# Patient Record
Sex: FEMALE | Race: WHITE | NOT HISPANIC OR LATINO
[De-identification: names, ages, dates, MRNs, and addresses within clinical notes are randomized per-mention and may not be internally consistent; named-entity substitution may affect disease eponyms.]

---

## 2017-02-10 ENCOUNTER — RX RENEWAL (OUTPATIENT)
Age: 82
End: 2017-02-10

## 2017-02-21 ENCOUNTER — NON-APPOINTMENT (OUTPATIENT)
Age: 82
End: 2017-02-21

## 2017-02-21 ENCOUNTER — APPOINTMENT (OUTPATIENT)
Dept: CARDIOLOGY | Facility: CLINIC | Age: 82
End: 2017-02-21

## 2017-02-21 VITALS
DIASTOLIC BLOOD PRESSURE: 82 MMHG | HEIGHT: 57 IN | BODY MASS INDEX: 28.26 KG/M2 | WEIGHT: 131 LBS | SYSTOLIC BLOOD PRESSURE: 152 MMHG | HEART RATE: 90 BPM

## 2017-03-02 ENCOUNTER — LABORATORY RESULT (OUTPATIENT)
Age: 82
End: 2017-03-02

## 2017-03-15 ENCOUNTER — RX RENEWAL (OUTPATIENT)
Age: 82
End: 2017-03-15

## 2017-04-18 ENCOUNTER — APPOINTMENT (OUTPATIENT)
Dept: CARDIOLOGY | Facility: CLINIC | Age: 82
End: 2017-04-18

## 2017-04-18 ENCOUNTER — NON-APPOINTMENT (OUTPATIENT)
Age: 82
End: 2017-04-18

## 2017-04-18 VITALS
OXYGEN SATURATION: 96 % | HEIGHT: 57 IN | DIASTOLIC BLOOD PRESSURE: 80 MMHG | SYSTOLIC BLOOD PRESSURE: 136 MMHG | WEIGHT: 125 LBS | HEART RATE: 86 BPM | BODY MASS INDEX: 26.97 KG/M2

## 2017-04-19 LAB
ALBUMIN SERPL ELPH-MCNC: 3.7 G/DL
ALP BLD-CCNC: 53 U/L
ALT SERPL-CCNC: 6 U/L
ANION GAP SERPL CALC-SCNC: 22 MMOL/L
AST SERPL-CCNC: 19 U/L
BILIRUB SERPL-MCNC: 0.4 MG/DL
BUN SERPL-MCNC: 30 MG/DL
CALCIUM SERPL-MCNC: 9.4 MG/DL
CHLORIDE SERPL-SCNC: 97 MMOL/L
CO2 SERPL-SCNC: 21 MMOL/L
CREAT SERPL-MCNC: 1.24 MG/DL
GLUCOSE SERPL-MCNC: 113 MG/DL
POTASSIUM SERPL-SCNC: 4 MMOL/L
PROT SERPL-MCNC: 6.2 G/DL
SODIUM SERPL-SCNC: 140 MMOL/L

## 2017-06-13 ENCOUNTER — RX RENEWAL (OUTPATIENT)
Age: 82
End: 2017-06-13

## 2017-07-18 ENCOUNTER — NON-APPOINTMENT (OUTPATIENT)
Age: 82
End: 2017-07-18

## 2017-07-18 ENCOUNTER — APPOINTMENT (OUTPATIENT)
Dept: CARDIOLOGY | Facility: CLINIC | Age: 82
End: 2017-07-18

## 2017-07-18 VITALS
BODY MASS INDEX: 26.97 KG/M2 | WEIGHT: 125 LBS | HEIGHT: 57 IN | HEART RATE: 88 BPM | DIASTOLIC BLOOD PRESSURE: 82 MMHG | SYSTOLIC BLOOD PRESSURE: 158 MMHG

## 2017-07-18 VITALS — SYSTOLIC BLOOD PRESSURE: 142 MMHG | DIASTOLIC BLOOD PRESSURE: 80 MMHG

## 2017-08-11 ENCOUNTER — RX RENEWAL (OUTPATIENT)
Age: 82
End: 2017-08-11

## 2017-09-11 ENCOUNTER — RX RENEWAL (OUTPATIENT)
Age: 82
End: 2017-09-11

## 2017-10-18 ENCOUNTER — APPOINTMENT (OUTPATIENT)
Dept: CARDIOLOGY | Facility: CLINIC | Age: 82
End: 2017-10-18
Payer: MEDICARE

## 2017-10-18 ENCOUNTER — NON-APPOINTMENT (OUTPATIENT)
Age: 82
End: 2017-10-18

## 2017-10-18 VITALS — SYSTOLIC BLOOD PRESSURE: 153 MMHG | OXYGEN SATURATION: 95 % | DIASTOLIC BLOOD PRESSURE: 89 MMHG | HEART RATE: 92 BPM

## 2017-10-18 VITALS — SYSTOLIC BLOOD PRESSURE: 127 MMHG | DIASTOLIC BLOOD PRESSURE: 78 MMHG

## 2017-10-18 PROCEDURE — 99215 OFFICE O/P EST HI 40 MIN: CPT

## 2017-10-18 PROCEDURE — 93000 ELECTROCARDIOGRAM COMPLETE: CPT

## 2017-11-09 ENCOUNTER — RX RENEWAL (OUTPATIENT)
Age: 82
End: 2017-11-09

## 2018-01-22 ENCOUNTER — NON-APPOINTMENT (OUTPATIENT)
Age: 83
End: 2018-01-22

## 2018-01-22 ENCOUNTER — APPOINTMENT (OUTPATIENT)
Dept: CARDIOLOGY | Facility: CLINIC | Age: 83
End: 2018-01-22
Payer: MEDICARE

## 2018-01-22 VITALS — SYSTOLIC BLOOD PRESSURE: 132 MMHG | DIASTOLIC BLOOD PRESSURE: 82 MMHG

## 2018-01-22 VITALS
OXYGEN SATURATION: 96 % | WEIGHT: 125 LBS | SYSTOLIC BLOOD PRESSURE: 151 MMHG | HEART RATE: 77 BPM | DIASTOLIC BLOOD PRESSURE: 92 MMHG | BODY MASS INDEX: 26.97 KG/M2 | HEIGHT: 57 IN

## 2018-01-22 PROCEDURE — 93000 ELECTROCARDIOGRAM COMPLETE: CPT

## 2018-01-22 PROCEDURE — 99214 OFFICE O/P EST MOD 30 MIN: CPT

## 2018-04-30 ENCOUNTER — NON-APPOINTMENT (OUTPATIENT)
Age: 83
End: 2018-04-30

## 2018-04-30 ENCOUNTER — APPOINTMENT (OUTPATIENT)
Dept: CARDIOLOGY | Facility: CLINIC | Age: 83
End: 2018-04-30
Payer: MEDICARE

## 2018-04-30 VITALS — SYSTOLIC BLOOD PRESSURE: 140 MMHG | DIASTOLIC BLOOD PRESSURE: 85 MMHG

## 2018-04-30 VITALS
BODY MASS INDEX: 26.97 KG/M2 | OXYGEN SATURATION: 99 % | SYSTOLIC BLOOD PRESSURE: 167 MMHG | DIASTOLIC BLOOD PRESSURE: 92 MMHG | HEIGHT: 57 IN | WEIGHT: 125 LBS

## 2018-04-30 PROCEDURE — 93000 ELECTROCARDIOGRAM COMPLETE: CPT

## 2018-04-30 PROCEDURE — 99214 OFFICE O/P EST MOD 30 MIN: CPT

## 2018-05-08 ENCOUNTER — RX RENEWAL (OUTPATIENT)
Age: 83
End: 2018-05-08

## 2018-08-29 ENCOUNTER — APPOINTMENT (OUTPATIENT)
Dept: CARDIOLOGY | Facility: CLINIC | Age: 83
End: 2018-08-29
Payer: MEDICARE

## 2018-08-29 ENCOUNTER — NON-APPOINTMENT (OUTPATIENT)
Age: 83
End: 2018-08-29

## 2018-08-29 VITALS — SYSTOLIC BLOOD PRESSURE: 130 MMHG | DIASTOLIC BLOOD PRESSURE: 80 MMHG

## 2018-08-29 VITALS — OXYGEN SATURATION: 96 % | SYSTOLIC BLOOD PRESSURE: 163 MMHG | HEART RATE: 99 BPM | DIASTOLIC BLOOD PRESSURE: 101 MMHG

## 2018-08-29 PROCEDURE — 93000 ELECTROCARDIOGRAM COMPLETE: CPT

## 2018-08-29 PROCEDURE — 99215 OFFICE O/P EST HI 40 MIN: CPT

## 2018-09-10 ENCOUNTER — RX RENEWAL (OUTPATIENT)
Age: 83
End: 2018-09-10

## 2018-11-05 ENCOUNTER — RX RENEWAL (OUTPATIENT)
Age: 83
End: 2018-11-05

## 2019-01-02 ENCOUNTER — APPOINTMENT (OUTPATIENT)
Dept: CARDIOLOGY | Facility: CLINIC | Age: 84
End: 2019-01-02
Payer: MEDICARE

## 2019-01-02 ENCOUNTER — NON-APPOINTMENT (OUTPATIENT)
Age: 84
End: 2019-01-02

## 2019-01-02 VITALS
BODY MASS INDEX: 27.18 KG/M2 | OXYGEN SATURATION: 94 % | HEART RATE: 102 BPM | DIASTOLIC BLOOD PRESSURE: 91 MMHG | SYSTOLIC BLOOD PRESSURE: 177 MMHG | HEIGHT: 57 IN | WEIGHT: 126 LBS

## 2019-01-02 VITALS — SYSTOLIC BLOOD PRESSURE: 145 MMHG | DIASTOLIC BLOOD PRESSURE: 80 MMHG

## 2019-01-02 DIAGNOSIS — E78.5 HYPERLIPIDEMIA, UNSPECIFIED: ICD-10-CM

## 2019-01-02 PROCEDURE — 93000 ELECTROCARDIOGRAM COMPLETE: CPT

## 2019-01-02 PROCEDURE — 99215 OFFICE O/P EST HI 40 MIN: CPT

## 2019-01-02 NOTE — HISTORY OF PRESENT ILLNESS
[FreeTextEntry1] : Ms. Sharma presents for follow up. Since last being seen she has well. She has CAD no chest pain. She has  AF rate controlled on A/C. No palpitations. She has HFpEF which is compensated. No shortness of breath. She only notes that once in a while that she doesn't feel herself.

## 2019-01-02 NOTE — DISCUSSION/SUMMARY
[FreeTextEntry1] : Overall doing well from a cardiac standpoint.\par Continue present medications.\par Follow up in 3 months.

## 2019-01-02 NOTE — PHYSICAL EXAM
[General Appearance - Well Developed] : well developed [Normal Appearance] : normal appearance [Well Groomed] : well groomed [General Appearance - Well Nourished] : well nourished [No Deformities] : no deformities [General Appearance - In No Acute Distress] : no acute distress [Normal Conjunctiva] : the conjunctiva exhibited no abnormalities [Eyelids - No Xanthelasma] : the eyelids demonstrated no xanthelasmas [Normal Oral Mucosa] : normal oral mucosa [No Oral Pallor] : no oral pallor [No Oral Cyanosis] : no oral cyanosis [Normal Jugular Venous A Waves Present] : normal jugular venous A waves present [Normal Jugular Venous V Waves Present] : normal jugular venous V waves present [No Jugular Venous Ratliff A Waves] : no jugular venous ratliff A waves [Respiration, Rhythm And Depth] : normal respiratory rhythm and effort [Exaggerated Use Of Accessory Muscles For Inspiration] : no accessory muscle use [Auscultation Breath Sounds / Voice Sounds] : lungs were clear to auscultation bilaterally [Heart Sounds] : normal S1 and S2 [Murmurs] : no murmurs present [Irregularly Irregular] : the rhythm was irregularly irregular [Abdomen Soft] : soft [Abdomen Tenderness] : non-tender [Abdomen Mass (___ Cm)] : no abdominal mass palpated [Abnormal Walk] : normal gait [Gait - Sufficient For Exercise Testing] : the gait was sufficient for exercise testing [Nail Clubbing] : no clubbing of the fingernails [Cyanosis, Localized] : no localized cyanosis [Petechial Hemorrhages (___cm)] : no petechial hemorrhages [Skin Color & Pigmentation] : normal skin color and pigmentation [] : no rash [No Venous Stasis] : no venous stasis [Skin Lesions] : no skin lesions [No Skin Ulcers] : no skin ulcer [No Xanthoma] : no  xanthoma was observed [Oriented To Time, Place, And Person] : oriented to person, place, and time [Affect] : the affect was normal [Mood] : the mood was normal [No Anxiety] : not feeling anxious [FreeTextEntry1] : Minimal LE edema.

## 2019-05-08 ENCOUNTER — NON-APPOINTMENT (OUTPATIENT)
Age: 84
End: 2019-05-08

## 2019-05-08 ENCOUNTER — APPOINTMENT (OUTPATIENT)
Dept: CARDIOLOGY | Facility: CLINIC | Age: 84
End: 2019-05-08
Payer: MEDICARE

## 2019-05-08 VITALS
HEART RATE: 98 BPM | DIASTOLIC BLOOD PRESSURE: 70 MMHG | BODY MASS INDEX: 29.62 KG/M2 | WEIGHT: 128 LBS | OXYGEN SATURATION: 97 % | SYSTOLIC BLOOD PRESSURE: 121 MMHG | HEIGHT: 55 IN

## 2019-05-08 PROCEDURE — 93000 ELECTROCARDIOGRAM COMPLETE: CPT

## 2019-05-08 PROCEDURE — 99215 OFFICE O/P EST HI 40 MIN: CPT

## 2019-05-08 NOTE — HISTORY OF PRESENT ILLNESS
[FreeTextEntry1] : Ms. Sharma presents for follow up. She notes she had been feeling fine until the last two days she has felt weak. She also notes feeling anxious and not sleeping well. She has CAD s/p CABG. NO chest pain. She has AF no palpitations on A/C. She has HTN on medical therapy. She has chronic LE edema on lasix. Feels swelling is better.

## 2019-05-08 NOTE — PHYSICAL EXAM
[General Appearance - Well Developed] : well developed [Normal Appearance] : normal appearance [Well Groomed] : well groomed [General Appearance - Well Nourished] : well nourished [No Deformities] : no deformities [General Appearance - In No Acute Distress] : no acute distress [Normal Conjunctiva] : the conjunctiva exhibited no abnormalities [Eyelids - No Xanthelasma] : the eyelids demonstrated no xanthelasmas [Normal Oral Mucosa] : normal oral mucosa [No Oral Pallor] : no oral pallor [No Oral Cyanosis] : no oral cyanosis [Normal Jugular Venous A Waves Present] : normal jugular venous A waves present [Normal Jugular Venous V Waves Present] : normal jugular venous V waves present [No Jugular Venous Ratliff A Waves] : no jugular venous ratliff A waves [Respiration, Rhythm And Depth] : normal respiratory rhythm and effort [Exaggerated Use Of Accessory Muscles For Inspiration] : no accessory muscle use [Auscultation Breath Sounds / Voice Sounds] : lungs were clear to auscultation bilaterally [Heart Sounds] : normal S1 and S2 [Irregularly Irregular] : the rhythm was irregularly irregular [Murmurs] : no murmurs present [Abdomen Tenderness] : non-tender [Abdomen Soft] : soft [Abnormal Walk] : normal gait [Abdomen Mass (___ Cm)] : no abdominal mass palpated [Gait - Sufficient For Exercise Testing] : the gait was sufficient for exercise testing [Nail Clubbing] : no clubbing of the fingernails [Petechial Hemorrhages (___cm)] : no petechial hemorrhages [Cyanosis, Localized] : no localized cyanosis [Skin Color & Pigmentation] : normal skin color and pigmentation [No Venous Stasis] : no venous stasis [] : no rash [Skin Lesions] : no skin lesions [No Skin Ulcers] : no skin ulcer [No Xanthoma] : no  xanthoma was observed [Oriented To Time, Place, And Person] : oriented to person, place, and time [Affect] : the affect was normal [No Anxiety] : not feeling anxious [Mood] : the mood was normal [FreeTextEntry1] : Minimal LE edema.

## 2019-05-08 NOTE — DISCUSSION/SUMMARY
[FreeTextEntry1] : Overall doing well from a cardiac standpoint.\par Continue present medications.\par Check repeat blood work. \par Follow up in 3 months.

## 2019-05-09 LAB
ALBUMIN SERPL ELPH-MCNC: 4 G/DL
ALP BLD-CCNC: 60 U/L
ALT SERPL-CCNC: 10 U/L
ANION GAP SERPL CALC-SCNC: 11 MMOL/L
AST SERPL-CCNC: 23 U/L
BASOPHILS # BLD AUTO: 0.02 K/UL
BASOPHILS NFR BLD AUTO: 0.3 %
BILIRUB SERPL-MCNC: 0.4 MG/DL
BUN SERPL-MCNC: 27 MG/DL
CALCIUM SERPL-MCNC: 9.1 MG/DL
CHLORIDE SERPL-SCNC: 104 MMOL/L
CO2 SERPL-SCNC: 25 MMOL/L
CREAT SERPL-MCNC: 0.95 MG/DL
EOSINOPHIL # BLD AUTO: 0.07 K/UL
EOSINOPHIL NFR BLD AUTO: 0.9 %
GLUCOSE SERPL-MCNC: 103 MG/DL
HCT VFR BLD CALC: 44.3 %
HGB BLD-MCNC: 14 G/DL
IMM GRANULOCYTES NFR BLD AUTO: 0.1 %
LYMPHOCYTES # BLD AUTO: 1.32 K/UL
LYMPHOCYTES NFR BLD AUTO: 17.8 %
MAN DIFF?: NORMAL
MCHC RBC-ENTMCNC: 30.4 PG
MCHC RBC-ENTMCNC: 31.6 GM/DL
MCV RBC AUTO: 96.3 FL
MONOCYTES # BLD AUTO: 0.73 K/UL
MONOCYTES NFR BLD AUTO: 9.8 %
NEUTROPHILS # BLD AUTO: 5.27 K/UL
NEUTROPHILS NFR BLD AUTO: 71.1 %
PLATELET # BLD AUTO: 192 K/UL
POTASSIUM SERPL-SCNC: 4.4 MMOL/L
PROT SERPL-MCNC: 6.3 G/DL
RBC # BLD: 4.6 M/UL
RBC # FLD: 13.9 %
SODIUM SERPL-SCNC: 140 MMOL/L
WBC # FLD AUTO: 7.42 K/UL

## 2019-05-31 ENCOUNTER — APPOINTMENT (OUTPATIENT)
Dept: CARDIOLOGY | Facility: CLINIC | Age: 84
End: 2019-05-31
Payer: MEDICARE

## 2019-05-31 ENCOUNTER — NON-APPOINTMENT (OUTPATIENT)
Age: 84
End: 2019-05-31

## 2019-05-31 VITALS
WEIGHT: 126 LBS | BODY MASS INDEX: 29.16 KG/M2 | HEART RATE: 109 BPM | OXYGEN SATURATION: 95 % | DIASTOLIC BLOOD PRESSURE: 79 MMHG | SYSTOLIC BLOOD PRESSURE: 134 MMHG | HEIGHT: 55 IN

## 2019-05-31 PROCEDURE — 93000 ELECTROCARDIOGRAM COMPLETE: CPT

## 2019-05-31 PROCEDURE — 99214 OFFICE O/P EST MOD 30 MIN: CPT

## 2019-08-21 ENCOUNTER — NON-APPOINTMENT (OUTPATIENT)
Age: 84
End: 2019-08-21

## 2019-08-21 ENCOUNTER — APPOINTMENT (OUTPATIENT)
Dept: CARDIOLOGY | Facility: CLINIC | Age: 84
End: 2019-08-21
Payer: MEDICARE

## 2019-08-21 VITALS
BODY MASS INDEX: 29.16 KG/M2 | OXYGEN SATURATION: 96 % | HEIGHT: 55 IN | DIASTOLIC BLOOD PRESSURE: 82 MMHG | HEART RATE: 97 BPM | SYSTOLIC BLOOD PRESSURE: 138 MMHG | WEIGHT: 126 LBS

## 2019-08-21 PROCEDURE — 93000 ELECTROCARDIOGRAM COMPLETE: CPT

## 2019-08-21 PROCEDURE — 99215 OFFICE O/P EST HI 40 MIN: CPT

## 2019-08-21 NOTE — DISCUSSION/SUMMARY
[FreeTextEntry1] : Overall doing well from a cardiac standpoint.\par Continue present medications.\par PAtient had gone back to Dilt 120 and rates ok. Can monitor at this dose. Continue A/C. \par Note Dilt is 240 mg and now 120 mg in the evening. \par Follow up in 3 months.

## 2019-08-21 NOTE — PHYSICAL EXAM
[General Appearance - Well Developed] : well developed [Normal Appearance] : normal appearance [General Appearance - Well Nourished] : well nourished [Well Groomed] : well groomed [No Deformities] : no deformities [General Appearance - In No Acute Distress] : no acute distress [Normal Conjunctiva] : the conjunctiva exhibited no abnormalities [Eyelids - No Xanthelasma] : the eyelids demonstrated no xanthelasmas [Normal Oral Mucosa] : normal oral mucosa [No Oral Pallor] : no oral pallor [No Oral Cyanosis] : no oral cyanosis [Normal Jugular Venous V Waves Present] : normal jugular venous V waves present [Normal Jugular Venous A Waves Present] : normal jugular venous A waves present [No Jugular Venous Ratliff A Waves] : no jugular venous ratliff A waves [Respiration, Rhythm And Depth] : normal respiratory rhythm and effort [Exaggerated Use Of Accessory Muscles For Inspiration] : no accessory muscle use [Auscultation Breath Sounds / Voice Sounds] : lungs were clear to auscultation bilaterally [Heart Sounds] : normal S1 and S2 [Murmurs] : no murmurs present [Irregularly Irregular] : the rhythm was irregularly irregular [Abdomen Soft] : soft [Abdomen Tenderness] : non-tender [Abdomen Mass (___ Cm)] : no abdominal mass palpated [Abnormal Walk] : normal gait [Gait - Sufficient For Exercise Testing] : the gait was sufficient for exercise testing [Nail Clubbing] : no clubbing of the fingernails [Cyanosis, Localized] : no localized cyanosis [Petechial Hemorrhages (___cm)] : no petechial hemorrhages [No Venous Stasis] : no venous stasis [Skin Color & Pigmentation] : normal skin color and pigmentation [] : no rash [Skin Lesions] : no skin lesions [No Skin Ulcers] : no skin ulcer [No Xanthoma] : no  xanthoma was observed [Oriented To Time, Place, And Person] : oriented to person, place, and time [Mood] : the mood was normal [Affect] : the affect was normal [No Anxiety] : not feeling anxious [FreeTextEntry1] : Minimal LE edema.

## 2019-08-21 NOTE — REASON FOR VISIT
[Atrial Fibrillation] : atrial fibrillation [Follow-Up - Clinic] : a clinic follow-up of [Coronary Artery Disease] : coronary artery disease [Heart Failure] : congestive heart failure

## 2019-08-23 NOTE — PHYSICAL EXAM
[Normal Appearance] : normal appearance [General Appearance - Well Developed] : well developed [General Appearance - Well Nourished] : well nourished [Well Groomed] : well groomed [No Deformities] : no deformities [General Appearance - In No Acute Distress] : no acute distress [Normal Conjunctiva] : the conjunctiva exhibited no abnormalities [Normal Oral Mucosa] : normal oral mucosa [Eyelids - No Xanthelasma] : the eyelids demonstrated no xanthelasmas [No Oral Pallor] : no oral pallor [No Oral Cyanosis] : no oral cyanosis [Normal Jugular Venous A Waves Present] : normal jugular venous A waves present [Normal Jugular Venous V Waves Present] : normal jugular venous V waves present [No Jugular Venous Ratliff A Waves] : no jugular venous ratliff A waves [Exaggerated Use Of Accessory Muscles For Inspiration] : no accessory muscle use [Respiration, Rhythm And Depth] : normal respiratory rhythm and effort [Auscultation Breath Sounds / Voice Sounds] : lungs were clear to auscultation bilaterally [Murmurs] : no murmurs present [Heart Sounds] : normal S1 and S2 [Abdomen Soft] : soft [FreeTextEntry1] : Minimal LE edema.  [Irregularly Irregular] : the rhythm was irregularly irregular [Abdomen Tenderness] : non-tender [Abdomen Mass (___ Cm)] : no abdominal mass palpated [Gait - Sufficient For Exercise Testing] : the gait was sufficient for exercise testing [Abnormal Walk] : normal gait [Cyanosis, Localized] : no localized cyanosis [Nail Clubbing] : no clubbing of the fingernails [Petechial Hemorrhages (___cm)] : no petechial hemorrhages [No Venous Stasis] : no venous stasis [] : no rash [Skin Color & Pigmentation] : normal skin color and pigmentation [No Skin Ulcers] : no skin ulcer [Skin Lesions] : no skin lesions [No Xanthoma] : no  xanthoma was observed [Oriented To Time, Place, And Person] : oriented to person, place, and time [Affect] : the affect was normal [Mood] : the mood was normal [No Anxiety] : not feeling anxious

## 2019-08-23 NOTE — HISTORY OF PRESENT ILLNESS
[FreeTextEntry1] : Ms. Sharma presents for follow up. She has flutter in her chest which she has had for some time. She as CAD with prior CABG nO chest pain. NO shortness of breath. She has AF on A/C. Rates are elevated and patient notes this fluttering.

## 2019-08-23 NOTE — DISCUSSION/SUMMARY
[FreeTextEntry1] : Increase dilt to 240 BID given fluttering she feels and elevated rates. \par Continue other medications. \par Lasix back to qd. Off [facility]. \par Patient to report if changes noted.

## 2019-09-09 ENCOUNTER — RX RENEWAL (OUTPATIENT)
Age: 84
End: 2019-09-09

## 2019-12-09 ENCOUNTER — APPOINTMENT (OUTPATIENT)
Dept: CARDIOLOGY | Facility: CLINIC | Age: 84
End: 2019-12-09
Payer: MEDICARE

## 2019-12-09 ENCOUNTER — NON-APPOINTMENT (OUTPATIENT)
Age: 84
End: 2019-12-09

## 2019-12-09 VITALS
SYSTOLIC BLOOD PRESSURE: 171 MMHG | BODY MASS INDEX: 29.75 KG/M2 | HEART RATE: 80 BPM | WEIGHT: 128 LBS | DIASTOLIC BLOOD PRESSURE: 77 MMHG | OXYGEN SATURATION: 98 %

## 2019-12-09 VITALS — DIASTOLIC BLOOD PRESSURE: 78 MMHG | SYSTOLIC BLOOD PRESSURE: 138 MMHG

## 2019-12-09 PROCEDURE — 93000 ELECTROCARDIOGRAM COMPLETE: CPT

## 2019-12-09 PROCEDURE — 99214 OFFICE O/P EST MOD 30 MIN: CPT

## 2019-12-09 NOTE — REASON FOR VISIT
[Atrial Fibrillation] : atrial fibrillation [Coronary Artery Disease] : coronary artery disease [Follow-Up - Clinic] : a clinic follow-up of [Hypertension] : hypertension

## 2019-12-09 NOTE — HISTORY OF PRESENT ILLNESS
[FreeTextEntry1] : Ms. Sharma presents for follow up. Since last being seen she has felt tired.  \par \par Labs from 9/19 Chol 136 LDL 54 HDL 72 TG 51

## 2019-12-09 NOTE — PHYSICAL EXAM
[General Appearance - Well Developed] : well developed [Normal Appearance] : normal appearance [Well Groomed] : well groomed [General Appearance - Well Nourished] : well nourished [General Appearance - In No Acute Distress] : no acute distress [Normal Conjunctiva] : the conjunctiva exhibited no abnormalities [No Deformities] : no deformities [Normal Oral Mucosa] : normal oral mucosa [Eyelids - No Xanthelasma] : the eyelids demonstrated no xanthelasmas [No Oral Pallor] : no oral pallor [No Oral Cyanosis] : no oral cyanosis [Normal Jugular Venous A Waves Present] : normal jugular venous A waves present [Normal Jugular Venous V Waves Present] : normal jugular venous V waves present [No Jugular Venous Ratliff A Waves] : no jugular venous ratliff A waves [Auscultation Breath Sounds / Voice Sounds] : lungs were clear to auscultation bilaterally [Respiration, Rhythm And Depth] : normal respiratory rhythm and effort [Exaggerated Use Of Accessory Muscles For Inspiration] : no accessory muscle use [Irregularly Irregular] : the rhythm was irregularly irregular [Heart Sounds] : normal S1 and S2 [Murmurs] : no murmurs present [Abdomen Soft] : soft [Abdomen Tenderness] : non-tender [Abnormal Walk] : normal gait [Abdomen Mass (___ Cm)] : no abdominal mass palpated [Cyanosis, Localized] : no localized cyanosis [Gait - Sufficient For Exercise Testing] : the gait was sufficient for exercise testing [Nail Clubbing] : no clubbing of the fingernails [Skin Color & Pigmentation] : normal skin color and pigmentation [Petechial Hemorrhages (___cm)] : no petechial hemorrhages [No Venous Stasis] : no venous stasis [Skin Lesions] : no skin lesions [] : no rash [No Skin Ulcers] : no skin ulcer [No Xanthoma] : no  xanthoma was observed [Oriented To Time, Place, And Person] : oriented to person, place, and time [Affect] : the affect was normal [No Anxiety] : not feeling anxious [Mood] : the mood was normal [FreeTextEntry1] : Minimal LE edema.

## 2020-05-26 ENCOUNTER — APPOINTMENT (OUTPATIENT)
Dept: ELECTROPHYSIOLOGY | Facility: CLINIC | Age: 85
End: 2020-05-26

## 2020-06-01 ENCOUNTER — APPOINTMENT (OUTPATIENT)
Dept: CARDIOLOGY | Facility: CLINIC | Age: 85
End: 2020-06-01
Payer: MEDICARE

## 2020-06-01 ENCOUNTER — NON-APPOINTMENT (OUTPATIENT)
Age: 85
End: 2020-06-01

## 2020-06-01 VITALS — SYSTOLIC BLOOD PRESSURE: 160 MMHG | DIASTOLIC BLOOD PRESSURE: 80 MMHG

## 2020-06-01 VITALS
HEART RATE: 111 BPM | DIASTOLIC BLOOD PRESSURE: 81 MMHG | BODY MASS INDEX: 29.16 KG/M2 | OXYGEN SATURATION: 95 % | WEIGHT: 126 LBS | HEIGHT: 55 IN | SYSTOLIC BLOOD PRESSURE: 184 MMHG

## 2020-06-01 PROCEDURE — 93000 ELECTROCARDIOGRAM COMPLETE: CPT

## 2020-06-01 PROCEDURE — 99215 OFFICE O/P EST HI 40 MIN: CPT

## 2020-06-01 NOTE — HISTORY OF PRESENT ILLNESS
[FreeTextEntry1] : Ms. Sharma presents for follow up. She has CAD. No chest pain. She has AF rate controlled. She is on A/C. She has HTN. BP elevated in the office but she suffers from significant anxiety.

## 2020-06-01 NOTE — DISCUSSION/SUMMARY
[FreeTextEntry1] : HR is acceptable. BP up but patient is extremely anxious. Repeat BP is improving. Will check blood work. She needs to address her anxiety which we have discussed in the past.

## 2020-06-01 NOTE — PHYSICAL EXAM
[General Appearance - Well Developed] : well developed [Normal Appearance] : normal appearance [Well Groomed] : well groomed [General Appearance - Well Nourished] : well nourished [No Deformities] : no deformities [General Appearance - In No Acute Distress] : no acute distress [Normal Conjunctiva] : the conjunctiva exhibited no abnormalities [Eyelids - No Xanthelasma] : the eyelids demonstrated no xanthelasmas [Normal Oral Mucosa] : normal oral mucosa [No Oral Pallor] : no oral pallor [No Oral Cyanosis] : no oral cyanosis [Normal Jugular Venous A Waves Present] : normal jugular venous A waves present [Normal Jugular Venous V Waves Present] : normal jugular venous V waves present [No Jugular Venous Ratliff A Waves] : no jugular venous ratliff A waves [Respiration, Rhythm And Depth] : normal respiratory rhythm and effort [Exaggerated Use Of Accessory Muscles For Inspiration] : no accessory muscle use [Auscultation Breath Sounds / Voice Sounds] : lungs were clear to auscultation bilaterally [Heart Sounds] : normal S1 and S2 [Murmurs] : no murmurs present [Irregularly Irregular] : the rhythm was irregularly irregular [Abdomen Soft] : soft [Abdomen Tenderness] : non-tender [Abdomen Mass (___ Cm)] : no abdominal mass palpated [Abnormal Walk] : normal gait [Nail Clubbing] : no clubbing of the fingernails [Gait - Sufficient For Exercise Testing] : the gait was sufficient for exercise testing [Cyanosis, Localized] : no localized cyanosis [Petechial Hemorrhages (___cm)] : no petechial hemorrhages [Skin Color & Pigmentation] : normal skin color and pigmentation [] : no rash [No Venous Stasis] : no venous stasis [No Xanthoma] : no  xanthoma was observed [No Skin Ulcers] : no skin ulcer [Skin Lesions] : no skin lesions [Oriented To Time, Place, And Person] : oriented to person, place, and time [No Anxiety] : not feeling anxious [Mood] : the mood was normal [Affect] : the affect was normal [FreeTextEntry1] : Minimal LE edema.

## 2020-06-02 LAB
ALBUMIN SERPL ELPH-MCNC: 4.2 G/DL
ALP BLD-CCNC: 55 U/L
ALT SERPL-CCNC: 8 U/L
ANION GAP SERPL CALC-SCNC: 14 MMOL/L
AST SERPL-CCNC: 20 U/L
BASOPHILS # BLD AUTO: 0.03 K/UL
BASOPHILS NFR BLD AUTO: 0.4 %
BILIRUB SERPL-MCNC: 0.3 MG/DL
BUN SERPL-MCNC: 26 MG/DL
CALCIUM SERPL-MCNC: 9.8 MG/DL
CHLORIDE SERPL-SCNC: 102 MMOL/L
CHOLEST SERPL-MCNC: 137 MG/DL
CHOLEST/HDLC SERPL: 1.9 RATIO
CO2 SERPL-SCNC: 27 MMOL/L
CREAT SERPL-MCNC: 1.02 MG/DL
EOSINOPHIL # BLD AUTO: 0.11 K/UL
EOSINOPHIL NFR BLD AUTO: 1.6 %
ESTIMATED AVERAGE GLUCOSE: 114 MG/DL
GLUCOSE SERPL-MCNC: 110 MG/DL
HBA1C MFR BLD HPLC: 5.6 %
HCT VFR BLD CALC: 43.3 %
HDLC SERPL-MCNC: 71 MG/DL
HGB BLD-MCNC: 13.7 G/DL
IMM GRANULOCYTES NFR BLD AUTO: 0.1 %
LDLC SERPL CALC-MCNC: 56 MG/DL
LYMPHOCYTES # BLD AUTO: 1.39 K/UL
LYMPHOCYTES NFR BLD AUTO: 20.6 %
MAN DIFF?: NORMAL
MCHC RBC-ENTMCNC: 30.5 PG
MCHC RBC-ENTMCNC: 31.6 GM/DL
MCV RBC AUTO: 96.4 FL
MONOCYTES # BLD AUTO: 0.67 K/UL
MONOCYTES NFR BLD AUTO: 9.9 %
NEUTROPHILS # BLD AUTO: 4.54 K/UL
NEUTROPHILS NFR BLD AUTO: 67.4 %
PLATELET # BLD AUTO: 197 K/UL
POTASSIUM SERPL-SCNC: 4.4 MMOL/L
PROT SERPL-MCNC: 6.1 G/DL
RBC # BLD: 4.49 M/UL
RBC # FLD: 13.7 %
SODIUM SERPL-SCNC: 143 MMOL/L
TRIGL SERPL-MCNC: 52 MG/DL
TSH SERPL-ACNC: 2.06 UIU/ML
WBC # FLD AUTO: 6.75 K/UL

## 2020-08-24 ENCOUNTER — APPOINTMENT (OUTPATIENT)
Dept: CARDIOLOGY | Facility: CLINIC | Age: 85
End: 2020-08-24
Payer: MEDICARE

## 2020-08-24 ENCOUNTER — NON-APPOINTMENT (OUTPATIENT)
Age: 85
End: 2020-08-24

## 2020-08-24 VITALS
OXYGEN SATURATION: 95 % | SYSTOLIC BLOOD PRESSURE: 170 MMHG | HEART RATE: 95 BPM | HEIGHT: 55 IN | BODY MASS INDEX: 28.46 KG/M2 | DIASTOLIC BLOOD PRESSURE: 94 MMHG | WEIGHT: 123 LBS

## 2020-08-24 VITALS — SYSTOLIC BLOOD PRESSURE: 150 MMHG | DIASTOLIC BLOOD PRESSURE: 80 MMHG

## 2020-08-24 PROCEDURE — 93000 ELECTROCARDIOGRAM COMPLETE: CPT

## 2020-08-24 PROCEDURE — 99214 OFFICE O/P EST MOD 30 MIN: CPT

## 2020-08-24 NOTE — HISTORY OF PRESENT ILLNESS
[FreeTextEntry1] : Ms. Sharma presents for follow up. Since last being seen she notes over the last few days she has been weak. She also notes her appetite is not what it should be. She has AF which is rate controlled on A/C. She has CAD without chest pain.

## 2020-08-24 NOTE — PHYSICAL EXAM
[General Appearance - Well Developed] : well developed [Well Groomed] : well groomed [Normal Appearance] : normal appearance [General Appearance - In No Acute Distress] : no acute distress [No Deformities] : no deformities [General Appearance - Well Nourished] : well nourished [Normal Oral Mucosa] : normal oral mucosa [Normal Conjunctiva] : the conjunctiva exhibited no abnormalities [Eyelids - No Xanthelasma] : the eyelids demonstrated no xanthelasmas [No Oral Cyanosis] : no oral cyanosis [Normal Jugular Venous A Waves Present] : normal jugular venous A waves present [No Oral Pallor] : no oral pallor [Normal Jugular Venous V Waves Present] : normal jugular venous V waves present [No Jugular Venous Ratliff A Waves] : no jugular venous ratliff A waves [Auscultation Breath Sounds / Voice Sounds] : lungs were clear to auscultation bilaterally [Exaggerated Use Of Accessory Muscles For Inspiration] : no accessory muscle use [Respiration, Rhythm And Depth] : normal respiratory rhythm and effort [Heart Sounds] : normal S1 and S2 [Irregularly Irregular] : the rhythm was irregularly irregular [Murmurs] : no murmurs present [Abdomen Soft] : soft [Abdomen Tenderness] : non-tender [Abdomen Mass (___ Cm)] : no abdominal mass palpated [Abnormal Walk] : normal gait [Cyanosis, Localized] : no localized cyanosis [Gait - Sufficient For Exercise Testing] : the gait was sufficient for exercise testing [Nail Clubbing] : no clubbing of the fingernails [Skin Color & Pigmentation] : normal skin color and pigmentation [] : no ischemic changes [Petechial Hemorrhages (___cm)] : no petechial hemorrhages [Skin Lesions] : no skin lesions [No Skin Ulcers] : no skin ulcer [No Venous Stasis] : no venous stasis [Affect] : the affect was normal [Mood] : the mood was normal [Oriented To Time, Place, And Person] : oriented to person, place, and time [No Xanthoma] : no  xanthoma was observed [No Anxiety] : not feeling anxious [FreeTextEntry1] : Minimal LE edema.

## 2020-08-24 NOTE — DISCUSSION/SUMMARY
[FreeTextEntry1] : Overall doing well from a cardiac standpoint.\par Continue present medications.\par BP is elevated in the office but she is extremely anxious in the doctors office. \par Follow up in 3 months.

## 2020-09-03 ENCOUNTER — RX RENEWAL (OUTPATIENT)
Age: 85
End: 2020-09-03

## 2020-09-08 ENCOUNTER — RX RENEWAL (OUTPATIENT)
Age: 85
End: 2020-09-08

## 2020-12-16 ENCOUNTER — NON-APPOINTMENT (OUTPATIENT)
Age: 85
End: 2020-12-16

## 2020-12-16 ENCOUNTER — APPOINTMENT (OUTPATIENT)
Dept: CARDIOLOGY | Facility: CLINIC | Age: 85
End: 2020-12-16
Payer: MEDICARE

## 2020-12-16 VITALS
HEART RATE: 89 BPM | DIASTOLIC BLOOD PRESSURE: 65 MMHG | WEIGHT: 125 LBS | HEIGHT: 55 IN | SYSTOLIC BLOOD PRESSURE: 182 MMHG | BODY MASS INDEX: 28.93 KG/M2 | OXYGEN SATURATION: 98 %

## 2020-12-16 VITALS — SYSTOLIC BLOOD PRESSURE: 142 MMHG | DIASTOLIC BLOOD PRESSURE: 82 MMHG

## 2020-12-16 PROCEDURE — 99214 OFFICE O/P EST MOD 30 MIN: CPT

## 2020-12-16 PROCEDURE — 93000 ELECTROCARDIOGRAM COMPLETE: CPT

## 2020-12-16 PROCEDURE — 99072 ADDL SUPL MATRL&STAF TM PHE: CPT

## 2020-12-16 NOTE — PHYSICAL EXAM
[General Appearance - Well Developed] : well developed [Normal Appearance] : normal appearance [Well Groomed] : well groomed [General Appearance - Well Nourished] : well nourished [No Deformities] : no deformities [General Appearance - In No Acute Distress] : no acute distress [Normal Conjunctiva] : the conjunctiva exhibited no abnormalities [Eyelids - No Xanthelasma] : the eyelids demonstrated no xanthelasmas [Normal Oral Mucosa] : normal oral mucosa [No Oral Pallor] : no oral pallor [No Oral Cyanosis] : no oral cyanosis [Normal Jugular Venous A Waves Present] : normal jugular venous A waves present [Normal Jugular Venous V Waves Present] : normal jugular venous V waves present [No Jugular Venous Ratliff A Waves] : no jugular venous ratliff A waves [Respiration, Rhythm And Depth] : normal respiratory rhythm and effort [Exaggerated Use Of Accessory Muscles For Inspiration] : no accessory muscle use [Auscultation Breath Sounds / Voice Sounds] : lungs were clear to auscultation bilaterally [Heart Sounds] : normal S1 and S2 [Murmurs] : no murmurs present [Irregularly Irregular] : the rhythm was irregularly irregular [FreeTextEntry1] : Minimal LE edema.  [Abdomen Soft] : soft [Abdomen Tenderness] : non-tender [Abdomen Mass (___ Cm)] : no abdominal mass palpated [Abnormal Walk] : normal gait [Gait - Sufficient For Exercise Testing] : the gait was sufficient for exercise testing [Nail Clubbing] : no clubbing of the fingernails [Cyanosis, Localized] : no localized cyanosis [Petechial Hemorrhages (___cm)] : no petechial hemorrhages [Skin Color & Pigmentation] : normal skin color and pigmentation [] : no rash [No Venous Stasis] : no venous stasis [Skin Lesions] : no skin lesions [No Skin Ulcers] : no skin ulcer [No Xanthoma] : no  xanthoma was observed [Oriented To Time, Place, And Person] : oriented to person, place, and time [Affect] : the affect was normal [Mood] : the mood was normal [No Anxiety] : not feeling anxious

## 2020-12-16 NOTE — HISTORY OF PRESENT ILLNESS
[FreeTextEntry1] : Ms. Sharma presents for follow up. She has CAD. She denies chest pain. She has AF on AC. Rates controlled. No palpitations. She has HTN and BP is significantly elevated in the office but she has white coat hypertension.

## 2020-12-16 NOTE — DISCUSSION/SUMMARY
[FreeTextEntry1] : Overall doing well from a cardiac standpoint.\par Continue present medications. BP elevated but improving on recheck. She will check at home. \par Check labs given diuretics and A/C. \par Follow up in 3 months.

## 2020-12-17 LAB
ALBUMIN SERPL ELPH-MCNC: 4.2 G/DL
ALP BLD-CCNC: 65 U/L
ALT SERPL-CCNC: 8 U/L
ANION GAP SERPL CALC-SCNC: 9 MMOL/L
AST SERPL-CCNC: 23 U/L
BASOPHILS # BLD AUTO: 0.02 K/UL
BASOPHILS NFR BLD AUTO: 0.3 %
BILIRUB SERPL-MCNC: 0.3 MG/DL
BUN SERPL-MCNC: 25 MG/DL
CALCIUM SERPL-MCNC: 9.5 MG/DL
CHLORIDE SERPL-SCNC: 103 MMOL/L
CO2 SERPL-SCNC: 29 MMOL/L
CREAT SERPL-MCNC: 1.06 MG/DL
EOSINOPHIL # BLD AUTO: 0.14 K/UL
EOSINOPHIL NFR BLD AUTO: 2 %
ESTIMATED AVERAGE GLUCOSE: 111 MG/DL
GLUCOSE SERPL-MCNC: 109 MG/DL
HBA1C MFR BLD HPLC: 5.5 %
HCT VFR BLD CALC: 44.1 %
HGB BLD-MCNC: 14 G/DL
IMM GRANULOCYTES NFR BLD AUTO: 0.1 %
LYMPHOCYTES # BLD AUTO: 1.5 K/UL
LYMPHOCYTES NFR BLD AUTO: 21.2 %
MAN DIFF?: NORMAL
MCHC RBC-ENTMCNC: 30.3 PG
MCHC RBC-ENTMCNC: 31.7 GM/DL
MCV RBC AUTO: 95.5 FL
MONOCYTES # BLD AUTO: 0.73 K/UL
MONOCYTES NFR BLD AUTO: 10.3 %
NEUTROPHILS # BLD AUTO: 4.69 K/UL
NEUTROPHILS NFR BLD AUTO: 66.1 %
PLATELET # BLD AUTO: 215 K/UL
POTASSIUM SERPL-SCNC: 4.6 MMOL/L
PROT SERPL-MCNC: 6.4 G/DL
RBC # BLD: 4.62 M/UL
RBC # FLD: 14.1 %
SODIUM SERPL-SCNC: 142 MMOL/L
WBC # FLD AUTO: 7.09 K/UL

## 2021-04-05 ENCOUNTER — NON-APPOINTMENT (OUTPATIENT)
Age: 86
End: 2021-04-05

## 2021-04-05 ENCOUNTER — APPOINTMENT (OUTPATIENT)
Dept: CARDIOLOGY | Facility: CLINIC | Age: 86
End: 2021-04-05
Payer: MEDICARE

## 2021-04-05 VITALS — SYSTOLIC BLOOD PRESSURE: 150 MMHG | DIASTOLIC BLOOD PRESSURE: 88 MMHG

## 2021-04-05 VITALS — HEIGHT: 55 IN | WEIGHT: 127 LBS | OXYGEN SATURATION: 98 % | BODY MASS INDEX: 29.39 KG/M2 | HEART RATE: 83 BPM

## 2021-04-05 PROCEDURE — 99072 ADDL SUPL MATRL&STAF TM PHE: CPT

## 2021-04-05 PROCEDURE — 99214 OFFICE O/P EST MOD 30 MIN: CPT

## 2021-04-05 PROCEDURE — 93000 ELECTROCARDIOGRAM COMPLETE: CPT

## 2021-04-05 NOTE — DISCUSSION/SUMMARY
[FreeTextEntry1] : OVerall doing well from a cardiac standpoint. \par She does have slight increased in LE edema. Can take extra 20 mg qd of lasix. \par Check Comp and CBC\par

## 2021-04-05 NOTE — HISTORY OF PRESENT ILLNESS
[FreeTextEntry1] : Ms. Sharma presents for follow up. Since last being seen she got Pfizer vaccine. \par She denies obvious cardiac symptoms.

## 2021-04-06 LAB
ALBUMIN SERPL ELPH-MCNC: 4.1 G/DL
ALP BLD-CCNC: 65 U/L
ALT SERPL-CCNC: 9 U/L
ANION GAP SERPL CALC-SCNC: 11 MMOL/L
AST SERPL-CCNC: 18 U/L
BASOPHILS # BLD AUTO: 0.02 K/UL
BASOPHILS NFR BLD AUTO: 0.3 %
BILIRUB SERPL-MCNC: 0.3 MG/DL
BUN SERPL-MCNC: 30 MG/DL
CALCIUM SERPL-MCNC: 9.7 MG/DL
CHLORIDE SERPL-SCNC: 105 MMOL/L
CO2 SERPL-SCNC: 26 MMOL/L
CREAT SERPL-MCNC: 1.04 MG/DL
EOSINOPHIL # BLD AUTO: 0.26 K/UL
EOSINOPHIL NFR BLD AUTO: 3.4 %
GLUCOSE SERPL-MCNC: 108 MG/DL
HCT VFR BLD CALC: 43.4 %
HGB BLD-MCNC: 13.7 G/DL
IMM GRANULOCYTES NFR BLD AUTO: 0.4 %
LYMPHOCYTES # BLD AUTO: 1.63 K/UL
LYMPHOCYTES NFR BLD AUTO: 21.5 %
MAN DIFF?: NORMAL
MCHC RBC-ENTMCNC: 30.4 PG
MCHC RBC-ENTMCNC: 31.6 GM/DL
MCV RBC AUTO: 96.4 FL
MONOCYTES # BLD AUTO: 0.77 K/UL
MONOCYTES NFR BLD AUTO: 10.2 %
NEUTROPHILS # BLD AUTO: 4.86 K/UL
NEUTROPHILS NFR BLD AUTO: 64.2 %
PLATELET # BLD AUTO: 243 K/UL
POTASSIUM SERPL-SCNC: 5.3 MMOL/L
PROT SERPL-MCNC: 6.5 G/DL
RBC # BLD: 4.5 M/UL
RBC # FLD: 14.2 %
SODIUM SERPL-SCNC: 143 MMOL/L
WBC # FLD AUTO: 7.57 K/UL

## 2021-07-12 NOTE — END OF VISIT
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  Hemolytic anemia treated with high dose steroids in 2016 and again in 2/2019 for treatment of hemolytic anemia.  However, when steroids were tapered to 5mg, she experienced a flare and received rituximab (?Nov/Dec 2019).  She was tapered off ofprednisone since April 2020 on plaquenil only.\par \par 4/26/2021  PtGA 0.5\par Household with COVID, she was asymptomatic but tested positive (4/9).  She was advised to receive the vaccine and she did receive her first dose on 4/21 which was associated with almost 2 days of fever and joint pains affecting her wrists, ankles and knees.  The pain is worse in the am, no swelling and is decreasing with time.  Otherwise, she is feeling well without fever, rash, anorexia, weight loss, alopecia, lymphadenopathy, chills, oral ulcers, fatigue, dry eyes, chest pain, shortness of breath, cough, anosmia, ageusia, nausea, vomiting, diarrhea, abdominal pain.\par \par 7/12/2021  PtGA 0.3\par f/u SLE, feeling extremely well except stating that she is losing hair when she hough it--however no hair on pillow (i.e. not considered "alopecia" per se).  No fever, rash, anorexia, weight loss--in fact she has gained weight, alopecia, lymphadenopathy, chills, joint pain/swelling/stiffness, oral ulcers, fatigue, dry eyes, chest pain, shortness of breath, cough, anosmia, ageusia, nausea, vomiting, diarrhea, abdominal pain. She received her first covid vaccination (Pfizer) on 4/21 and her second on 5/12/21.\par \par Meds: plaquenil 400mg (not sure when her last opthalmology appointment occurred)\par NKA\par \par PE:  /71, 154 LBS\par No rash, alopecia, nasal/oral ulcers, lymphadenopathy, synovitis, joint tenderness, nodules, livedo, parotid or lacrimal swelling.  Lungs: Clear without wheeze, rales, rhonch; Cor: S1S2 without murmur, gallop or rub; Abd: soft, NT, BS+;, Back: no spinal or paraspinal tenderness; Ext: no CCE; Neuro: non-focal\par \par Imp:\par SLE with h/o hemolytic anemia on plaquenil alone at this point.  Doing well, recently received her COVID vaccine.    Will check labs and will see in ~3 months (unless change in her clinical status).  \par \par Of note, she has had 3 episodes of hemolytic anemia, the most recent with treatment with B cell depletion.  Will follow closely--her hg has decreased slowly prior to her previous episodes requiring high dose steroids suggesting an indolent process.  Her hg has remained above 12.0 since March 2020. \par \par  \par \par \par \par \par 
[>50% of Time Spent on Counseling and Coordination of Care for  ___] : Greater than 50% of the encounter time was spent on counseling and coordination of care for [unfilled]

## 2021-07-28 ENCOUNTER — APPOINTMENT (OUTPATIENT)
Dept: CARDIOLOGY | Facility: CLINIC | Age: 86
End: 2021-07-28
Payer: MEDICARE

## 2021-07-28 ENCOUNTER — NON-APPOINTMENT (OUTPATIENT)
Age: 86
End: 2021-07-28

## 2021-07-28 VITALS — SYSTOLIC BLOOD PRESSURE: 140 MMHG | DIASTOLIC BLOOD PRESSURE: 80 MMHG

## 2021-07-28 VITALS
SYSTOLIC BLOOD PRESSURE: 153 MMHG | OXYGEN SATURATION: 95 % | BODY MASS INDEX: 28.93 KG/M2 | HEART RATE: 82 BPM | HEIGHT: 55 IN | WEIGHT: 125 LBS | DIASTOLIC BLOOD PRESSURE: 73 MMHG

## 2021-07-28 PROCEDURE — 99214 OFFICE O/P EST MOD 30 MIN: CPT

## 2021-07-28 PROCEDURE — 93000 ELECTROCARDIOGRAM COMPLETE: CPT

## 2021-07-29 LAB
ALBUMIN SERPL ELPH-MCNC: 4.1 G/DL
ALP BLD-CCNC: 62 U/L
ALT SERPL-CCNC: 8 U/L
ANION GAP SERPL CALC-SCNC: 13 MMOL/L
AST SERPL-CCNC: 19 U/L
BASOPHILS # BLD AUTO: 0.04 K/UL
BASOPHILS NFR BLD AUTO: 0.6 %
BILIRUB SERPL-MCNC: 0.3 MG/DL
BUN SERPL-MCNC: 35 MG/DL
CALCIUM SERPL-MCNC: 9.5 MG/DL
CHLORIDE SERPL-SCNC: 102 MMOL/L
CHOLEST SERPL-MCNC: 140 MG/DL
CO2 SERPL-SCNC: 26 MMOL/L
CREAT SERPL-MCNC: 1.03 MG/DL
EOSINOPHIL # BLD AUTO: 0.18 K/UL
EOSINOPHIL NFR BLD AUTO: 2.6 %
ESTIMATED AVERAGE GLUCOSE: 114 MG/DL
GLUCOSE SERPL-MCNC: 109 MG/DL
HBA1C MFR BLD HPLC: 5.6 %
HCT VFR BLD CALC: 41.9 %
HDLC SERPL-MCNC: 70 MG/DL
HGB BLD-MCNC: 13.4 G/DL
IMM GRANULOCYTES NFR BLD AUTO: 0.7 %
LDLC SERPL CALC-MCNC: 60 MG/DL
LDLC SERPL DIRECT ASSAY-MCNC: 62 MG/DL
LYMPHOCYTES # BLD AUTO: 1.43 K/UL
LYMPHOCYTES NFR BLD AUTO: 20.7 %
MAN DIFF?: NORMAL
MCHC RBC-ENTMCNC: 30.1 PG
MCHC RBC-ENTMCNC: 32 GM/DL
MCV RBC AUTO: 94.2 FL
MONOCYTES # BLD AUTO: 0.71 K/UL
MONOCYTES NFR BLD AUTO: 10.3 %
NEUTROPHILS # BLD AUTO: 4.49 K/UL
NEUTROPHILS NFR BLD AUTO: 65.1 %
NONHDLC SERPL-MCNC: 70 MG/DL
PLATELET # BLD AUTO: 225 K/UL
POTASSIUM SERPL-SCNC: 4.5 MMOL/L
PROT SERPL-MCNC: 6.4 G/DL
RBC # BLD: 4.45 M/UL
RBC # FLD: 14.1 %
SODIUM SERPL-SCNC: 141 MMOL/L
TRIGL SERPL-MCNC: 51 MG/DL
TSH SERPL-ACNC: 2.69 UIU/ML
WBC # FLD AUTO: 6.9 K/UL

## 2021-08-11 NOTE — HISTORY OF PRESENT ILLNESS
[FreeTextEntry1] : Ms. Sharma presents for follow up. Since last being seen she thinks she is doing well. She notes she sighs a lot.\par \par No chest pain. No SOB. No palpitations. \par \par LE edema is unchanged.

## 2021-08-11 NOTE — DISCUSSION/SUMMARY
[FreeTextEntry1] : Overall doing well from a cardiac standpoint.\par Continue present medications.\par Follow up in 3 months. \par Repeat BP improving to 140/80. She has white coat HTN.

## 2021-08-11 NOTE — PHYSICAL EXAM

## 2021-08-30 ENCOUNTER — RX RENEWAL (OUTPATIENT)
Age: 86
End: 2021-08-30

## 2021-10-22 ENCOUNTER — NON-APPOINTMENT (OUTPATIENT)
Age: 86
End: 2021-10-22

## 2021-11-03 ENCOUNTER — APPOINTMENT (OUTPATIENT)
Dept: CARDIOLOGY | Facility: CLINIC | Age: 86
End: 2021-11-03
Payer: MEDICARE

## 2021-11-03 ENCOUNTER — RX RENEWAL (OUTPATIENT)
Age: 86
End: 2021-11-03

## 2021-11-03 ENCOUNTER — NON-APPOINTMENT (OUTPATIENT)
Age: 86
End: 2021-11-03

## 2021-11-03 VITALS
BODY MASS INDEX: 28.93 KG/M2 | HEIGHT: 55 IN | DIASTOLIC BLOOD PRESSURE: 101 MMHG | SYSTOLIC BLOOD PRESSURE: 188 MMHG | OXYGEN SATURATION: 96 % | HEART RATE: 87 BPM | WEIGHT: 125 LBS

## 2021-11-03 VITALS — DIASTOLIC BLOOD PRESSURE: 82 MMHG | SYSTOLIC BLOOD PRESSURE: 142 MMHG

## 2021-11-03 PROCEDURE — 93000 ELECTROCARDIOGRAM COMPLETE: CPT

## 2021-11-03 PROCEDURE — 99214 OFFICE O/P EST MOD 30 MIN: CPT

## 2021-11-04 LAB
ALBUMIN SERPL ELPH-MCNC: 4.1 G/DL
ALP BLD-CCNC: 62 U/L
ALT SERPL-CCNC: 8 U/L
ANION GAP SERPL CALC-SCNC: 13 MMOL/L
AST SERPL-CCNC: 20 U/L
BASOPHILS # BLD AUTO: 0.02 K/UL
BASOPHILS NFR BLD AUTO: 0.3 %
BILIRUB SERPL-MCNC: 0.2 MG/DL
BUN SERPL-MCNC: 31 MG/DL
CALCIUM SERPL-MCNC: 9.3 MG/DL
CHLORIDE SERPL-SCNC: 102 MMOL/L
CHOLEST SERPL-MCNC: 141 MG/DL
CO2 SERPL-SCNC: 24 MMOL/L
CREAT SERPL-MCNC: 1.01 MG/DL
EOSINOPHIL # BLD AUTO: 0.1 K/UL
EOSINOPHIL NFR BLD AUTO: 1.6 %
GLUCOSE SERPL-MCNC: 104 MG/DL
HCT VFR BLD CALC: 43.4 %
HDLC SERPL-MCNC: 68 MG/DL
HGB BLD-MCNC: 13.6 G/DL
IMM GRANULOCYTES NFR BLD AUTO: 0.3 %
LDLC SERPL CALC-MCNC: 59 MG/DL
LYMPHOCYTES # BLD AUTO: 1.18 K/UL
LYMPHOCYTES NFR BLD AUTO: 18.4 %
MAN DIFF?: NORMAL
MCHC RBC-ENTMCNC: 30.2 PG
MCHC RBC-ENTMCNC: 31.3 GM/DL
MCV RBC AUTO: 96.4 FL
MONOCYTES # BLD AUTO: 0.58 K/UL
MONOCYTES NFR BLD AUTO: 9.1 %
NEUTROPHILS # BLD AUTO: 4.5 K/UL
NEUTROPHILS NFR BLD AUTO: 70.3 %
NONHDLC SERPL-MCNC: 72 MG/DL
PLATELET # BLD AUTO: 211 K/UL
POTASSIUM SERPL-SCNC: 4.6 MMOL/L
PROT SERPL-MCNC: 6.5 G/DL
RBC # BLD: 4.5 M/UL
RBC # FLD: 14.1 %
SODIUM SERPL-SCNC: 139 MMOL/L
TRIGL SERPL-MCNC: 69 MG/DL
WBC # FLD AUTO: 6.4 K/UL

## 2021-11-22 NOTE — PHYSICAL EXAM

## 2021-11-22 NOTE — HISTORY OF PRESENT ILLNESS
[FreeTextEntry1] : Good days and bad. On bad days mood feels down but not having any specific symptoms. \par Still with mostly right lower extremity edema. \par No other cardiac complaints besides the edema.

## 2022-02-22 RX ORDER — DABIGATRAN ETEXILATE MESYLATE 75 MG/1
75 CAPSULE ORAL
Qty: 180 | Refills: 1 | Status: ACTIVE | COMMUNITY
Start: 1900-01-01 | End: 1900-01-01

## 2022-03-29 ENCOUNTER — RESULT CHARGE (OUTPATIENT)
Age: 87
End: 2022-03-29

## 2022-03-30 ENCOUNTER — NON-APPOINTMENT (OUTPATIENT)
Age: 87
End: 2022-03-30

## 2022-03-30 ENCOUNTER — APPOINTMENT (OUTPATIENT)
Dept: CARDIOLOGY | Facility: CLINIC | Age: 87
End: 2022-03-30
Payer: MEDICARE

## 2022-03-30 VITALS
OXYGEN SATURATION: 93 % | BODY MASS INDEX: 31.24 KG/M2 | HEART RATE: 78 BPM | DIASTOLIC BLOOD PRESSURE: 86 MMHG | HEIGHT: 55 IN | WEIGHT: 135 LBS | SYSTOLIC BLOOD PRESSURE: 177 MMHG

## 2022-03-30 VITALS — DIASTOLIC BLOOD PRESSURE: 77 MMHG | SYSTOLIC BLOOD PRESSURE: 144 MMHG

## 2022-03-30 PROCEDURE — 99214 OFFICE O/P EST MOD 30 MIN: CPT

## 2022-03-30 PROCEDURE — 93000 ELECTROCARDIOGRAM COMPLETE: CPT

## 2022-03-31 LAB
24R-OH-CALCIDIOL SERPL-MCNC: 52.5 PG/ML
ALBUMIN SERPL ELPH-MCNC: 4.1 G/DL
ALP BLD-CCNC: 65 U/L
ALT SERPL-CCNC: 9 U/L
ANION GAP SERPL CALC-SCNC: 12 MMOL/L
AST SERPL-CCNC: 19 U/L
BASOPHILS # BLD AUTO: 0.02 K/UL
BASOPHILS NFR BLD AUTO: 0.3 %
BILIRUB SERPL-MCNC: 0.3 MG/DL
BUN SERPL-MCNC: 38 MG/DL
CALCIUM SERPL-MCNC: 9.7 MG/DL
CHLORIDE SERPL-SCNC: 103 MMOL/L
CHOLEST SERPL-MCNC: 168 MG/DL
CO2 SERPL-SCNC: 27 MMOL/L
CREAT SERPL-MCNC: 1.05 MG/DL
EGFR: 49 ML/MIN/1.73M2
EOSINOPHIL # BLD AUTO: 0.19 K/UL
EOSINOPHIL NFR BLD AUTO: 2.8 %
ESTIMATED AVERAGE GLUCOSE: 126 MG/DL
GLUCOSE SERPL-MCNC: 109 MG/DL
HBA1C MFR BLD HPLC: 6 %
HCT VFR BLD CALC: 43.5 %
HDLC SERPL-MCNC: 78 MG/DL
HGB BLD-MCNC: 13.6 G/DL
IMM GRANULOCYTES NFR BLD AUTO: 0.3 %
LDLC SERPL CALC-MCNC: 75 MG/DL
LYMPHOCYTES # BLD AUTO: 1.27 K/UL
LYMPHOCYTES NFR BLD AUTO: 18.7 %
MAGNESIUM SERPL-MCNC: 2.3 MG/DL
MAN DIFF?: NORMAL
MCHC RBC-ENTMCNC: 29.4 PG
MCHC RBC-ENTMCNC: 31.3 GM/DL
MCV RBC AUTO: 94.2 FL
MONOCYTES # BLD AUTO: 0.71 K/UL
MONOCYTES NFR BLD AUTO: 10.4 %
NEUTROPHILS # BLD AUTO: 4.59 K/UL
NEUTROPHILS NFR BLD AUTO: 67.5 %
NONHDLC SERPL-MCNC: 90 MG/DL
PLATELET # BLD AUTO: 230 K/UL
POTASSIUM SERPL-SCNC: 4.4 MMOL/L
PROT SERPL-MCNC: 6.5 G/DL
RBC # BLD: 4.62 M/UL
RBC # FLD: 14.2 %
SODIUM SERPL-SCNC: 142 MMOL/L
TRIGL SERPL-MCNC: 76 MG/DL
TSH SERPL-ACNC: 4.12 UIU/ML
VIT B12 SERPL-MCNC: 922 PG/ML
WBC # FLD AUTO: 6.8 K/UL

## 2022-04-04 NOTE — REVIEW OF SYSTEMS
[FreeTextEntry5] : As per HPI otherwise negative.  [FreeTextEntry1] : As per HPI HPI otherwise negative.

## 2022-04-04 NOTE — HISTORY OF PRESENT ILLNESS
[FreeTextEntry1] : Increasing lower extremity edema. No SOB. \par No chest pain. No SOB. \par She has just got diabetic socks \par She is not fully compliant with her diuretics. \par Toe pain.

## 2022-04-04 NOTE — PHYSICAL EXAM

## 2022-05-11 NOTE — CARDIOLOGY SUMMARY
Evaluated by [id number] [facility] Provider          [address] ED  EMERGENCY DEPARTMENT ENCOUNTER        Pt Name: Miguel Ribera  MRN: [id number]  [place] 1978  Dateof evaluation: 5/11/2022  Provider: SHAKA Busby - RICCI  PCP: Saman Celeste MD  ED Attending: No att. providers found    [facility]       Chief Complaint   Patient presents with    Chest Pain     Pt. states he has been having CP for a month. Pt. states his pain got worse today. HISTORY OF PRESENTILLNESS   (Location/Symptom, Timing/Onset, Context/Setting, Quality, Duration, Modifying Factors, Severity)  Note limiting factors. Miguel Ribera is a 37 y.o. male for chest pain. Onset was 1 month. Context includes patient reports that he has had intermittent chest pain for the last month in the left side of his chest.  Patient states that his pain became worse today which brought him to the ED. Patient denies that it is pleuritic in nature. Its not reproducible and its not worse with movement. Patient does not see cardiology. Alleviating factors include nothing. Aggravating factors include nothing. Pain is 0/10. Nothing has been used for pain today. Nursing Notes were all reviewed and agreed with or any disagreements were addressed  in the HPI. REVIEW OF SYSTEMS    (2-9 systems for level 4, 10 or more for level 5)     Review of Systems   Constitutional: Negative for activity change, appetite change and fever. HENT: Negative for congestion, facial swelling, rhinorrhea and sore throat. Eyes: Negative for visual disturbance. Respiratory: Negative for shortness of breath. Cardiovascular: Positive for chest pain. Gastrointestinal: Negative for abdominal pain. Genitourinary: Negative for difficulty urinating. Musculoskeletal: Negative for arthralgias and myalgias. Skin: Negative for color change and rash. Neurological: Negative for dizziness and light-headedness. [___] : [unfilled] Psychiatric/Behavioral: Negative for agitation. All other systems reviewed and are negative. Positives and Pertinent negatives as per HPI. Except as noted above in the ROS, all other systems were reviewed and negative. PAST MEDICAL HISTORY     Past Medical History:   Diagnosis Date    Arthritis     Asthma     Chronic back pain     Herniated nucleus pulposus     L4, L5,  has pinched nerve         SURGICAL HISTORY     History reviewed. No pertinent surgical history.       CURRENT MEDICATIONS       Discharge Medication List as of 5/11/2022  5:15 PM      CONTINUE these medications which have NOT CHANGED    Details   ondansetron (ZOFRAN) 4 MG tablet Take 1 tablet by mouth every 8 hours as needed for Nausea, Disp-10 tablet, R-0      naproxen (NAPROSYN) 500 MG tablet Take 1 tablet by mouth 2 times daily as needed for Pain, Disp-10 tablet, R-0               ALLERGIES     Penicillins and Tramadol    FAMILY HISTORY       Family History   Problem Relation Age of Onset    Osteoarthritis Mother     Stroke Mother     Hypertension Mother     Osteoarthritis Father     Stroke Paternal Grandfather     Heart Disease Other           SOCIAL HISTORY       Social History     Socioeconomic History    Marital status:      Spouse name: None    Number of children: 2    Years of education: None    Highest education level: None   Occupational History    Occupation: Labor   Tobacco Use    Smoking status: Current Every Day Smoker     Packs/day: 1.00     Years: 17.00     Pack years: 17.00     Types: Cigarettes    Smokeless tobacco: Never Used   Vaping Use    Vaping Use: Never used   Substance and Sexual Activity    Alcohol use: No    Drug use: No    Sexual activity: Yes     Partners: Female   Other Topics Concern    None   Social History Narrative    None     Social Determinants of Health     Financial Resource Strain:     Difficulty of Paying Living Expenses: Not on file   Food Insecurity:     Worried About [facility] in the Last Year: Not on file    Harjeet of Food in the Last Year: Not on file   Transportation Needs:     Lack of Transportation (Medical): Not on file    Lack of Transportation (Non-Medical): Not on file   Physical Activity:     Days of Exercise per Week: Not on file    Minutes of Exercise per Session: Not on file   Stress:     Feeling of Stress : Not on file   Social Connections:     Frequency of Communication with Friends and Family: Not on file    Frequency of Social Gatherings with Friends and Family: Not on file    Attends Zoroastrianism Services: Not on file    Active Member of [address] [place] [address] or Organizations: Not on file    Attends Club or Organization Meetings: Not on file    Marital Status: Not on file   Intimate Partner Violence:     Fear of Current or Ex-Partner: Not on file    Emotionally Abused: Not on file    Physically Abused: Not on file    Sexually Abused: Not on file   Housing Stability:     Unable to Pay for Housing in the Last Year: Not on file    Number of [place] in the Last Year: Not on file    Unstable Housing in the Last Year: Not on file       SCREENINGS      Heart Score for chest pain patients  History: Slightly Suspicious  ECG: Normal  Patient Age: < 45 years  *Risk factors for Atherosclerotic disease: Cigarette smoking,Obesity  Risk Factors: 1 or 2 risk factors  Troponin: < 1X normal limit  Heart Score Total: 1      PHYSICAL EXAM  (up to 7 for level 4, 8 or more for level 5)     ED Triage Vitals [05/11/22 1501]   BP Temp Temp Source Pulse Resp SpO2 Height Weight   (!) 158/90 96.9 °F (36.1 °C) Tympanic 68 18 97 % 5' 8\" (1.727 m) 200 lb (90.7 kg)       Physical Exam  Constitutional:       Appearance: He is well-developed. HENT:      Head: Normocephalic and atraumatic. Cardiovascular:      Rate and Rhythm: Normal rate. Pulmonary:      Effort: Pulmonary effort is normal. No respiratory distress.    Abdominal:      General: There is no distension. Palpations: Abdomen is soft. Tenderness: There is no abdominal tenderness. Musculoskeletal:         General: Normal range of motion. Cervical back: Normal range of motion. Skin:     General: Skin is warm and dry. Neurological:      Mental Status: He is alert and oriented to person, place, and time. DIAGNOSTIC RESULTS   LABS:    Labs Reviewed   COMPREHENSIVE METABOLIC PANEL W/ REFLEX TO MG FOR LOW K - Abnormal; Notable for the following components:       Result Value    CREATININE 0.8 (*)     ALT 51 (*)     All other components within normal limits   CBC WITH AUTO DIFFERENTIAL   TROPONIN   BRAIN NATRIURETIC PEPTIDE       All other labs werewithin normal range or not returned as of this dictation. EKG: All EKG's are interpreted by the Emergency Department Physician who either signs or Co-signs this chart in the absence of a cardiologist.  Please see their note for interpretation of EKG. RADIOLOGY:   Chest x-ray interpreted by radiologist for  FINDINGS:   No acute airspace infiltrate.  No pneumothorax or pleural effusion.  Normal   cardiomediastinal silhouette       Interpretation per the Radiologist below, if available at the time of this note:    XR CHEST (2 VW)   Final Result   No acute cardiopulmonary disease           XR CHEST (2 VW)    Result Date: 5/11/2022  EXAMINATION: TWO XRAY VIEWS OF THE CHEST 5/11/2022 3:32 pm COMPARISON: None. HISTORY: ORDERING SYSTEM PROVIDED HISTORY: Chest Discomfort TECHNOLOGIST PROVIDED HISTORY: Reason for exam:->Chest Discomfort Reason for Exam: chest pain FINDINGS: No acute airspace infiltrate. No pneumothorax or pleural effusion.   Normal cardiomediastinal silhouette     No acute cardiopulmonary disease         PROCEDURES   Unless otherwise noted below, none     Procedures     CRITICAL CARE TIME   N/A    CONSULTS:  None      EMERGENCYDEPARTMENT COURSE and DIFFERENTIAL DIAGNOSIS/MDM:   Vitals:    Vitals:    05/11/22 1501 05/11/22 1720 BP: (!) 158/90 (!) 155/89   Pulse: 68 69   Resp: 18 14   Temp: 96.9 °F (36.1 °C)    TempSrc: Tympanic    SpO2: 97% 100%   Weight: 200 lb (90.7 kg)    Height: 5' 8\" (1.727 m)        Patient was given the following medications:  Medications - No data to display    Patient was seen evaluated by myself. Patient here for concerns for chest pain and reports he has had intermittently for the last month. Patient states that it is not reproducible and is not pleuritic in nature. Patient does not follow with cardiology. On exam the patient is awake and alert hemodynamically stable nontoxic in appearance. Lab values have been reviewed and interpreted. Patient has a heart score of 1. I did discuss the case with Vibha Johnson however she did not see the patient she was in agreement with the patient being discharged. Patient will be discharged with instructions to follow-up with his primary care doctor the next 2 days and return to the ED for worsening symptoms. He was given referrals for cardiology and stress test.  He was encouraged to return to the ED for worsening symptoms. Patient was ultimately discharged with all questions answered. The patient tolerated their visit well. I have evaluated this patient. My supervising physician was available for consultation. The patient and / or the family were informed of the results of any tests, a time was given to answer questions, a plan was proposed and they agreed with plan. FINAL IMPRESSION      1. Chest wall pain          DISPOSITION/PLAN   DISPOSITION        PATIENT REFERRED TO:  Maty Boles [address]  [phone]      for re-evaluation    [facility] [place] [facility]  [address] [facility]  [phone]    If symptoms worsen    Kraig Mustafa MD  [address] [place] [address].   [facility]  [phone]            DISCHARGE MEDICATIONS:  Discharge Medication List as of 5/11/2022  5:15 PM DISCONTINUED MEDICATIONS:  Discharge Medication List as of 5/11/2022  5:15 PM                 (Please note that portions of this note were completed with a voice recognition program.  Efforts were made to edit the dictations but occasionally words are mis-transcribed.)    SHAKA Murray CNP (electronically signed)         SHAKA Murray CNP  05/15/22 0349

## 2022-05-20 LAB
ANION GAP SERPL CALC-SCNC: 16 MMOL/L
BUN SERPL-MCNC: 36 MG/DL
CALCIUM SERPL-MCNC: 9.1 MG/DL
CHLORIDE SERPL-SCNC: 101 MMOL/L
CO2 SERPL-SCNC: 25 MMOL/L
CREAT SERPL-MCNC: 1.28 MG/DL
EGFR: 39 ML/MIN/1.73M2
GLUCOSE SERPL-MCNC: 91 MG/DL
POTASSIUM SERPL-SCNC: 4.9 MMOL/L
SODIUM SERPL-SCNC: 142 MMOL/L

## 2022-05-27 ENCOUNTER — APPOINTMENT (OUTPATIENT)
Dept: CARDIOLOGY | Facility: CLINIC | Age: 87
End: 2022-05-27
Payer: MEDICARE

## 2022-05-27 VITALS
SYSTOLIC BLOOD PRESSURE: 174 MMHG | HEIGHT: 55 IN | BODY MASS INDEX: 32.63 KG/M2 | WEIGHT: 141 LBS | DIASTOLIC BLOOD PRESSURE: 62 MMHG | HEART RATE: 75 BPM | OXYGEN SATURATION: 92 %

## 2022-05-27 PROCEDURE — 99215 OFFICE O/P EST HI 40 MIN: CPT

## 2022-05-27 NOTE — PHYSICAL EXAM
[General Appearance - Well Developed] : well developed [Normal Appearance] : normal appearance [Well Groomed] : well groomed [General Appearance - Well Nourished] : well nourished [No Deformities] : no deformities [General Appearance - In No Acute Distress] : no acute distress [Normal Conjunctiva] : the conjunctiva exhibited no abnormalities [Eyelids - No Xanthelasma] : the eyelids demonstrated no xanthelasmas [Normal Oral Mucosa] : normal oral mucosa [No Oral Pallor] : no oral pallor [No Oral Cyanosis] : no oral cyanosis [Normal Jugular Venous A Waves Present] : normal jugular venous A waves present [Normal Jugular Venous V Waves Present] : normal jugular venous V waves present [No Jugular Venous Ratliff A Waves] : no jugular venous ratliff A waves [Respiration, Rhythm And Depth] : normal respiratory rhythm and effort [Exaggerated Use Of Accessory Muscles For Inspiration] : no accessory muscle use [Auscultation Breath Sounds / Voice Sounds] : lungs were clear to auscultation bilaterally [Abdomen Soft] : soft [Abdomen Tenderness] : non-tender [] : no hepato-splenomegaly [Abdomen Mass (___ Cm)] : no abdominal mass palpated [FreeTextEntry1] : bilateral pitting edema from the feet to the thighs, equal.  Small water blisters.  no weeping.  No open wounds.  strong pedal pulses [Oriented To Time, Place, And Person] : oriented to person, place, and time [Affect] : the affect was normal [Mood] : the mood was normal [No Anxiety] : not feeling anxious

## 2022-05-27 NOTE — REASON FOR VISIT
[Initial Evaluation] : an initial evaluation of [FreeTextEntry2] : venous insufficiency / LE edema [FreeTextEntry1] : 5/27/22\par \par 89 y/o F w/ PMHX HTN, HLD,  Afib, CVA, CAD, CKD,\par \par who presents for evaluation for LE edema / venous insufficiency.\par \par Reports possible treatment for sclerotherapy for reticular veins at the time.\par \par Reports previously wearing compression stockings.\par Stopped due to blister formation.\par Reports history of weeping from L LE.\par Resolved several days ago. \par Currently using gauze and then ACE wraps for the past several weeks.\par \par Denies history of VTE, PE or DVT.\par Reports history of LE edema with her sisters.\par \par Recent was increased by Cardiology Dr. Chew and patient reports recently loosing water weight of 3 to 4 lbs.\par \par Medications:\par *Pradaxa 75 mg BID -> transitioning to Eliquis 2.5 mg BID\par *Diltiazem 120 mg in PM and 240 mm Hg AM\par *Lasix 40 mg daily, takes BID PRN \par *Zocor 40 mg daily\par *Synthroid\par *Zolpidem\par *ASA 81 mg daily

## 2022-05-27 NOTE — ASSESSMENT
[FreeTextEntry1] :  Assessment:\par 1.  Pitting edema\par \par \par \par Plan:\par I'm not convinced there is a primary vascular cause of her edema (it venous insufficiency or lymphedema)- she has bilateral pitting to the thighs and was somewhat responsive to diuretic therapy.  Her echo from 2016 showed some AI , reduced EF and some pulmonary HTN.  \par \par She will repeat her echo\par I gave her ammonium lactate to apply to the skin\par I increased her Lasix to 40mg BID (I think this will help)\par Daughter will continue wrapping for now, I will not arrange for a pneumatic pump at this time.\par Home labs in 1 week\par I will see her again in 2 weeks to assure improvement \par \par Thanks\par \par AG

## 2022-06-01 ENCOUNTER — APPOINTMENT (OUTPATIENT)
Dept: CARDIOLOGY | Facility: CLINIC | Age: 87
End: 2022-06-01
Payer: MEDICARE

## 2022-06-01 PROCEDURE — 93306 TTE W/DOPPLER COMPLETE: CPT

## 2022-06-03 ENCOUNTER — LABORATORY RESULT (OUTPATIENT)
Age: 87
End: 2022-06-03

## 2022-06-03 ENCOUNTER — NON-APPOINTMENT (OUTPATIENT)
Age: 87
End: 2022-06-03

## 2022-06-10 ENCOUNTER — APPOINTMENT (OUTPATIENT)
Dept: CARDIOLOGY | Facility: CLINIC | Age: 87
End: 2022-06-10

## 2022-06-10 ENCOUNTER — INPATIENT (INPATIENT)
Facility: HOSPITAL | Age: 87
LOS: 10 days | Discharge: HOME HEALTH | DRG: 291 | End: 2022-06-21
Attending: STUDENT IN AN ORGANIZED HEALTH CARE EDUCATION/TRAINING PROGRAM | Admitting: STUDENT IN AN ORGANIZED HEALTH CARE EDUCATION/TRAINING PROGRAM
Payer: MEDICARE

## 2022-06-10 VITALS
OXYGEN SATURATION: 93 % | BODY MASS INDEX: 32.63 KG/M2 | HEART RATE: 85 BPM | HEIGHT: 55 IN | SYSTOLIC BLOOD PRESSURE: 158 MMHG | WEIGHT: 141 LBS | DIASTOLIC BLOOD PRESSURE: 81 MMHG

## 2022-06-10 VITALS
RESPIRATION RATE: 17 BRPM | SYSTOLIC BLOOD PRESSURE: 172 MMHG | HEIGHT: 56 IN | TEMPERATURE: 98 F | DIASTOLIC BLOOD PRESSURE: 82 MMHG | HEART RATE: 86 BPM | WEIGHT: 145.95 LBS | OXYGEN SATURATION: 93 %

## 2022-06-10 DIAGNOSIS — I50.33 ACUTE ON CHRONIC DIASTOLIC (CONGESTIVE) HEART FAILURE: ICD-10-CM

## 2022-06-10 DIAGNOSIS — E03.9 HYPOTHYROIDISM, UNSPECIFIED: ICD-10-CM

## 2022-06-10 DIAGNOSIS — L03.119 CELLULITIS OF UNSPECIFIED PART OF LIMB: ICD-10-CM

## 2022-06-10 DIAGNOSIS — Z29.9 ENCOUNTER FOR PROPHYLACTIC MEASURES, UNSPECIFIED: ICD-10-CM

## 2022-06-10 DIAGNOSIS — I50.32 CHRONIC DIASTOLIC (CONGESTIVE) HEART FAILURE: ICD-10-CM

## 2022-06-10 DIAGNOSIS — N18.30 CHRONIC KIDNEY DISEASE, STAGE 3 UNSPECIFIED: ICD-10-CM

## 2022-06-10 DIAGNOSIS — I25.10 ATHEROSCLEROTIC HEART DISEASE OF NATIVE CORONARY ARTERY WITHOUT ANGINA PECTORIS: ICD-10-CM

## 2022-06-10 DIAGNOSIS — I48.20 CHRONIC ATRIAL FIBRILLATION, UNSPECIFIED: ICD-10-CM

## 2022-06-10 DIAGNOSIS — R60.0 LOCALIZED EDEMA: ICD-10-CM

## 2022-06-10 DIAGNOSIS — I10 ESSENTIAL (PRIMARY) HYPERTENSION: ICD-10-CM

## 2022-06-10 DIAGNOSIS — I50.9 HEART FAILURE, UNSPECIFIED: ICD-10-CM

## 2022-06-10 DIAGNOSIS — E78.5 HYPERLIPIDEMIA, UNSPECIFIED: ICD-10-CM

## 2022-06-10 LAB
ALBUMIN SERPL ELPH-MCNC: 3.7 G/DL — SIGNIFICANT CHANGE UP (ref 3.3–5)
ALP SERPL-CCNC: 71 U/L — SIGNIFICANT CHANGE UP (ref 40–120)
ALT FLD-CCNC: 9 U/L — LOW (ref 10–45)
ANION GAP SERPL CALC-SCNC: 11 MMOL/L — SIGNIFICANT CHANGE UP (ref 5–17)
AST SERPL-CCNC: 28 U/L — SIGNIFICANT CHANGE UP (ref 10–40)
BASOPHILS # BLD AUTO: 0.04 K/UL — SIGNIFICANT CHANGE UP (ref 0–0.2)
BASOPHILS NFR BLD AUTO: 0.5 % — SIGNIFICANT CHANGE UP (ref 0–2)
BILIRUB SERPL-MCNC: 0.4 MG/DL — SIGNIFICANT CHANGE UP (ref 0.2–1.2)
BUN SERPL-MCNC: 44 MG/DL — HIGH (ref 7–23)
CALCIUM SERPL-MCNC: 9.4 MG/DL — SIGNIFICANT CHANGE UP (ref 8.4–10.5)
CHLORIDE SERPL-SCNC: 103 MMOL/L — SIGNIFICANT CHANGE UP (ref 96–108)
CO2 SERPL-SCNC: 29 MMOL/L — SIGNIFICANT CHANGE UP (ref 22–31)
CREAT SERPL-MCNC: 1.22 MG/DL — SIGNIFICANT CHANGE UP (ref 0.5–1.3)
EGFR: 41 ML/MIN/1.73M2 — LOW
EOSINOPHIL # BLD AUTO: 0.65 K/UL — HIGH (ref 0–0.5)
EOSINOPHIL NFR BLD AUTO: 8.3 % — HIGH (ref 0–6)
FLUAV AG NPH QL: SIGNIFICANT CHANGE UP
FLUBV AG NPH QL: SIGNIFICANT CHANGE UP
GLUCOSE SERPL-MCNC: 99 MG/DL — SIGNIFICANT CHANGE UP (ref 70–99)
HCT VFR BLD CALC: 40.7 % — SIGNIFICANT CHANGE UP (ref 34.5–45)
HGB BLD-MCNC: 12.5 G/DL — SIGNIFICANT CHANGE UP (ref 11.5–15.5)
IMM GRANULOCYTES NFR BLD AUTO: 0.3 % — SIGNIFICANT CHANGE UP (ref 0–1.5)
LYMPHOCYTES # BLD AUTO: 1.31 K/UL — SIGNIFICANT CHANGE UP (ref 1–3.3)
LYMPHOCYTES # BLD AUTO: 16.7 % — SIGNIFICANT CHANGE UP (ref 13–44)
MCHC RBC-ENTMCNC: 29.5 PG — SIGNIFICANT CHANGE UP (ref 27–34)
MCHC RBC-ENTMCNC: 30.7 GM/DL — LOW (ref 32–36)
MCV RBC AUTO: 96 FL — SIGNIFICANT CHANGE UP (ref 80–100)
MONOCYTES # BLD AUTO: 1.07 K/UL — HIGH (ref 0–0.9)
MONOCYTES NFR BLD AUTO: 13.7 % — SIGNIFICANT CHANGE UP (ref 2–14)
NEUTROPHILS # BLD AUTO: 4.74 K/UL — SIGNIFICANT CHANGE UP (ref 1.8–7.4)
NEUTROPHILS NFR BLD AUTO: 60.5 % — SIGNIFICANT CHANGE UP (ref 43–77)
NRBC # BLD: 0 /100 WBCS — SIGNIFICANT CHANGE UP (ref 0–0)
NT-PROBNP SERPL-SCNC: 2152 PG/ML — HIGH (ref 0–300)
PLATELET # BLD AUTO: 230 K/UL — SIGNIFICANT CHANGE UP (ref 150–400)
POTASSIUM SERPL-MCNC: 5 MMOL/L — SIGNIFICANT CHANGE UP (ref 3.5–5.3)
POTASSIUM SERPL-SCNC: 5 MMOL/L — SIGNIFICANT CHANGE UP (ref 3.5–5.3)
PROT SERPL-MCNC: 6.6 G/DL — SIGNIFICANT CHANGE UP (ref 6–8.3)
RBC # BLD: 4.24 M/UL — SIGNIFICANT CHANGE UP (ref 3.8–5.2)
RBC # FLD: 14.6 % — HIGH (ref 10.3–14.5)
RSV RNA NPH QL NAA+NON-PROBE: SIGNIFICANT CHANGE UP
SARS-COV-2 RNA SPEC QL NAA+PROBE: SIGNIFICANT CHANGE UP
SODIUM SERPL-SCNC: 143 MMOL/L — SIGNIFICANT CHANGE UP (ref 135–145)
TROPONIN T, HIGH SENSITIVITY RESULT: 31 NG/L — SIGNIFICANT CHANGE UP (ref 0–51)
WBC # BLD: 7.83 K/UL — SIGNIFICANT CHANGE UP (ref 3.8–10.5)
WBC # FLD AUTO: 7.83 K/UL — SIGNIFICANT CHANGE UP (ref 3.8–10.5)

## 2022-06-10 PROCEDURE — 99222 1ST HOSP IP/OBS MODERATE 55: CPT

## 2022-06-10 PROCEDURE — 99223 1ST HOSP IP/OBS HIGH 75: CPT

## 2022-06-10 PROCEDURE — 99214 OFFICE O/P EST MOD 30 MIN: CPT | Mod: 25

## 2022-06-10 PROCEDURE — 99285 EMERGENCY DEPT VISIT HI MDM: CPT

## 2022-06-10 PROCEDURE — 71045 X-RAY EXAM CHEST 1 VIEW: CPT | Mod: 26

## 2022-06-10 PROCEDURE — 93010 ELECTROCARDIOGRAM REPORT: CPT

## 2022-06-10 RX ORDER — DILTIAZEM HCL 120 MG
120 CAPSULE, EXT RELEASE 24 HR ORAL AT BEDTIME
Refills: 0 | Status: DISCONTINUED | OUTPATIENT
Start: 2022-06-10 | End: 2022-06-21

## 2022-06-10 RX ORDER — BRIMONIDINE TARTRATE 2 MG/MG
1 SOLUTION/ DROPS OPHTHALMIC
Refills: 0 | Status: DISCONTINUED | OUTPATIENT
Start: 2022-06-10 | End: 2022-06-21

## 2022-06-10 RX ORDER — ASPIRIN/CALCIUM CARB/MAGNESIUM 324 MG
81 TABLET ORAL DAILY
Refills: 0 | Status: DISCONTINUED | OUTPATIENT
Start: 2022-06-10 | End: 2022-06-21

## 2022-06-10 RX ORDER — FUROSEMIDE 40 MG
40 TABLET ORAL
Refills: 0 | Status: DISCONTINUED | OUTPATIENT
Start: 2022-06-10 | End: 2022-06-12

## 2022-06-10 RX ORDER — LEVOTHYROXINE SODIUM 125 MCG
50 TABLET ORAL DAILY
Refills: 0 | Status: DISCONTINUED | OUTPATIENT
Start: 2022-06-10 | End: 2022-06-21

## 2022-06-10 RX ORDER — APIXABAN 2.5 MG/1
2.5 TABLET, FILM COATED ORAL EVERY 12 HOURS
Refills: 0 | Status: DISCONTINUED | OUTPATIENT
Start: 2022-06-10 | End: 2022-06-21

## 2022-06-10 RX ORDER — DILTIAZEM HCL 120 MG
240 CAPSULE, EXT RELEASE 24 HR ORAL DAILY
Refills: 0 | Status: DISCONTINUED | OUTPATIENT
Start: 2022-06-10 | End: 2022-06-21

## 2022-06-10 RX ORDER — FUROSEMIDE 40 MG
40 TABLET ORAL ONCE
Refills: 0 | Status: COMPLETED | OUTPATIENT
Start: 2022-06-10 | End: 2022-06-10

## 2022-06-10 RX ORDER — ACETAMINOPHEN 500 MG
650 TABLET ORAL EVERY 6 HOURS
Refills: 0 | Status: DISCONTINUED | OUTPATIENT
Start: 2022-06-10 | End: 2022-06-21

## 2022-06-10 RX ORDER — SIMVASTATIN 20 MG/1
40 TABLET, FILM COATED ORAL AT BEDTIME
Refills: 0 | Status: DISCONTINUED | OUTPATIENT
Start: 2022-06-10 | End: 2022-06-21

## 2022-06-10 RX ORDER — ZOLPIDEM TARTRATE 10 MG/1
5 TABLET ORAL AT BEDTIME
Refills: 0 | Status: DISCONTINUED | OUTPATIENT
Start: 2022-06-10 | End: 2022-06-17

## 2022-06-10 RX ADMIN — APIXABAN 2.5 MILLIGRAM(S): 2.5 TABLET, FILM COATED ORAL at 18:22

## 2022-06-10 RX ADMIN — Medication 650 MILLIGRAM(S): at 18:19

## 2022-06-10 RX ADMIN — Medication 650 MILLIGRAM(S): at 19:00

## 2022-06-10 RX ADMIN — Medication 40 MILLIGRAM(S): at 12:13

## 2022-06-10 RX ADMIN — Medication 40 MILLIGRAM(S): at 16:58

## 2022-06-10 RX ADMIN — Medication 120 MILLIGRAM(S): at 21:46

## 2022-06-10 RX ADMIN — ZOLPIDEM TARTRATE 5 MILLIGRAM(S): 10 TABLET ORAL at 21:46

## 2022-06-10 RX ADMIN — BRIMONIDINE TARTRATE 1 DROP(S): 2 SOLUTION/ DROPS OPHTHALMIC at 21:47

## 2022-06-10 RX ADMIN — SIMVASTATIN 40 MILLIGRAM(S): 20 TABLET, FILM COATED ORAL at 21:46

## 2022-06-10 NOTE — H&P ADULT - PROBLEM SELECTOR PLAN 1
Elevated proBNP with significant LE edema. Nonhypoxic  -Cards following appreciate recs  -Lasix 40 IV BID  -Monitor I/O  -Monitor lytes  -Daily weight  -Tele

## 2022-06-10 NOTE — H&P ADULT - NSHPSOCIALHISTORY_GEN_ALL_CORE
Lives alone, daughter visits daily  Manages medications  Dependent in remainder of iadls  denies etoh/illicits  walks with walker, 3 steps to enter house

## 2022-06-10 NOTE — ED PROVIDER NOTE - OBJECTIVE STATEMENT
89 yo history of CHF, CKD, AF (not on AC)), HTN, Pulm HTN and moderate AS coming in w/ worsening LE. Followed by Dr. Murdock and was sent in for lack of improvement on 80mg PO lasix. Denies fevers, chills, chest pain, SOB, or exertional dyspnea.

## 2022-06-10 NOTE — ED PROVIDER NOTE - PHYSICAL EXAMINATION
GENERAL: well appearing in no acute distress, non-toxic appearing  HEAD: normocephalic, atraumatic  HENT: airway intact  EYES: normal conjunctiva  CARDIAC: regular rate and rhythm, normal S1S2, no appreciable murmurs, 2+ pulses in UE/LE b/l  PULM: normal breath sounds, clear to ascultation bilaterally, no rales, rhonchi, wheezing  GI: abdomen nondistended, soft, nontender, no guarding, rebound tenderness  NEURO: no focal motor or sensory deficits  MSK:4+ pitting edema b/l LE w/ blister formation  SKIN: well-perfused, extremities warm, no visible rashes

## 2022-06-10 NOTE — ED PROVIDER NOTE - ATTENDING CONTRIBUTION TO CARE
89 y/o f with pmhx CKD, moderate AS, HTN, pulm HTN, A fib, presents with daughter for concern of worsening CHF. She is on Lasix and had been taking incrementing doses with out any improvement in leg swelling and shortness of breath. earlier today in cardio office Dr. Murdock and sent to ED for worsening symptoms.   Cardio Dr. Kwasi Chew  Gen.  no acute distress   HEENT:  perrl eomi  Lungs:  b/l bs, O2 sat 91-92 % on room air  CVS: S1S2   Abd; soft non tender no distention    Ext: b/l lower ext edema +4 pitting to ant thigh.  Neuro: awake, alert, oriented to person   MSK: strength 5/5 b/l upper and lower ext.

## 2022-06-10 NOTE — H&P ADULT - HISTORY OF PRESENT ILLNESS
CC: 89 y/o F presenting with b/l LE swelling for 1 month    HPI: 89 y/o F with h/o HFpEF, CAD s/p CABG, CKD3, Chronic AF on Eliquis, HTN, moderate AS, pulm HTN presenting with 1 month of LE edema. History per patient and her daughter.     Over the past month, patient has noticed increased b/l LE swelling and heaviness while ambulating with walker. Denies SOB over this time/cp. Has been on lasix outpatient.  Cardiologist Dr. Chew prescribed increased dose to 40 BID for 6 days, without improvement. Presented to Dr. Murdock more recently recommended similarly to increase to 40 BID for a few days. Patient and daughter report no improvement so presented to ED. Of note denies cp/sob/inability to lie flat. Denies f/chills/recent sick contacts. Denies new cough. Recent outpatient TTE 6/1 with moderate AS and mod pulm HTN, EF 55%.     In ED afeb hds. labs notable for probnp of 2152. Received lasix 40 IV *1. Cardiology was consulted.  CC: 89 y/o F presenting with b/l LE swelling for 1 month    HPI: 89 y/o F with h/o HFpEF, CAD s/p CABG, CKD3, Chronic AF on Eliquis, HTN, moderate AS, pulm HTN presenting with 1 month of LE edema. History per patient and her daughter.     Over the past month, patient has noticed increased b/l LE swelling and heaviness while ambulating with walker. Denies SOB over this time/cp. Has been on lasix outpatient.  Cardiologist Dr. Chew prescribed increased dose to 40 BID for 6 days, without improvement. Presented to Dr. Murdock more recently recommended similarly to increase to 40 BID for a few days. Patient and daughter report no improvement so presented to ED. Of note denies cp/sob/inability to lie flat. Denies f/chills/recent sick contacts. Denies new cough. Recent outpatient TTE 6/1 with moderate AS and mod pulm HTN, EF 55%.     In ED afeb hds 93% on RA. labs notable for probnp of 2152. Received lasix 40 IV *1. Cardiology was consulted.   Since then now on 3L NC sat 94% speaking in full sentences

## 2022-06-10 NOTE — ED PROVIDER NOTE - NSICDXPASTMEDICALHX_GEN_ALL_CORE_FT
PAST MEDICAL HISTORY:  Atrial fibrillation     Benign Essential Hypertension     Chronic Sciatica     CVA (cerebral vascular accident)     Hypertension     Personal History of Coronary Artery Disease

## 2022-06-10 NOTE — ED PROVIDER NOTE - NS ED ROS FT
General: denies fever, chills  HENT: denies nasal congestion, rhinorrhea  Eyes: denies visual changes, blurred vision  CV: denies chest pain, palpitations  Resp: denies difficulty breathing, cough  Abdominal: denies nausea, vomiting, diarrhea, abdominal pain  : denies urinary pain or discharge  MSK: b/l leg swelling  Neuro: denies headaches, numbness, tingling  Skin: denies rashes, bruises

## 2022-06-10 NOTE — H&P ADULT - PROBLEM SELECTOR PLAN 5
Cr 1.22, outpatient 1.0-1.1. Likely combonent of volume overload  -monitor while admitted given diuresis Cr 1.22, outpatient 1.0-1.1. Likely component of volume overload  -monitor while admitted given diuresis

## 2022-06-10 NOTE — H&P ADULT - NSHPLABSRESULTS_GEN_ALL_CORE
EKG personally reviewed: Afib rate well controlled, previous inferior ischemia, Left axis deviation, unchanged from prior    CXR no acute effusions or infiltrates    TTE 6/1  EF 55%, moderate pulm HTN, moderate AS EKG personally reviewed: Afib rate well controlled, previous inferior ischemia, Left axis deviation, unchanged from prior    CXR no acute effusions or infiltrates on R, L side difficult to ascertain diaphragmatic border    TTE 6/1  EF 55%, moderate pulm HTN, moderate AS

## 2022-06-10 NOTE — ED PROVIDER NOTE - INTERNATIONAL TRAVEL
"-- Message is from the [facility]--    Reason for Call: patient mom Gaetano Chiu is calling and would like a call back from her son provider office to  a written script for his adderall. Patient mom can be reach at [phone]    Caller Information       Type Contact Phone    10/14/2019 12:08 PM Phone (Incoming) Gaetano Chiu (Mother) [phone]          Alternative phone number: Na  -- Message is from the [facility]--    Patient is requesting a medication refill - medication is on active medication list    Patient is currently OUT of the requested medication. Was Medication Pended? Yes. Rx Name and Dose:  amphetamine-dextroamphetamine XR (ADDERALL XR) 10 MG 24 hr capsule    Duration: 30 days    Patient will  prescription(s) from office. Caller Information       Type Contact Phone    10/14/2019 12:08 PM Phone (Incoming) Gaetano Chiu (Mother) [phone]          Alternative phone number: NA    Turnaround time given to caller: ""This message will be sent to [facility] Provider's name]. The clinical team will fulfill your request as soon as they review your message. \""  Turnaround time given to caller:   \""This message will be sent to [facility] Provider's name]. The clinical team will fulfill your request as soon as they review your message. \""    "
Part of med's filled needs additional refill
No

## 2022-06-10 NOTE — CONSULT NOTE ADULT - SUBJECTIVE AND OBJECTIVE BOX
Cardiology Consult Note    Patient seen and evaluated at bedside    Cardiologist: Dr. Kwasi Chew    Chief Complaint: LE edema    HPI:  89yo woman with diastolic HF, CKD3B, atrial fibrillation (not on anticoagulation), HTN, moderate AS, pulmonary HTN, who presents to the ED with several weeks of worsening LE edema.     Patient's daughter at bedside reports that patient's cardiologist is Dr. Kwasi Chew who prescribed PO furosemide 40 mg daily for LE edema. Patient was taking this daily but continued to have worsening LE edema and was instructed to take 80 mg daily x 6 days without minimal improvement (though patient's son reported 3 lbs weight loss). Patient then saw Dr. Sathish Murdock who recommended continuing furosemide 80 mg daily. Due to lack of response to increased diuretic dose patient was instructed to present to the ED. Of note patient had TTE done as outpatient 6/1/22 revealing preserved LV functio, moderate AS, and moderate pulmonary HTN.    In the ED patient was given     PMHx:   Benign Essential Hypertension  Chronic Sciatica  Personal History of Coronary Artery Disease  Hypertension  Atrial fibrillation  CVA (cerebral vascular accident)    PSHx:   S/P CABG    Allergies:  No Known Allergies      Home Meds:    Current Medications:       FAMILY HISTORY:  No pertinent family history        Social History:  Smoking History:  Alcohol Use:  Drug Use:    REVIEW OF SYSTEMS:  Constitutional:     [x ] negative [ ] fevers [ ] chills [ ] weight loss [ ] weight gain  HEENT:                  [x ] negative [ ] dry eyes [ ] eye irritation [ ] postnasal drip [ ] nasal congestion  CV:                         [ x] negative  [ ] chest pain [ ] orthopnea [ ] palpitations [ ] murmur  Resp:                     [x ] negative [ ] cough [ ] shortness of breath [ ] dyspnea [ ] wheezing [ ] sputum [ ]hemoptysis  GI:                          [ x] negative [ ] nausea [ ] vomiting [ ] diarrhea [ ] constipation [ ] abd pain [ ] dysphagia   :                        [ x] negative [ ] dysuria [ ] nocturia [ ] hematuria [ ] increased urinary frequency  Musculoskeletal: [x ] negative [ ] back pain [ ] myalgias [ ] arthralgias [ ] fracture  Skin:                       [ x] negative [ ] rash [ ] itch  Neurological:        [ x] negative [ ] headache [ ] dizziness [ ] syncope [ ] weakness [ ] numbness  Psychiatric:           [ x] negative [ ] anxiety [ ] depression  Endocrine:            [ x] negative [ ] diabetes [ ] thyroid problem  Heme/Lymph:      [ x] negative [ ] anemia [ ] bleeding problem  Allergic/Immune: [ x] negative [ ] itchy eyes [ ] nasal discharge [ ] hives [ ] angioedema    [ x] All other systems negative  [ ] Unable to assess ROS due to      Physical Exam:  T(F): 97.8 (06-10), Max: 97.8 (06-10)  HR: 86 (06-10) (86 - 86)  BP: 172/82 (06-10) (172/82 - 172/82)  RR: 17 (06-10)  SpO2: 93% (06-10)  GENERAL: No acute distress, well-developed  HEAD:  Atraumatic, Normocephalic  ENT: EOMI, PERRLA, conjunctiva and sclera clear, Neck supple, No JVD, moist mucosa  CHEST/LUNG: Clear to auscultation bilaterally; No wheeze, equal breath sounds bilaterally   BACK: No spinal tenderness  HEART: Regular rate and rhythm; No murmurs, rubs, or gallops  ABDOMEN: Soft, Nontender, Nondistended; Bowel sounds present  EXTREMITIES:  No clubbing, cyanosis, or edema  PSYCH: Nl behavior, nl affect  NEUROLOGY: AAOx3, non-focal, cranial nerves intact  SKIN: Normal color, No rashes or lesions  LINES:    Cardiovascular Diagnostic Testing:    ECG: Personally reviewed:    Echo: Personally reviewed:    Stress Testing:    Cath:    Imaging:    CXR: Personally reviewed    Labs: Personally reviewed                   Cardiology Consult Note    Patient seen and evaluated at bedside    Cardiologist: Dr. Kwasi Chew    Chief Complaint: LE edema    HPI:  89yo woman with diastolic HF, CKD3B, atrial fibrillation (not on anticoagulation), HTN, moderate AS, pulmonary HTN, who presents to the ED with several weeks of worsening LE edema.     Patient's daughter at bedside reports that patient's cardiologist is Dr. Kwasi Chew who prescribed PO furosemide 40 mg daily for LE edema. Patient was taking this daily but continued to have worsening LE edema and was instructed to take 80 mg daily x 6 days without minimal improvement (though patient's son reported 3 lbs weight loss). Patient then saw Dr. Sathish Murdock who recommended continuing furosemide 80 mg daily. Due to lack of response to increased diuretic dose patient was instructed to present to the ED. Of note patient had TTE done as outpatient 6/1/22 revealing preserved LV functio, moderate AS, and moderate pulmonary HTN.    In the ED patient was given 40 IV furosemide.     PMHx:   Benign Essential Hypertension  Chronic Sciatica  Personal History of Coronary Artery Disease  Hypertension  Atrial fibrillation  CVA (cerebral vascular accident)    PSHx:   S/P CABG    Allergies:  No Known Allergies    Home Meds:  · ALPRAZolam 0.25 MG Oral Tablet; TAKE 1 TABLET Every 8 hours PRN anxiety MDD:0.75  mg   · Aspirin 81 MG TABS; TAKE 1 TABLET DAILY   · Cartia  MG Oral Capsule Extended Release 24 Hour; TAKE 1 CAPSULE Daily   · dilTIAZem HCl ER Coated Beads 120 MG Oral Capsule Extended Release 24 Hour;  TAKE 1 CAPSULE Daily   · Eliquis 2.5 MG Oral Tablet; Take 1 tablet twice daily   · Enalapril Maleate 10 MG Oral Tablet   · Furosemide 40 MG Oral Tablet; TAKE 1 TABLET TWICE DAILY   · Levothyroxine Sodium 50 MCG Oral Tablet; TAKE 1 TABLET EVERY DAY   · Metoprolol Tartrate 25 MG Oral Tablet; TAKE 0.5 TABLET Twice daily   · Pradaxa 75 MG Oral Capsule; TAKE 1 CAPSULE Every twelve hours   · Pradaxa 75 MG Oral Capsule; take 1 capsule twice a day   · Simvastatin 40 MG Oral Tablet; take 1 tablet at bedtime   · Zocor 40 MG Oral Tablet; TAKE 1 TABLET DAILY AT BEDTIME   · Zolpidem Tartrate 10 MG Oral Tablet; TAKE 1 TABLET AT BEDTIME AS NEEDED      Current Medications:       FAMILY HISTORY:  No pertinent family history        Social History:  Smoking History:  Alcohol Use:  Drug Use:    REVIEW OF SYSTEMS:  Constitutional:     [x ] negative [ ] fevers [ ] chills [ ] weight loss [ ] weight gain  HEENT:                  [x ] negative [ ] dry eyes [ ] eye irritation [ ] postnasal drip [ ] nasal congestion  CV:                         [ x] negative  [ ] chest pain [ ] orthopnea [ ] palpitations [ ] murmur  Resp:                     [x ] negative [ ] cough [ ] shortness of breath [ ] dyspnea [ ] wheezing [ ] sputum [ ]hemoptysis  GI:                          [ x] negative [ ] nausea [ ] vomiting [ ] diarrhea [ ] constipation [ ] abd pain [ ] dysphagia   :                        [ x] negative [ ] dysuria [ ] nocturia [ ] hematuria [ ] increased urinary frequency  Musculoskeletal: [x ] negative [ ] back pain [ ] myalgias [ ] arthralgias [ ] fracture  Skin:                       [ x] negative [ ] rash [ ] itch  Neurological:        [ x] negative [ ] headache [ ] dizziness [ ] syncope [ ] weakness [ ] numbness  Psychiatric:           [ x] negative [ ] anxiety [ ] depression  Endocrine:            [ x] negative [ ] diabetes [ ] thyroid problem  Heme/Lymph:      [ x] negative [ ] anemia [ ] bleeding problem  Allergic/Immune: [ x] negative [ ] itchy eyes [ ] nasal discharge [ ] hives [ ] angioedema    [ x] All other systems negative  [ ] Unable to assess ROS due to      Physical Exam:  T(F): 97.8 (06-10), Max: 97.8 (06-10)  HR: 86 (06-10) (86 - 86)  BP: 172/82 (06-10) (172/82 - 172/82)  RR: 17 (06-10)  SpO2: 93% (06-10)  GENERAL: No acute distress, well-developed  HEAD:  Atraumatic, Normocephalic  ENT: EOMI, PERRLA, conjunctiva and sclera clear, Neck supple, No JVD, moist mucosa  CHEST/LUNG: Clear to auscultation bilaterally; No wheeze, equal breath sounds bilaterally   BACK: No spinal tenderness  HEART: Regular rate and rhythm; No murmurs, rubs, or gallops  ABDOMEN: Soft, Nontender, Nondistended; Bowel sounds present  EXTREMITIES:  No clubbing, cyanosis, or edema  PSYCH: Nl behavior, nl affect  NEUROLOGY: AAOx3, non-focal, cranial nerves intact  SKIN: Normal color, No rashes or lesions  LINES:    Cardiovascular Diagnostic Testing:    ECG: Personally reviewed:    Echo: Personally reviewed:    Stress Testing:    Cath:    Imaging:    CXR: Personally reviewed    Labs: Personally reviewed                   Cardiology Consult Note    Patient seen and evaluated at bedside    Cardiologist: Dr. Kwasi Chew    Chief Complaint: LE edema    HPI:  89yo woman with diastolic HF, CAD s/p CABG, CKD3B, atrial fibrillation (on apixaban), HTN, moderate AS, pulmonary HTN, who presents to the ED with several weeks of worsening LE edema.     Patient's daughter at bedside reports that patient's cardiologist is Dr. Kwasi Chew who prescribed PO furosemide 40 mg daily for LE edema. Patient was taking this daily but continued to have worsening LE edema and was instructed to take 80 mg daily x 6 days without minimal improvement (though patient's son reported 3 lbs weight loss). Patient then saw Dr. Sathish Murdock who recommended continuing furosemide 80 mg daily. Due to lack of response to increased diuretic dose patient was instructed to present to the ED. Of note patient had TTE done as outpatient 6/1/22 revealing preserved LV functio, moderate AS, and moderate pulmonary HTN.    In the ED patient was given 40 IV furosemide.     PMHx:   Benign Essential Hypertension  Chronic Sciatica  Personal History of Coronary Artery Disease  Hypertension  Atrial fibrillation  CVA (cerebral vascular accident)    PSHx:   S/P CABG    Allergies:  No Known Allergies    Home Meds:  · ALPRAZolam 0.25 MG Oral Tablet; TAKE 1 TABLET Every 8 hours PRN anxiety MDD:0.75  mg   · Aspirin 81 MG TABS; TAKE 1 TABLET DAILY   · Cartia  MG Oral Capsule Extended Release 24 Hour; TAKE 1 CAPSULE Daily   · dilTIAZem HCl ER Coated Beads 120 MG Oral Capsule Extended Release 24 Hour;  TAKE 1 CAPSULE Daily   · Eliquis 2.5 MG Oral Tablet; Take 1 tablet twice daily   · Enalapril Maleate 10 MG Oral Tablet   · Furosemide 40 MG Oral Tablet; TAKE 1 TABLET TWICE DAILY   · Levothyroxine Sodium 50 MCG Oral Tablet; TAKE 1 TABLET EVERY DAY   · Metoprolol Tartrate 25 MG Oral Tablet; TAKE 0.5 TABLET Twice daily   · Pradaxa 75 MG Oral Capsule; TAKE 1 CAPSULE Every twelve hours   · Pradaxa 75 MG Oral Capsule; take 1 capsule twice a day   · Simvastatin 40 MG Oral Tablet; take 1 tablet at bedtime   · Zocor 40 MG Oral Tablet; TAKE 1 TABLET DAILY AT BEDTIME   · Zolpidem Tartrate 10 MG Oral Tablet; TAKE 1 TABLET AT BEDTIME AS NEEDED    Current Medications:       FAMILY HISTORY:  No pertinent family history    Social History:    No toxic habits    REVIEW OF SYSTEMS:  Constitutional:     [x ] negative [ ] fevers [ ] chills [ ] weight loss [ ] weight gain  HEENT:                  [x ] negative [ ] dry eyes [ ] eye irritation [ ] postnasal drip [ ] nasal congestion  CV:                         [ ] negative  [ ] chest pain [ ] orthopnea [ ] palpitations [ ] murmur [x] LE edema  Resp:                     [x ] negative [ ] cough [ ] shortness of breath [ ] dyspnea [ ] wheezing [ ] sputum [ ]hemoptysis  GI:                          [ x] negative [ ] nausea [ ] vomiting [ ] diarrhea [ ] constipation [ ] abd pain [ ] dysphagia   :                        [ x] negative [ ] dysuria [ ] nocturia [ ] hematuria [ ] increased urinary frequency  Musculoskeletal: [x ] negative [ ] back pain [ ] myalgias [ ] arthralgias [ ] fracture  Skin:                       [ x] negative [ ] rash [ ] itch  Neurological:        [ x] negative [ ] headache [ ] dizziness [ ] syncope [ ] weakness [ ] numbness  Psychiatric:           [ x] negative [ ] anxiety [ ] depression  Endocrine:            [ x] negative [ ] diabetes [ ] thyroid problem  Heme/Lymph:      [ x] negative [ ] anemia [ ] bleeding problem  Allergic/Immune: [ x] negative [ ] itchy eyes [ ] nasal discharge [ ] hives [ ] angioedema      Physical Exam:  T(F): 97.8 (06-10), Max: 97.8 (06-10)  HR: 86 (06-10) (86 - 86)  BP: 172/82 (06-10) (172/82 - 172/82)  RR: 17 (06-10)  SpO2: 93% (06-10)  GENERAL: Elderly woman, NAD  HEAD:  Atraumatic, Normocephalic  ENT: EOMI, PERRLA, conjunctiva and sclera clear, Neck supple, No JVD, moist mucosa  CHEST/LUNG: Clear to auscultation bilaterally  HEART: Irregular; 2/6 systolic murmur best heard at LUSB,   ABDOMEN: Soft, Nontender, Nondistended; Bowel sounds present  EXTREMITIES:  3+ edema to thighs with blistering and stasis changes  PSYCH: Nl behavior, nl affect  NEUROLOGY: AAOx3, non-focal, cranial nerves intact  SKIN: Normal color, No rashes or lesions    Cardiovascular Diagnostic Testing:    ECG: Personally reviewed:  Afib with occasional PVCs    Imaging:    CXR: Personally reviewed    Labs: Personally reviewed                          12.5   7.83  )-----------( 230      ( 10 Jarrett 2022 12:50 )             40.7     06-10    143  |  103  |  44<H>  ----------------------------<  99  5.0   |  29  |  1.22    Ca    9.4      10 Jarrett 2022 12:50    TPro  6.6  /  Alb  3.7  /  TBili  0.4  /  DBili  x   /  AST  28  /  ALT  9<L>  /  AlkPhos  71  06-10

## 2022-06-10 NOTE — PATIENT PROFILE ADULT - FALL HARM RISK - HARM RISK INTERVENTIONS
Assistance with ambulation/Assistance OOB with selected safe patient handling equipment/Communicate Risk of Fall with Harm to all staff/Discuss with provider need for PT consult/Monitor gait and stability/Reinforce activity limits and safety measures with patient and family/Tailored Fall Risk Interventions/Toileting schedule using arm’s reach rule for commode and bathroom/Visual Cue: Yellow wristband and red socks/Bed in lowest position, wheels locked, appropriate side rails in place/Call bell, personal items and telephone in reach/Instruct patient to call for assistance before getting out of bed or chair/Non-slip footwear when patient is out of bed/[place] to call system/Physically safe environment - no spills, clutter or unnecessary equipment/Purposeful Proactive Rounding/Room/bathroom lighting operational, light cord in reach

## 2022-06-10 NOTE — ED PROVIDER NOTE - CLINICAL SUMMARY MEDICAL DECISION MAKING FREE TEXT BOX
89 yo history of CHF, CKD, AF (not on AC)), HTN, Pulm HTN and moderate AS coming in w/ worsening LE; patient clinically overloaded on the LE; will assess labs + provide diuretics, likely requires admission 89 yo history of CHF, CKD, AF (not on AC)), HTN, Pulm HTN and moderate AS coming in w/ worsening LE; patient clinically overloaded on the LE; will assess labs + provide diuretics, likely requires admission  ATTG: : lower leg edema not improving at home with medication, concern for acute decom HF, will check labs, cardiac work up check ekg, check xray chest,  cardio eval and re eval for dispo

## 2022-06-10 NOTE — ED ADULT NURSE REASSESSMENT NOTE - NS ED NURSE REASSESS COMMENT FT1
Lasix administered as ordered - NAD noted. Pt appears tachypneic, O2Sat 90%RA - pt repositioned and 4L/NC placed - tolerated well w/ SpO2 improvement up to 95%. Bed locked in lowest position. Red socks in place - call bell within reach - educated pt and family member on use and they verbalized understanding on use. Siderails up x2. MD at bedside for eval. XRay at bedside for portable CXR.

## 2022-06-10 NOTE — CONSULT NOTE ADULT - ASSESSMENT
89yo woman with diastolic HF, CAD s/p CABG, CKD3B, atrial fibrillation (on apixaban), HTN, moderate AS, pulmonary HTN, who presents to the ED with several weeks of worsening LE edema despite increasing does of PO diuretics. No other overt symptoms of CHF (eg, SOB, orthopnea, PND). Likely components of diastolic HF and venous insufficiency.     1. HFpEF, venous insufficiency LE edema  - IV furosemide 40 mg BID, goal neg negative 1-2L/day  - Strict I/Os, daily weights  - Consider wound RN consult for leg wrapping    2. Atrial fibrillation  - Continue home diltiazem  - Continue home reduced dose apixaban    3. CAD s./p CABG  - Continue home asa and statin    Bryan Heart MD  Department of Cardiology  Cardiology Fellow, PGY4   89yo woman with diastolic HF, CAD s/p CABG, CKD3B, atrial fibrillation (on apixaban), HTN, moderate AS, pulmonary HTN, who presents to the ED with several weeks of worsening LE edema despite increasing does of PO diuretics. No other overt symptoms of CHF (eg, SOB, orthopnea, PND). Likely components of diastolic HF and venous insufficiency.     1. HFpEF, venous insufficiency LE edema  - IV furosemide 40 mg BID, goal neg negative 1-2L/day  - Strict I/Os, daily weights  - Consider wound RN consult for leg wrapping    2. Atrial fibrillation  - Continue home diltiazem  - Continue home reduced dose apixaban    3. CAD s./p CABG  - Continue home asa and statin    Bryan Heart MD  Department of Cardiology  Cardiology Fellow, PGY4    This patient was seen and examined personally by me and the plan was discussed with the fellow and/or resident above. Amendments were made as necessary to the above. Agree with the excellent note and plan above. 89F HFpEF, mod AS, PH, CAD here with ESSENCE. Unresponsive to outpt diuresis, needs IV diuresis, strict weight and I/Os. Tele for AF burden.    Wilfred Armenta MD, MPhil, [facility]  Cardiologist, [facility]  ; [facility]  Email: [email or web address] and Cardiovascular Surgery on-service contact/call information, go to [email or web address] and use "[employer]" to login.  Outpatient Cardiology appointments, call [phone] to arrange with a colleague; I do not have outpatient Cardiology clinic.

## 2022-06-10 NOTE — H&P ADULT - PROBLEM SELECTOR PLAN 2
likely combination of HF and underlying venous stasis  -diuresis as above  -Wound care consulted given blistering  -PT

## 2022-06-10 NOTE — H&P ADULT - ASSESSMENT
89 y/o F with h/o HFpEF, CAD s/p CABG, CKD3, Chronic AF on Eliquis, HTN, moderate AS, pulm HTN presenting with 1 month of LE edema which has failed outpatient diuretics c/w HF exacerbation combined with underlying chronic venous stasis, admitted for IV diuresis.

## 2022-06-10 NOTE — ED ADULT NURSE NOTE - OBJECTIVE STATEMENT
Patient presents to ED for worsening b/l LE edema. Patient was seen by cardiologist and placed on 80mg of lasix but has shown no signs of improvement. Legs are now developing blister like bumps. Patient denies CP or SOB, denies abd pain, denies n/v/d, denies problems urinating, denies any recent illness. Patient placed on cardiac monitor.

## 2022-06-11 LAB
ANION GAP SERPL CALC-SCNC: 12 MMOL/L — SIGNIFICANT CHANGE UP (ref 5–17)
BUN SERPL-MCNC: 36 MG/DL — HIGH (ref 7–23)
CALCIUM SERPL-MCNC: 8.9 MG/DL — SIGNIFICANT CHANGE UP (ref 8.4–10.5)
CHLORIDE SERPL-SCNC: 105 MMOL/L — SIGNIFICANT CHANGE UP (ref 96–108)
CO2 SERPL-SCNC: 32 MMOL/L — HIGH (ref 22–31)
CREAT SERPL-MCNC: 0.93 MG/DL — SIGNIFICANT CHANGE UP (ref 0.5–1.3)
EGFR: 57 ML/MIN/1.73M2 — LOW
GLUCOSE SERPL-MCNC: 89 MG/DL — SIGNIFICANT CHANGE UP (ref 70–99)
HCT VFR BLD CALC: 37.2 % — SIGNIFICANT CHANGE UP (ref 34.5–45)
HGB BLD-MCNC: 11.1 G/DL — LOW (ref 11.5–15.5)
MAGNESIUM SERPL-MCNC: 2.2 MG/DL — SIGNIFICANT CHANGE UP (ref 1.6–2.6)
MCHC RBC-ENTMCNC: 29.3 PG — SIGNIFICANT CHANGE UP (ref 27–34)
MCHC RBC-ENTMCNC: 29.8 GM/DL — LOW (ref 32–36)
MCV RBC AUTO: 98.2 FL — SIGNIFICANT CHANGE UP (ref 80–100)
NRBC # BLD: 0 /100 WBCS — SIGNIFICANT CHANGE UP (ref 0–0)
PLATELET # BLD AUTO: 202 K/UL — SIGNIFICANT CHANGE UP (ref 150–400)
POTASSIUM SERPL-MCNC: 3.9 MMOL/L — SIGNIFICANT CHANGE UP (ref 3.5–5.3)
POTASSIUM SERPL-SCNC: 3.9 MMOL/L — SIGNIFICANT CHANGE UP (ref 3.5–5.3)
RBC # BLD: 3.79 M/UL — LOW (ref 3.8–5.2)
RBC # FLD: 14.6 % — HIGH (ref 10.3–14.5)
SODIUM SERPL-SCNC: 149 MMOL/L — HIGH (ref 135–145)
WBC # BLD: 5.29 K/UL — SIGNIFICANT CHANGE UP (ref 3.8–10.5)
WBC # FLD AUTO: 5.29 K/UL — SIGNIFICANT CHANGE UP (ref 3.8–10.5)

## 2022-06-11 PROCEDURE — 99232 SBSQ HOSP IP/OBS MODERATE 35: CPT | Mod: GC

## 2022-06-11 RX ORDER — SENNA PLUS 8.6 MG/1
2 TABLET ORAL AT BEDTIME
Refills: 0 | Status: DISCONTINUED | OUTPATIENT
Start: 2022-06-11 | End: 2022-06-21

## 2022-06-11 RX ORDER — POLYETHYLENE GLYCOL 3350 17 G/17G
17 POWDER, FOR SOLUTION ORAL DAILY
Refills: 0 | Status: DISCONTINUED | OUTPATIENT
Start: 2022-06-11 | End: 2022-06-21

## 2022-06-11 RX ADMIN — SENNA PLUS 2 TABLET(S): 8.6 TABLET ORAL at 21:50

## 2022-06-11 RX ADMIN — POLYETHYLENE GLYCOL 3350 17 GRAM(S): 17 POWDER, FOR SOLUTION ORAL at 18:11

## 2022-06-11 RX ADMIN — Medication 40 MILLIGRAM(S): at 06:43

## 2022-06-11 RX ADMIN — Medication 81 MILLIGRAM(S): at 15:22

## 2022-06-11 RX ADMIN — Medication 1 APPLICATION(S): at 20:23

## 2022-06-11 RX ADMIN — SIMVASTATIN 40 MILLIGRAM(S): 20 TABLET, FILM COATED ORAL at 21:50

## 2022-06-11 RX ADMIN — APIXABAN 2.5 MILLIGRAM(S): 2.5 TABLET, FILM COATED ORAL at 18:12

## 2022-06-11 RX ADMIN — ZOLPIDEM TARTRATE 5 MILLIGRAM(S): 10 TABLET ORAL at 21:50

## 2022-06-11 RX ADMIN — Medication 40 MILLIGRAM(S): at 15:21

## 2022-06-11 RX ADMIN — APIXABAN 2.5 MILLIGRAM(S): 2.5 TABLET, FILM COATED ORAL at 06:43

## 2022-06-11 RX ADMIN — Medication 1 TABLET(S): at 15:22

## 2022-06-11 RX ADMIN — BRIMONIDINE TARTRATE 1 DROP(S): 2 SOLUTION/ DROPS OPHTHALMIC at 21:50

## 2022-06-11 RX ADMIN — Medication 50 MICROGRAM(S): at 06:43

## 2022-06-11 RX ADMIN — BRIMONIDINE TARTRATE 1 DROP(S): 2 SOLUTION/ DROPS OPHTHALMIC at 06:42

## 2022-06-11 RX ADMIN — Medication 120 MILLIGRAM(S): at 21:50

## 2022-06-11 RX ADMIN — Medication 240 MILLIGRAM(S): at 06:43

## 2022-06-11 NOTE — PROGRESS NOTE ADULT - SUBJECTIVE AND OBJECTIVE BOX
Patient is a 89y old  Female who presents with a chief complaint of LE swelling (11 Jun 2022 08:56)      SUBJECTIVE / OVERNIGHT EVENTS:    Patient seen and examined. feels fine. dtr at bedside. pt denies cp sob.      Vital Signs Last 24 Hrs  T(C): 36.3 (11 Jun 2022 03:50), Max: 36.7 (10 Jarrett 2022 15:01)  T(F): 97.3 (11 Jun 2022 03:50), Max: 98 (10 Jarrett 2022 15:01)  HR: 103 (11 Jun 2022 10:13) (76 - 103)  BP: 145/73 (11 Jun 2022 10:13) (137/71 - 172/82)  BP(mean): 112 (10 Jarrett 2022 14:46) (112 - 112)  RR: 18 (11 Jun 2022 03:50) (17 - 18)  SpO2: 98% (11 Jun 2022 10:13) (93% - 98%)  I&O's Summary    10 Jarrett 2022 07:01  -  11 Jun 2022 07:00  --------------------------------------------------------  IN: 240 mL / OUT: 900 mL / NET: -660 mL        PE:  GENERAL: NAD, AAOx3  CHEST/LUNG: bl rales  HEART: irregular irregular no murmur  ABDOMEN: Soft, Nontender, Nondistended; Bowel sounds present  EXTREMITIES:  2+ Peripheral Pulses, +2 le edema, chronic venous stasis  NEURO: No focal deficits    LABS:                        11.1   5.29  )-----------( 202      ( 11 Jun 2022 07:13 )             37.2     06-11    149<H>  |  105  |  36<H>  ----------------------------<  89  3.9   |  32<H>  |  0.93    Ca    8.9      11 Jun 2022 07:12  Mg     2.2     06-11    TPro  6.6  /  Alb  3.7  /  TBili  0.4  /  DBili  x   /  AST  28  /  ALT  9<L>  /  AlkPhos  71  06-10      CAPILLARY BLOOD GLUCOSE                RADIOLOGY & ADDITIONAL TESTS:    Imaging Personally Reviewed:  [x] YES  [ ] NO    Consultant(s) Notes Reviewed:  [x] YES  [ ] NO    MEDICATIONS  (STANDING):  apixaban 2.5 milliGRAM(s) Oral every 12 hours  aspirin enteric coated 81 milliGRAM(s) Oral daily  brimonidine 0.2% Ophthalmic Solution 1 Drop(s) Both EYES two times a day  diltiazem    milliGRAM(s) Oral daily  diltiazem    milliGRAM(s) Oral at bedtime  furosemide   Injectable 40 milliGRAM(s) IV Push two times a day  levothyroxine 50 MICROGram(s) Oral daily  multivitamin 1 Tablet(s) Oral daily  simvastatin 40 milliGRAM(s) Oral at bedtime  zolpidem 5 milliGRAM(s) Oral at bedtime    MEDICATIONS  (PRN):  acetaminophen     Tablet .. 650 milliGRAM(s) Oral every 6 hours PRN Moderate Pain (4 - 6)      Care Discussed with Consultants/Other Providers [x] YES  [ ] NO    HEALTH ISSUES - PROBLEM Dx:  Chronic heart failure with preserved ejection fraction (HFpEF)    Lower extremity edema    Acute on chronic heart failure with preserved ejection fraction (HFpEF)    Chronic atrial fibrillation    HTN (hypertension)    Stage 3 chronic kidney disease    DVT prophylaxis    Hypothyroidism    HLD (hyperlipidemia)    CAD (coronary artery disease)

## 2022-06-11 NOTE — PHYSICAL THERAPY INITIAL EVALUATION ADULT - ADDITIONAL COMMENTS
Pt lives alone in a pvt house w/ 3 steps to enter. Pt amb w/ RW PTA. Pt req occassional assistance from her DTR w/ ADL's.

## 2022-06-11 NOTE — CHART NOTE - NSCHARTNOTEFT_GEN_A_CORE
HPI: 89 y/o F with h/o HFpEF, CAD s/p CABG, CKD3, Chronic AF on Eliquis, HTN, moderate AS, pulm HTN presenting with 1 month of LE edema.     DERM: Dermatology consulted for itchy rash x 1 week. Rash started on the bilateral thighs and spread to involve the lower abdomen. Rash is not painful. There is no involvement of the eyes, mouth, or genital area. Patient also has chronic lymphedema of the lower legs with blisters, which has remained stable.    Patient has not been on any new medications – same medications for years, only with an increase in Lasix dose recently.     Afebrile VSS    WBC: 5.29 // Eos: -- (--%) // Neut: -- (--%) // Lymph: -- (--%) // Atypical Lymphs (--% or --%) (06-11)  WBC: 7.83 // Eos: 0.65<H> (8.3<H>%) // Neut: 4.74 (60.5%) // Lymph: 1.31 (16.7%) // Atypical Lymphs (--% or --%) (06-10)  Hb: 11.1<L> // PLT: 202 (06-11)  Hb: 12.5 // PLT: 230 (06-10)    BUN: 36<H> // Cr: 0.93 (06-11)  BUN: 44<H> // Cr: 1.22 (06-10)  AST: 28 // ALT: 9<L> // ALP: 71 (06-10)    CXR:   Mild pulmonary vascular congestion.  Trace left pleural effusion with left basilar atelectasis.    COVID neg     PE (per photos): pink edematous papules and plaques of the thighs and abdomen     A&P – presentation favors drug eruption, however patient has no reported history of exposure to new medications. Exanthematous drug eruption (ENE; also known as morbilliform drug eruption) is the most common of all medication-induced drug rashes. It consists of red macules and papules that often arise on the trunk and spread symmetrically to involve the proximal extremities. In severe cases, lesions coalesce and may lead to erythroderma. Palms, soles, and mucous membranes may also be involved. Pruritus is a common complaint. While most patients are afebrile, a low-grade fever may occur in more severe reactions. Onset is usually within 7-10 days of initiating a medication, but a lag time of up to 14 days has been reported. The eruption may occur even if the offending medication has already been discontinued.    Differential includes eczematous      At this time:   - Recommend topical triamcinolone 0.1% ointment BID to affected areas of skin     The patient's case and photos were reviewed remotely with the dermatology attending Dr. Barlow. Recommendations were communicated with the primary team.  Please page [phone] w/10 digit call back number for further related questions.    Lisa Cruz MD  Resident Physician, PGY2  [facility] Dermatology  Pager: [phone]  Office: [phone]

## 2022-06-11 NOTE — PHYSICAL THERAPY INITIAL EVALUATION ADULT - MODALITIES TREATMENT COMMENTS
B/l LE edema w/ +Bullae/Blisters red and normal temp to the touch t/o; Mid thigh and pannus red rash like appearance. No open wounds noted

## 2022-06-11 NOTE — PROGRESS NOTE ADULT - SUBJECTIVE AND OBJECTIVE BOX
INTERVAL EVENTS: No o/n events. Denies CP, dyspnea, palpitations, presyncope, syncope, f/c/n/v.     REVIEW OF SYSTEMS:  Constitutional:     [X] negative [ ] fevers [ ] chills [ ] weight loss [ ] weight gain  HEENT:                  [X] negative [ ] dry eyes [ ] eye irritation [ ] postnasal drip [ ] nasal congestion  CV:                         [X] negative  [ ] chest pain [ ] orthopnea [ ] palpitations [ ] murmur  Resp:                     [X] negative [ ] cough [ ] shortness of breath [ ] wheezing [ ] sputum [ ] hemoptysis  GI:                          [X] negative [ ] nausea [ ] vomiting [ ] diarrhea [ ] constipation [ ] abd pain [ ] dysphagia   :                        [X] negative [ ] dysuria [ ] nocturia [ ] hematuria [ ] increased urinary frequency  MSK:                      [X] negative [ ] back pain [ ] myalgias [ ] arthralgias [ ] fracture  Skin:                       [X] negative [ ] rash [ ] itch  Neuro:                   [X] negative [ ] headache [ ] dizziness [ ] syncope [ ] weakness [ ] numbness  Psych:                    [X] negative [ ] anxiety [ ] depression  Endo:                     [X] negative [ ] diabetes [ ] thyroid problem  Heme/Lymph:      [X] negative [ ] anemia [ ] bleeding problem  Allergic/Immune: [X] negative [ ] itchy eyes [ ] nasal discharge [ ] hives [ ] angioedema    [X] All other systems negative or otherwise described above.  [ ] Unable to assess ROS because ________.    PAST MEDICAL & SURGICAL HISTORY:  Benign Essential Hypertension    Chronic Sciatica    Personal History of Coronary Artery Disease    Hypertension    Atrial fibrillation    CVA (cerebral vascular accident)    S/P CABG      MEDICATIONS  (STANDING):  apixaban 2.5 milliGRAM(s) Oral every 12 hours  aspirin enteric coated 81 milliGRAM(s) Oral daily  brimonidine 0.2% Ophthalmic Solution 1 Drop(s) Both EYES two times a day  diltiazem    milliGRAM(s) Oral daily  diltiazem    milliGRAM(s) Oral at bedtime  furosemide   Injectable 40 milliGRAM(s) IV Push two times a day  levothyroxine 50 MICROGram(s) Oral daily  multivitamin 1 Tablet(s) Oral daily  simvastatin 40 milliGRAM(s) Oral at bedtime  zolpidem 5 milliGRAM(s) Oral at bedtime    MEDICATIONS  (PRN):  acetaminophen     Tablet .. 650 milliGRAM(s) Oral every 6 hours PRN Moderate Pain (4 - 6)    ICU Vital Signs Last 24 Hrs  T(C): 36.3 (2022 03:50), Max: 36.7 (10 Jarrett 2022 15:01)  T(F): 97.3 (2022 03:50), Max: 98 (10 Jarrett 2022 15:01)  HR: 84 (2022 03:50) (76 - 86)  BP: 139/73 (2022 03:50) (137/71 - 172/82)  BP(mean): 112 (10 Jarrett 2022 14:46) (112 - 112)  ABP: --  ABP(mean): --  RR: 18 (2022 03:50) (17 - 18)  SpO2: 96% (2022 03:50) (93% - 98%)    Orthostatic VS    Daily Height in cm: 142.24 (10 Jarrett 2022 11:17)    Daily Weight in k.9 (2022 03:50)  I&O's Summary    10 Jarrett 2022 07:01  -  2022 07:00  --------------------------------------------------------  IN: 240 mL / OUT: 900 mL / NET: -660 mL        PHYSICAL EXAM:  GENERAL: Elderly woman, NAD  HEAD:  Atraumatic, Normocephalic  ENT: EOMI, PERRLA, conjunctiva and sclera clear, Neck supple, No JVD, moist mucosa  CHEST/LUNG: Clear to auscultation bilaterally  HEART: Irregular; 2/6 systolic murmur best heard at LUSB,   ABDOMEN: Soft, Nontender, Nondistended; Bowel sounds present  EXTREMITIES:  3+ pitting edema to thighs with blistering and stasis changes  PSYCH: Nl behavior, nl affect  NEUROLOGY: AAOx3, non-focal, cranial nerves intact  SKIN: Normal color, No rashes or lesions     LABS:                        11.1   5.29  )-----------( 202      ( 2022 07:13 )             37.2       06-11    149<H>  |  105  |  36<H>  ----------------------------<  89  3.9   |  32<H>  |  0.93    Ca    8.9      2022 07:12  Mg     2.2     -11    TPro  6.6  /  Alb  3.7  /  TBili  0.4  /  DBili  x   /  AST  28  /  ALT  9<L>  /  AlkPhos  71  06-10        proBNP: Serum Pro-Brain Natriuretic Peptide: 2152 pg/mL (06-10-22 @ 12:50)    Lipid Profile:     HgA1c:   TSH:         RADIOLOGY & ADDITIONAL STUDIES:    Cardiovascular Diagnostic Testing    Telemetry: reviewed, AF    Echo: Personally reviewed  < from: Transthoracic Echocardiogram (16 @ 16:28) >  Conclusions:  1. Mitral annular calcification. The posterior annulus is  heavily calcified. There appears to be independently mobile  material seen at the posterior annulus. This may represent  endocarditis, clinical correlation indicated.  2. Calcified trileaflet aortic valve with normal opening.  Mild-moderate aortic regurgitation.  msec  3. Increased relative wall thickness with normal left  ventricular mass index, consistent with concentric left  ventricular remodeling.  4. Endocardium not well visualized; Mild segmental left  ventricular systolic dysfunction. The base-mid inferior  wall, the mid lateral wall, and the basal inferoseptum are  hypokinetic.  Regional wall motion variation is noted on  the setting ofatrial fibrillation.  5. Normal right ventricular size with mildly  decreased  right ventricular systolic function.  6. Normal tricuspid valve. Mild-moderate tricuspid  regurgitation.  7. Estimated pulmonary artery systolic pressure equals 44  mm Hg,assuming right atrial pressure equals 8 mm Hg,  consistent with mild pulmonary pressures.  ------------------------------------------------------------------------  Confirmed on  2016 - 14:45:39 by Jazzy Phillips M.D.  ------------------------------------------------------------------------    < end of copied text >    Stress Testing: none    Cath:   < from: Cardiac Cath Lab (11 @ 11:47) >  Coronary vessels: The coronary circulation is right dominant.  LM:      LM: Normal. Angiography showed minor luminal irregularities with  no flow limiting lesions.  LAD:      Proximal LAD: There was a tubular 90 % stenosis.  Mid LAD: There was a diffuse 90 % stenosis.  CX:      Proximal circumflex: There was a 90 % stenosis.  RCA:      Proximal RCA: There was a 80 % stenosis.  Complications: There were no complications.  Recommendations:  Consultation with a cardiac surgeon be obtained for coronary artery bypass  grafting.  Prepared and signed by  Michael Dickens M.D.    < end of copied text >      CXR: Personally reviewed  < from: Xray Chest 1 View- PORTABLE-Urgent (Xray Chest 1 View- PORTABLE-Urgent .) (06.10.22 @ 12:22) >  IMPRESSION:  Mild pulmonary vascular congestion.    Trace left pleural effusion with left basilar atelectasis.    --- End of Report ---    < end of copied text >    Other cardiac imaging: none    Other misc imaging: none

## 2022-06-11 NOTE — PHYSICAL THERAPY INITIAL EVALUATION ADULT - PERTINENT HX OF CURRENT PROBLEM, REHAB EVAL
89 y/o F with h/o HFpEF, CAD s/p CABG, CKD3, Chronic AF on Eliquis, HTN, moderate AS, pulm HTN presenting with 1 month of LE edema which has failed outpatient diuretics c/w HF exacerbation combined with underlying chronic venous stasis, admitted for IV diuresis.

## 2022-06-11 NOTE — PHYSICAL THERAPY INITIAL EVALUATION ADULT - DISCHARGE DISPOSITION, PT EVAL
Subacute rehab. If pt goes home, she will benefit from Home PT to address functional deficits./rehabilitation facility

## 2022-06-11 NOTE — PROGRESS NOTE ADULT - ASSESSMENT
89yo woman with diastolic HF, CAD s/p CABG, CKD3B, atrial fibrillation (on apixaban), HTN, moderate AS, pulmonary HTN, who presents to the ED with several weeks of worsening LE edema despite increasing does of PO diuretics. No other overt symptoms of CHF (eg, SOB, orthopnea, PND). Likely components of diastolic HF and venous insufficiency.     1. HFpEF, venous insufficiency LE edema  - IV furosemide 40 mg BID, goal neg negative 1-2L/day (currently net negative 660cc)  - Strict I/Os, daily weights (standing if possible)  - Consider wound RN consult for leg wrapping    2. Atrial fibrillation  - Continue home diltiazem  - Continue home reduced dose apixaban    3. CAD s./p CABG  - Continue home asa and statin 89yo woman with diastolic HF, CAD s/p CABG, CKD3B, atrial fibrillation (on apixaban), HTN, moderate AS, pulmonary HTN, who presents to the ED with several weeks of worsening LE edema despite increasing does of PO diuretics. No other overt symptoms of CHF (eg, SOB, orthopnea, PND). Likely components of diastolic HF and venous insufficiency.     1. HFpEF, venous insufficiency LE edema  - IV furosemide 40 mg BID, goal neg negative 1-2L/day (currently net negative 660cc)  - Strict I/Os, daily weights (standing if possible)  - Wound consult for leg wrapping    2. Atrial fibrillation  - Continue home diltiazem  - Continue home reduced dose apixaban    3. CAD s/p CABG  - Continue home asa and statin 89yo woman with diastolic HF, CAD s/p CABG, CKD3B, atrial fibrillation (on apixaban), HTN, moderate AS, pulmonary HTN, who presents to the ED with several weeks of worsening LE edema despite increasing does of PO diuretics. No other overt symptoms of CHF (eg, SOB, orthopnea, PND). Likely components of diastolic HF and venous insufficiency.     1. HFpEF, venous insufficiency LE edema  - IV furosemide 40 mg BID, goal neg negative 1-2L/day (currently net negative 660cc)  - Strict I/Os, daily weights (standing if possible)  - Wan supplemental O2 as tolerated  - Wound consult for leg wrapping    2. Atrial fibrillation  - Continue home diltiazem  - Continue home reduced dose apixaban    3. CAD s/p CABG  - Continue home asa and statin 89yo woman with diastolic HF, CAD s/p CABG, CKD3B, atrial fibrillation (on apixaban), HTN, moderate AS, pulmonary HTN, who presents to the ED with several weeks of worsening LE edema despite increasing does of PO diuretics. No other overt symptoms of CHF (eg, SOB, orthopnea, PND). Likely components of diastolic HF and venous insufficiency.     1. HFpEF, venous insufficiency LE edema  - IV furosemide 40 mg BID, goal neg negative 1-2L/day (currently net negative 660cc)  - Strict I/Os, daily weights (standing if possible)  - Wan supplemental O2 as tolerated  - Wound consult for leg wrapping    2. Atrial fibrillation  - Continue home diltiazem  - Continue home reduced dose apixaban    3. CAD s/p CABG  - Continue home asa and statin      Eduardo Perez MD  Department of Cardiology  Cardiology Fellow

## 2022-06-11 NOTE — PROGRESS NOTE ADULT - ASSESSMENT
89 y/o F with h/o HFpEF, CAD s/p CABG, CKD3, Chronic AF on Eliquis, HTN, moderate AS, pulm HTN presenting with 1 month of LE edema which has failed outpatient diuretics c/w HF exacerbation combined with underlying chronic venous stasis, admitted for IV diuresis.     # Acute on chronic HFpEF  # Chronic atrial fibrillation  # HTN, HLD  # CAD s/p CABG  Lasix 40 IV BID, diuretics per cardio  monitor co2 while diuresing  Monitor I/O, Daily weight  Tele  eliquis   dilt 240 am, 120 pm, rate controlled  cont asa statin    # venous stasis  diuresis as above  Wound care consulted given blistering  PT    # JACK on Stage 3 chronic kidney disease  improving with diuretics  cont to monitor    # Hypothyroidism   synthroid 50mcg    DVT prophylaxis. eliquis.    matthias dtr [place] at bedside    Please call [facility] with questions [phone].

## 2022-06-12 LAB
ANION GAP SERPL CALC-SCNC: 11 MMOL/L — SIGNIFICANT CHANGE UP (ref 5–17)
APPEARANCE UR: CLEAR — SIGNIFICANT CHANGE UP
BACTERIA # UR AUTO: NEGATIVE — SIGNIFICANT CHANGE UP
BILIRUB UR-MCNC: NEGATIVE — SIGNIFICANT CHANGE UP
BUN SERPL-MCNC: 37 MG/DL — HIGH (ref 7–23)
CALCIUM SERPL-MCNC: 9 MG/DL — SIGNIFICANT CHANGE UP (ref 8.4–10.5)
CHLORIDE SERPL-SCNC: 103 MMOL/L — SIGNIFICANT CHANGE UP (ref 96–108)
CHLORIDE UR-SCNC: 44 MMOL/L — SIGNIFICANT CHANGE UP
CO2 SERPL-SCNC: 32 MMOL/L — HIGH (ref 22–31)
COLOR SPEC: YELLOW — SIGNIFICANT CHANGE UP
CREAT ?TM UR-MCNC: 70 MG/DL — SIGNIFICANT CHANGE UP
CREAT SERPL-MCNC: 1.35 MG/DL — HIGH (ref 0.5–1.3)
DIFF PNL FLD: NEGATIVE — SIGNIFICANT CHANGE UP
EGFR: 36 ML/MIN/1.73M2 — LOW
EPI CELLS # UR: 0 /HPF — SIGNIFICANT CHANGE UP
GLUCOSE SERPL-MCNC: 101 MG/DL — HIGH (ref 70–99)
GLUCOSE UR QL: NEGATIVE — SIGNIFICANT CHANGE UP
KETONES UR-MCNC: NEGATIVE — SIGNIFICANT CHANGE UP
LEUKOCYTE ESTERASE UR-ACNC: ABNORMAL
MAGNESIUM SERPL-MCNC: 2.2 MG/DL — SIGNIFICANT CHANGE UP (ref 1.6–2.6)
NITRITE UR-MCNC: NEGATIVE — SIGNIFICANT CHANGE UP
PH UR: 6.5 — SIGNIFICANT CHANGE UP (ref 5–8)
POTASSIUM SERPL-MCNC: 3.8 MMOL/L — SIGNIFICANT CHANGE UP (ref 3.5–5.3)
POTASSIUM SERPL-SCNC: 3.8 MMOL/L — SIGNIFICANT CHANGE UP (ref 3.5–5.3)
PROT ?TM UR-MCNC: 119 MG/DL — HIGH (ref 0–12)
PROT UR-MCNC: 100 — SIGNIFICANT CHANGE UP
PROT/CREAT UR-RTO: 1.7 RATIO — HIGH (ref 0–0.2)
RBC CASTS # UR COMP ASSIST: 3 /HPF — SIGNIFICANT CHANGE UP (ref 0–4)
SODIUM SERPL-SCNC: 146 MMOL/L — HIGH (ref 135–145)
SODIUM UR-SCNC: 59 MMOL/L — SIGNIFICANT CHANGE UP
SP GR SPEC: 1.02 — SIGNIFICANT CHANGE UP (ref 1.01–1.02)
UROBILINOGEN FLD QL: NEGATIVE — SIGNIFICANT CHANGE UP
WBC UR QL: 2 /HPF — SIGNIFICANT CHANGE UP (ref 0–5)

## 2022-06-12 PROCEDURE — 99232 SBSQ HOSP IP/OBS MODERATE 35: CPT | Mod: GC

## 2022-06-12 RX ORDER — FUROSEMIDE 40 MG
40 TABLET ORAL DAILY
Refills: 0 | Status: DISCONTINUED | OUTPATIENT
Start: 2022-06-13 | End: 2022-06-14

## 2022-06-12 RX ADMIN — Medication 650 MILLIGRAM(S): at 21:10

## 2022-06-12 RX ADMIN — Medication 1 APPLICATION(S): at 15:54

## 2022-06-12 RX ADMIN — BRIMONIDINE TARTRATE 1 DROP(S): 2 SOLUTION/ DROPS OPHTHALMIC at 21:19

## 2022-06-12 RX ADMIN — APIXABAN 2.5 MILLIGRAM(S): 2.5 TABLET, FILM COATED ORAL at 06:33

## 2022-06-12 RX ADMIN — Medication 40 MILLIGRAM(S): at 06:33

## 2022-06-12 RX ADMIN — Medication 650 MILLIGRAM(S): at 20:17

## 2022-06-12 RX ADMIN — Medication 1 APPLICATION(S): at 18:23

## 2022-06-12 RX ADMIN — ZOLPIDEM TARTRATE 5 MILLIGRAM(S): 10 TABLET ORAL at 23:00

## 2022-06-12 RX ADMIN — POLYETHYLENE GLYCOL 3350 17 GRAM(S): 17 POWDER, FOR SOLUTION ORAL at 15:54

## 2022-06-12 RX ADMIN — Medication 650 MILLIGRAM(S): at 03:39

## 2022-06-12 RX ADMIN — Medication 10 MILLIGRAM(S): at 08:52

## 2022-06-12 RX ADMIN — Medication 1 TABLET(S): at 15:53

## 2022-06-12 RX ADMIN — APIXABAN 2.5 MILLIGRAM(S): 2.5 TABLET, FILM COATED ORAL at 18:24

## 2022-06-12 RX ADMIN — Medication 650 MILLIGRAM(S): at 04:10

## 2022-06-12 RX ADMIN — Medication 240 MILLIGRAM(S): at 06:33

## 2022-06-12 RX ADMIN — SIMVASTATIN 40 MILLIGRAM(S): 20 TABLET, FILM COATED ORAL at 21:19

## 2022-06-12 RX ADMIN — SENNA PLUS 2 TABLET(S): 8.6 TABLET ORAL at 21:19

## 2022-06-12 RX ADMIN — Medication 50 MICROGRAM(S): at 06:32

## 2022-06-12 RX ADMIN — Medication 120 MILLIGRAM(S): at 21:19

## 2022-06-12 RX ADMIN — Medication 81 MILLIGRAM(S): at 15:53

## 2022-06-12 RX ADMIN — BRIMONIDINE TARTRATE 1 DROP(S): 2 SOLUTION/ DROPS OPHTHALMIC at 06:33

## 2022-06-12 NOTE — CONSULT NOTE ADULT - SUBJECTIVE AND OBJECTIVE BOX
[facility] [place] [facility]ACT[facility]   MD XAVIER ARMSTRONG MD KRISTINE SOLTANPOUR, DO ANGELA WONG, PA        TEL:  OFFICE: [phone]  From 5pm-7am answering service [phone]    --- INITIAL RENAL CONSULT NOTE ---date of service 06-12-22 @ 16:02    HPI:  CC: 89 y/o F presenting with b/l LE swelling for 1 month    HPI: 89 y/o F with h/o HFpEF, CAD s/p CABG, CKD3, Chronic AF on Eliquis, HTN, moderate AS, pulm HTN presenting with 1 month of LE edema. History per patient and her daughter.     Over the past month, patient has noticed increased b/l LE swelling and heaviness while ambulating with walker. Denies SOB over this time/cp. Has been on lasix outpatient.  Cardiologist Dr. Chew prescribed increased dose to 40 BID for 6 days, without improvement. Presented to Dr. Murdock more recently recommended similarly to increase to 40 BID for a few days. Patient and daughter report no improvement so presented to ED. Of note denies cp/sob/inability to lie flat. Denies f/chills/recent sick contacts. Denies new cough. Recent outpatient TTE 6/1 with moderate AS and mod pulm HTN, EF 55%. Nephrology consulted for JACK baseline Scr was 0.93 mg/dl    In ED afeb hds 93% on RA. labs notable for probnp of 2152. Received lasix 40 IV *1. Cardiology was consulted.   Since then now on 3L NC sat 94% speaking in full sentences (10 Jarrett 2022 14:46)        Allergies:  No Known Allergies      PAST MEDICAL & SURGICAL HISTORY:  Benign Essential Hypertension  Chronic Sciatica  Personal History of Coronary Artery Disease  Hypertension  Atrial fibrillation  CVA (cerebral vascular accident)  S/P CABG      Home Medications:  aspirin 81 mg oral delayed release tablet: 1 tab(s) orally once a day (23 Nov 2016 15:28)  Combigan 0.2%-0.5% ophthalmic solution: 1 drop(s) to each affected eye every 12 hours (23 Nov 2016 15:28)  levothyroxine 50 mcg (0.05 mg) oral capsule: 1 cap(s) orally once a day (23 Nov 2016 15:28)  multivitamin: 1 tab(s) orally once a day (23 Nov 2016 15:28)  simvastatin 40 mg oral tablet: 1 tab(s) orally once a day (at bedtime) (23 Nov 2016 15:28)      Home Medications Reviewed    Hospital Medications:   MEDICATIONS  (STANDING):  apixaban 2.5 milliGRAM(s) Oral every 12 hours  aspirin enteric coated 81 milliGRAM(s) Oral daily  brimonidine 0.2% Ophthalmic Solution 1 Drop(s) Both EYES two times a day  diltiazem    milliGRAM(s) Oral daily  diltiazem    milliGRAM(s) Oral at bedtime  furosemide   Injectable 40 milliGRAM(s) IV Push two times a day  levothyroxine 50 MICROGram(s) Oral daily  multivitamin 1 Tablet(s) Oral daily  polyethylene glycol 3350 17 Gram(s) Oral daily  senna 2 Tablet(s) Oral at bedtime  simvastatin 40 milliGRAM(s) Oral at bedtime  triamcinolone 0.1% Ointment 1 Application(s) Topical every 12 hours  zolpidem 5 milliGRAM(s) Oral at bedtime      SOCIAL HISTORY:  Denies ETOh, Smoking,     FAMILY HISTORY:  No pertinent family history        REVIEW OF SYSTEMS:  CONSTITUTIONAL: No weakness, fevers or chills  EYES/ENT: No visual changes;  No vertigo or throat pain   NECK: No pain or stiffness  RESPIRATORY: No cough, wheezing, hemoptysis; Has shortness of breath on exertion  CARDIOVASCULAR: No chest pain or palpitations.  GASTROINTESTINAL: No abdominal or epigastric pain. No nausea, vomiting, or hematemesis; No diarrhea or constipation. No melena or hematochezia.  GENITOURINARY: No dysuria, frequency, foamy urine, urinary urgency, incontinence or hematuria  NEUROLOGICAL: No numbness or weakness  SKIN: No itching, burning, rashes, or lesions   VASCULAR: No bilateral lower extremity edema.   All other review of systems is negative unless indicated above.    VITALS:  T(F): 98.1 (06-12-22 @ 12:21), Max: 98.1 (06-11-22 @ 20:01)  HR: 77 (06-12-22 @ 12:21)  BP: 140/77 (06-12-22 @ 12:21)  RR: 18 (06-12-22 @ 12:21)  SpO2: 96% (06-12-22 @ 12:21)  Wt(kg): --    06-11 @ 07:01  -  06-12 @ 07:00  --------------------------------------------------------  IN: 720 mL / OUT: 600 mL / NET: 120 mL    06-12 @ 07:01  -  06-12 @ 16:02  --------------------------------------------------------  IN: 180 mL / OUT: 850 mL / NET: -670 mL          PHYSICAL EXAM:  General: NAD  HEENT: anicteric sclera, oropharynx clear, MMM  Neck: No JVD  Respiratory: CTAB, no wheezes, rales or rhonchi  Cardiovascular: S1, S2, RRR  Gastrointestinal: BS+, soft, NT/ND  Extremities: No cyanosis or clubbing. 1+ edema  Neurological: A/O x 3, no focal deficits  Psychiatric: Normal mood, normal affect  : No CVA tenderness. No hollingsworth.   Skin: No rashes      LABS:  06-12    146<H>  |  103  |  37<H>  ----------------------------<  101<H>  3.8   |  32<H>  |  1.35<H>    Ca    9.0      12 Jun 2022 07:09  Mg     2.2     06-12      Creatinine Trend: 1.35 <--, 0.93 <--, 1.22 <--                        11.1   5.29  )-----------( 202      ( 11 Jun 2022 07:13 )             37.2     Urine Studies:        RADIOLOGY & ADDITIONAL STUDIES:    ACC: [id number] EXAM:  XR CHEST PORTABLE URGENT 1V                          PROCEDURE DATE:  06/10/2022          INTERPRETATION:  EXAMINATION: XR CHEST URGENT    CLINICAL INDICATION: Shortness of Breath    TECHNIQUE: Single frontal, portable view of the chest was obtained.    COMPARISON: Chest x-ray 11/21/2016    FINDINGS:  Heart size and mediastinum difficult to assess on this projection.   Calcified aortic knob.  Status post median sternotomy.  Mild pulmonary vascular congestion.  Trace left pleural effusion with left basilar atelectasis. No   pneumothorax.  Right humeral head orthopedic screws noted.    IMPRESSION:  Mild pulmonary vascular congestion.    Trace left pleural effusion with left basilar atelectasis.    --- End of Report ---          SEBLE HUMPHREYS MD; Resident Radiologist  This document has been electronically signed.  NAVYA OMER MD; Attending Radiologist  This document has been electronically signed. Jun 11 2022 12:36AM

## 2022-06-12 NOTE — PROGRESS NOTE ADULT - ASSESSMENT
89 y/o F with h/o HFpEF, CAD s/p CABG, CKD3, Chronic AF on Eliquis, HTN, moderate AS, pulm HTN presenting with 1 month of LE edema which has failed outpatient diuretics c/w HF exacerbation combined with underlying chronic venous stasis, admitted for IV diuresis.     # Acute on chronic HFpEF  # Chronic atrial fibrillation  # HTN, HLD  # CAD s/p CABG  Lasix 40 IV BID, diuretics per cardio  monitor CO2 while diuresing  Monitor I/O, Daily weight  Tele  eliquis   dilt 240 am, 120 pm, rate controlled  cont asa statin    # xanthematous drug eruption  appreciate derm recs  Recommend topical triamcinolone 0.1% ointment BID to affected areas of skin     # venous stasis  diuresis as above  Wound care consulted given blistering  PT    # JACK on Stage 3 chronic kidney disease  monitor on diuretics  renal consult    # Hypothyroidism   synthroid 50mcg    DVT prophylaxis. eliquis    [facility] [phone].

## 2022-06-12 NOTE — PROGRESS NOTE ADULT - SUBJECTIVE AND OBJECTIVE BOX
Patient is a 89y old  Female who presents with a chief complaint of LE swelling (12 Jun 2022 07:53)      SUBJECTIVE / OVERNIGHT EVENTS:    Patient seen and examined. denies pruritis. denies cp sob. co constipation.      Vital Signs Last 24 Hrs  T(C): 36.7 (12 Jun 2022 12:21), Max: 36.7 (11 Jun 2022 20:01)  T(F): 98.1 (12 Jun 2022 12:21), Max: 98.1 (11 Jun 2022 20:01)  HR: 77 (12 Jun 2022 12:21) (63 - 79)  BP: 140/77 (12 Jun 2022 12:21) (140/77 - 162/70)  BP(mean): --  RR: 18 (12 Jun 2022 12:21) (18 - 18)  SpO2: 96% (12 Jun 2022 12:21) (95% - 96%)  I&O's Summary    11 Jun 2022 07:01  -  12 Jun 2022 07:00  --------------------------------------------------------  IN: 720 mL / OUT: 600 mL / NET: 120 mL    12 Jun 2022 07:01  -  12 Jun 2022 13:08  --------------------------------------------------------  IN: 180 mL / OUT: 850 mL / NET: -670 mL        PE:  GENERAL: NAD, AAOx3  CHEST/LUNG: bl rales  HEART: irregular irregular no murmur  ABDOMEN: Soft, Nontender, Nondistended; Bowel sounds present  EXTREMITIES:  2+ Peripheral Pulses, +2 le edema, chronic venous stasis  NEURO: No focal deficits      LABS:                        11.1   5.29  )-----------( 202      ( 11 Jun 2022 07:13 )             37.2     06-12    146<H>  |  103  |  37<H>  ----------------------------<  101<H>  3.8   |  32<H>  |  1.35<H>    Ca    9.0      12 Jun 2022 07:09  Mg     2.2     06-12        CAPILLARY BLOOD GLUCOSE                RADIOLOGY & ADDITIONAL TESTS:    Imaging Personally Reviewed:  [x] YES  [ ] NO    Consultant(s) Notes Reviewed:  [x] YES  [ ] NO    MEDICATIONS  (STANDING):  apixaban 2.5 milliGRAM(s) Oral every 12 hours  aspirin enteric coated 81 milliGRAM(s) Oral daily  brimonidine 0.2% Ophthalmic Solution 1 Drop(s) Both EYES two times a day  diltiazem    milliGRAM(s) Oral daily  diltiazem    milliGRAM(s) Oral at bedtime  furosemide   Injectable 40 milliGRAM(s) IV Push two times a day  levothyroxine 50 MICROGram(s) Oral daily  multivitamin 1 Tablet(s) Oral daily  polyethylene glycol 3350 17 Gram(s) Oral daily  senna 2 Tablet(s) Oral at bedtime  simvastatin 40 milliGRAM(s) Oral at bedtime  triamcinolone 0.1% Ointment 1 Application(s) Topical every 12 hours  zolpidem 5 milliGRAM(s) Oral at bedtime    MEDICATIONS  (PRN):  acetaminophen     Tablet .. 650 milliGRAM(s) Oral every 6 hours PRN Moderate Pain (4 - 6)  bisacodyl Suppository 10 milliGRAM(s) Rectal daily PRN Constipation      Care Discussed with Consultants/Other Providers [x] YES  [ ] NO    HEALTH ISSUES - PROBLEM Dx:  Chronic heart failure with preserved ejection fraction (HFpEF)    Lower extremity edema    Acute on chronic heart failure with preserved ejection fraction (HFpEF)    Chronic atrial fibrillation    HTN (hypertension)    Stage 3 chronic kidney disease    DVT prophylaxis    Hypothyroidism    HLD (hyperlipidemia)    CAD (coronary artery disease)

## 2022-06-12 NOTE — CONSULT NOTE ADULT - ASSESSMENT
89 y/o F with h/o HFpEF, CAD s/p CABG, CKD3, Chronic AF on Eliquis, HTN, moderate AS, pulm HTN presenting with 1 month of LE edema which has failed outpatient diuretics c/w HF exacerbation combined with underlying chronic venous stasis, admitted for IV diuresis. Nephrology consulted for JACK.     JACK on Chronic Kidney Disease Stage 3  Etiology could be due to prerenal or aggressive diuresis.   Started on lasix on Sarah 10, 2022  Scr on admission was 1.22 mg/dl  Continue Lasix for now. But reduce the dosage to once daily.    Lasix 40 mg IV daily.   Recommend UA, urine sodium, urine chloride, urine urea  bladder scan  Will consider kidney US but currently will hold off.   monitor on diuretics    Hypernatremia  Encourage pt to drink water but still needs diuresis  This is from aggressive diuresis     Contraction alkalosis   Will improve with gentle diuresis    Anemia  Get iron studies  Iron, iron sat, TIBC and retic.     HFpEF  Continue diuretic but reduce the dosage.

## 2022-06-12 NOTE — PROGRESS NOTE ADULT - ASSESSMENT
89F w/ diastolic HF (2016), CAD s/p CABG, Afib on AC, HTN, moderate AS, pHTN who presented w/ worsening LE edema. Etiology likely HF decompensation from lack of response to outpatient PO diuretics vs venous insufficiency. Currently, asymptomatic, HDS and diuresing.     Recs:  -echo  -standing weights, I/O  -con't lasix 40mg IV BID, goal neg 1L  -wean O2  -wound care/derm recs appreciated  -con't AC and Diltiazem for Afib management; rate-controlled  -con't ASA and statin for CAD    Joel Worley MD  Cardiology Fellow PGY-5  [facility]    Notes are not final until signed by attending  For all consults and questions:  [email or web address]   Login: [email or web address] 89F w/ diastolic HF (2016), CAD s/p CABG, Afib on AC, HTN, moderate AS, pHTN who presented w/ worsening LE edema. Etiology likely HF decompensation from lack of response to outpatient PO diuretics vs venous insufficiency. Currently, asymptomatic, HDS and diuresing.     Recs:  -echo from this year shows similar findings as 2016  -standing weights, I/O  -con't lasix 40mg IV BID, goal neg 1L  -wean O2  -wound care/derm recs appreciated  -con't AC and Diltiazem for Afib management; rate-controlled  -con't ASA and statin for CAD    Joel Worley MD  Cardiology Fellow PGY-5  [facility]    Notes are not final until signed by attending  For all consults and questions:  [email or web address]   Login: [email or web address]

## 2022-06-12 NOTE — PROGRESS NOTE ADULT - SUBJECTIVE AND OBJECTIVE BOX
Patient seen and examined at bedside.    Overnight Events: no events, issues or complaints; leg edema improving    Review of Systems:  REVIEW OF SYSTEMS:  CONSTITUTIONAL: No weakness, fevers or chills  EYES/ENT: No visual changes;  No dysphagia  NECK: No pain or stiffness  RESPIRATORY: No cough, wheezing, hemoptysis; No shortness of breath  CARDIOVASCULAR: No chest pain or palpitations  GASTROINTESTINAL: No abdominal or epigastric pain. No nausea, vomiting, or hematemesis; No diarrhea or constipation. No melena or hematochezia.  BACK: No back pain  GENITOURINARY: No dysuria, frequency or hematuria  NEUROLOGICAL: No numbness or weakness  All other review of systems is negative unless indicated above.    [ ] All other systems negative  [ ] Unable to assess ROS due to    Current Meds:  acetaminophen     Tablet .. 650 milliGRAM(s) Oral every 6 hours PRN  apixaban 2.5 milliGRAM(s) Oral every 12 hours  aspirin enteric coated 81 milliGRAM(s) Oral daily  brimonidine 0.2% Ophthalmic Solution 1 Drop(s) Both EYES two times a day  diltiazem    milliGRAM(s) Oral daily  diltiazem    milliGRAM(s) Oral at bedtime  furosemide   Injectable 40 milliGRAM(s) IV Push two times a day  levothyroxine 50 MICROGram(s) Oral daily  multivitamin 1 Tablet(s) Oral daily  polyethylene glycol 3350 17 Gram(s) Oral daily  senna 2 Tablet(s) Oral at bedtime  simvastatin 40 milliGRAM(s) Oral at bedtime  triamcinolone 0.1% Ointment 1 Application(s) Topical every 12 hours  zolpidem 5 milliGRAM(s) Oral at bedtime      PAST MEDICAL & SURGICAL HISTORY:  Benign Essential Hypertension      Chronic Sciatica      Personal History of Coronary Artery Disease      Hypertension      Atrial fibrillation      CVA (cerebral vascular accident)      S/P CABG          Vitals:  T(F): 97.8 (06-12), Max: 98.1 (06-11)  HR: 63 (06-12) (63 - 103)  BP: 148/68 (06-12) (145/73 - 162/70)  RR: 18 (06-12)  SpO2: 95% (06-12)  I&O's Summary    11 Jun 2022 07:01  -  12 Jun 2022 07:00  --------------------------------------------------------  IN: 720 mL / OUT: 600 mL / NET: 120 mL        Physical Exam:  GENERAL: Elderly woman, NAD  HEAD:  Atraumatic, Normocephalic  ENT: EOMI, PERRLA, conjunctiva and sclera clear, Neck supple, No JVD, moist mucosa  CHEST/LUNG: Clear to auscultation bilaterally  HEART: Irregular; 2/6 systolic murmur best heard at LUSB,   ABDOMEN: Soft, Nontender, Nondistended; Bowel sounds present  EXTREMITIES:  2+ pitting edema to thighs with blistering and stasis changes; legs wrapped  PSYCH: Nl behavior, nl affect  NEUROLOGY: AAOx3, non-focal, cranial nerves intact  SKIN: Normal color, No rashes or lesions                             11.1   5.29  )-----------( 202      ( 11 Jun 2022 07:13 )             37.2     06-11    149<H>  |  105  |  36<H>  ----------------------------<  89  3.9   |  32<H>  |  0.93    Ca    8.9      11 Jun 2022 07:12  Mg     2.2     06-11    TPro  6.6  /  Alb  3.7  /  TBili  0.4  /  DBili  x   /  AST  28  /  ALT  9<L>  /  AlkPhos  71  06-10          Serum Pro-Brain Natriuretic Peptide: 2152 pg/mL (06-10 @ 12:50)

## 2022-06-13 LAB
ANION GAP SERPL CALC-SCNC: 12 MMOL/L — SIGNIFICANT CHANGE UP (ref 5–17)
BUN SERPL-MCNC: 36 MG/DL — HIGH (ref 7–23)
CALCIUM SERPL-MCNC: 9.2 MG/DL — SIGNIFICANT CHANGE UP (ref 8.4–10.5)
CHLORIDE SERPL-SCNC: 99 MMOL/L — SIGNIFICANT CHANGE UP (ref 96–108)
CO2 SERPL-SCNC: 35 MMOL/L — HIGH (ref 22–31)
CREAT ?TM UR-MCNC: 42 MG/DL — SIGNIFICANT CHANGE UP
CREAT ?TM UR-MCNC: 78 MG/DL — SIGNIFICANT CHANGE UP
CREAT SERPL-MCNC: 1.43 MG/DL — HIGH (ref 0.5–1.3)
EGFR: 34 ML/MIN/1.73M2 — LOW
GLUCOSE SERPL-MCNC: 93 MG/DL — SIGNIFICANT CHANGE UP (ref 70–99)
HCT VFR BLD CALC: 38.5 % — SIGNIFICANT CHANGE UP (ref 34.5–45)
HGB BLD-MCNC: 11.7 G/DL — SIGNIFICANT CHANGE UP (ref 11.5–15.5)
IRON SATN MFR SERPL: 11 % — LOW (ref 14–50)
IRON SATN MFR SERPL: 28 UG/DL — LOW (ref 30–160)
MAGNESIUM SERPL-MCNC: 2.2 MG/DL — SIGNIFICANT CHANGE UP (ref 1.6–2.6)
MCHC RBC-ENTMCNC: 29.9 PG — SIGNIFICANT CHANGE UP (ref 27–34)
MCHC RBC-ENTMCNC: 30.4 GM/DL — LOW (ref 32–36)
MCV RBC AUTO: 98.5 FL — SIGNIFICANT CHANGE UP (ref 80–100)
NRBC # BLD: 0 /100 WBCS — SIGNIFICANT CHANGE UP (ref 0–0)
PLATELET # BLD AUTO: 205 K/UL — SIGNIFICANT CHANGE UP (ref 150–400)
POTASSIUM SERPL-MCNC: 4 MMOL/L — SIGNIFICANT CHANGE UP (ref 3.5–5.3)
POTASSIUM SERPL-SCNC: 4 MMOL/L — SIGNIFICANT CHANGE UP (ref 3.5–5.3)
RBC # BLD: 3.91 M/UL — SIGNIFICANT CHANGE UP (ref 3.8–5.2)
RBC # BLD: 3.91 M/UL — SIGNIFICANT CHANGE UP (ref 3.8–5.2)
RBC # FLD: 14.5 % — SIGNIFICANT CHANGE UP (ref 10.3–14.5)
RETICS #: 73.1 K/UL — SIGNIFICANT CHANGE UP (ref 25–125)
RETICS/RBC NFR: 1.9 % — SIGNIFICANT CHANGE UP (ref 0.5–2.5)
SODIUM SERPL-SCNC: 146 MMOL/L — HIGH (ref 135–145)
TIBC SERPL-MCNC: 268 UG/DL — SIGNIFICANT CHANGE UP (ref 220–430)
UIBC SERPL-MCNC: 240 UG/DL — SIGNIFICANT CHANGE UP (ref 110–370)
UUN UR-MCNC: 447 MG/DL — SIGNIFICANT CHANGE UP
WBC # BLD: 8.3 K/UL — SIGNIFICANT CHANGE UP (ref 3.8–10.5)
WBC # FLD AUTO: 8.3 K/UL — SIGNIFICANT CHANGE UP (ref 3.8–10.5)

## 2022-06-13 PROCEDURE — 99223 1ST HOSP IP/OBS HIGH 75: CPT

## 2022-06-13 PROCEDURE — 99233 SBSQ HOSP IP/OBS HIGH 50: CPT | Mod: GC

## 2022-06-13 RX ORDER — HYDROCORTISONE 1 %
1 OINTMENT (GRAM) TOPICAL
Refills: 0 | Status: DISCONTINUED | OUTPATIENT
Start: 2022-06-13 | End: 2022-06-21

## 2022-06-13 RX ORDER — LANOLIN ALCOHOL/MO/W.PET/CERES
3 CREAM (GRAM) TOPICAL AT BEDTIME
Refills: 0 | Status: DISCONTINUED | OUTPATIENT
Start: 2022-06-13 | End: 2022-06-13

## 2022-06-13 RX ADMIN — Medication 40 MILLIGRAM(S): at 05:11

## 2022-06-13 RX ADMIN — Medication 240 MILLIGRAM(S): at 05:10

## 2022-06-13 RX ADMIN — Medication 1 APPLICATION(S): at 17:58

## 2022-06-13 RX ADMIN — APIXABAN 2.5 MILLIGRAM(S): 2.5 TABLET, FILM COATED ORAL at 05:10

## 2022-06-13 RX ADMIN — APIXABAN 2.5 MILLIGRAM(S): 2.5 TABLET, FILM COATED ORAL at 17:57

## 2022-06-13 RX ADMIN — BRIMONIDINE TARTRATE 1 DROP(S): 2 SOLUTION/ DROPS OPHTHALMIC at 05:11

## 2022-06-13 RX ADMIN — SIMVASTATIN 40 MILLIGRAM(S): 20 TABLET, FILM COATED ORAL at 21:10

## 2022-06-13 RX ADMIN — ZOLPIDEM TARTRATE 5 MILLIGRAM(S): 10 TABLET ORAL at 23:11

## 2022-06-13 RX ADMIN — Medication 1 APPLICATION(S): at 05:11

## 2022-06-13 RX ADMIN — Medication 1 TABLET(S): at 17:58

## 2022-06-13 RX ADMIN — Medication 81 MILLIGRAM(S): at 17:57

## 2022-06-13 RX ADMIN — Medication 50 MICROGRAM(S): at 05:10

## 2022-06-13 RX ADMIN — Medication 120 MILLIGRAM(S): at 21:11

## 2022-06-13 RX ADMIN — BRIMONIDINE TARTRATE 1 DROP(S): 2 SOLUTION/ DROPS OPHTHALMIC at 21:11

## 2022-06-13 NOTE — CONSULT NOTE ADULT - SUBJECTIVE AND OBJECTIVE BOX
HPI:  CC: 89 y/o F presenting with b/l LE swelling for 1 month    HPI: 89 y/o F with h/o HFpEF, CAD s/p CABG, CKD3, Chronic AF on Eliquis, HTN, moderate AS, pulm HTN presenting with 1 month of LE edema. History per patient and her daughter.     Over the past month, patient has noticed increased b/l LE swelling and heaviness while ambulating with walker. Denies SOB over this time/cp. Has been on lasix outpatient.  Cardiologist Dr. Chew prescribed increased dose to 40 BID for 6 days, without improvement. Presented to Dr. Murdock more recently recommended similarly to increase to 40 BID for a few days. Patient and daughter report no improvement so presented to ED. Of note denies cp/sob/inability to lie flat. Denies f/chills/recent sick contacts. Denies new cough. Recent outpatient TTE  with moderate AS and mod pulm HTN, EF 55%.     In ED afeb hds 93% on RA. labs notable for probnp of 2152. Received lasix 40 IV *1. Cardiology was consulted.   Since then now on 3L NC sat 94% speaking in full sentences (10 Jarrett 2022 14:46)    Dermatology consulted for itchy rash x 1 week. Rash started on the bilateral thighs and spread to involve the lower abdomen. Rash is not painful. There is no involvement of the eyes, mouth, or genital area. Patient also has chronic lymphedema of the lower legs with blisters, which has remained stable.    Patient has not been on any new medications – same medications for years, only with an increase in Lasix dose recently.       PAST MEDICAL & SURGICAL HISTORY:  Benign Essential Hypertension      Chronic Sciatica      Personal History of Coronary Artery Disease      Hypertension      Atrial fibrillation      CVA (cerebral vascular accident)      S/P CABG          Review of Systems:  REVIEW OF SYSTEMS      General: no fevers/chills, no lethary	    Skin/Breast: see HPI  	  Ophthalmologic: no eye pain or change in vision  	  ENMT: no dysphagia or change in hearing    Respiratory and Thorax: no SOB or cough  	  Cardiovascular: no palpitations or chest pain    Gastrointestinal: no abdomenal pain or blood in stool     Genitourinary: no dysuria or frequency    Musculoskeletal: no joint pains or weakness	    Neurological:no weakness, numbness , or tingling    MEDICATIONS  (STANDING):  apixaban 2.5 milliGRAM(s) Oral every 12 hours  aspirin enteric coated 81 milliGRAM(s) Oral daily  brimonidine 0.2% Ophthalmic Solution 1 Drop(s) Both EYES two times a day  diltiazem    milliGRAM(s) Oral daily  diltiazem    milliGRAM(s) Oral at bedtime  furosemide   Injectable 40 milliGRAM(s) IV Push daily  levothyroxine 50 MICROGram(s) Oral daily  multivitamin 1 Tablet(s) Oral daily  polyethylene glycol 3350 17 Gram(s) Oral daily  senna 2 Tablet(s) Oral at bedtime  simvastatin 40 milliGRAM(s) Oral at bedtime  triamcinolone 0.1% Ointment 1 Application(s) Topical every 12 hours  zolpidem 5 milliGRAM(s) Oral at bedtime    ALLERGIES: No Known Allergies    Social History:  Lives alone, daughter visits daily  Manages medications  Dependent in remainder of iadls  denies etoh/illicits  walks with walker, 3 steps to enter house  FAMILY HISTORY:  No pertinent family history          VITAL SIGNS LAST 24 HOURS:  T(F): 98.6 (-13 @ 12:06), Max: 98.6 (-13 @ 12:06)  HR: 81 (-13 @ 12:06) (81 - 88)  BP: 113/70 (-13 @ 12:06) (113/70 - 143/73)  RR: 18 (-13 @ 12:06) (18 - 18)    PHYSICAL EXAM:     The patient was alert and oriented X 3, well nourished, and in no  apparent distress.  OP showed no ulcerations  There was no visible lymphadenopathy.  Conjunctiva were non injected  There was no clubbing or edema of extremities.  The scalp, hair, face, eyebrows, lips, OP, neck, chest, back,   extremities X 4, nails were examined.  There was no hyperhidrosis or bromhidrosis.    Of note on skin exam:     pink edematous papules and plaques of the thighs and abdomen     LABS:                        11.7   8.30  )-----------( 205      ( 2022 07:07 )             38.5     06-    146<H>  |  99  |  36<H>  ----------------------------<  93  4.0   |  35<H>  |  1.43<H>    Ca    9.2      2022 07:00  Mg     2.2     06-13        Urinalysis Basic - ( 2022 18:46 )    Color: Yellow / Appearance: Clear / S.016 / pH: x  Gluc: x / Ketone: Negative  / Bili: Negative / Urobili: Negative   Blood: x / Protein: 100 / Nitrite: Negative   Leuk Esterase: Small / RBC: 3 /hpf / WBC 2 /HPF   Sq Epi: x / Non Sq Epi: 0 /hpf / Bacteria: Negative     HPI:  CC: 89 y/o F presenting with b/l LE swelling for 1 month    HPI: 89 y/o F with h/o HFpEF, CAD s/p CABG, CKD3, Chronic AF on Eliquis, HTN, moderate AS, pulm HTN presenting with 1 month of LE edema. History per patient and her daughter.     Over the past month, patient has noticed increased b/l LE swelling and heaviness while ambulating with walker. Denies SOB over this time/cp. Has been on lasix outpatient.  Cardiologist Dr. Chew prescribed increased dose to 40 BID for 6 days, without improvement. Presented to Dr. Murdock more recently recommended similarly to increase to 40 BID for a few days. Patient and daughter report no improvement so presented to ED. Of note denies cp/sob/inability to lie flat. Denies f/chills/recent sick contacts. Denies new cough. Recent outpatient TTE  with moderate AS and mod pulm HTN, EF 55%.     In ED afeb hds 93% on RA. labs notable for probnp of 2152. Received lasix 40 IV *1. Cardiology was consulted.   Since then now on 3L NC sat 94% speaking in full sentences (10 Jarrett 2022 14:46)    Dermatology consulted for itchy rash x 1 week. Rash started on the bilateral thighs and spread to involve the lower abdomen. Rash is not painful. There is no involvement of the eyes, mouth, or genital area. Patient also has chronic lymphedema of the lower legs with blisters, which has remained stable.    Patient has not been on any new medications – same medications for years, only with an increase in Lasix dose recently.       PAST MEDICAL & SURGICAL HISTORY:  Benign Essential Hypertension      Chronic Sciatica      Personal History of Coronary Artery Disease      Hypertension      Atrial fibrillation      CVA (cerebral vascular accident)      S/P CABG          Review of Systems:  REVIEW OF SYSTEMS      General: no fevers/chills, no lethary	    Skin/Breast: see HPI  	  Ophthalmologic: no eye pain or change in vision  	  ENMT: no dysphagia or change in hearing    Respiratory and Thorax: no SOB or cough  	  Cardiovascular: no palpitations or chest pain    Gastrointestinal: no abdomenal pain or blood in stool     Genitourinary: no dysuria or frequency    Musculoskeletal: no joint pains or weakness	    Neurological:no weakness, numbness , or tingling    MEDICATIONS  (STANDING):  apixaban 2.5 milliGRAM(s) Oral every 12 hours  aspirin enteric coated 81 milliGRAM(s) Oral daily  brimonidine 0.2% Ophthalmic Solution 1 Drop(s) Both EYES two times a day  diltiazem    milliGRAM(s) Oral daily  diltiazem    milliGRAM(s) Oral at bedtime  furosemide   Injectable 40 milliGRAM(s) IV Push daily  levothyroxine 50 MICROGram(s) Oral daily  multivitamin 1 Tablet(s) Oral daily  polyethylene glycol 3350 17 Gram(s) Oral daily  senna 2 Tablet(s) Oral at bedtime  simvastatin 40 milliGRAM(s) Oral at bedtime  triamcinolone 0.1% Ointment 1 Application(s) Topical every 12 hours  zolpidem 5 milliGRAM(s) Oral at bedtime    ALLERGIES: No Known Allergies    Social History:  Lives alone, daughter visits daily  Manages medications  Dependent in remainder of iadls  denies etoh/illicits  walks with walker, 3 steps to enter house  FAMILY HISTORY:  No pertinent family history          VITAL SIGNS LAST 24 HOURS:  T(F): 98.6 (-13 @ 12:06), Max: 98.6 (-13 @ 12:06)  HR: 81 (-13 @ 12:06) (81 - 88)  BP: 113/70 (-13 @ 12:06) (113/70 - 143/73)  RR: 18 (-13 @ 12:06) (18 - 18)    PHYSICAL EXAM:     The patient was alert and oriented X 3, well nourished, and in no  apparent distress.  OP showed no ulcerations  There was no visible lymphadenopathy.  Conjunctiva were non injected  There was no clubbing or edema of extremities.  The scalp, hair, face, eyebrows, lips, OP, neck, chest, back,   extremities X 4, nails were examined.  There was no hyperhidrosis or bromhidrosis.    Of note on skin exam:     morbiliform pink edematous papules and plaques of the thighs and abdomen     LABS:                        11.7   8.30  )-----------( 205      ( 2022 07:07 )             38.5     06-13    146<H>  |  99  |  36<H>  ----------------------------<  93  4.0   |  35<H>  |  1.43<H>    Ca    9.2      2022 07:00  Mg     2.2     06-13        Urinalysis Basic - ( 2022 18:46 )    Color: Yellow / Appearance: Clear / S.016 / pH: x  Gluc: x / Ketone: Negative  / Bili: Negative / Urobili: Negative   Blood: x / Protein: 100 / Nitrite: Negative   Leuk Esterase: Small / RBC: 3 /hpf / WBC 2 /HPF   Sq Epi: x / Non Sq Epi: 0 /hpf / Bacteria: Negative

## 2022-06-13 NOTE — CONSULT NOTE ADULT - ATTENDING COMMENTS
Early morbilliform eruption- ddx includes a viral etiology vs drug indued (possibly eliquis). Fortunately pt does not display any concerning signs for a more dangerous drug eruption as outlined in the physical exam and the history. The natural history of morbilliform drug eruption is that it is presents 7-14 days after drug exposure, but it can occur within 2-3 days if previously sensitized. It typically worsens for 3-5 days even after the drug is stopped, plateaus for 3-5 days, and then resolves over 5-10 days. As it resolves, the rash will turn from pink to red to brown and then fade. Interestingly, the rash will often resolve even if the culprit medication is continued. Would treat with topical steroids and trend daily cbc w/ diff and CMP. Would also recommend RVP.

## 2022-06-13 NOTE — PROGRESS NOTE ADULT - SUBJECTIVE AND OBJECTIVE BOX
Cardiology Consult Progress Note    Patient seen and examined at bedside.    Overnight Events:   - Creatinine bump yesterday, likely prerenal i/s/o aggressive diuresis, patient ordered for IV furosemide 40 mg daily  - Creatinine this AM stably elevated  - Tele: Afib, occasional PVCs    Review Of Systems: No chest pain, shortness of breath, or palpitations            Current Meds:  acetaminophen     Tablet .. 650 milliGRAM(s) Oral every 6 hours PRN  apixaban 2.5 milliGRAM(s) Oral every 12 hours  aspirin enteric coated 81 milliGRAM(s) Oral daily  bisacodyl Suppository 10 milliGRAM(s) Rectal daily PRN  brimonidine 0.2% Ophthalmic Solution 1 Drop(s) Both EYES two times a day  diltiazem    milliGRAM(s) Oral daily  diltiazem    milliGRAM(s) Oral at bedtime  furosemide   Injectable 40 milliGRAM(s) IV Push daily  levothyroxine 50 MICROGram(s) Oral daily  multivitamin 1 Tablet(s) Oral daily  polyethylene glycol 3350 17 Gram(s) Oral daily  senna 2 Tablet(s) Oral at bedtime  simvastatin 40 milliGRAM(s) Oral at bedtime  triamcinolone 0.1% Ointment 1 Application(s) Topical every 12 hours  zolpidem 5 milliGRAM(s) Oral at bedtime      Vitals:  T(F): 98 (06-13), Max: 98.3 (06-12)  HR: 88 (06-13) (77 - 88)  BP: 133/73 (06-13) (132/66 - 143/73)  RR: 18 (06-13)  SpO2: 93% (06-13)  I&O's Summary    12 Jun 2022 07:01  -  13 Jun 2022 07:00  --------------------------------------------------------  IN: 300 mL / OUT: 1000 mL / NET: -700 mL    Physical Exam:  GENERAL: Elderly woman, NAD  HEAD:  Atraumatic, Normocephalic  ENT: EOMI, PERRLA, conjunctiva and sclera clear, Neck supple, No JVD, moist mucosa  CHEST/LUNG: Clear to auscultation bilaterally  HEART: Irregular; 2/6 systolic murmur best heard at LUSB,   ABDOMEN: Soft, Nontender, Nondistended; Bowel sounds present  EXTREMITIES:  2+ pitting edema (though significantly improved compared to admission)  PSYCH: Nl behavior, nl affect  NEUROLOGY: AAOx3, non-focal, cranial nerves intact  SKIN: Normal color, No rashes or lesions                           11.7   8.30  )-----------( 205      ( 13 Jun 2022 07:07 )             38.5     06-13    146<H>  |  99  |  36<H>  ----------------------------<  93  4.0   |  35<H>  |  1.43<H>    Ca    9.2      13 Jun 2022 07:00  Mg     2.2     06-13      CARDIAC MARKERS ( 10 Jarrett 2022 12:50 )  31 ng/L / x     / x     / x     / x     / x        Serum Pro-Brain Natriuretic Peptide: 2152 pg/mL (06-10 @ 12:50)   Cardiology Consult Progress Note    Patient seen and examined at bedside.    Overnight Events: none.  No chest pain, shortness of breath, or palpitations            - Creatinine bump yesterday, likely prerenal i/s/o aggressive diuresis, patient ordered for IV furosemide 40 mg daily  - Creatinine this AM stably elevated    - Tele: Afib, occasional PVCs    Review Of Systems: Neg.            Current Meds:  acetaminophen     Tablet .. 650 milliGRAM(s) Oral every 6 hours PRN  apixaban 2.5 milliGRAM(s) Oral every 12 hours  aspirin enteric coated 81 milliGRAM(s) Oral daily  bisacodyl Suppository 10 milliGRAM(s) Rectal daily PRN  brimonidine 0.2% Ophthalmic Solution 1 Drop(s) Both EYES two times a day  diltiazem    milliGRAM(s) Oral daily  diltiazem    milliGRAM(s) Oral at bedtime  furosemide   Injectable 40 milliGRAM(s) IV Push daily  levothyroxine 50 MICROGram(s) Oral daily  multivitamin 1 Tablet(s) Oral daily  polyethylene glycol 3350 17 Gram(s) Oral daily  senna 2 Tablet(s) Oral at bedtime  simvastatin 40 milliGRAM(s) Oral at bedtime  triamcinolone 0.1% Ointment 1 Application(s) Topical every 12 hours  zolpidem 5 milliGRAM(s) Oral at bedtime      Vitals:  T(F): 98 (06-13), Max: 98.3 (06-12)  HR: 88 (06-13) (77 - 88)  BP: 133/73 (06-13) (132/66 - 143/73)  RR: 18 (06-13)  SpO2: 93% (06-13)    Physical Exam:  GENERAL: Elderly woman, NAD  HEAD:  Atraumatic, Normocephalic  ENT: EOMI, PERRLA, conjunctiva and sclera clear, Neck supple, No JVD, moist mucosa  CHEST/LUNG: Clear to auscultation bilaterally  HEART: Irregular; 2/6 systolic murmur best heard at LUSB,   ABDOMEN: Soft, Nontender, Nondistended; Bowel sounds present  EXTREMITIES:  2+ pitting edema (though significantly improved compared to admission)  PSYCH: Nl behavior, nl affect  NEUROLOGY: AAOx3, non-focal, cranial nerves intact  SKIN: Normal color, No rashes or lesions     I&O's Summary  12 Jun 2022 07:01  -  13 Jun 2022 07:00  --------------------------------------------------------  IN: 300 mL / OUT: 1000 mL / NET: -700 mL      LABS:                      11.7   8.30  )-----------( 205      ( 13 Jun 2022 07:07 )             38.5     06-13  146<H>  |  99  |  36<H>  ----------------------------<  93  4.0   |  35<H>  |  1.43<H>    Ca    9.2      13 Jun 2022 07:00  Mg     2.2     06-13    CARDIAC MARKERS ( 10 Jarrett 2022 12:50 )  31 ng/L / x     / x     / x     / x     / x        Serum Pro-Brain Natriuretic Peptide: 2152 pg/mL (06-10 @ 12:50)

## 2022-06-13 NOTE — PROGRESS NOTE ADULT - SUBJECTIVE AND OBJECTIVE BOX
[facility]   MD XAVIER ARMSTRONG MD, PA KRISTINE SOLTANPOUR, DO INJUNG KO, NP    TEL:  OFFICE: [phone]    From 5pm-7am Answering Service [phone]    -- RENAL FOLLOW UP NOTE ---Date of Service 06-13-22 @ 14:12    Patient is a 89y old  Female who presents with a chief complaint of LE swelling (13 Jun 2022 13:38)      Patient seen and examined at bedside. No chest pain/sob    VITALS:  T(F): 98.6 (06-13-22 @ 12:06), Max: 98.6 (06-13-22 @ 12:06)  HR: 81 (06-13-22 @ 12:06)  BP: 113/70 (06-13-22 @ 12:06)  RR: 18 (06-13-22 @ 12:06)  SpO2: 96% (06-13-22 @ 12:06)  Wt(kg): --    06-12 @ 07:01  -  06-13 @ 07:00  --------------------------------------------------------  IN: 300 mL / OUT: 1000 mL / NET: -700 mL          PHYSICAL EXAM:  Constitutional: NAD  Neck: No JVD  Respiratory: crackles   Cardiovascular: S1, S2, RRR  Gastrointestinal: BS+, soft, NT/ND  Extremities: + peripheral edema    Hospital Medications:   MEDICATIONS  (STANDING):  apixaban 2.5 milliGRAM(s) Oral every 12 hours  aspirin enteric coated 81 milliGRAM(s) Oral daily  brimonidine 0.2% Ophthalmic Solution 1 Drop(s) Both EYES two times a day  diltiazem    milliGRAM(s) Oral daily  diltiazem    milliGRAM(s) Oral at bedtime  furosemide   Injectable 40 milliGRAM(s) IV Push daily  levothyroxine 50 MICROGram(s) Oral daily  multivitamin 1 Tablet(s) Oral daily  polyethylene glycol 3350 17 Gram(s) Oral daily  senna 2 Tablet(s) Oral at bedtime  simvastatin 40 milliGRAM(s) Oral at bedtime  triamcinolone 0.1% Ointment 1 Application(s) Topical every 12 hours  zolpidem 5 milliGRAM(s) Oral at bedtime      LABS:  06-13    146<H>  |  99  |  36<H>  ----------------------------<  93  4.0   |  35<H>  |  1.43<H>    Ca    9.2      13 Jun 2022 07:00  Mg     2.2     06-13      Creatinine Trend: 1.43 <--, 1.35 <--, 0.93 <--, 1.22 <--    Iron Total, Serum: 28 ug/dL (06-13 @ 07:00)  Iron - Total Binding Capacity.: 268 ug/dL (06-13 @ 07:00)                              11.7   8.30  )-----------( 205      ( 13 Jun 2022 07:07 )             38.5     Urine Studies:  Urinalysis - [06-12-22 @ 18:46]      Color Yellow / Appearance Clear / SG 1.016 / pH 6.5      Gluc Negative / Ketone Negative  / Bili Negative / Urobili Negative       Blood Negative / Protein 100 / Leuk Est Small / Nitrite Negative      RBC 3 / WBC 2 / Hyaline  / Gran  / Sq Epi  / Non Sq Epi 0 / Bacteria Negative    Urine Creatinine 70      [06-12-22 @ 18:46]  Urine Protein 119      [06-12-22 @ 18:46]  Urine Sodium 59      [06-12-22 @ 18:46]  Urine Chloride 44      [06-12-22 @ 18:46]    Iron 28, TIBC 268, %sat 11      [06-13-22 @ 07:00]        RADIOLOGY & ADDITIONAL STUDIES:

## 2022-06-13 NOTE — PROGRESS NOTE ADULT - SUBJECTIVE AND OBJECTIVE BOX
Patient is a 89y old  Female who presents with a chief complaint of LE swelling (2022 09:39)      SUBJECTIVE / OVERNIGHT EVENTS:    Patient seen and examined. no cp sob. feels like legs are less swollen. dtr at bedside.      Vital Signs Last 24 Hrs  T(C): 37 (2022 12:06), Max: 37 (2022 12:06)  T(F): 98.6 (2022 12:06), Max: 98.6 (2022 12:06)  HR: 81 (2022 12:06) (81 - 88)  BP: 113/70 (2022 12:06) (113/70 - 143/73)  BP(mean): --  RR: 18 (2022 12:06) (18 - 18)  SpO2: 96% (2022 12:06) (93% - 96%)  I&O's Summary    2022 07:01  -  2022 07:00  --------------------------------------------------------  IN: 300 mL / OUT: 1000 mL / NET: -700 mL        PE:  GENERAL: NAD, AAOx3  CHEST/LUNG: coarse bs  HEART: irregular irregular no murmur  ABDOMEN: Soft, Nontender, Nondistended; Bowel sounds present  EXTREMITIES:  2+ Peripheral Pulses, no le edema, chronic venous stasis  NEURO: No focal deficits    LABS:                        11.7   8.30  )-----------( 205      ( 2022 07:07 )             38.5     06-13    146<H>  |  99  |  36<H>  ----------------------------<  93  4.0   |  35<H>  |  1.43<H>    Ca    9.2      2022 07:00  Mg     2.2     06-13        CAPILLARY BLOOD GLUCOSE            Urinalysis Basic - ( 2022 18:46 )    Color: Yellow / Appearance: Clear / S.016 / pH: x  Gluc: x / Ketone: Negative  / Bili: Negative / Urobili: Negative   Blood: x / Protein: 100 / Nitrite: Negative   Leuk Esterase: Small / RBC: 3 /hpf / WBC 2 /HPF   Sq Epi: x / Non Sq Epi: 0 /hpf / Bacteria: Negative        RADIOLOGY & ADDITIONAL TESTS:    Imaging Personally Reviewed:  [x] YES  [ ] NO    Consultant(s) Notes Reviewed:  [x] YES  [ ] NO    MEDICATIONS  (STANDING):  apixaban 2.5 milliGRAM(s) Oral every 12 hours  aspirin enteric coated 81 milliGRAM(s) Oral daily  brimonidine 0.2% Ophthalmic Solution 1 Drop(s) Both EYES two times a day  diltiazem    milliGRAM(s) Oral daily  diltiazem    milliGRAM(s) Oral at bedtime  furosemide   Injectable 40 milliGRAM(s) IV Push daily  levothyroxine 50 MICROGram(s) Oral daily  multivitamin 1 Tablet(s) Oral daily  polyethylene glycol 3350 17 Gram(s) Oral daily  senna 2 Tablet(s) Oral at bedtime  simvastatin 40 milliGRAM(s) Oral at bedtime  triamcinolone 0.1% Ointment 1 Application(s) Topical every 12 hours  zolpidem 5 milliGRAM(s) Oral at bedtime    MEDICATIONS  (PRN):  acetaminophen     Tablet .. 650 milliGRAM(s) Oral every 6 hours PRN Moderate Pain (4 - 6)  bisacodyl Suppository 10 milliGRAM(s) Rectal daily PRN Constipation      Care Discussed with Consultants/Other Providers [x] YES  [ ] NO    HEALTH ISSUES - PROBLEM Dx:  Chronic heart failure with preserved ejection fraction (HFpEF)    Lower extremity edema    Acute on chronic heart failure with preserved ejection fraction (HFpEF)    Chronic atrial fibrillation    HTN (hypertension)    Stage 3 chronic kidney disease    DVT prophylaxis    Hypothyroidism    HLD (hyperlipidemia)    CAD (coronary artery disease)

## 2022-06-13 NOTE — PROGRESS NOTE ADULT - ASSESSMENT
89yo woman with diastolic HF, CAD s/p CABG, CKD3B, atrial fibrillation (on apixaban), HTN, moderate AS, pulmonary HTN, who presents to the ED with several weeks of worsening LE edema despite increasing does of PO diuretics. No other overt symptoms of CHF (eg, SOB, orthopnea, PND). Likely components of diastolic HF and venous insufficiency. Aggressively diuresed with improvement in LE edema, now with JACK.    1. HFpEF, venous insufficiency LE edema  - JACK, hold diuretics  - Trend Cr daily, when improving reinitiate oral furosemide (eg, 60 mg PO daily)  - Strict I/Os, daily weights  - Appreciate wound care/derm recs    2. Atrial fibrillation  - Continue home diltiazem  - Continue home reduced dose apixaban    3. CAD s./p CABG  - Continue home asa and statin    Bryan Heart MD  Department of Cardiology  Cardiology Fellow, PGY4   89yo woman with diastolic HF, CAD s/p CABG, CKD3B, atrial fibrillation (on apixaban), HTN, moderate AS, pulmonary HTN, who presents to the ED with several weeks of worsening LE edema despite increasing does of PO diuretics. No other overt symptoms of CHF (eg, SOB, orthopnea, PND). Likely components of diastolic HF and venous insufficiency. Aggressively diuresed with improvement in LE edema, now with JACK.    1. HFpEF, venous insufficiency LE edema  - JACK, hold diuretics  - Trend Cr daily, when improving reinitiate oral furosemide (eg, 40 mg PO BID)  - Strict I/Os, daily weights  - Appreciate wound care/derm recs    2. Atrial fibrillation  - Continue home diltiazem  - Continue home reduced dose apixaban    3. CAD s./p CABG  - Continue home asa and statin    Bryan Heart MD  Department of Cardiology  Cardiology Fellow, PGY4   89 yo F with diastolic HF, CAD s/p CABG, CKD3B, atrial fibrillation (on apixaban), HTN, moderate AS and pulmonary HTN.  Presents to the ED with several weeks of worsening LE edema despite increasing does of PO diuretics. No other overt symptoms of CHF (eg, SOB, orthopnea, PND); likely due to components of diastolic HF and venous insufficiency.   Now aggressively diuresed IV with improvement in LE edema, now with mild JACK.      1. HFpEF, venous insufficiency LE edema  - JACK, hold diuretics  - Trend Cr daily, when improving reinitiate oral furosemide (eg, 40 mg PO BID)  - Strict I/Os, daily weights  - Appreciate wound care/derm recs    2. Atrial fibrillation  - Continue home diltiazem  - Continue home reduced dose apixaban    3. CAD s./p CABG  - Continue home asa and statin      Bryan Heart MD  Department of Cardiology  Cardiology Fellow, PGY4    Plan discussed with cardiology fellow.  Patient seen and examined.  Hx., exam and labs as above.  I agree with the assessment and recommendations. which I have reviewed and edited where appropriate.  Reji Harmon M.D.  Cardiology Attending, Consult Service  For Cardiology consults and questions, all Cardiology service information can be found 24/7 on [email or web address] - use password: [email or web address] to log in.

## 2022-06-13 NOTE — CONSULT NOTE ADULT - ASSESSMENT
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  - Check a hepatitis B/C panel and RVP  - Start triamcinolone 0.1% ointment BID x 2 weeks.  - No evidence of a severe cutaneous adverse drug reaction such as DRESS or SJS at this time.  - Trend CBC w/ diff and CMP (w/ LFTs) daily to r/o evolving DRESS (low suspicion).    The patient's chart was reviewed in addition to being seen and examined at bedside with the dermatology attending Dr. Snowden.  Recommendations were communicated with the primary team.  Please page [phone] with a 10-digit call-back number for further related questions.    Alan Nick MD  Resident Physician, PGY-2  [facility]  Pager: [phone]  Office: [phone]

## 2022-06-13 NOTE — PROGRESS NOTE ADULT - ASSESSMENT
89 y/o F with h/o HFpEF, CAD s/p CABG, CKD3, Chronic AF on Eliquis, HTN, moderate AS, pulm HTN presenting with 1 month of LE edema which has failed outpatient diuretics c/w HF exacerbation combined with underlying chronic venous stasis, admitted for IV diuresis.     # Acute on chronic HFpEF  # Chronic atrial fibrillation  # HTN, HLD  # CAD s/p CABG  Lasix 40 IV qd  Monitor I/O, Daily weights  Tele  eliquis   dilt 240 am, 120 pm, rate controlled  cont asa statin  cardio following    # JACK on Stage 3 chronic kidney disease  monitor while on diuretics, lasix decreased to qd  renal following    # xanthematous drug eruption  appreciate derm recs  Recommend topical triamcinolone 0.1% ointment BID to affected areas of skin     # venous stasis  diuresis as above  Wound care   PT recs MIN but pt wants home    # Hypothyroidism   synthroid 50mcg    DVT prophylaxis. eliquis    dw dtr at bedside    Please call [facility] with questions [phone].

## 2022-06-13 NOTE — PROGRESS NOTE ADULT - ASSESSMENT
89 y/o F with h/o HFpEF, CAD s/p CABG, CKD3, Chronic AF on Eliquis, HTN, moderate AS, pulm HTN presenting with 1 month of LE edema which has failed outpatient diuretics c/w HF exacerbation combined with underlying chronic venous stasis, admitted for IV diuresis. Nephrology consulted for JACK.     JACK on Chronic Kidney Disease Stage 3  Etiology could be due to prerenal or aggressive diuresis.   clinically overloaded; continue Lasix 40 mg IV daily.   please get urine Cr, urine URea   Avoid further nephrotoxins, NSAIDS, RCA   monitor on diuretics    Hypernatremia  remains stable   encourage oral intake   monitor     HTN   optimal   monitor BP closely     Contraction alkalosis   Will improve with gentle diuresis    Anemia  iron deficiency   give oral iron        89 y/o F with h/o HFpEF, CAD s/p CABG, CKD3, Chronic AF on Eliquis, HTN, moderate AS, pulm HTN presenting with 1 month of LE edema which has failed outpatient diuretics c/w HF exacerbation combined with underlying chronic venous stasis, admitted for IV diuresis. Nephrology consulted for JACK.     JACK on Chronic Kidney Disease Stage 3  likely cardio renal   clinically overloaded; continue Lasix 40 mg IV daily.   please get urine Cr, urine URea   Avoid further nephrotoxins, NSAIDS, RCA   monitor on diuretics    Hypernatremia  remains stable   encourage oral intake   monitor     HTN   optimal   monitor BP closely       Anemia  iron deficiency   give oral iron   MOnitor HB

## 2022-06-14 LAB
ALBUMIN SERPL ELPH-MCNC: 3.1 G/DL — LOW (ref 3.3–5)
ALP SERPL-CCNC: 66 U/L — SIGNIFICANT CHANGE UP (ref 40–120)
ALT FLD-CCNC: 6 U/L — LOW (ref 10–45)
ANION GAP SERPL CALC-SCNC: 12 MMOL/L — SIGNIFICANT CHANGE UP (ref 5–17)
AST SERPL-CCNC: 14 U/L — SIGNIFICANT CHANGE UP (ref 10–40)
BASOPHILS # BLD AUTO: 0.01 K/UL — SIGNIFICANT CHANGE UP (ref 0–0.2)
BASOPHILS NFR BLD AUTO: 0.1 % — SIGNIFICANT CHANGE UP (ref 0–2)
BILIRUB SERPL-MCNC: 0.4 MG/DL — SIGNIFICANT CHANGE UP (ref 0.2–1.2)
BUN SERPL-MCNC: 40 MG/DL — HIGH (ref 7–23)
CALCIUM SERPL-MCNC: 9 MG/DL — SIGNIFICANT CHANGE UP (ref 8.4–10.5)
CHLORIDE SERPL-SCNC: 100 MMOL/L — SIGNIFICANT CHANGE UP (ref 96–108)
CO2 SERPL-SCNC: 32 MMOL/L — HIGH (ref 22–31)
CREAT SERPL-MCNC: 1.26 MG/DL — SIGNIFICANT CHANGE UP (ref 0.5–1.3)
EGFR: 39 ML/MIN/1.73M2 — LOW
EOSINOPHIL # BLD AUTO: 0.52 K/UL — HIGH (ref 0–0.5)
EOSINOPHIL NFR BLD AUTO: 7.1 % — HIGH (ref 0–6)
GLUCOSE SERPL-MCNC: 96 MG/DL — SIGNIFICANT CHANGE UP (ref 70–99)
HCT VFR BLD CALC: 38.6 % — SIGNIFICANT CHANGE UP (ref 34.5–45)
HGB BLD-MCNC: 11.5 G/DL — SIGNIFICANT CHANGE UP (ref 11.5–15.5)
IMM GRANULOCYTES NFR BLD AUTO: 0.4 % — SIGNIFICANT CHANGE UP (ref 0–1.5)
LYMPHOCYTES # BLD AUTO: 0.92 K/UL — LOW (ref 1–3.3)
LYMPHOCYTES # BLD AUTO: 12.5 % — LOW (ref 13–44)
MAGNESIUM SERPL-MCNC: 2.4 MG/DL — SIGNIFICANT CHANGE UP (ref 1.6–2.6)
MCHC RBC-ENTMCNC: 29.4 PG — SIGNIFICANT CHANGE UP (ref 27–34)
MCHC RBC-ENTMCNC: 29.8 GM/DL — LOW (ref 32–36)
MCV RBC AUTO: 98.7 FL — SIGNIFICANT CHANGE UP (ref 80–100)
MONOCYTES # BLD AUTO: 0.94 K/UL — HIGH (ref 0–0.9)
MONOCYTES NFR BLD AUTO: 12.8 % — SIGNIFICANT CHANGE UP (ref 2–14)
NEUTROPHILS # BLD AUTO: 4.93 K/UL — SIGNIFICANT CHANGE UP (ref 1.8–7.4)
NEUTROPHILS NFR BLD AUTO: 67.1 % — SIGNIFICANT CHANGE UP (ref 43–77)
NRBC # BLD: 0 /100 WBCS — SIGNIFICANT CHANGE UP (ref 0–0)
PHOSPHATE SERPL-MCNC: 3.5 MG/DL — SIGNIFICANT CHANGE UP (ref 2.5–4.5)
PLATELET # BLD AUTO: 210 K/UL — SIGNIFICANT CHANGE UP (ref 150–400)
POTASSIUM SERPL-MCNC: 4.7 MMOL/L — SIGNIFICANT CHANGE UP (ref 3.5–5.3)
POTASSIUM SERPL-SCNC: 4.7 MMOL/L — SIGNIFICANT CHANGE UP (ref 3.5–5.3)
PROT SERPL-MCNC: 6.1 G/DL — SIGNIFICANT CHANGE UP (ref 6–8.3)
RAPID RVP RESULT: SIGNIFICANT CHANGE UP
RBC # BLD: 3.91 M/UL — SIGNIFICANT CHANGE UP (ref 3.8–5.2)
RBC # FLD: 14.6 % — HIGH (ref 10.3–14.5)
SARS-COV-2 RNA SPEC QL NAA+PROBE: SIGNIFICANT CHANGE UP
SODIUM SERPL-SCNC: 144 MMOL/L — SIGNIFICANT CHANGE UP (ref 135–145)
WBC # BLD: 7.35 K/UL — SIGNIFICANT CHANGE UP (ref 3.8–10.5)
WBC # FLD AUTO: 7.35 K/UL — SIGNIFICANT CHANGE UP (ref 3.8–10.5)

## 2022-06-14 PROCEDURE — 99232 SBSQ HOSP IP/OBS MODERATE 35: CPT | Mod: GC

## 2022-06-14 RX ORDER — FUROSEMIDE 40 MG
40 TABLET ORAL EVERY 12 HOURS
Refills: 0 | Status: DISCONTINUED | OUTPATIENT
Start: 2022-06-14 | End: 2022-06-16

## 2022-06-14 RX ADMIN — Medication 50 MICROGRAM(S): at 05:29

## 2022-06-14 RX ADMIN — POLYETHYLENE GLYCOL 3350 17 GRAM(S): 17 POWDER, FOR SOLUTION ORAL at 21:48

## 2022-06-14 RX ADMIN — Medication 1 APPLICATION(S): at 05:29

## 2022-06-14 RX ADMIN — SENNA PLUS 2 TABLET(S): 8.6 TABLET ORAL at 21:47

## 2022-06-14 RX ADMIN — BRIMONIDINE TARTRATE 1 DROP(S): 2 SOLUTION/ DROPS OPHTHALMIC at 21:49

## 2022-06-14 RX ADMIN — Medication 120 MILLIGRAM(S): at 21:48

## 2022-06-14 RX ADMIN — Medication 81 MILLIGRAM(S): at 12:25

## 2022-06-14 RX ADMIN — Medication 40 MILLIGRAM(S): at 17:13

## 2022-06-14 RX ADMIN — Medication 1 TABLET(S): at 12:25

## 2022-06-14 RX ADMIN — Medication 1 APPLICATION(S): at 05:30

## 2022-06-14 RX ADMIN — APIXABAN 2.5 MILLIGRAM(S): 2.5 TABLET, FILM COATED ORAL at 05:29

## 2022-06-14 RX ADMIN — SIMVASTATIN 40 MILLIGRAM(S): 20 TABLET, FILM COATED ORAL at 21:49

## 2022-06-14 RX ADMIN — Medication 240 MILLIGRAM(S): at 05:29

## 2022-06-14 RX ADMIN — BRIMONIDINE TARTRATE 1 DROP(S): 2 SOLUTION/ DROPS OPHTHALMIC at 05:29

## 2022-06-14 RX ADMIN — Medication 1 APPLICATION(S): at 17:13

## 2022-06-14 RX ADMIN — ZOLPIDEM TARTRATE 5 MILLIGRAM(S): 10 TABLET ORAL at 23:00

## 2022-06-14 RX ADMIN — Medication 40 MILLIGRAM(S): at 05:29

## 2022-06-14 RX ADMIN — APIXABAN 2.5 MILLIGRAM(S): 2.5 TABLET, FILM COATED ORAL at 17:13

## 2022-06-14 NOTE — PROGRESS NOTE ADULT - SUBJECTIVE AND OBJECTIVE BOX
Patient is a 89y old  Female who presents with a chief complaint of LE swelling (2022 11:35)      SUBJECTIVE / OVERNIGHT EVENTS:    Patient seen and examined. dtr at bedside. pt denies cp sob.       Vital Signs Last 24 Hrs  T(C): 36.6 (2022 08:50), Max: 37 (2022 12:06)  T(F): 97.8 (2022 08:50), Max: 98.6 (2022 12:06)  HR: 82 (2022 08:50) (81 - 88)  BP: 144/77 (2022 08:50) (113/70 - 144/77)  BP(mean): --  RR: 18 (2022 08:50) (18 - 18)  SpO2: 97% (2022 08:50) (95% - 97%)  I&O's Summary    2022 07:01  -  2022 07:00  --------------------------------------------------------  IN: 480 mL / OUT: 200 mL / NET: 280 mL    2022 07:01  -  2022 11:40  --------------------------------------------------------  IN: 120 mL / OUT: 300 mL / NET: -180 mL        PE:  GENERAL: NAD, AAOx3  CHEST/LUNG: CTABL  HEART: irregular irregular no murmur  ABDOMEN: Soft, Nontender, Nondistended; Bowel sounds present  EXTREMITIES:  2+ Peripheral Pulses, edema improved, chronic venous stasis  NEURO: No focal deficits    LABS:                        11.5   7.35  )-----------( 210      ( 2022 07:00 )             38.6     06-14    144  |  100  |  40<H>  ----------------------------<  96  4.7   |  32<H>  |  1.26    Ca    9.0      2022 07:00  Phos  3.5     06-14  Mg     2.4     06-14    TPro  6.1  /  Alb  3.1<L>  /  TBili  0.4  /  DBili  x   /  AST  14  /  ALT  6<L>  /  AlkPhos  66  06-14      CAPILLARY BLOOD GLUCOSE            Urinalysis Basic - ( 2022 18:46 )    Color: Yellow / Appearance: Clear / S.016 / pH: x  Gluc: x / Ketone: Negative  / Bili: Negative / Urobili: Negative   Blood: x / Protein: 100 / Nitrite: Negative   Leuk Esterase: Small / RBC: 3 /hpf / WBC 2 /HPF   Sq Epi: x / Non Sq Epi: 0 /hpf / Bacteria: Negative        RADIOLOGY & ADDITIONAL TESTS:    Imaging Personally Reviewed:  [x] YES  [ ] NO    Consultant(s) Notes Reviewed:  [x] YES  [ ] NO    MEDICATIONS  (STANDING):  apixaban 2.5 milliGRAM(s) Oral every 12 hours  aspirin enteric coated 81 milliGRAM(s) Oral daily  brimonidine 0.2% Ophthalmic Solution 1 Drop(s) Both EYES two times a day  diltiazem    milliGRAM(s) Oral daily  diltiazem    milliGRAM(s) Oral at bedtime  furosemide   Injectable 40 milliGRAM(s) IV Push daily  hydrocortisone 1% Ointment 1 Application(s) Topical two times a day  levothyroxine 50 MICROGram(s) Oral daily  multivitamin 1 Tablet(s) Oral daily  polyethylene glycol 3350 17 Gram(s) Oral daily  senna 2 Tablet(s) Oral at bedtime  simvastatin 40 milliGRAM(s) Oral at bedtime  triamcinolone 0.1% Ointment 1 Application(s) Topical every 12 hours  zolpidem 5 milliGRAM(s) Oral at bedtime    MEDICATIONS  (PRN):  acetaminophen     Tablet .. 650 milliGRAM(s) Oral every 6 hours PRN Moderate Pain (4 - 6)  bisacodyl Suppository 10 milliGRAM(s) Rectal daily PRN Constipation      Care Discussed with Consultants/Other Providers [x] YES  [ ] NO    HEALTH ISSUES - PROBLEM Dx:  Chronic heart failure with preserved ejection fraction (HFpEF)    Lower extremity edema    Acute on chronic heart failure with preserved ejection fraction (HFpEF)    Chronic atrial fibrillation    HTN (hypertension)    Stage 3 chronic kidney disease    DVT prophylaxis    Hypothyroidism    HLD (hyperlipidemia)    CAD (coronary artery disease)

## 2022-06-14 NOTE — PROGRESS NOTE ADULT - ASSESSMENT
89 yo F with diastolic HF, CAD s/p CABG, CKD3B, atrial fibrillation (on apixaban), HTN, moderate AS and pulmonary HTN.  Presents to the ED with several weeks of worsening LE edema despite increasing does of PO diuretics. No other overt symptoms of CHF (eg, SOB, orthopnea, PND); likely due to components of diastolic HF and venous insufficiency.   Now aggressively diuresed IV with improvement in LE edema.      1. HFpEF, venous insufficiency LE edema  - Can likely transition to oral diuretics (eg, torsemide 20 mg PO BID or furosemide 40 mg PO BID)  - Trend Cr daily  - Strict I/Os, daily weights  - Appreciate wound care/derm recs    2. Atrial fibrillation  - Continue home diltiazem  - Continue home reduced dose apixaban    3. CAD s/p CABG  - Continue home asa and statin    4. JACK on CKD  - Appreciate nephrology recommendations      Bryan Heart MD  Department of Cardiology  Cardiology Fellow, PGY4 89 yo F with diastolic HF, CAD s/p CABG, CKD3B, atrial fibrillation (on apixaban), HTN, moderate AS and pulmonary HTN.  Presents to the ED with several weeks of worsening LE edema despite increasing does of PO diuretics. No other overt symptoms of CHF (eg, SOB, orthopnea, PND); likely due to components of diastolic HF and venous insufficiency.   Now diuresed IV with improvement in LE edema.      REC:  1. HFpEF, venous insufficiency LE edema  - Can likely transition to oral diuretics (eg, torsemide 20 mg PO BID or furosemide 40 mg PO BID)  - Trend Cr daily  - Strict I/Os, daily weights  - Appreciate wound care/derm recs    2. Atrial fibrillation  - Continue home diltiazem  - Continue home reduced dose apixaban    3. CAD s/p CABG  - Continue home asa and statin    4. JACK on CKD  - Appreciate nephrology recommendations      Bryan Heart MD  Department of Cardiology  Cardiology Fellow, PGY4    Plan discussed with cardiology fellow.  Patient seen and examined.  Hx., exam and labs as above.  I agree with the assessment and recommendations. which I have reviewed and edited where appropriate.  Reji Harmon M.D.  Cardiology Attending, Consult Service  For Cardiology consults and questions, all Cardiology service information can be found 24/7 on [email or web address] - use password: [email or web address] to log in.

## 2022-06-14 NOTE — PROGRESS NOTE ADULT - SUBJECTIVE AND OBJECTIVE BOX
[facility]   MD XAVIER ARMSTRONG MD, DO JETT NORTH NP    TEL:  OFFICE: [phone]    From 5pm-7am Answering Service [phone]    -- RENAL FOLLOW UP NOTE ---Date of Service 06-14-22 @ 11:35    Patient is a 89y old  Female who presents with a chief complaint of LE swelling (14 Jun 2022 08:30)      Patient seen and examined at bedside. No chest pain/sob    VITALS:  T(F): 97.8 (06-14-22 @ 08:50), Max: 98.6 (06-13-22 @ 12:06)  HR: 82 (06-14-22 @ 08:50)  BP: 144/77 (06-14-22 @ 08:50)  RR: 18 (06-14-22 @ 08:50)  SpO2: 97% (06-14-22 @ 08:50)  Wt(kg): --    06-13 @ 07:01  -  06-14 @ 07:00  --------------------------------------------------------  IN: 480 mL / OUT: 200 mL / NET: 280 mL    06-14 @ 07:01  -  06-14 @ 11:35  --------------------------------------------------------  IN: 120 mL / OUT: 300 mL / NET: -180 mL          PHYSICAL EXAM:  Constitutional: NAD  Neck: No JVD  Respiratory: CTAB, no wheezes, rales or rhonchi  Cardiovascular: S1, S2, RRR  Gastrointestinal: BS+, soft, NT/ND  Extremities: + peripheral edema    Hospital Medications:   MEDICATIONS  (STANDING):  apixaban 2.5 milliGRAM(s) Oral every 12 hours  aspirin enteric coated 81 milliGRAM(s) Oral daily  brimonidine 0.2% Ophthalmic Solution 1 Drop(s) Both EYES two times a day  diltiazem    milliGRAM(s) Oral daily  diltiazem    milliGRAM(s) Oral at bedtime  furosemide   Injectable 40 milliGRAM(s) IV Push daily  hydrocortisone 1% Ointment 1 Application(s) Topical two times a day  levothyroxine 50 MICROGram(s) Oral daily  multivitamin 1 Tablet(s) Oral daily  polyethylene glycol 3350 17 Gram(s) Oral daily  senna 2 Tablet(s) Oral at bedtime  simvastatin 40 milliGRAM(s) Oral at bedtime  triamcinolone 0.1% Ointment 1 Application(s) Topical every 12 hours  zolpidem 5 milliGRAM(s) Oral at bedtime      LABS:  06-14    144  |  100  |  40<H>  ----------------------------<  96  4.7   |  32<H>  |  1.26    Ca    9.0      14 Jun 2022 07:00  Phos  3.5     06-14  Mg     2.4     06-14    TPro  6.1  /  Alb  3.1<L>  /  TBili  0.4  /  DBili      /  AST  14  /  ALT  6<L>  /  AlkPhos  66  06-14    Creatinine Trend: 1.26 <--, 1.43 <--, 1.35 <--, 0.93 <--, 1.22 <--    Phosphorus Level, Serum: 3.5 mg/dL (06-14 @ 07:00)  Albumin, Serum: 3.1 g/dL (06-14 @ 07:00)                              11.5   7.35  )-----------( 210      ( 14 Jun 2022 07:00 )             38.6     Urine Studies:  Urinalysis - [06-12-22 @ 18:46]      Color Yellow / Appearance Clear / SG 1.016 / pH 6.5      Gluc Negative / Ketone Negative  / Bili Negative / Urobili Negative       Blood Negative / Protein 100 / Leuk Est Small / Nitrite Negative      RBC 3 / WBC 2 / Hyaline  / Gran  / Sq Epi  / Non Sq Epi 0 / Bacteria Negative    Urine Creatinine 78      [06-13-22 @ 22:30]  Urine Protein 119      [06-12-22 @ 18:46]  Urine Sodium 59      [06-12-22 @ 18:46]  Urine Urea Nitrogen 447      [06-13-22 @ 14:55]  Urine Chloride 44      [06-12-22 @ 18:46]    Iron 28, TIBC 268, %sat 11      [06-13-22 @ 07:00]        RADIOLOGY & ADDITIONAL STUDIES:

## 2022-06-14 NOTE — PROGRESS NOTE ADULT - ASSESSMENT
89 y/o F with h/o HFpEF, CAD s/p CABG, CKD3, Chronic AF on Eliquis, HTN, moderate AS, pulm HTN presenting with 1 month of LE edema which has failed outpatient diuretics c/w HF exacerbation combined with underlying chronic venous stasis, admitted for IV diuresis.     # Acute on chronic HFpEF  # Chronic atrial fibrillation  # HTN, HLD  # CAD s/p CABG  fu cardio recs, likely can transition to PO diuretics  Monitor I/O, Daily weights, Tele  eliquis   dilt 240 am, 120 pm, rate controlled  cont asa statin  titrate O2, check on RA    # JACK on Stage 3 chronic kidney disease  cardio renal  monitor while on diuretics, stable  renal following    # morbilliform drug eruption  appreciate derm recs  Recommend topical triamcinolone 0.1% ointment BID to affected areas of skin     # venous stasis  diuresis as above  Wound carem, ACE wraps  PT recs MIN but pt wants home    # Hypothyroidism   synthroid 50mcg    DVT prophylaxis. eliquis    dw dtr at bedside    dispo: dc planning to home when ready, declined MIN    Dr. Tor Stone will be covering me starting tomorrow.  Please contact with any questions or concerns [phone].

## 2022-06-14 NOTE — PROGRESS NOTE ADULT - SUBJECTIVE AND OBJECTIVE BOX
Cardiology Consult Progress Note    Patient seen and examined at bedside.    Interval Events:   - BNP remains elevated   - Given IV furosemide 40 mg   - Cr this AM improved 1.43 -> 1.26    Review Of Systems: No chest pain, shortness of breath, or palpitations            Current Meds:  acetaminophen     Tablet .. 650 milliGRAM(s) Oral every 6 hours PRN  apixaban 2.5 milliGRAM(s) Oral every 12 hours  aspirin enteric coated 81 milliGRAM(s) Oral daily  bisacodyl Suppository 10 milliGRAM(s) Rectal daily PRN  brimonidine 0.2% Ophthalmic Solution 1 Drop(s) Both EYES two times a day  diltiazem    milliGRAM(s) Oral daily  diltiazem    milliGRAM(s) Oral at bedtime  furosemide   Injectable 40 milliGRAM(s) IV Push daily  hydrocortisone 1% Ointment 1 Application(s) Topical two times a day  levothyroxine 50 MICROGram(s) Oral daily  multivitamin 1 Tablet(s) Oral daily  polyethylene glycol 3350 17 Gram(s) Oral daily  senna 2 Tablet(s) Oral at bedtime  simvastatin 40 milliGRAM(s) Oral at bedtime  triamcinolone 0.1% Ointment 1 Application(s) Topical every 12 hours  zolpidem 5 milliGRAM(s) Oral at bedtime    Vitals:  T(F): 98.1 (06-14), Max: 98.6 (06-13)  HR: 81 (06-14) (81 - 88)  BP: 139/74 (06-14) (113/70 - 139/74)  RR: 18 (06-14)  SpO2: 95% (06-14)  I&O's Summary    13 Jun 2022 07:01  -  14 Jun 2022 07:00  --------------------------------------------------------  IN: 480 mL / OUT: 200 mL / NET: 280 mL    Physical Exam:  GENERAL: Elderly woman, NAD  HEAD:  Atraumatic, Normocephalic  ENT: EOMI, PERRLA, conjunctiva and sclera clear, Neck supple, No JVD, moist mucosa  CHEST/LUNG: Clear to auscultation bilaterally  HEART: Irregular; 2/6 systolic murmur best heard at LUSB,   ABDOMEN: Soft, Nontender, Nondistended; Bowel sounds present  EXTREMITIES:  2+ pitting edema (though significantly improved compared to admission)  PSYCH: Nl behavior, nl affect  NEUROLOGY: AAOx3, non-focal, cranial nerves intact  SKIN: Normal color, No rashes or lesions                           11.5   7.35  )-----------( 210      ( 14 Jun 2022 07:00 )             38.6     06-14    144  |  100  |  40<H>  ----------------------------<  96  4.7   |  32<H>  |  1.26    Ca    9.0      14 Jun 2022 07:00  Phos  3.5     06-14  Mg     2.4     06-14    TPro  6.1  /  Alb  3.1<L>  /  TBili  0.4  /  DBili  x   /  AST  14  /  ALT  6<L>  /  AlkPhos  66  06-14    CARDIAC MARKERS ( 10 Jarrett 2022 12:50 )  31 ng/L / x     / x     / x     / x     / x        Serum Pro-Brain Natriuretic Peptide: 2139 pg/mL (06-13 @ 07:00)  Serum Pro-Brain Natriuretic Peptide: 2152 pg/mL (06-10 @ 12:50) Cardiology Consult Progress Note    Patient seen and examined at bedside.  Alert, no distress.  No cardiac sxs; no CP, palps or dyspnea     Interval Events:   - BNP remains elevated   - Given IV furosemide 40 mg   - Cr this AM improved 1.43 -> 1.26            Current Meds:  acetaminophen     Tablet .. 650 milliGRAM(s) Oral every 6 hours PRN  apixaban 2.5 milliGRAM(s) Oral every 12 hours  aspirin enteric coated 81 milliGRAM(s) Oral daily  bisacodyl Suppository 10 milliGRAM(s) Rectal daily PRN  brimonidine 0.2% Ophthalmic Solution 1 Drop(s) Both EYES two times a day  diltiazem    milliGRAM(s) Oral daily  diltiazem    milliGRAM(s) Oral at bedtime  furosemide   Injectable 40 milliGRAM(s) IV Push daily  hydrocortisone 1% Ointment 1 Application(s) Topical two times a day  levothyroxine 50 MICROGram(s) Oral daily  multivitamin 1 Tablet(s) Oral daily  polyethylene glycol 3350 17 Gram(s) Oral daily  senna 2 Tablet(s) Oral at bedtime  simvastatin 40 milliGRAM(s) Oral at bedtime  triamcinolone 0.1% Ointment 1 Application(s) Topical every 12 hours  zolpidem 5 milliGRAM(s) Oral at bedtime      ROS:  Unchanged      Vitals:  T(F): 98.1 (06-14), Max: 98.6 (06-13)  HR: 81 (06-14) (81 - 88)  BP: 139/74 (06-14) (113/70 - 139/74)  RR: 18 (06-14)  SpO2: 95% (06-14)    Physical Exam:  GENERAL: Elderly woman, NAD  HEAD:  Atraumatic, Normocephalic  ENT: EOMI, PERRLA, conjunctiva and sclera clear, Neck supple, No JVD, moist mucosa  CHEST/LUNG: Clear to auscultation bilaterally  HEART: Irregular; 2/6 systolic murmur best heard at LUSB,   ABDOMEN: Soft, Nontender, Nondistended; Bowel sounds present  EXTREMITIES:  2+ pitting edema (though significantly improved compared to admission)  PSYCH: Nl behavior, nl affect  NEUROLOGY: AAOx3, non-focal, cranial nerves intact  SKIN: Normal color, No rashes or lesions       I&O's Summary    13 Jun 2022 07:01  -  14 Jun 2022 07:00  --------------------------------------------------------  IN: 480 mL / OUT: 200 mL / NET: 280 mL      LABS:                      11.5   7.35  )-----------( 210      ( 14 Jun 2022 07:00 )             38.6     06-14  144  |  100  |  40<H>  ----------------------------<  96  4.7   |  32<H>  |  1.26    Ca    9.0      14 Jun 2022 07:00  Phos  3.5     06-14  Mg     2.4     06-14    TPro  6.1  /  Alb  3.1<L>  /  TBili  0.4  /  DBili  x   /  AST  14  /  ALT  6<L>  /  AlkPhos  66  06-14    CARDIAC MARKERS ( 10 Jarrett 2022 12:50 )  31 ng/L / x     / x     / x     / x     / x        Serum Pro-Brain Natriuretic Peptide: 2139 pg/mL (06-13 @ 07:00)  Serum Pro-Brain Natriuretic Peptide: 2152 pg/mL (06-10 @ 12:50)

## 2022-06-14 NOTE — PROGRESS NOTE ADULT - ASSESSMENT
89 y/o F with h/o HFpEF, CAD s/p CABG, CKD3, Chronic AF on Eliquis, HTN, moderate AS, pulm HTN presenting with 1 month of LE edema which has failed outpatient diuretics c/w HF exacerbation combined with underlying chronic venous stasis, admitted for IV diuresis. Nephrology consulted for JACK.     JACK on Chronic Kidney Disease Stage 3  likely cardio renal   FEUrea 42.3%  scr improving,   diuretic as per cardiology   Avoid further nephrotoxins, NSAIDS, RCA   monitor on diuretics    Hypernatremia  remains stable   improving   monitor     HTN   optimal   monitor BP closely     Anemia  iron deficiency   give oral iron   MOnitor HB

## 2022-06-15 LAB
ANION GAP SERPL CALC-SCNC: 11 MMOL/L — SIGNIFICANT CHANGE UP (ref 5–17)
BUN SERPL-MCNC: 45 MG/DL — HIGH (ref 7–23)
CALCIUM SERPL-MCNC: 8.8 MG/DL — SIGNIFICANT CHANGE UP (ref 8.4–10.5)
CHLORIDE SERPL-SCNC: 99 MMOL/L — SIGNIFICANT CHANGE UP (ref 96–108)
CO2 SERPL-SCNC: 32 MMOL/L — HIGH (ref 22–31)
CREAT SERPL-MCNC: 1.31 MG/DL — HIGH (ref 0.5–1.3)
EGFR: 38 ML/MIN/1.73M2 — LOW
GLUCOSE SERPL-MCNC: 102 MG/DL — HIGH (ref 70–99)
HAV IGM SER-ACNC: SIGNIFICANT CHANGE UP
HBV CORE IGM SER-ACNC: SIGNIFICANT CHANGE UP
HBV SURFACE AG SER-ACNC: SIGNIFICANT CHANGE UP
HCV AB S/CO SERPL IA: 0.07 S/CO — SIGNIFICANT CHANGE UP (ref 0–0.99)
HCV AB SERPL-IMP: SIGNIFICANT CHANGE UP
POTASSIUM SERPL-MCNC: 4.3 MMOL/L — SIGNIFICANT CHANGE UP (ref 3.5–5.3)
POTASSIUM SERPL-SCNC: 4.3 MMOL/L — SIGNIFICANT CHANGE UP (ref 3.5–5.3)
SODIUM SERPL-SCNC: 142 MMOL/L — SIGNIFICANT CHANGE UP (ref 135–145)

## 2022-06-15 PROCEDURE — 71045 X-RAY EXAM CHEST 1 VIEW: CPT | Mod: 26

## 2022-06-15 PROCEDURE — 99232 SBSQ HOSP IP/OBS MODERATE 35: CPT | Mod: GC

## 2022-06-15 RX ADMIN — Medication 50 MICROGRAM(S): at 05:33

## 2022-06-15 RX ADMIN — Medication 1 APPLICATION(S): at 17:00

## 2022-06-15 RX ADMIN — Medication 1 APPLICATION(S): at 05:35

## 2022-06-15 RX ADMIN — Medication 40 MILLIGRAM(S): at 16:59

## 2022-06-15 RX ADMIN — Medication 40 MILLIGRAM(S): at 05:40

## 2022-06-15 RX ADMIN — Medication 1 TABLET(S): at 11:06

## 2022-06-15 RX ADMIN — ZOLPIDEM TARTRATE 5 MILLIGRAM(S): 10 TABLET ORAL at 23:14

## 2022-06-15 RX ADMIN — APIXABAN 2.5 MILLIGRAM(S): 2.5 TABLET, FILM COATED ORAL at 05:33

## 2022-06-15 RX ADMIN — APIXABAN 2.5 MILLIGRAM(S): 2.5 TABLET, FILM COATED ORAL at 16:59

## 2022-06-15 RX ADMIN — BRIMONIDINE TARTRATE 1 DROP(S): 2 SOLUTION/ DROPS OPHTHALMIC at 21:01

## 2022-06-15 RX ADMIN — Medication 240 MILLIGRAM(S): at 05:33

## 2022-06-15 RX ADMIN — Medication 120 MILLIGRAM(S): at 21:01

## 2022-06-15 RX ADMIN — SIMVASTATIN 40 MILLIGRAM(S): 20 TABLET, FILM COATED ORAL at 21:01

## 2022-06-15 RX ADMIN — BRIMONIDINE TARTRATE 1 DROP(S): 2 SOLUTION/ DROPS OPHTHALMIC at 05:33

## 2022-06-15 RX ADMIN — Medication 81 MILLIGRAM(S): at 11:06

## 2022-06-15 NOTE — PROGRESS NOTE ADULT - ASSESSMENT
89 yo F with diastolic HF, CAD s/p CABG, CKD3B, atrial fibrillation (on apixaban), HTN, moderate AS and pulmonary HTN.  Presents to the ED with several weeks of worsening LE edema despite increasing does of PO diuretics. No other overt symptoms of CHF (eg, SOB, orthopnea, PND); likely due to components of diastolic HF and venous insufficiency.   Now diuresed IV with improvement in LE edema.    REC:  1. HFpEF, venous insufficiency LE edema  - Continue PO furosemide 40 mg BID (eg, 8am and 2pm)   - Trend Cr daily  - Strict I/Os and daily weights please, if not diuresing appropriately with above diuretic dose will need to increase  - Appreciate wound care/derm recs    2. Atrial fibrillation  - Continue home diltiazem  - Continue home reduced dose apixaban    3. CAD s/p CABG  - Continue home asa and statin    4. JACK on CKD  - Appreciate nephrology recommendations      Bryan Heart MD  Department of Cardiology  Cardiology Fellow, PGY4 89 yo F with diastolic HF, CAD s/p CABG, CKD3B, atrial fibrillation (on apixaban), HTN, moderate AS and pulmonary HTN.  Presented to the ED with several weeks of worsening LE edema despite increasing dose of PO diuretic. No other overt symptoms of CHF (eg, SOB, orthopnea, PND); likely due to components of diastolic HF and venous insufficiency.   Now diuresed IV with improvement in LE edema.      REC:  1. HFpEF, venous insufficiency LE edema  - Continue PO furosemide 40 mg BID (eg, 8am and 2pm)   - Trend Cr daily  - Strict I/Os and daily weights please, if not diuresing appropriately with above diuretic dose will need to increase  - Appreciate wound care/derm recs    2. Atrial fibrillation  - Continue home diltiazem  - Continue home reduced dose apixaban    3. CAD s/p CABG  - Continue home asa and statin    4. JACK on CKD  - Appreciate nephrology recommendations      Bryan Heart MD  Department of Cardiology  Cardiology Fellow, PGY4    Plan discussed with cardiology fellow.  Patient seen and examined.  Hx., exam and labs as above.  I agree with the assessment and recommendations. which I have reviewed and edited where appropriate.  Reji Harmon M.D.  Cardiology Attending, Consult Service  For Cardiology consults and questions, all Cardiology service information can be found 24/7 on [email or web address] - use password: [email or web address] to log in.

## 2022-06-15 NOTE — PROVIDER CONTACT NOTE (OTHER) - BACKGROUND
Patient admitted for Heart Failure, Coronary Artery Disease, Hyperlipidemia, Chronic Kidney Disease.
Patient admitted for LE edema and CHF.  PMH of CVA, AFib, CKD, HTN

## 2022-06-15 NOTE — PROGRESS NOTE ADULT - SUBJECTIVE AND OBJECTIVE BOX
Cardiology Consult Progress Note    Patient seen and examined at bedside.    Overnight Events:   No acute events    Review Of Systems: No chest pain, shortness of breath, or palpitations            Current Meds:  acetaminophen     Tablet .. 650 milliGRAM(s) Oral every 6 hours PRN  apixaban 2.5 milliGRAM(s) Oral every 12 hours  aspirin enteric coated 81 milliGRAM(s) Oral daily  bisacodyl Suppository 10 milliGRAM(s) Rectal daily PRN  brimonidine 0.2% Ophthalmic Solution 1 Drop(s) Both EYES two times a day  diltiazem    milliGRAM(s) Oral daily  diltiazem    milliGRAM(s) Oral at bedtime  furosemide    Tablet 40 milliGRAM(s) Oral every 12 hours  hydrocortisone 1% Ointment 1 Application(s) Topical two times a day  levothyroxine 50 MICROGram(s) Oral daily  multivitamin 1 Tablet(s) Oral daily  polyethylene glycol 3350 17 Gram(s) Oral daily  senna 2 Tablet(s) Oral at bedtime  simvastatin 40 milliGRAM(s) Oral at bedtime  triamcinolone 0.1% Ointment 1 Application(s) Topical every 12 hours  zolpidem 5 milliGRAM(s) Oral at bedtime      Vitals:  T(F): 98 (06-15), Max: 98.7 (06-14)  HR: 91 (06-15) (75 - 91)  BP: 146/70 (06-15) (109/63 - 146/70)  RR: 18 (06-15)  SpO2: 92% (06-15)  I&O's Summary    14 Jun 2022 07:01  -  15 Jarrett 2022 07:00  --------------------------------------------------------  IN: 420 mL / OUT: 900 mL / NET: -480 mL    15 Jarrett 2022 07:01  -  15 Jarrett 2022 09:02  --------------------------------------------------------  IN: 120 mL / OUT: 300 mL / NET: -180 mL    Physical Exam:  GENERAL: Elderly woman, NAD  HEAD:  Atraumatic, Normocephalic  ENT: EOMI, PERRLA, conjunctiva and sclera clear, Neck supple, No JVD, moist mucosa  CHEST/LUNG: Clear to auscultation bilaterally  HEART: Irregular; 2/6 systolic murmur best heard at LUSB,   ABDOMEN: Soft, Nontender, Nondistended; Bowel sounds present  EXTREMITIES:  Legs wrapped, 2+ edema at feet  PSYCH: Nl behavior, nl affect  NEUROLOGY: AAOx3, non-focal, cranial nerves intact  SKIN: Normal color, No rashes or lesions                           11.5   7.35  )-----------( 210      ( 14 Jun 2022 07:00 )             38.6     06-15    142  |  99  |  45<H>  ----------------------------<  102<H>  4.3   |  32<H>  |  1.31<H>    Ca    8.8      15 Jarrett 2022 07:34  Phos  3.5     06-14  Mg     2.4     06-14    TPro  6.1  /  Alb  3.1<L>  /  TBili  0.4  /  DBili  x   /  AST  14  /  ALT  6<L>  /  AlkPhos  66  06-14    CARDIAC MARKERS ( 10 Jarrett 2022 12:50 )  31 ng/L / x     / x     / x     / x     / x        Serum Pro-Brain Natriuretic Peptide: 2139 pg/mL (06-13 @ 07:00)  Serum Pro-Brain Natriuretic Peptide: 2152 pg/mL (06-10 @ 12:50)   Cardiology Consult Progress Note    Patient seen and examined at bedside.    Overnight Events:  No acute events  No chest pain, shortness of breath, or palpitations            Current Meds:  acetaminophen     Tablet .. 650 milliGRAM(s) Oral every 6 hours PRN  apixaban 2.5 milliGRAM(s) Oral every 12 hours  aspirin enteric coated 81 milliGRAM(s) Oral daily  bisacodyl Suppository 10 milliGRAM(s) Rectal daily PRN  brimonidine 0.2% Ophthalmic Solution 1 Drop(s) Both EYES two times a day  diltiazem    milliGRAM(s) Oral daily  diltiazem    milliGRAM(s) Oral at bedtime  furosemide    Tablet 40 milliGRAM(s) Oral every 12 hours  hydrocortisone 1% Ointment 1 Application(s) Topical two times a day  levothyroxine 50 MICROGram(s) Oral daily  multivitamin 1 Tablet(s) Oral daily  polyethylene glycol 3350 17 Gram(s) Oral daily  senna 2 Tablet(s) Oral at bedtime  simvastatin 40 milliGRAM(s) Oral at bedtime  triamcinolone 0.1% Ointment 1 Application(s) Topical every 12 hours  zolpidem 5 milliGRAM(s) Oral at bedtime    ROS:  Neg.    Vitals:  T(F): 98 (06-15), Max: 98.7 (06-14)  HR: 91 (06-15) (75 - 91)  BP: 146/70 (06-15) (109/63 - 146/70)  RR: 18 (06-15)  SpO2: 92% (06-15)    Physical Exam:  GENERAL: Elderly woman, NAD  HEAD:  Atraumatic, Normocephalic  ENT: EOMI, PERRLA, conjunctiva and sclera clear, Neck supple, No JVD, moist mucosa  CHEST/LUNG: Clear to auscultation bilaterally  HEART: Irregular; 2/6 systolic murmur best heard at LUSB,   ABDOMEN: Soft, Nontender, Nondistended; Bowel sounds present  EXTREMITIES:  Legs wrapped, 2+ edema at feet  PSYCH: Nl behavior, nl affect  NEUROLOGY: AAOx3, non-focal, cranial nerves intact  SKIN: Normal color, No rashes or lesions       I&O's Summary  14 Jun 2022 07:01  -  15 Jarrett 2022 07:00  --------------------------------------------------------  IN: 420 mL / OUT: 900 mL / NET: -480 mL    15 Jarrett 2022 07:01  -  15 Jarrett 2022 09:02  --------------------------------------------------------  IN: 120 mL / OUT: 300 mL / NET: -180 mL      LABS:                      11.5   7.35  )-----------( 210      ( 14 Jun 2022 07:00 )             38.6     06-15  142  |  99  |  45<H>  ----------------------------<  102<H>  4.3   |  32<H>  |  1.31<H>    Ca    8.8      15 Jarrett 2022 07:34  Phos  3.5     06-14  Mg     2.4     06-14    TPro  6.1  /  Alb  3.1<L>  /  TBili  0.4  /  DBili  x   /  AST  14  /  ALT  6<L>  /  AlkPhos  66  06-14    CARDIAC MARKERS ( 10 Jarrett 2022 12:50 )  31 ng/L / x     / x     / x     / x     / x        Serum Pro-Brain Natriuretic Peptide: 2139 pg/mL (06-13 @ 07:00)  Serum Pro-Brain Natriuretic Peptide: 2152 pg/mL (06-10 @ 12:50)

## 2022-06-15 NOTE — PROGRESS NOTE ADULT - ASSESSMENT
89 y/o F with h/o HFpEF, CAD s/p CABG, CKD3, Chronic AF on Eliquis, HTN, moderate AS, pulm HTN presenting with 1 month of LE edema which has failed outpatient diuretics c/w HF exacerbation combined with underlying chronic venous stasis, admitted for IV diuresis.     # Acute on chronic HFpEF  # Chronic atrial fibrillation  # HTN, HLD  # CAD s/p CABG  appreciate cardio and nephro recsl currently on lasix 40 po bid   Monitor I/O, Daily weights, Tele  eliquis   dilt 240 am, 120 pm, rate controlled  cont asa statin  titrate O2, check on RA  rpt cxr today    # JACK on Stage 3 chronic kidney disease  cardio renal  monitor while on diuretics;; slight bump in cr will monitor for now   renal following    # morbilliform drug eruption  appreciate derm recs  can be related to ckd as well.  Recommend topical triamcinolone 0.1% ointment BID to affected areas of skin     # venous stasis  diuresis as above  Wound carem, ACE wraps  PT recs MIN but pt wants home    # Hypothyroidism   synthroid 50mcg    DVT prophylaxis. eliquis    dw dtr at bedside    dispo: eva planning to home when ready. possibly in am.       Please contact with any questions or concerns [phone].

## 2022-06-15 NOTE — PROGRESS NOTE ADULT - SUBJECTIVE AND OBJECTIVE BOX
Patient is a 89y old  Female who presents with a chief complaint of LE swelling (15 Jarrett 2022 13:55)      SUBJECTIVE / OVERNIGHT EVENTS:  patient seen and examined.   no overnight events. stable, no acute complaints. daughter at bedside.      Vital Signs Last 24 Hrs  T(C): 36.4 (15 Jarrett 2022 12:35), Max: 37 (14 Jun 2022 23:55)  T(F): 97.6 (15 Jarrett 2022 12:35), Max: 98.6 (14 Jun 2022 23:55)  HR: 80 (15 Jarrett 2022 12:35) (75 - 91)  BP: 128/66 (15 Jarrett 2022 12:35) (120/67 - 146/70)  BP(mean): --  RR: 18 (15 Jarrett 2022 12:35) (18 - 18)  SpO2: 96% (15 Jarrett 2022 12:35) (91% - 96%)  I&O's Summary    14 Jun 2022 07:01  -  15 Jarrett 2022 07:00  --------------------------------------------------------  IN: 420 mL / OUT: 900 mL / NET: -480 mL    15 Jarrett 2022 07:01  -  15 Jarrett 2022 16:53  --------------------------------------------------------  IN: 360 mL / OUT: 700 mL / NET: -340 mL        PHYSICAL EXAM:  GENERAL: NAD, AAO; on nc oxygen   HEAD:  Atraumatic, Normocephalic  EYES: EOMI; conjunctiva and sclera clear  NECK: Supple, No JVD, No LAD  CHEST/LUNG: B/L air entry; No wheezes, rales or rhonci   HEART: Regular rate and rhythm; No murmurs, rubs, or gallops  ABDOMEN: Soft, Nontender, Nondistended; Bowel sounds present  EXTREMITIES:  2+ Peripheral Pulses, No clubbing, cyanosis, or edema  SKIN: No rashes or lesions    LABS:                        11.5   7.35  )-----------( 210      ( 14 Jun 2022 07:00 )             38.6     06-15    142  |  99  |  45<H>  ----------------------------<  102<H>  4.3   |  32<H>  |  1.31<H>    Ca    8.8      15 Jarrett 2022 07:34  Phos  3.5     06-14  Mg     2.4     06-14    TPro  6.1  /  Alb  3.1<L>  /  TBili  0.4  /  DBili  x   /  AST  14  /  ALT  6<L>  /  AlkPhos  66  06-14      CAPILLARY BLOOD GLUCOSE                RADIOLOGY & ADDITIONAL TESTS:    Imaging Personally Reviewed:  [x] YES  [ ] NO    Consultant(s) Notes Reviewed:  [x] YES  [ ] NO      MEDICATIONS  (STANDING):  apixaban 2.5 milliGRAM(s) Oral every 12 hours  aspirin enteric coated 81 milliGRAM(s) Oral daily  brimonidine 0.2% Ophthalmic Solution 1 Drop(s) Both EYES two times a day  diltiazem    milliGRAM(s) Oral daily  diltiazem    milliGRAM(s) Oral at bedtime  furosemide    Tablet 40 milliGRAM(s) Oral every 12 hours  hydrocortisone 1% Ointment 1 Application(s) Topical two times a day  levothyroxine 50 MICROGram(s) Oral daily  multivitamin 1 Tablet(s) Oral daily  polyethylene glycol 3350 17 Gram(s) Oral daily  senna 2 Tablet(s) Oral at bedtime  simvastatin 40 milliGRAM(s) Oral at bedtime  triamcinolone 0.1% Ointment 1 Application(s) Topical every 12 hours  zolpidem 5 milliGRAM(s) Oral at bedtime    MEDICATIONS  (PRN):  acetaminophen     Tablet .. 650 milliGRAM(s) Oral every 6 hours PRN Moderate Pain (4 - 6)  bisacodyl Suppository 10 milliGRAM(s) Rectal daily PRN Constipation      Care Discussed with Consultants/Other Providers [x] YES  [ ] NO    HEALTH ISSUES - PROBLEM Dx:  Chronic heart failure with preserved ejection fraction (HFpEF)    Lower extremity edema    Acute on chronic heart failure with preserved ejection fraction (HFpEF)    Chronic atrial fibrillation    HTN (hypertension)    Stage 3 chronic kidney disease    DVT prophylaxis    Hypothyroidism    HLD (hyperlipidemia)    CAD (coronary artery disease)

## 2022-06-15 NOTE — PROVIDER CONTACT NOTE (OTHER) - ASSESSMENT
Patient noted to have a pinpoint red rash across her back.
Wand (Optional): Medium
Endpoint: mild erythema
Mask Type: soft collagen
Treatment Number: 2
Vacuum Pressure Units: inches Hg
A&O x4, forgetful.  Incontinence care was being provided to patient during time of event.  Pt asymptomatic and denies pain, discomfort, palpitations.  VS: 98.4, HR 83, 124/68, RR 18, 95% O2 saturation on 2L N/C
Prefacial Cleansing: ZO gentle cleanser
Prep Text: The patients skin was cleaned with ZO gentle cleanser and prepped with a hot towel.
Indication: acne
Consent: Written consent obtained, risks reviewed including but not limited to crusting, scabbing, blistering, scarring, darker or lighter pigmentary change, bruising, and/or incomplete response.
Facial Steaming: steamed
Price (Use Numbers Only, No Special Characters Or $): 95
Serum Type (Optional): Daily Power Defense and Firming Serum
Vacuum Pressure: 15
Extraction Method: extractor
Post-Care Instructions: I reviewed with the patient in detail post-care instructions. Patient should stay away from the sun and wear sun protection until treated areas are fully healed.
Detail Level: Zone

## 2022-06-15 NOTE — PROGRESS NOTE ADULT - SUBJECTIVE AND OBJECTIVE BOX
[facility] [place] [facility]   MD XAVIER ARMSTRONG MD, DO JETT NORTH NP    TEL:  OFFICE: [phone]    From 5pm-7am Answering Service [phone]    -- RENAL FOLLOW UP NOTE ---Date of Service 06-15-22 @ 13:56    Patient is a 89y old  Female who presents with a chief complaint of LE swelling (14 Jun 2022 11:40)      Patient seen and examined at bedside. No chest pain/sob    VITALS:  T(F): 97.6 (06-15-22 @ 12:35), Max: 98.7 (06-14-22 @ 15:57)  HR: 80 (06-15-22 @ 12:35)  BP: 128/66 (06-15-22 @ 12:35)  RR: 18 (06-15-22 @ 12:35)  SpO2: 96% (06-15-22 @ 12:35)  Wt(kg): --    06-14 @ 07:01  -  06-15 @ 07:00  --------------------------------------------------------  IN: 420 mL / OUT: 900 mL / NET: -480 mL    06-15 @ 07:01  -  06-15 @ 13:56  --------------------------------------------------------  IN: 360 mL / OUT: 700 mL / NET: -340 mL        PHYSICAL EXAM:  Constitutional: NAD  Neck: No JVD  Respiratory: CTAB, no wheezes, rales or rhonchi  Cardiovascular: S1, S2, RRR  Gastrointestinal: BS+, soft, NT/ND  Extremities: + peripheral edema    Hospital Medications:   MEDICATIONS  (STANDING):  apixaban 2.5 milliGRAM(s) Oral every 12 hours  aspirin enteric coated 81 milliGRAM(s) Oral daily  brimonidine 0.2% Ophthalmic Solution 1 Drop(s) Both EYES two times a day  diltiazem    milliGRAM(s) Oral daily  diltiazem    milliGRAM(s) Oral at bedtime  furosemide    Tablet 40 milliGRAM(s) Oral every 12 hours  hydrocortisone 1% Ointment 1 Application(s) Topical two times a day  levothyroxine 50 MICROGram(s) Oral daily  multivitamin 1 Tablet(s) Oral daily  polyethylene glycol 3350 17 Gram(s) Oral daily  senna 2 Tablet(s) Oral at bedtime  simvastatin 40 milliGRAM(s) Oral at bedtime  triamcinolone 0.1% Ointment 1 Application(s) Topical every 12 hours  zolpidem 5 milliGRAM(s) Oral at bedtime      LABS:  06-15    142  |  99  |  45<H>  ----------------------------<  102<H>  4.3   |  32<H>  |  1.31<H>    Ca    8.8      15 Jarrett 2022 07:34  Phos  3.5     06-14  Mg     2.4     06-14    TPro  6.1  /  Alb  3.1<L>  /  TBili  0.4  /  DBili      /  AST  14  /  ALT  6<L>  /  AlkPhos  66  06-14    Creatinine Trend: 1.31 <--, 1.26 <--, 1.43 <--, 1.35 <--, 0.93 <--, 1.22 <--                                11.5   7.35  )-----------( 210      ( 14 Jun 2022 07:00 )             38.6     Urine Studies:  Urinalysis - [06-12-22 @ 18:46]      Color Yellow / Appearance Clear / SG 1.016 / pH 6.5      Gluc Negative / Ketone Negative  / Bili Negative / Urobili Negative       Blood Negative / Protein 100 / Leuk Est Small / Nitrite Negative      RBC 3 / WBC 2 / Hyaline  / Gran  / Sq Epi  / Non Sq Epi 0 / Bacteria Negative    Urine Creatinine 78      [06-13-22 @ 22:30]  Urine Protein 119      [06-12-22 @ 18:46]  Urine Sodium 59      [06-12-22 @ 18:46]  Urine Urea Nitrogen 447      [06-13-22 @ 14:55]  Urine Chloride 44      [06-12-22 @ 18:46]    Iron 28, TIBC 268, %sat 11      [06-13-22 @ 07:00]        RADIOLOGY & ADDITIONAL STUDIES:

## 2022-06-15 NOTE — PROVIDER CONTACT NOTE (OTHER) - ACTION/TREATMENT ORDERED:
NP notified and aware.  No new orders at this time.  Continue to monitor pt and maintain safety
NP aware & NP consulted with MD. MD at beside and assessed patient's rash.

## 2022-06-15 NOTE — PROGRESS NOTE ADULT - ASSESSMENT
89 y/o F with h/o HFpEF, CAD s/p CABG, CKD3, Chronic AF on Eliquis, HTN, moderate AS, pulm HTN presenting with 1 month of LE edema which has failed outpatient diuretics c/w HF exacerbation combined with underlying chronic venous stasis, admitted for IV diuresis. Nephrology consulted for JACK.     JACK on Chronic Kidney Disease Stage 3  likely cardio renal   FEUrea 42.3%  scr fluctuating 2/2 lasix,   consider to switch to lasix IV 40mg bid after d/w cardiology   Avoid further nephrotoxins, NSAIDS, RCA   monitor on diuretics    Hypernatremia  remains stable   improving   monitor     HTN   optimal   monitor BP closely     Anemia  iron deficiency   give oral iron   MOnitor HB        89 y/o F with h/o HFpEF, CAD s/p CABG, CKD3, Chronic AF on Eliquis, HTN, moderate AS, pulm HTN presenting with 1 month of LE edema which has failed outpatient diuretics c/w HF exacerbation combined with underlying chronic venous stasis, admitted for IV diuresis. Nephrology consulted for JACK.     JACK on Chronic Kidney Disease Stage 3  likely cardio renal   FEUrea 42.3%  scr fluctuating 2/2 lasix,   consider switching to IV lasix if volume status worsens  Avoid further nephrotoxins, NSAIDS, RCA   monitor on diuretics    Hypernatremia  remains stable   improving   monitor     HTN   optimal   monitor BP closely     Anemia  iron deficiency   give oral iron   MOnitor HB

## 2022-06-15 NOTE — PROVIDER CONTACT NOTE (OTHER) - RECOMMENDATIONS
Check electrolytes?
Assess patient. Review patient's orders, history & lab results. Address patient's rash.

## 2022-06-16 LAB
ANION GAP SERPL CALC-SCNC: 12 MMOL/L — SIGNIFICANT CHANGE UP (ref 5–17)
BUN SERPL-MCNC: 48 MG/DL — HIGH (ref 7–23)
CALCIUM SERPL-MCNC: 8.9 MG/DL — SIGNIFICANT CHANGE UP (ref 8.4–10.5)
CHLORIDE SERPL-SCNC: 99 MMOL/L — SIGNIFICANT CHANGE UP (ref 96–108)
CO2 SERPL-SCNC: 33 MMOL/L — HIGH (ref 22–31)
CREAT SERPL-MCNC: 1.31 MG/DL — HIGH (ref 0.5–1.3)
EGFR: 38 ML/MIN/1.73M2 — LOW
GLUCOSE SERPL-MCNC: 100 MG/DL — HIGH (ref 70–99)
MAGNESIUM SERPL-MCNC: 2.4 MG/DL — SIGNIFICANT CHANGE UP (ref 1.6–2.6)
PHOSPHATE SERPL-MCNC: 3.4 MG/DL — SIGNIFICANT CHANGE UP (ref 2.5–4.5)
POTASSIUM SERPL-MCNC: 4.3 MMOL/L — SIGNIFICANT CHANGE UP (ref 3.5–5.3)
POTASSIUM SERPL-SCNC: 4.3 MMOL/L — SIGNIFICANT CHANGE UP (ref 3.5–5.3)
SODIUM SERPL-SCNC: 144 MMOL/L — SIGNIFICANT CHANGE UP (ref 135–145)

## 2022-06-16 PROCEDURE — 78582 LUNG VENTILAT&PERFUS IMAGING: CPT | Mod: 26

## 2022-06-16 PROCEDURE — 71250 CT THORAX DX C-: CPT | Mod: 26

## 2022-06-16 PROCEDURE — 99232 SBSQ HOSP IP/OBS MODERATE 35: CPT | Mod: GC

## 2022-06-16 RX ORDER — FUROSEMIDE 40 MG
80 TABLET ORAL EVERY 12 HOURS
Refills: 0 | Status: COMPLETED | OUTPATIENT
Start: 2022-06-16 | End: 2022-06-17

## 2022-06-16 RX ADMIN — Medication 1 APPLICATION(S): at 06:17

## 2022-06-16 RX ADMIN — Medication 50 MICROGRAM(S): at 06:59

## 2022-06-16 RX ADMIN — Medication 1 TABLET(S): at 11:48

## 2022-06-16 RX ADMIN — Medication 120 MILLIGRAM(S): at 21:09

## 2022-06-16 RX ADMIN — Medication 80 MILLIGRAM(S): at 17:21

## 2022-06-16 RX ADMIN — BRIMONIDINE TARTRATE 1 DROP(S): 2 SOLUTION/ DROPS OPHTHALMIC at 06:16

## 2022-06-16 RX ADMIN — Medication 1 APPLICATION(S): at 17:01

## 2022-06-16 RX ADMIN — BRIMONIDINE TARTRATE 1 DROP(S): 2 SOLUTION/ DROPS OPHTHALMIC at 21:08

## 2022-06-16 RX ADMIN — Medication 40 MILLIGRAM(S): at 06:59

## 2022-06-16 RX ADMIN — SIMVASTATIN 40 MILLIGRAM(S): 20 TABLET, FILM COATED ORAL at 21:08

## 2022-06-16 RX ADMIN — Medication 1 APPLICATION(S): at 17:00

## 2022-06-16 RX ADMIN — Medication 240 MILLIGRAM(S): at 06:59

## 2022-06-16 RX ADMIN — APIXABAN 2.5 MILLIGRAM(S): 2.5 TABLET, FILM COATED ORAL at 06:59

## 2022-06-16 RX ADMIN — ZOLPIDEM TARTRATE 5 MILLIGRAM(S): 10 TABLET ORAL at 22:49

## 2022-06-16 RX ADMIN — APIXABAN 2.5 MILLIGRAM(S): 2.5 TABLET, FILM COATED ORAL at 17:00

## 2022-06-16 RX ADMIN — Medication 81 MILLIGRAM(S): at 11:48

## 2022-06-16 NOTE — PROGRESS NOTE ADULT - ASSESSMENT
89 y/o F with h/o HFpEF, CAD s/p CABG, CKD3, Chronic AF on Eliquis, HTN, moderate AS, pulm HTN presenting with 1 month of LE edema which has failed outpatient diuretics c/w HF exacerbation combined with underlying chronic venous stasis, admitted for IV diuresis.     # Acute respiratory failure with hypoxia   # Acute on chronic HFpEF  # Chronic atrial fibrillation  # HTN, HLD  # CAD s/p CABG  o2 86% on RA; CT chest ordered ; Pulmonary consult  appreciate cardio and nephro recs; C/w current dose of lasix 40 po bid   Monitor I/O, Daily weights, Tele  eliquis   dilt 240 am, 120 pm, rate controlled  cont asa statin  titrate O2, check on RA  rpt cxr appears the same; will obtain ct chest     # JACK on Stage 3 chronic kidney disease  cardio renal  monitor while on diuretics;; cr stable  renal following    # morbilliform drug eruption  appreciate derm recs  can be related to ckd as well.  Recommend topical triamcinolone 0.1% ointment BID to affected areas of skin     # venous stasis  diuresis as above  Wound carem, ACE wraps  PT recs MIN but pt wants home    # Hypothyroidism   synthroid 50mcg    DVT prophylaxis. eliquis    dw dtr at bedside    dispo: pending clinical improvement. f/u ct chest, pulmonary recs.       Please contact with any questions or concerns [phone].

## 2022-06-16 NOTE — PROGRESS NOTE ADULT - SUBJECTIVE AND OBJECTIVE BOX
Patient is a 89y old  Female who presents with a chief complaint of LE swelling (16 Jun 2022 11:08)      SUBJECTIVE / OVERNIGHT EVENTS:  Patient seen and examined.   Feels the same.   o2 dropped to 86% on RA.       Vital Signs Last 24 Hrs  T(C): 36.4 (16 Jun 2022 11:45), Max: 36.7 (15 Jarrett 2022 16:58)  T(F): 97.5 (16 Jun 2022 11:45), Max: 98.1 (15 Jarrett 2022 16:58)  HR: 73 (16 Jun 2022 11:45) (72 - 91)  BP: 139/67 (16 Jun 2022 11:45) (122/64 - 139/67)  BP(mean): --  RR: 18 (16 Jun 2022 11:45) (18 - 18)  SpO2: 86% (16 Jun 2022 14:38) (86% - 97%)  I&O's Summary    15 Jarrett 2022 07:01  -  16 Jun 2022 07:00  --------------------------------------------------------  IN: 960 mL / OUT: 1950 mL / NET: -990 mL    16 Jun 2022 07:01  -  16 Jun 2022 15:19  --------------------------------------------------------  IN: 360 mL / OUT: 0 mL / NET: 360 mL        PHYSICAL EXAM:  GENERAL: NAD, AAOx3  HEAD:  Atraumatic, Normocephalic  EYES: EOMI; conjunctiva and sclera clear  NECK: Supple, No JVD, No LAD  CHEST/LUNG: B/L air entry; No wheezes, rales or rhonci   HEART: Regular rate and rhythm; No murmurs, rubs, or gallops  ABDOMEN: Soft, Nontender, Nondistended; Bowel sounds present  EXTREMITIES:  2+ Peripheral Pulses, No clubbing, cyanosis, or edema  SKIN: No rashes or lesions    LABS:    06-16    144  |  99  |  48<H>  ----------------------------<  100<H>  4.3   |  33<H>  |  1.31<H>    Ca    8.9      16 Jun 2022 06:51  Phos  3.4     06-16  Mg     2.4     06-16        CAPILLARY BLOOD GLUCOSE                RADIOLOGY & ADDITIONAL TESTS:    Imaging Personally Reviewed:  [x] YES  [ ] NO    Consultant(s) Notes Reviewed:  [x] YES  [ ] NO      MEDICATIONS  (STANDING):  apixaban 2.5 milliGRAM(s) Oral every 12 hours  aspirin enteric coated 81 milliGRAM(s) Oral daily  brimonidine 0.2% Ophthalmic Solution 1 Drop(s) Both EYES two times a day  diltiazem    milliGRAM(s) Oral daily  diltiazem    milliGRAM(s) Oral at bedtime  furosemide    Tablet 40 milliGRAM(s) Oral every 12 hours  hydrocortisone 1% Ointment 1 Application(s) Topical two times a day  levothyroxine 50 MICROGram(s) Oral daily  multivitamin 1 Tablet(s) Oral daily  polyethylene glycol 3350 17 Gram(s) Oral daily  senna 2 Tablet(s) Oral at bedtime  simvastatin 40 milliGRAM(s) Oral at bedtime  triamcinolone 0.1% Ointment 1 Application(s) Topical every 12 hours  zolpidem 5 milliGRAM(s) Oral at bedtime    MEDICATIONS  (PRN):  acetaminophen     Tablet .. 650 milliGRAM(s) Oral every 6 hours PRN Moderate Pain (4 - 6)  bisacodyl Suppository 10 milliGRAM(s) Rectal daily PRN Constipation      Care Discussed with Consultants/Other Providers [x] YES  [ ] NO    HEALTH ISSUES - PROBLEM Dx:  Chronic heart failure with preserved ejection fraction (HFpEF)    Lower extremity edema    Acute on chronic heart failure with preserved ejection fraction (HFpEF)    Chronic atrial fibrillation    HTN (hypertension)    Stage 3 chronic kidney disease    DVT prophylaxis    Hypothyroidism    HLD (hyperlipidemia)    CAD (coronary artery disease)

## 2022-06-16 NOTE — DIETITIAN INITIAL EVALUATION ADULT - OTHER INFO
pt complaining of headache, dizziness and need to go to bathroom at time of visit. Nursing made aware.

## 2022-06-16 NOTE — CONSULT NOTE ADULT - ASSESSMENT
ASSESSMENT:    89 year old gentlewoman, lifelong non-smoker, without known history of intrinsic lung disease; She has a history of HTN, atrial fibrillation maintained on Eliquis, aortic stenosis, CAD/MI/CABG in 2010, CVA and CKD. The patient is followed by Dr. Kwasi Chew in the outpatient setting. Over the last several weeks, the patient has developed lower extremity swelling treated with lasix 40mg daily. Due to increasing leg swelling, lasix was increased to 80mg daily for days without much improvement -> ER. Outpatient ECHO -> normal left ventricular ejection fraction - normal right ventricular size and function - moderate aortic stenosis - moderate pulmonary hypertension. Hospital course has been complicated by JACK on CKD (stage III), contraction alkalosis and hypernatremia felt to be related to aggressive diuresis. The patient has been noted to have hypoxemia -> 86% on room air. She has no shortness of breath. She has an occasional cough productive of scant sputum. She describes a wheezing sound in her chest. No fevers, chills or sweats. No chest pain/pressure or palpitations.    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, central obesity and respiratory muscle weakness  3) no evidence of bronchospasm (despite the patient's claims of wheezing)  4) no likelihood of VTE disease on full dose A/C for atrial fibrillation    PLAN/RECOMMENDATIONS:    oxygen supplementation to keep saturation greater than 92% - currently on a 1lpm nasal canula -> trial on room air now  chest CT without contrast to better evaluate the lung parenchyma and pleural space  V/Q scan  bedside spirometry  CXR 6/16 "to my eye" - increasing pulmonary edema - left lower lung field opacity c/w atelectasis or effusion - s/p sternotomy - s/p orthopedic surgery  observe off pulmonary mediations, antibiotics and systemic steroids  incentive spirometry  cardiology follow-up -> needs more diuresis     cardiac meds: eliquis/ASA/zocordiltiazem CD  bowel regimen    Thank you for the courtesy of this referral. Plan of care discussed with the patient and her family at bedside and with the [facility] staff.     Juanjo Rizo MD, [facility]  [phone]  Pulmonary Medicine               ASSESSMENT:    89 year old gentlewoman, lifelong non-smoker, without known history of intrinsic lung disease; She has a history of HTN, atrial fibrillation maintained on Eliquis, aortic stenosis, CAD/MI/CABG in 2010, CVA and CKD. The patient is followed by Dr. Kwasi Chew in the outpatient setting. Over the last several weeks, the patient has developed lower extremity swelling treated with lasix 40mg daily. Due to increasing leg swelling, lasix was increased to 80mg daily for days without much improvement -> ER. Outpatient ECHO -> normal left ventricular ejection fraction - normal right ventricular size and function - moderate aortic stenosis - moderate pulmonary hypertension. Hospital course has been complicated by JACK on CKD (stage III), contraction alkalosis and hypernatremia felt to be related to aggressive diuresis. The patient has been noted to have hypoxemia -> 86% on room air. She has no shortness of breath. She has an occasional cough productive of scant sputum. She describes a wheezing sound in her chest. No fevers, chills or sweats. No chest pain/pressure or palpitations.    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, central obesity and respiratory muscle weakness  3) no evidence of bronchospasm (despite the patient's claims of wheezing)  4) no likelihood of VTE disease on full dose A/C for atrial fibrillation    lower extremity swelling  1) pulmonary hypertension without right ventricular enlargement or dysfunction  2) venous stasis changes    PLAN/RECOMMENDATIONS:    oxygen supplementation to keep saturation greater than 92% - currently on a 1lpm nasal canula -> trial on room air now  chest CT without contrast to better evaluate the lung parenchyma and pleural space  V/Q scan  bedside spirometry  CXR 6/16 "to my eye" - increasing pulmonary edema - left lower lung field opacity c/w atelectasis or effusion - s/p sternotomy - s/p orthopedic surgery  observe off pulmonary mediations, antibiotics and systemic steroids  incentive spirometry  cardiology follow-up -> needs more diuresis     cardiac meds: eliquis/ASA/zocordiltiazem CD  bowel regimen  leg elevation and wrapping    Thank you for the courtesy of this referral. Plan of care discussed with the patient and her family at bedside and with the [facility] staff.     Juanjo Rizo MD, [facility]  [phone]  Pulmonary Medicine               ASSESSMENT:    89 year old gentlewoman, lifelong non-smoker, without known history of intrinsic lung disease; She has a history of HTN, atrial fibrillation maintained on Eliquis, aortic stenosis, CAD/MI/CABG in 2010, CVA and CKD. The patient is followed by Dr. Kwasi Chew in the outpatient setting. Over the last several weeks, the patient has developed lower extremity swelling treated with lasix 40mg daily. Due to increasing leg swelling, lasix was increased to 80mg daily for days without much improvement -> ER. Outpatient ECHO -> normal left ventricular ejection fraction - normal right ventricular size and function - moderate aortic stenosis - moderate pulmonary hypertension. Hospital course has been complicated by JACK on CKD (stage III), contraction alkalosis and hypernatremia felt to be related to aggressive diuresis. The patient has been noted to have hypoxemia -> 86% on room air. She has no shortness of breath. She has an occasional cough productive of scant sputum. She describes a wheezing sound in her chest. No fevers, chills or sweats. No chest pain/pressure or palpitations.    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, central obesity and respiratory muscle weakness  3) bronchospasm due to "cardiac asthma"  4) low likelihood of VTE disease on full dose A/C for atrial fibrillation    lower extremity swelling  1) pulmonary hypertension without right ventricular enlargement or dysfunction  2) venous stasis changes  3) lymphedema    PLAN/RECOMMENDATIONS:    oxygen supplementation to keep saturation greater than 92% - currently on a 1lpm nasal canula -> trial on room air now  chest CT without contrast to better evaluate the lung parenchyma and pleural space  V/Q scan  bedside spirometry  CXR 6/16 "to my eye" - increasing pulmonary edema - left lower lung field opacity c/w atelectasis or effusion - s/p sternotomy - s/p orthopedic surgery  albuterol/atrovent nebs q6h  incentive spirometry  cardiology follow-up -> needs more diuresis     cardiac meds: eliquis/ASA/zocordiltiazem CD  bowel regimen  leg elevation and wrapping    Thank you for the courtesy of this referral. Plan of care discussed with the patient and her family at bedside and with the [facility] staff.     Juanjo Rizo MD, [facility]  [phone]  Pulmonary Medicine

## 2022-06-16 NOTE — PROGRESS NOTE ADULT - SUBJECTIVE AND OBJECTIVE BOX
INTERVAL HPI/OVERNIGHT EVENTS:  Rash primarily on legs now, denies any oral involvement however she is now experiencing an itchy rash on her back over the last day or so. Daughters think it looks different from her other rash.    MEDICATIONS  (STANDING):  apixaban 2.5 milliGRAM(s) Oral every 12 hours  aspirin enteric coated 81 milliGRAM(s) Oral daily  brimonidine 0.2% Ophthalmic Solution 1 Drop(s) Both EYES two times a day  diltiazem    milliGRAM(s) Oral daily  diltiazem    milliGRAM(s) Oral at bedtime  furosemide   Injectable 80 milliGRAM(s) IV Push every 12 hours  hydrocortisone 1% Ointment 1 Application(s) Topical two times a day  levothyroxine 50 MICROGram(s) Oral daily  multivitamin 1 Tablet(s) Oral daily  polyethylene glycol 3350 17 Gram(s) Oral daily  senna 2 Tablet(s) Oral at bedtime  simvastatin 40 milliGRAM(s) Oral at bedtime  triamcinolone 0.1% Ointment 1 Application(s) Topical every 12 hours  zolpidem 5 milliGRAM(s) Oral at bedtime    MEDICATIONS  (PRN):  acetaminophen     Tablet .. 650 milliGRAM(s) Oral every 6 hours PRN Moderate Pain (4 - 6)  bisacodyl Suppository 10 milliGRAM(s) Rectal daily PRN Constipation      Allergies    No Known Allergies    Intolerances        REVIEW OF SYSTEMS  Skin: see HPI    Vital Signs Last 24 Hrs  T(C): 36.4 (16 Jun 2022 11:45), Max: 36.7 (15 Jarrett 2022 20:07)  T(F): 97.5 (16 Jun 2022 11:45), Max: 98.1 (15 Jarrett 2022 20:07)  HR: 73 (16 Jun 2022 11:45) (73 - 91)  BP: 139/67 (16 Jun 2022 11:45) (122/64 - 139/67)  BP(mean): --  RR: 18 (16 Jun 2022 11:45) (18 - 18)  SpO2: 86% (16 Jun 2022 14:38) (86% - 93%)    PHYSICAL EXAM:   The patient was alert and oriented X 3, well nourished, and in no  apparent distress.  There was no visible lymphadenopathy.  Conjunctiva were non injected  There was no clubbing or edema of extremities.    Of note on skin exam:   -pink edematous papules, some coalescing into plaques on the thighs >> abdomen  -red papules scattered over the back    LABS:    06-16    144  |  99  |  48<H>  ----------------------------<  100<H>  4.3   |  33<H>  |  1.31<H>    Ca    8.9      16 Jun 2022 06:51  Phos  3.4     06-16  Mg     2.4     06-16    Hepatitis serologies negative  RVP negative

## 2022-06-16 NOTE — PROGRESS NOTE ADULT - SUBJECTIVE AND OBJECTIVE BOX
Cardiology Consult Progress Note    Patient seen and examined at bedside.    Interval Events:   No acute events  Transitioned to PO furosemide  Net negative 990cc over 24 hours    Review Of Systems: No chest pain, shortness of breath, or palpitations            Current Meds:  acetaminophen     Tablet .. 650 milliGRAM(s) Oral every 6 hours PRN  apixaban 2.5 milliGRAM(s) Oral every 12 hours  aspirin enteric coated 81 milliGRAM(s) Oral daily  bisacodyl Suppository 10 milliGRAM(s) Rectal daily PRN  brimonidine 0.2% Ophthalmic Solution 1 Drop(s) Both EYES two times a day  diltiazem    milliGRAM(s) Oral daily  diltiazem    milliGRAM(s) Oral at bedtime  furosemide    Tablet 40 milliGRAM(s) Oral every 12 hours  hydrocortisone 1% Ointment 1 Application(s) Topical two times a day  levothyroxine 50 MICROGram(s) Oral daily  multivitamin 1 Tablet(s) Oral daily  polyethylene glycol 3350 17 Gram(s) Oral daily  senna 2 Tablet(s) Oral at bedtime  simvastatin 40 milliGRAM(s) Oral at bedtime  triamcinolone 0.1% Ointment 1 Application(s) Topical every 12 hours  zolpidem 5 milliGRAM(s) Oral at bedtime      Vitals:  T(F): 98.1 (06-16), Max: 98.1 (06-15)  HR: 91 (06-16) (72 - 91)  BP: 122/64 (06-16) (122/64 - 146/70)  RR: 18 (06-16)  SpO2: 93% (06-16)  I&O's Summary    15 Jarrett 2022 07:01  -  16 Jun 2022 07:00  --------------------------------------------------------  IN: 960 mL / OUT: 1950 mL / NET: -990 mL    Physical Exam:  GENERAL: Elderly woman, NAD  HEAD:  Atraumatic, Normocephalic  ENT: EOMI, PERRLA, conjunctiva and sclera clear, Neck supple, No JVD, moist mucosa  CHEST/LUNG: Clear to auscultation bilaterally  HEART: Irregular; 2/6 systolic murmur best heard at LUSB,   ABDOMEN: Soft, Nontender, Nondistended; Bowel sounds present  EXTREMITIES:  Legs wrapped, 1+ edema at feet  PSYCH: Nl behavior, nl affect  NEUROLOGY: AAOx3, non-focal, cranial nerves intact  SKIN: Normal color, No rashes or lesions       06-16    144  |  99  |  48<H>  ----------------------------<  100<H>  4.3   |  33<H>  |  1.31<H>    Ca    8.9      16 Jun 2022 06:51  Phos  3.4     06-16  Mg     2.4     06-16    CARDIAC MARKERS ( 10 Jarrett 2022 12:50 )  31 ng/L / x     / x     / x     / x     / x        Serum Pro-Brain Natriuretic Peptide: 2139 pg/mL (06-13 @ 07:00)  Serum Pro-Brain Natriuretic Peptide: 2152 pg/mL (06-10 @ 12:50)   Cardiology Consult Progress Note    Patient seen and examined at bedside.    Interval Events:   No acute events  Transitioned to PO furosemide; net negative 990cc over 24 hours  No chest pain, shortness of breath, or palpitations            Current Meds:  acetaminophen     Tablet .. 650 milliGRAM(s) Oral every 6 hours PRN  apixaban 2.5 milliGRAM(s) Oral every 12 hours  aspirin enteric coated 81 milliGRAM(s) Oral daily  bisacodyl Suppository 10 milliGRAM(s) Rectal daily PRN  brimonidine 0.2% Ophthalmic Solution 1 Drop(s) Both EYES two times a day  diltiazem    milliGRAM(s) Oral daily  diltiazem    milliGRAM(s) Oral at bedtime  furosemide    Tablet 40 milliGRAM(s) Oral every 12 hours  hydrocortisone 1% Ointment 1 Application(s) Topical two times a day  levothyroxine 50 MICROGram(s) Oral daily  multivitamin 1 Tablet(s) Oral daily  polyethylene glycol 3350 17 Gram(s) Oral daily  senna 2 Tablet(s) Oral at bedtime  simvastatin 40 milliGRAM(s) Oral at bedtime  triamcinolone 0.1% Ointment 1 Application(s) Topical every 12 hours  zolpidem 5 milliGRAM(s) Oral at bedtime    ROS:  Neg.    Vitals:  T(F): 98.1 (06-16), Max: 98.1 (06-15)  HR: 91 (06-16) (72 - 91)  BP: 122/64 (06-16) (122/64 - 146/70)  RR: 18 (06-16)  SpO2: 93% (06-16)    Physical Exam:  GENERAL: Elderly woman, NAD  HEAD:  Atraumatic, Normocephalic  ENT: EOMI, PERRLA, conjunctiva and sclera clear, Neck supple, No JVD, moist mucosa  CHEST/LUNG: Clear to auscultation bilaterally  HEART: Irregular; 2/6 systolic murmur best heard at LUSB,   ABDOMEN: Soft, Nontender, Nondistended; Bowel sounds present  EXTREMITIES:  Legs wrapped, 1+ edema at feet  PSYCH: Nl behavior, nl affect  NEUROLOGY: AAOx3, non-focal, cranial nerves intact  SKIN: Normal color, No rashes or lesions       I&O's Summary  15 Jarrett 2022 07:01  -  16 Jun 2022 07:00  --------------------------------------------------------  IN: 960 mL / OUT: 1950 mL / NET: -990 mL      LABS:  06-16  144  |  99  |  48<H>  ----------------------------<  100<H>  4.3   |  33<H>  |  1.31<H>    Ca    8.9      16 Jun 2022 06:51  Phos  3.4     06-16  Mg     2.4     06-16    CARDIAC MARKERS ( 10 Jarrett 2022 12:50 )  31 ng/L / x     / x     / x     / x     / x        Serum Pro-Brain Natriuretic Peptide: 2139 pg/mL (06-13 @ 07:00)  Serum Pro-Brain Natriuretic Peptide: 2152 pg/mL (06-10 @ 12:50)

## 2022-06-16 NOTE — CONSULT NOTE ADULT - SUBJECTIVE AND OBJECTIVE BOX
[facility] - [facility] - CONSULT NOTE    HPI: 89 year old gentlewoman, lifelong non-smoker, without known history of intrinsic lung disease; She has a history of HTN, atrial fibrillation maintained on Eliquis, aortic stenosis, CAD/CABG, CVA and CKD. The patient is followed by Dr. Kwasi Chew in the outpatient setting. Over the last several weeks, the patient has been having lower extremity swelling treated with lasix 40mg daily. Due to increasing leg swelling, lasix was increased to 80mg daily for 6 days without much improvement -> ER. Outpatient ECHO -> normal left ventricular ejection fraction - moderate aortic stenosis - moderate pulmonary hypertension. Hospital course has been complicated by JACK on CKD (stage III), contraction alkalosis and hypernatremia felt to be related to aggressive diuresis a    PMHX:  Hypertension  Atrial fibrillation  Coronary Artery Disease  Aortic stenosis  Chronic Sciatica  CVA (cerebral vascular accident)    PSHX:  CABG    FAMILY HISTORY:  No pertinent family history    SOCIAL HISTORY:  ; lifelong non-wmoker    Pulmonary Medications:       Antimicrobials:      Cardiology:  diltiazem    milliGRAM(s) Oral daily  diltiazem    milliGRAM(s) Oral at bedtime  furosemide    Tablet 40 milliGRAM(s) Oral every 12 hours      Other:  apixaban 2.5 milliGRAM(s) Oral every 12 hours  aspirin enteric coated 81 milliGRAM(s) Oral daily  brimonidine 0.2% Ophthalmic Solution 1 Drop(s) Both EYES two times a day  hydrocortisone 1% Ointment 1 Application(s) Topical two times a day  levothyroxine 50 MICROGram(s) Oral daily  multivitamin 1 Tablet(s) Oral daily  polyethylene glycol 3350 17 Gram(s) Oral daily  senna 2 Tablet(s) Oral at bedtime  simvastatin 40 milliGRAM(s) Oral at bedtime  triamcinolone 0.1% Ointment 1 Application(s) Topical every 12 hours  zolpidem 5 milliGRAM(s) Oral at bedtime      Prn:  MEDICATIONS  (PRN):  acetaminophen     Tablet .. 650 milliGRAM(s) Oral every 6 hours PRN Moderate Pain (4 - 6)  bisacodyl Suppository 10 milliGRAM(s) Rectal daily PRN Constipation      Allergies    No Known Allergies    HOME MEDICATIONS: see  H & P    REVIEW OF SYSTEMS:  Constitutional: As per HPI  HEENT: Within normal limits  CV: As per HPI  Resp: As per HPI  GI: Within normal limits   : Within normal limits  Musculoskeletal: Within normal limits  Skin: Within normal limits  Neurological: Within normal limits  Psychiatric: Within normal limits  Endocrine: Within normal limits  Hematologic/Lymphatic: Within normal limits  Allergic/Immunologic: Within normal limits    [x] All other systems negative    OBJECTIVE:    Daily Weight in k (2022 04:19)      PHYSICAL EXAM:  ICU Vital Signs Last 24 Hrs  T(C): 36.4 (2022 11:45), Max: 36.7 (15 Jarrett 2022 16:58)  T(F): 97.5 (2022 11:45), Max: 98.1 (15 Jarrett 2022 16:58)  HR: 73 (2022 11:45) (72 - 91)  BP: 139/67 (2022 11:45) (122/64 - 139/67)  BP(mean): --  ABP: --  ABP(mean): --  RR: 18 (2022 11:45) (18 - 18)  SpO2: 86% (2022 14:38) (86% - 97%)    General: Awake. Alert. Cooperative. No distress. Appears stated age 	  HEENT:   Atraumatic. Normocephalic. Anicteric. Normal oral mucosa. PERRL. EOMI.  Neck: Supple. Trachea midline. Thyroid without enlargement/tenderness/nodules. No carotid bruit. No JVD.	  Cardiovascular: Regular rate and rhythm. S1 S2 normal. No murmurs, rubs or gallops.  Respiratory: Respirations unlabored. Clear to auscultation and percussion bilaterally. No curvature.  Abdomen: Soft. Non-tender. Non-distended. No organomegaly. No masses. Normal bowel sounds.  Extremities: Warm to touch. No clubbing or cyanosis. No pedal edema.  Pulses: 2+ peripheral pulses all extremities.	  Skin: Normal skin color. No rashes or lesions. No ecchymoses. No cyanosis. Warm to touch.  Lymph Nodes: Cervical, supraclavicular and axillary nodes normal  Neurological: Motor and sensory examination equal and normal. A and O x 3  Psychiatry: Appropriate mood and affect.      LABS:      CBC    WBC  7.35 <==, 8.30 <==, 5.29 <==, 7.83 <==    Hemoglobin  11.5 <<==, 11.7 <<==, 11.1 <<==, 12.5 <<==    Hematocrit  38.6 <==, 38.5 <==, 37.2 <==, 40.7 <==    Platelets  210 <==, 205 <==, 202 <==, 230 <==      144  |  99  |  48<H>  ----------------------------<  100<H>    06-16  4.3   |  33<H>  |  1.31<H>    LYTES    sodium  144 <==, 142 <==, 144 <==, 146 <==, 146 <==, 149 <==, 143 <==    potassium   4.3 <==, 4.3 <==, 4.7 <==, 4.0 <==, 3.8 <==, 3.9 <==, 5.0 <==    chloride  99 <==, 99 <==, 100 <==, 99 <==, 103 <==, 105 <==, 103 <==    carbon dioxide  33 <==, 32 <==, 32 <==, 35 <==, 32 <==, 32 <==, 29 <==    =============================================================================================  RENAL FUNCTION:    Creatinine:   1.31  <<==, 1.31  <<==, 1.26  <<==, 1.43  <<==, 1.35  <<==, 0.93  <<==, 1.22  <<==    BUN:   48 <==, 45 <==, 40 <==, 36 <==, 37 <==, 36 <==, 44 <==    ============================================================================================    calcium   8.9 <==, 8.8 <==, 9.0 <==, 9.2 <==, 9.0 <==, 8.9 <==, 9.4 <==    phos   3.4 <==, 3.5 <==    mag   2.4 <==, 2.4 <==, 2.2 <==, 2.2 <==, 2.2 <==    ============================================================================================  LFTs    AST:   14 <== , 28 <==     ALT:  6  <== , 9  <==     AP:  66  <=, 71  <=    Bili:  0.4  <=, 0.4  <=    Serum Pro-Brain Natriuretic Peptide: 2139 pg/mL ( @ 07:00)    MICROBIOLOGY:     Respiratory Viral Panel with COVID-19 by YESSICA (22 @ 12:44)   Rapid RVP Result: [facility]   SARS-CoV-2: [facility]  This Respiratory Panel uses polymerase chain reaction (PCR) to detect for   adenovirus; coronavirus (HKU1, NL63, 229E, OC43); human metapneumovirus   (hMPV); human enterovirus/rhinovirus (Entero/RV); influenza A; influenza   A/H1; influenza A/H3; influenza A/H1-2009; influenza B; parainfluenza   viruses 1, 2, 3, 4; respiratory syncytial virus; Mycoplasma pneumoniae;   Chlamydophila pneumoniae; and SARS-CoV-2.       RADIOLOGY:  [x ] Chest radiographs reviewed and interpreted by me    EXAM:  XR CHEST PORTABLE URGENT 1V                          PROCEDURE DATE:  06/10/2022      FINDINGS:  Heart size and mediastinum difficult to assess on this projection.   Calcified aortic knob.  Status post median sternotomy.  Mild pulmonary vascular congestion.  Trace left pleural effusion with left basilar atelectasis. No   pneumothorax.  Right humeral head orthopedic screws noted.    IMPRESSION:  Mild pulmonary vascular congestion.    Trace left pleural effusion with left basilar atelectasis.      SEBLE HUMPHREYS MD; Resident Radiologist  This document has been electronically signed.  NAVYA OMER MD; Attending Radiologist  This document has been electronically signed. 2022 12:36AM  ---------------------------------------------------------------------------------------------------------------           [facility] - [facility] - CONSULT NOTE    HPI: 89 year old gentlewoman, lifelong non-smoker, without known history of intrinsic lung disease; She has a history of HTN, atrial fibrillation maintained on Eliquis, aortic stenosis, CAD/MI/CABG in 2010, CVA and CKD. The patient is followed by Dr. Kwasi Chew in the outpatient setting. Over the last several weeks, the patient has developed lower extremity swelling treated with lasix 40mg daily. Due to increasing leg swelling, lasix was increased to 80mg daily for days without much improvement -> ER. Outpatient ECHO -> normal left ventricular ejection fraction - normal right ventricular size and function - moderate aortic stenosis - moderate pulmonary hypertension. Hospital course has been complicated by JACK on CKD (stage III), contraction alkalosis and hypernatremia felt to be related to aggressive diuresis. The patient has been noted to have hypoxemia -> 86% on room air. She has no shortness of breath. She has an occasional cough productive of scant sputum. She describes a wheezing sound in her chest. No fevers, chills or sweats. No chest pain/pressure or palpitations. Asked to evaluate    PMHX:  Hypertension  Atrial fibrillation  Coronary Artery Disease  Myocardial infarction  Aortic stenosis  Chronic Sciatica  CVA (cerebral vascular accident)    PSHX:  CABG x 3 -  (Dr. Aranda)  orthopedic surgery - right shoulder    FAMILY HISTORY:  No pertinent family history    SOCIAL HISTORY:  ; lifelong non-smoker;    Pulmonary Medications:       Antimicrobials:      Cardiology:  diltiazem    milliGRAM(s) Oral daily  diltiazem    milliGRAM(s) Oral at bedtime  furosemide    Tablet 40 milliGRAM(s) Oral every 12 hours      Other:  apixaban 2.5 milliGRAM(s) Oral every 12 hours  aspirin enteric coated 81 milliGRAM(s) Oral daily  brimonidine 0.2% Ophthalmic Solution 1 Drop(s) Both EYES two times a day  hydrocortisone 1% Ointment 1 Application(s) Topical two times a day  levothyroxine 50 MICROGram(s) Oral daily  multivitamin 1 Tablet(s) Oral daily  polyethylene glycol 3350 17 Gram(s) Oral daily  senna 2 Tablet(s) Oral at bedtime  simvastatin 40 milliGRAM(s) Oral at bedtime  triamcinolone 0.1% Ointment 1 Application(s) Topical every 12 hours  zolpidem 5 milliGRAM(s) Oral at bedtime      Prn:  MEDICATIONS  (PRN):  acetaminophen     Tablet .. 650 milliGRAM(s) Oral every 6 hours PRN Moderate Pain (4 - 6)  bisacodyl Suppository 10 milliGRAM(s) Rectal daily PRN Constipation      Allergies    No Known Allergies    HOME MEDICATIONS: see  H & P    REVIEW OF SYSTEMS:  Constitutional: As per HPI  HEENT: Within normal limits  CV: As per HPI  Resp: As per HPI  GI: Within normal limits   : Within normal limits  Musculoskeletal: Within normal limits  Skin: Within normal limits  Neurological: Within normal limits  Psychiatric: Within normal limits  Endocrine: Within normal limits  Hematologic/Lymphatic: Within normal limits  Allergic/Immunologic: Within normal limits    [x] All other systems negative    OBJECTIVE:    Daily Weight in k (2022 04:19)    PHYSICAL EXAM:  ICU Vital Signs Last 24 Hrs  T(C): 36.4 (2022 11:45), Max: 36.7 (15 Jarrett 2022 16:58)  T(F): 97.5 (2022 11:45), Max: 98.1 (15 Jarrett 2022 16:58)  HR: 73 (2022 11:45) (72 - 91)  BP: 139/67 (2022 11:45) (122/64 - 139/67)  BP(mean): --  ABP: --  ABP(mean): --  RR: 18 (2022 11:45) (18 - 18)  SpO2: 86% (2022 14:38) (86% on room air at rest - 97% on 1lpm at rest)    General: Awake. Alert. Cooperative. No distress. Appears stated age. Comfortable sitting in the chair 	  HEENT:  Atraumatic. Normocephalic. Anicteric. Normal oral mucosa. PERRL. EOMI.  Neck: Supple. Trachea midline. Thyroid without enlargement/tenderness/nodules. No carotid bruit. Mild JVD.	  Cardiovascular: Irregularly irregular rate and rhythm. S1 S2 normal. II/VI systolic murmur.  Respiratory: Respirations unlabored. Faint bilateral rales. Kyphosis.  Abdomen: Soft. Non-tender. Non-distended. No organomegaly. No masses. Normal bowel sounds. Central obesity  Extremities: Warm to touch. No clubbing or cyanosis. Moderate lower extremity up to the thigh - decreased - wrapped in ACE bandages  Pulses: 2+ peripheral pulses all extremities.	  Skin: Normal skin color. No rashes or lesions. No ecchymoses. No cyanosis. Warm to touch.  Lymph Nodes: Cervical, supraclavicular and axillary nodes normal  Neurological: Motor and sensory examination equal and normal. A and O x 3  Psychiatry: Appropriate mood and affect.      LABS:      CBC    WBC  7.35 <==, 8.30 <==, 5.29 <==, 7.83 <==    Hemoglobin  11.5 <<==, 11.7 <<==, 11.1 <<==, 12.5 <<==    Hematocrit  38.6 <==, 38.5 <==, 37.2 <==, 40.7 <==    Platelets  210 <==, 205 <==, 202 <==, 230 <==      144  |  99  |  48<H>  ----------------------------<  100<H>    06-16  4.3   |  33<H>  |  1.31<H>    LYTES    sodium  144 <==, 142 <==, 144 <==, 146 <==, 146 <==, 149 <==, 143 <==    potassium   4.3 <==, 4.3 <==, 4.7 <==, 4.0 <==, 3.8 <==, 3.9 <==, 5.0 <==    chloride  99 <==, 99 <==, 100 <==, 99 <==, 103 <==, 105 <==, 103 <==    carbon dioxide  33 <==, 32 <==, 32 <==, 35 <==, 32 <==, 32 <==, 29 <==    =============================================================================================  RENAL FUNCTION:    Creatinine:   1.31  <<==, 1.31  <<==, 1.26  <<==, 1.43  <<==, 1.35  <<==, 0.93  <<==, 1.22  <<==    BUN:   48 <==, 45 <==, 40 <==, 36 <==, 37 <==, 36 <==, 44 <==    ============================================================================================    calcium   8.9 <==, 8.8 <==, 9.0 <==, 9.2 <==, 9.0 <==, 8.9 <==, 9.4 <==    phos   3.4 <==, 3.5 <==    mag   2.4 <==, 2.4 <==, 2.2 <==, 2.2 <==, 2.2 <==    ============================================================================================  LFTs    AST:   14 <== , 28 <==     ALT:  6  <== , 9  <==     AP:  66  <=, 71  <=    Bili:  0.4  <=, 0.4  <=    Serum Pro-Brain Natriuretic Peptide: 2139 pg/mL ( @ 07:00)    MICROBIOLOGY:     Respiratory Viral Panel with COVID-19 by YESSICA (22 @ 12:44)   Rapid RVP Result: [facility]   SARS-CoV-2: [facility]  This Respiratory Panel uses polymerase chain reaction (PCR) to detect for   adenovirus; coronavirus (HKU1, NL63, 229E, OC43); human metapneumovirus   (hMPV); human enterovirus/rhinovirus (Entero/RV); influenza A; influenza   A/H1; influenza A/H3; influenza A/H1-2009; influenza B; parainfluenza   viruses 1, 2, 3, 4; respiratory syncytial virus; Mycoplasma pneumoniae;   Chlamydophila pneumoniae; and SARS-CoV-2.       RADIOLOGY:  [x ] Chest radiographs reviewed and interpreted by me    EXAM:  XR CHEST PORTABLE URGENT 1V                          PROCEDURE DATE:  06/10/2022      FINDINGS:  Heart size and mediastinum difficult to assess on this projection.   Calcified aortic knob.  Status post median sternotomy.  Mild pulmonary vascular congestion.  Trace left pleural effusion with left basilar atelectasis. No   pneumothorax.  Right humeral head orthopedic screws noted.    IMPRESSION:  Mild pulmonary vascular congestion.    Trace left pleural effusion with left basilar atelectasis.      SEBLE HUMPHREYS MD; Resident Radiologist  This document has been electronically signed.  NAVYA OMER MD; Attending Radiologist  This document has been electronically signed. 2022 12:36AM  ---------------------------------------------------------------------------------------------------------------

## 2022-06-16 NOTE — DIETITIAN INITIAL EVALUATION ADULT - PERTINENT MEDS FT
MEDICATIONS  (STANDING):  apixaban 2.5 milliGRAM(s) Oral every 12 hours  aspirin enteric coated 81 milliGRAM(s) Oral daily  brimonidine 0.2% Ophthalmic Solution 1 Drop(s) Both EYES two times a day  diltiazem    milliGRAM(s) Oral daily  diltiazem    milliGRAM(s) Oral at bedtime  furosemide    Tablet 40 milliGRAM(s) Oral every 12 hours  hydrocortisone 1% Ointment 1 Application(s) Topical two times a day  levothyroxine 50 MICROGram(s) Oral daily  multivitamin 1 Tablet(s) Oral daily  polyethylene glycol 3350 17 Gram(s) Oral daily  senna 2 Tablet(s) Oral at bedtime  simvastatin 40 milliGRAM(s) Oral at bedtime  triamcinolone 0.1% Ointment 1 Application(s) Topical every 12 hours  zolpidem 5 milliGRAM(s) Oral at bedtime    MEDICATIONS  (PRN):  acetaminophen     Tablet .. 650 milliGRAM(s) Oral every 6 hours PRN Moderate Pain (4 - 6)  bisacodyl Suppository 10 milliGRAM(s) Rectal daily PRN Constipation

## 2022-06-16 NOTE — PROGRESS NOTE ADULT - ASSESSMENT
89 yo F with diastolic HF, CAD s/p CABG, CKD3B, atrial fibrillation (on apixaban), HTN, moderate AS and pulmonary HTN.  Presented to the ED with several weeks of worsening LE edema despite increasing dose of PO diuretic. No other overt symptoms of CHF (eg, SOB, orthopnea, PND); likely due to components of diastolic HF and venous insufficiency.   Now diuresed IV with improvement in LE edema.    REC:  1. HFpEF, venous insufficiency LE edema  - Continue PO furosemide 40 mg BID (eg, 8am and 2pm)   - Trend Cr daily  - Strict I/Os and daily weights please  - Appreciate wound care/derm recs    2. Atrial fibrillation  - Continue home diltiazem  - Continue home reduced dose apixaban    3. CAD s/p CABG  - Continue home asa and statin    4. JACK on CKD  - Appreciate nephrology recommendations    Bryan Heart MD  Department of Cardiology  Cardiology Fellow, PGY4   89 yo F with diastolic HF, CAD s/p CABG, CKD3B, atrial fibrillation (on apixaban), HTN, moderate AS and pulmonary HTN.  Presented to the ED with several weeks of worsening LE edema despite increasing dose of PO diuretic. No other overt symptoms of CHF (eg, SOB, orthopnea, PND); likely due to components of diastolic HF and venous insufficiency.   Now diuresed IV with improvement in LE edema.    REC:  1. HFpEF, venous insufficiency LE edema  - Continue PO furosemide 40 mg BID   - Trend Cr daily  - Strict I/Os and daily weights please  - Appreciate wound care/derm recs    2. Atrial fibrillation  - Continue home diltiazem  - Continue home reduced dose apixaban    3. CAD s/p CABG  - Continue home asa and statin    4. JACK on CKD  - Appreciate nephrology recommendations      Bryan Heart MD  Department of Cardiology  Cardiology Fellow, PGY4    Plan discussed with cardiology fellow.  Patient seen and examined.  Hx., exam and labs as above.  I agree with the assessment and recommendations. which I have reviewed and edited where appropriate.  Reji Harmon M.D.  Cardiology Attending, Consult Service  For Cardiology consults and questions, all Cardiology service information can be found 24/7 on [email or web address] - use password: [email or web address] to log in.

## 2022-06-16 NOTE — CONSULT NOTE ADULT - CONSULT REASON
hypoxemia; pulmonary edema; pleural effusions; CAD/CABG; aortic stenosis; CKD hypoxemia; pulmonary edema; pleural effusions; pulmonary hypertension; CAD/CABG; aortic stenosis; CKD

## 2022-06-16 NOTE — PROGRESS NOTE ADULT - ASSESSMENT
89 y/o F with h/o HFpEF, CAD s/p CABG, CKD3, Chronic AF on Eliquis, HTN, moderate AS, pulm HTN presenting with 1 month of LE edema which has failed outpatient diuretics c/w HF exacerbation combined with underlying chronic venous stasis, admitted for IV diuresis. Nephrology consulted for JACK.     JACK on Chronic Kidney Disease Stage 3  likely cardio renal   FEUrea 42.3%  scr remains stable   consider switching to IV lasix if volume status worsens  Avoid further nephrotoxins, NSAIDS, RCA   monitor on diuretics    Hypernatremia  remains stable    monitor     HTN   optimal   monitor BP closely     Anemia  iron deficiency   give oral iron   MOnitor HB

## 2022-06-16 NOTE — PROGRESS NOTE ADULT - ASSESSMENT
1. Morbilliform (exanthematous) drug eruption, mild, improving  - Possibly 2/2 Eliquis , but mild enough to treat through and the risks of stopping Eliquis outweigh the benefits.  - c/w  triamcinolone 0.1% ointment BID to AA x 2 weeks.    2. John Day's disease/transient acantholytic dermatosis  Transient acantholytic dermatosis, also known as Enmanuel disease, is an acquired skin disorder of unknown etiology. Although the cause has not been established, it is often associated with heat, sweating and prolonged bed rest (such as in hospitalized patients)  -diagnosis reviewed with patient and her daughters  -START triamcinolone 0.1% as cream BID prn  -efforts to reduce sweating and keep cool/dry can help with this condition.      The patient's chart was reviewed in addition to being seen and examined at bedside with the dermatology attending Dr. Snowden.  Recommendations were communicated with the primary team.  Please page [phone] with a 10-digit call-back number for further related questions.    Sussy Vázquez MD  Resident Physician, PGY2  [facility]  Pager: [phone]  Office: [phone]   1. Morbilliform (exanthematous) drug eruption, mild, improving  - Possibly 2/2 Eliquis , but mild enough to treat through and the risks of stopping Eliquis outweigh the benefits.  - c/w  triamcinolone 0.1% ointment BID to AA x 2 weeks.    2. Brice's disease/transient acantholytic dermatosis  Transient acantholytic dermatosis, also known as Enmanuel disease, is an acquired skin disorder of unknown etiology. Although the cause has not been established, it is often associated with heat, sweating and prolonged bed rest (such as in hospitalized patients)  -diagnosis reviewed with patient and her daughters  -START triamcinolone 0.1% as cream BID prn  -efforts to reduce sweating and keep cool/dry can help with this condition.    Thank you for allowing us to participate in the care of this pt. Dermatology to sign off at this time. Please notify us and re-consult as needed for new or concerning changes to skin exam.    The patient's chart was reviewed in addition to being seen and examined at bedside with the dermatology attending Dr. Snowden.  Recommendations were communicated with the primary team.  Please page [phone] with a 10-digit call-back number for further related questions.    Sussy Vázquez MD  Resident Physician, PGY2  [facility]  Pager: [phone]  Office: [phone]

## 2022-06-16 NOTE — DIETITIAN INITIAL EVALUATION ADULT - PERTINENT LABORATORY DATA
06-16    144  |  99  |  48<H>  ----------------------------<  100<H>  4.3   |  33<H>  |  1.31<H>    Ca    8.9      16 Jun 2022 06:51  Phos  3.4     06-16  Mg     2.4     06-16

## 2022-06-16 NOTE — DIETITIAN INITIAL EVALUATION ADULT - ORAL INTAKE PTA/DIET HISTORY
cereal for breakfast, could not recall lunch, meals on wheels for dinner. snacks on fruit. her daughter buys Boost for her.

## 2022-06-16 NOTE — CHART NOTE - NSCHARTNOTEFT_GEN_A_CORE
This patient was admitted with acute on chronic heart failure.  At this time her acute state has been resolved but she is requiring supplemental O2.  This patient was evaluated and noted with an O2 Sat of 86% on room air at rest.  The patient is being ordered home oxygen at this time.       Mary Gonzales, JAYJAY-C  [id number]

## 2022-06-16 NOTE — PROGRESS NOTE ADULT - SUBJECTIVE AND OBJECTIVE BOX
[facility] [place]   MD XAVIER ARMSTRONG MD, DO JETT NORTH NP    TEL:  OFFICE: [phone]    From 5pm-7am Answering Service [phone]    -- RENAL FOLLOW UP NOTE ---Date of Service 06-16-22 @ 11:08    Patient is a 89y old  Female who presents with a chief complaint of LE swelling (16 Jun 2022 08:31)      Patient seen and examined at bedside. No chest pain/sob    VITALS:  T(F): 98.1 (06-16-22 @ 04:19), Max: 98.1 (06-15-22 @ 16:58)  HR: 91 (06-16-22 @ 04:19)  BP: 122/64 (06-16-22 @ 04:19)  RR: 18 (06-16-22 @ 04:19)  SpO2: 93% (06-16-22 @ 04:19)  Wt(kg): --    06-15 @ 07:01  -  06-16 @ 07:00  --------------------------------------------------------  IN: 960 mL / OUT: 1950 mL / NET: -990 mL    06-16 @ 07:01  -  06-16 @ 11:08  --------------------------------------------------------  IN: 180 mL / OUT: 0 mL / NET: 180 mL          PHYSICAL EXAM:  Constitutional: NAD  Neck: No JVD  Respiratory: CTAB, no wheezes, rales or rhonchi  Cardiovascular: S1, S2, RRR  Gastrointestinal: BS+, soft, NT/ND  Extremities: + peripheral edema    Hospital Medications:   MEDICATIONS  (STANDING):  apixaban 2.5 milliGRAM(s) Oral every 12 hours  aspirin enteric coated 81 milliGRAM(s) Oral daily  brimonidine 0.2% Ophthalmic Solution 1 Drop(s) Both EYES two times a day  diltiazem    milliGRAM(s) Oral daily  diltiazem    milliGRAM(s) Oral at bedtime  furosemide    Tablet 40 milliGRAM(s) Oral every 12 hours  hydrocortisone 1% Ointment 1 Application(s) Topical two times a day  levothyroxine 50 MICROGram(s) Oral daily  multivitamin 1 Tablet(s) Oral daily  polyethylene glycol 3350 17 Gram(s) Oral daily  senna 2 Tablet(s) Oral at bedtime  simvastatin 40 milliGRAM(s) Oral at bedtime  triamcinolone 0.1% Ointment 1 Application(s) Topical every 12 hours  zolpidem 5 milliGRAM(s) Oral at bedtime      LABS:  06-16    144  |  99  |  48<H>  ----------------------------<  100<H>  4.3   |  33<H>  |  1.31<H>    Ca    8.9      16 Jun 2022 06:51  Phos  3.4     06-16  Mg     2.4     06-16      Creatinine Trend: 1.31 <--, 1.31 <--, 1.26 <--, 1.43 <--, 1.35 <--, 0.93 <--, 1.22 <--    Phosphorus Level, Serum: 3.4 mg/dL (06-16 @ 06:51)          Urine Studies:  Urinalysis - [06-12-22 @ 18:46]      Color Yellow / Appearance Clear / SG 1.016 / pH 6.5      Gluc Negative / Ketone Negative  / Bili Negative / Urobili Negative       Blood Negative / Protein 100 / Leuk Est Small / Nitrite Negative      RBC 3 / WBC 2 / Hyaline  / Gran  / Sq Epi  / Non Sq Epi 0 / Bacteria Negative    Urine Creatinine 78      [06-13-22 @ 22:30]  Urine Protein 119      [06-12-22 @ 18:46]  Urine Sodium 59      [06-12-22 @ 18:46]  Urine Urea Nitrogen 447      [06-13-22 @ 14:55]  Urine Chloride 44      [06-12-22 @ 18:46]    Iron 28, TIBC 268, %sat 11      [06-13-22 @ 07:00]    HBsAg Nonreact      [06-15-22 @ 07:40]  HCV 0.07, Nonreact      [06-15-22 @ 07:40]      RADIOLOGY & ADDITIONAL STUDIES:

## 2022-06-17 LAB
ANION GAP SERPL CALC-SCNC: 9 MMOL/L — SIGNIFICANT CHANGE UP (ref 5–17)
BASOPHILS # BLD AUTO: 0.03 K/UL — SIGNIFICANT CHANGE UP (ref 0–0.2)
BASOPHILS NFR BLD AUTO: 0.5 % — SIGNIFICANT CHANGE UP (ref 0–2)
BUN SERPL-MCNC: 47 MG/DL — HIGH (ref 7–23)
CALCIUM SERPL-MCNC: 9.2 MG/DL — SIGNIFICANT CHANGE UP (ref 8.4–10.5)
CHLORIDE SERPL-SCNC: 98 MMOL/L — SIGNIFICANT CHANGE UP (ref 96–108)
CO2 SERPL-SCNC: 35 MMOL/L — HIGH (ref 22–31)
CREAT SERPL-MCNC: 1.5 MG/DL — HIGH (ref 0.5–1.3)
EGFR: 32 ML/MIN/1.73M2 — LOW
EOSINOPHIL # BLD AUTO: 0.61 K/UL — HIGH (ref 0–0.5)
EOSINOPHIL NFR BLD AUTO: 9.3 % — HIGH (ref 0–6)
GLUCOSE SERPL-MCNC: 100 MG/DL — HIGH (ref 70–99)
HCT VFR BLD CALC: 37.8 % — SIGNIFICANT CHANGE UP (ref 34.5–45)
HGB BLD-MCNC: 11.1 G/DL — LOW (ref 11.5–15.5)
IMM GRANULOCYTES NFR BLD AUTO: 0.3 % — SIGNIFICANT CHANGE UP (ref 0–1.5)
LYMPHOCYTES # BLD AUTO: 0.87 K/UL — LOW (ref 1–3.3)
LYMPHOCYTES # BLD AUTO: 13.3 % — SIGNIFICANT CHANGE UP (ref 13–44)
MCHC RBC-ENTMCNC: 28.8 PG — SIGNIFICANT CHANGE UP (ref 27–34)
MCHC RBC-ENTMCNC: 29.4 GM/DL — LOW (ref 32–36)
MCV RBC AUTO: 97.9 FL — SIGNIFICANT CHANGE UP (ref 80–100)
MONOCYTES # BLD AUTO: 0.86 K/UL — SIGNIFICANT CHANGE UP (ref 0–0.9)
MONOCYTES NFR BLD AUTO: 13.1 % — SIGNIFICANT CHANGE UP (ref 2–14)
NEUTROPHILS # BLD AUTO: 4.15 K/UL — SIGNIFICANT CHANGE UP (ref 1.8–7.4)
NEUTROPHILS NFR BLD AUTO: 63.5 % — SIGNIFICANT CHANGE UP (ref 43–77)
NRBC # BLD: 0 /100 WBCS — SIGNIFICANT CHANGE UP (ref 0–0)
PLATELET # BLD AUTO: 219 K/UL — SIGNIFICANT CHANGE UP (ref 150–400)
POTASSIUM SERPL-MCNC: 4.2 MMOL/L — SIGNIFICANT CHANGE UP (ref 3.5–5.3)
POTASSIUM SERPL-SCNC: 4.2 MMOL/L — SIGNIFICANT CHANGE UP (ref 3.5–5.3)
RBC # BLD: 3.86 M/UL — SIGNIFICANT CHANGE UP (ref 3.8–5.2)
RBC # FLD: 14.6 % — HIGH (ref 10.3–14.5)
SODIUM SERPL-SCNC: 142 MMOL/L — SIGNIFICANT CHANGE UP (ref 135–145)
WBC # BLD: 6.54 K/UL — SIGNIFICANT CHANGE UP (ref 3.8–10.5)
WBC # FLD AUTO: 6.54 K/UL — SIGNIFICANT CHANGE UP (ref 3.8–10.5)

## 2022-06-17 PROCEDURE — 94010 BREATHING CAPACITY TEST: CPT | Mod: 26

## 2022-06-17 PROCEDURE — 99233 SBSQ HOSP IP/OBS HIGH 50: CPT | Mod: GC

## 2022-06-17 RX ORDER — FUROSEMIDE 40 MG
40 TABLET ORAL
Refills: 0 | Status: DISCONTINUED | OUTPATIENT
Start: 2022-06-17 | End: 2022-06-20

## 2022-06-17 RX ORDER — IPRATROPIUM/ALBUTEROL SULFATE 18-103MCG
3 AEROSOL WITH ADAPTER (GRAM) INHALATION EVERY 6 HOURS
Refills: 0 | Status: DISCONTINUED | OUTPATIENT
Start: 2022-06-17 | End: 2022-06-21

## 2022-06-17 RX ORDER — FUROSEMIDE 40 MG
40 TABLET ORAL EVERY 12 HOURS
Refills: 0 | Status: DISCONTINUED | OUTPATIENT
Start: 2022-06-17 | End: 2022-06-17

## 2022-06-17 RX ADMIN — APIXABAN 2.5 MILLIGRAM(S): 2.5 TABLET, FILM COATED ORAL at 06:20

## 2022-06-17 RX ADMIN — Medication 40 MILLIGRAM(S): at 16:17

## 2022-06-17 RX ADMIN — Medication 1 APPLICATION(S): at 18:36

## 2022-06-17 RX ADMIN — Medication 50 MICROGRAM(S): at 06:20

## 2022-06-17 RX ADMIN — APIXABAN 2.5 MILLIGRAM(S): 2.5 TABLET, FILM COATED ORAL at 18:36

## 2022-06-17 RX ADMIN — Medication 81 MILLIGRAM(S): at 12:34

## 2022-06-17 RX ADMIN — ZOLPIDEM TARTRATE 5 MILLIGRAM(S): 10 TABLET ORAL at 23:32

## 2022-06-17 RX ADMIN — BRIMONIDINE TARTRATE 1 DROP(S): 2 SOLUTION/ DROPS OPHTHALMIC at 22:08

## 2022-06-17 RX ADMIN — Medication 120 MILLIGRAM(S): at 22:07

## 2022-06-17 RX ADMIN — Medication 80 MILLIGRAM(S): at 06:19

## 2022-06-17 RX ADMIN — Medication 240 MILLIGRAM(S): at 06:20

## 2022-06-17 RX ADMIN — SIMVASTATIN 40 MILLIGRAM(S): 20 TABLET, FILM COATED ORAL at 22:07

## 2022-06-17 RX ADMIN — BRIMONIDINE TARTRATE 1 DROP(S): 2 SOLUTION/ DROPS OPHTHALMIC at 06:19

## 2022-06-17 RX ADMIN — Medication 1 APPLICATION(S): at 06:20

## 2022-06-17 RX ADMIN — Medication 1 TABLET(S): at 12:34

## 2022-06-17 RX ADMIN — POLYETHYLENE GLYCOL 3350 17 GRAM(S): 17 POWDER, FOR SOLUTION ORAL at 12:35

## 2022-06-17 RX ADMIN — Medication 3 MILLILITER(S): at 07:00

## 2022-06-17 RX ADMIN — Medication 3 MILLILITER(S): at 13:24

## 2022-06-17 RX ADMIN — SENNA PLUS 2 TABLET(S): 8.6 TABLET ORAL at 22:07

## 2022-06-17 RX ADMIN — Medication 3 MILLILITER(S): at 18:36

## 2022-06-17 NOTE — PROGRESS NOTE ADULT - ASSESSMENT
ASSESSMENT:    89 year old gentlewoman, lifelong non-smoker, without known history of intrinsic lung disease; She has a history of HTN, atrial fibrillation maintained on Eliquis, aortic stenosis, CAD/MI/CABG in 2010, CVA and CKD. The patient is followed by Dr. Kwasi Chew in the outpatient setting. Over the last several weeks, the patient has developed lower extremity swelling treated with lasix 40mg daily. Lasix was increased to 80mg daily due to increasing leg swelling without much improvement -> ER. Outpatient ECHO -> normal left ventricular ejection fraction - normal right ventricular size and function - moderate aortic stenosis - moderate pulmonary hypertension. Hospital course has been complicated by JACK on CKD (stage III), contraction alkalosis and hypernatremia all felt to be related to aggressive diuresis. The patient has been noted to have hypoxemia -> 86% on room air. She has no shortness of breath. She has an occasional cough productive of scant sputum. She describes a wheezing sound in her chest. No fevers, chills or sweats. No chest pain/pressure or palpitations.    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, central obesity and respiratory muscle weakness  3) bronchospasm due to "cardiac asthma"  4) low likelihood of VTE disease on full dose A/C for atrial fibrillation    lower extremity swelling  1) pulmonary hypertension without right ventricular enlargement or dysfunction  2) venous stasis changes  3) lymphedema    PLAN/RECOMMENDATIONS:    oxygen supplementation to keep saturation greater than 92% - currently on a 3lpm nasal canula -> trial on room air now -> 82%  chest CT 6/16 -> small bilateral pleural effusions with associated passive atelectasis - bilateral patchy groundglass opacities with interlobular septal thickening c/w pulmonary edema.  V/Q scan -> very low probability of pulmonary embolus.  spirometry    FEV1 0.52 liters - 56% predicted    FVC 0.75 liters - 57% predicted    FEV1% - 70       c/w moderate restrictive lung disease  albuterol/atrovent nebs q6h  incentive spirometry  cardiology follow-up -> needs more diuresis     cardiac meds: eliquis/ASA/zocor/diltiazem CD/lasix (dose to be determined)  bowel regimen  leg elevation and wrapping    Will follow with you. Plan of care discussed with the patient and her family at bedside and with Dr. Stone.    Juanjo Rizo MD, [facility]  [phone]  Pulmonary Medicine

## 2022-06-17 NOTE — PROGRESS NOTE ADULT - ASSESSMENT
89 yo F with diastolic HF, CAD s/p CABG, CKD3B, atrial fibrillation (on apixaban), HTN, moderate AS and pulmonary HTN.  Presented to the ED with several weeks of worsening LE edema despite increasing dose of PO diuretic. No other overt symptoms of CHF (eg, SOB, orthopnea, PND); likely due to components of diastolic HF and venous insufficiency.   Now diuresed IV with improvement in LE edema. Transitioned to PO furosemide, but appears to have quickly reaccumulated fluid.     REC:  1. HFpEF, venous insufficiency LE edema  - S/p IV furosemide 80 mg x 2  - IV furosemide 40 mg BID  - Trend Cr daily  - Strict I/Os and daily weights please  - Appreciate wound care/derm recs  - Likely plan to transition to high dose of PO diuretic (eg, torsemide 40 mg BID)    2. Atrial fibrillation  - Continue home diltiazem  - Continue home reduced dose apixaban    3. CAD s/p CABG  - Continue home asa and statin    4. JACK on CKD  - Appreciate nephrology recommendations    Bryan Heart MD  Department of Cardiology  Cardiology Fellow, PGY4 89 yo F with diastolic HF, CAD s/p CABG, CKD3B, atrial fibrillation (on apixaban), HTN, moderate AS and pulmonary HTN.  Presented to the ED with several weeks of worsening LE edema despite increasing dose of PO diuretic. No other overt symptoms of CHF (eg, SOB, orthopnea, PND); likely due to components of diastolic HF and venous insufficiency.   Now diuresed IV with improvement in LE edema. Transitioned to PO furosemide, but appears to have quickly reaccumulated fluid.     REC:  1. HFpEF, venous insufficiency LE edema  - S/p IV furosemide 80 mg x 2  - IV furosemide 40 mg BID  - Trend Cr daily  - Strict I/Os and daily weights please  - Appreciate wound care/derm recs  - Likely plan to transition to high dose of PO diuretic (eg, torsemide 40 mg BID)    2. Atrial fibrillation  - Continue home diltiazem  - Continue home reduced dose apixaban    3. CAD s/p CABG  - Continue home asa and statin    4. JACK on CKD  - Appreciate nephrology recommendations      Bryan Heart MD  Department of Cardiology  Cardiology Fellow, PGY4    Plan discussed with cardiology fellow.  Patient seen and examined.  Hx., exam and labs as above.  I agree with the assessment and recommendations. which I have reviewed and edited where appropriate.  Reji Harmon M.D.  Cardiology Attending, Consult Service  For Cardiology consults and questions, all Cardiology service information can be found 24/7 on [email or web address] - use password: [email or web address] to log in.

## 2022-06-17 NOTE — PROGRESS NOTE ADULT - SUBJECTIVE AND OBJECTIVE BOX
[facility] - [facility] - PROGRESS NOTE    CHIEF COMPLAINT: hypoxemia; pulmonary edema; pleural effusions; pulmonary hypertension; CAD/CABG; aortic stenosis; CKD    INTERVAL HISTORY: CT scan -> mild pulmonary edema - small bilateral pleural effusions with associated atelectasis - s/p median sternotomy; V/Q scan -> very low probability of pulmonary embolus; brisk diuresis after 80mg IVP last night - just received a second dose of lasix IVP; placed on a 3lpm nasal canula overnight for oxygen desaturation while sleeping with her mouth open; no shortness of breath; occasional cough productive of scant sputum; mild chest congestion and wheeze; no fevers, chills or sweats; no chest pain/pressure or palpitations.     REVIEW OF SYSTEMS:  Constitutional: As per interval history  HEENT: Within normal limits  CV: As per interval history  Resp: As per interval history  GI: Within normal limits   : Within normal limits  Musculoskeletal: Within normal limits  Skin: Within normal limits  Neurological: Within normal limits  Psychiatric: Within normal limits  Endocrine: Within normal limits  Hematologic/Lymphatic: Within normal limits  Allergic/Immunologic: Within normal limits    MEDICATIONS:     Pulmonary "    Anti-microbials:    Cardiovascular:  diltiazem    milliGRAM(s) Oral daily  diltiazem    milliGRAM(s) Oral at bedtime    Other:  apixaban 2.5 milliGRAM(s) Oral every 12 hours  aspirin enteric coated 81 milliGRAM(s) Oral daily  brimonidine 0.2% Ophthalmic Solution 1 Drop(s) Both EYES two times a day  hydrocortisone 1% Ointment 1 Application(s) Topical two times a day  levothyroxine 50 MICROGram(s) Oral daily  multivitamin 1 Tablet(s) Oral daily  polyethylene glycol 3350 17 Gram(s) Oral daily  senna 2 Tablet(s) Oral at bedtime  simvastatin 40 milliGRAM(s) Oral at bedtime  triamcinolone 0.1% Ointment 1 Application(s) Topical every 12 hours  zolpidem 5 milliGRAM(s) Oral at bedtime    MEDICATIONS  (PRN):  acetaminophen     Tablet .. 650 milliGRAM(s) Oral every 6 hours PRN Moderate Pain (4 - 6)  bisacodyl Suppository 10 milliGRAM(s) Rectal daily PRN Constipation  triamcinolone 0.1% Cream 1 Application(s) Topical two times a day PRN rash      OBJECTIVE:    Daily Weight in k.1 (2022 04:05)    PHYSICAL EXAM:       ICU Vital Signs Last 24 Hrs  T(C): 36.6 (2022 04:05), Max: 36.7 (2022 20:51)  T(F): 97.8 (2022 04:05), Max: 98.1 (2022 20:51)  HR: 90 (2022 07:00) (73 - 90)  BP: 138/69 (2022 04:05) (137/68 - 139/67)  BP(mean): --  ABP: --  ABP(mean): --  RR: 18 (2022 07:00) (18 - 18)  SpO2: 92% (2022 07:00) (86% on room air - 96% on 3lpm nasal canula     General: Awake. Alert. Cooperative. No distress. Appears stated age. Leg bandages being changed.  HEENT:  Atraumatic. Normocephalic. Anicteric. Normal oral mucosa. PERRL. EOMI.  Neck: Supple. Trachea midline. Thyroid without enlargement/tenderness/nodules. No carotid bruit. Mild JVD.	  Cardiovascular: Irregularly irregular rate and rhythm. S1 S2 normal. II/VI systolic murmur.  Respiratory: Respirations unlabored. Faint bilateral rales. Mild wheeze. Kyphosis.  Abdomen: Soft. Non-tender. Non-distended. No organomegaly. No masses. Normal bowel sounds. Central obesity  Extremities: Warm to touch. No clubbing or cyanosis. Moderate lower extremity up to the knee - bilateral venous stasis changes and lymphedema  Pulses: 2+ peripheral pulses all extremities.	  Skin: Normal skin color. No rashes or lesions. No ecchymoses. No cyanosis. Warm to touch.  Lymph Nodes: Cervical, supraclavicular and axillary nodes normal  Neurological: Motor and sensory examination equal and normal. A and O x 3  Psychiatry: Appropriate mood and affect.    LABS:                          11.1   6.54  )-----------( 219      ( 2022 06:29 )             37.8     CBC    WBC  6.54 <==, 7.35 <==, 8.30 <==, 5.29 <==, 7.83 <==    Hemoglobin  11.1 <<==, 11.5 <<==, 11.7 <<==, 11.1 <<==, 12.5 <<==    Hematocrit  37.8 <==, 38.6 <==, 38.5 <==, 37.2 <==, 40.7 <==    Platelets  219 <==, 210 <==, 205 <==, 202 <==, 230 <==      144  |  99  |  48<H>  ----------------------------<  100<H>    06-16  4.3   |  33<H>  |  1.31<H>      LYTES    sodium  144 <==, 142 <==, 144 <==, 146 <==, 146 <==, 149 <==, 143 <==    potassium   4.3 <==, 4.3 <==, 4.7 <==, 4.0 <==, 3.8 <==, 3.9 <==, 5.0 <==    chloride  99 <==, 99 <==, 100 <==, 99 <==, 103 <==, 105 <==, 103 <==    carbon dioxide  33 <==, 32 <==, 32 <==, 35 <==, 32 <==, 32 <==, 29 <==    =============================================================================================  RENAL FUNCTION:    Creatinine:   1.31  <<==, 1.31  <<==, 1.26  <<==, 1.43  <<==, 1.35  <<==, 0.93  <<==, 1.22  <<==    BUN:   48 <==, 45 <==, 40 <==, 36 <==, 37 <==, 36 <==, 44 <==    ============================================================================================    calcium   8.9 <==, 8.8 <==, 9.0 <==, 9.2 <==, 9.0 <==, 8.9 <==, 9.4 <==    phos   3.4 <==, 3.5 <==    mag   2.4 <==, 2.4 <==, 2.2 <==, 2.2 <==, 2.2 <==    ============================================================================================  LFTs    AST:   14 <== , 28 <==     ALT:  6  <== , 9  <==     AP:  66  <=, 71  <=    Bili:  0.4  <=, 0.4  <=    Serum Pro-Brain Natriuretic Peptide: 2139 pg/mL ( @ 07:00)    MICROBIOLOGY:     Respiratory Viral Panel with COVID-19 by YESSICA (22 @ 12:44)   [facility]   S[facility]CoV-2: [facility]  This Respiratory Panel uses polymerase chain reaction (PCR) to detect for   adenovirus; coronavirus (HKU1, NL63, 229E, OC43); human metapneumovirus   (hMPV); human enterovirus/rhinovirus (Entero/RV); influenza A; influenza   A/H1; influenza A/H3; influenza A/H1-2009; influenza B; parainfluenza   viruses 1, 2, 3, 4; respiratory syncytial virus; Mycoplasma pneumoniae;   Chlamydophila pneumoniae; and SARS-CoV-2.       RADIOLOGY:  [x] Chest radiographs reviewed and interpreted by me    < from: CT Chest No Cont (22 @ 18:47) >    ******PRELIMINARY REPORT******      EXAM:  CT CHEST                          PROCEDURE DATE:  2022      ******PRELIMINARY REPORT******        INTERPRETATION:  Small bilateral pleural effusions with associated   passive atelectasis.  Bilateral patchy ground glass opacities with interlobular septal   thickening possibly secondary to pulmonary edema.    Cardiomegaly. Aortic valve and mitral annular calcifications. Aortic and   coronary artery calcifications. Status post CABG.  Mildly dilated ascending aorta measuring 4.1cm at the level of the main   pulmonary artery.  Status post median sternotomy with sternotomy wires intact. Right humeral   head suture anchors.  Degenerative changes of the spine.    Follow up final report in AM.    ******PRELIMINARY REPORT******       MINISTERIO CHRISTY MD; Resident Radiology  This document is a PRELIMINARY interpretation and is pending final   attending approval. 2022  7:45PM  ---------------------------------------------------------------------------------------------------------------  EXAM:  NM PULM VENTILATION PERFUS IMG                          PROCEDURE DATE:  2022      FINDINGS: There is heterogeneous distribution of radiopharmaceutical in   the lungs on the ventilation and perfusion images. There are no segmental   perfusion defects.    IMPRESSION: Very low probability of pulmonary embolus.    JONNY PURCELL MD; Attending Nuclear Medicine  This document has been electronically signed. 2022  7:13PM  ---------------------------------------------------------------------------------------------------------------  EXAM:  XR CHEST PORTABLE ROUTINE 1V                          PROCEDURE DATE:  06/15/2022      INTERPRETATION:    Heart size and the mediastinum cannot be accurately evaluated on this   projection. Calcified mitral valve annulus. Median sternotomy sutures and   surgical clips are again seen. Calcified tortuous aorta.  There are low lung volumes.  There is pulmonary vascular congestion/mild interstitial pulmonary edema.  There is medial left retrocardiac opacity.  No left pleural effusion seen. No pneumothorax.  Orthopedic anchors project over the right humeral head.    IMPRESSION:  Low lung volumes.    Pulmonary vascular congestion/mild interstitial pulmonary edema.    Medial left retrocardiac opacity, possibly subsegmental atelectasis, a   small left pleural effusion with associated passive atelectasis or   developing pneumonia in the appropriate clinical context.    LEILANI SPRINGER MD; Attending Radiologist  This document has been electronically signed. 2022  4:29PM  ---------------------------------------------------------------------------------------------------------------  EXAM:  XR CHEST PORTABLE URGENT 1V                          PROCEDURE DATE:  06/10/2022      FINDINGS:  Heart size and mediastinum difficult to assess on this projection.   Calcified aortic knob.  Status post median sternotomy.  Mild pulmonary vascular congestion.  Trace left pleural effusion with left basilar atelectasis. No   pneumothorax.  Right humeral head orthopedic screws noted.    IMPRESSION:  Mild pulmonary vascular congestion.    Trace left pleural effusion with left basilar atelectasis.    SEBLE HUMPHREYS MD; Resident Radiologist  This document has been electronically signed.  NAVYA OMER MD; Attending Radiologist  This document has been electronically signed. 2022 12:36AM  ---------------------------------------------------------------------------------------------------------------

## 2022-06-17 NOTE — PROGRESS NOTE ADULT - ASSESSMENT
89 y/o F with h/o HFpEF, CAD s/p CABG, CKD3, Chronic AF on Eliquis, HTN, moderate AS, pulm HTN presenting with 1 month of LE edema which has failed outpatient diuretics c/w HF exacerbation combined with underlying chronic venous stasis, admitted for IV diuresis.     # Acute respiratory failure with hypoxia   # Acute on chronic HFpEF  # Chronic atrial fibrillation  # HTN, HLD  # CAD s/p CABG  o2 86% on RA; CT chest consistent with pulmonary edema; appreciate pulm recs; pt given IV lasix   switch lasix from PO to 40 IV BID. Cardio and Nephro following   Monitor I/O, Daily weights, Tele  eliquis   dilt 240 am, 120 pm, rate controlled  cont asa statin  titrate O2, check on RA    # JACK on Stage 3 chronic kidney disease  cardio renal  monitor while on diuretics;; cr slightly rising   renal following    # morbilliform drug eruption  appreciate derm recs  can be related to ckd as well.  Recommend topical triamcinolone 0.1% ointment BID to affected areas of skin     # venous stasis  diuresis as above  Wound carem, ACE wraps  PT recs MIN but pt wants home    # Hypothyroidism   synthroid 50mcg    DVT prophylaxis. eliquis    dw dtr at bedside    dispo: pending clinical improvement. on IV lasix;        Please contact with any questions or concerns [phone].

## 2022-06-17 NOTE — PROGRESS NOTE ADULT - ASSESSMENT
89 y/o F with h/o HFpEF, CAD s/p CABG, CKD3, Chronic AF on Eliquis, HTN, moderate AS, pulm HTN presenting with 1 month of LE edema which has failed outpatient diuretics c/w HF exacerbation combined with underlying chronic venous stasis, admitted for IV diuresis. Nephrology consulted for JACK.     JACK on Chronic Kidney Disease Stage 3  likely cardio renal   FEUrea 42.3%  scr worsening,   suggests to switch to IV lasix   Avoid further nephrotoxins, NSAIDS, RCA   monitor on diuretics    Hypernatremia  remains stable    monitor     HTN   optimal   monitor BP closely     Anemia  iron deficiency   give oral iron   MOnitor HB

## 2022-06-17 NOTE — PROGRESS NOTE ADULT - SUBJECTIVE AND OBJECTIVE BOX
Cardiology Consult Progress Note    Patient seen and examined at bedside.    Interval Events:   Transitioned to PO furosemide; initially net negative over 24 hours and weight down  Overnight patient had desaturations and placed on NC  CT chest down demonstrating mild pulmonary congestion and small bilateral pleural effusions with associated atelectasis  Net positive 690CC over 24h   No chest pain, shortness of breath, or palpitations            Current Meds:  acetaminophen     Tablet .. 650 milliGRAM(s) Oral every 6 hours PRN  albuterol/ipratropium for Nebulization 3 milliLiter(s) Nebulizer every 6 hours  apixaban 2.5 milliGRAM(s) Oral every 12 hours  aspirin enteric coated 81 milliGRAM(s) Oral daily  bisacodyl Suppository 10 milliGRAM(s) Rectal daily PRN  brimonidine 0.2% Ophthalmic Solution 1 Drop(s) Both EYES two times a day  diltiazem    milliGRAM(s) Oral daily  diltiazem    milliGRAM(s) Oral at bedtime  furosemide    Tablet 40 milliGRAM(s) Oral every 12 hours  hydrocortisone 1% Ointment 1 Application(s) Topical two times a day  levothyroxine 50 MICROGram(s) Oral daily  multivitamin 1 Tablet(s) Oral daily  polyethylene glycol 3350 17 Gram(s) Oral daily  senna 2 Tablet(s) Oral at bedtime  simvastatin 40 milliGRAM(s) Oral at bedtime  triamcinolone 0.1% Cream 1 Application(s) Topical two times a day PRN  triamcinolone 0.1% Ointment 1 Application(s) Topical every 12 hours  zolpidem 5 milliGRAM(s) Oral at bedtime      Vitals:  T(F): 97.8 (06-17), Max: 98.1 (06-16)  HR: 90 (06-17) (73 - 90)  BP: 138/69 (06-17) (137/68 - 139/67)  RR: 18 (06-17)  SpO2: 93% (06-17)  I&O's Summary    16 Jun 2022 07:01  -  17 Jun 2022 07:00  --------------------------------------------------------  IN: 840 mL / OUT: 150 mL / NET: 690 mL    Physical Exam:  GENERAL: Elderly woman, NAD  HEAD:  Atraumatic, Normocephalic  ENT: EOMI, PERRLA, conjunctiva and sclera clear, Neck supple, No JVD, moist mucosa  CHEST/LUNG: Clear to auscultation bilaterally  HEART: Irregular; 2/6 systolic murmur best heard at LUSB,   ABDOMEN: Soft, Nontender, Nondistended; Bowel sounds present  EXTREMITIES:  Legs wrapped, 1+ edema at feet  PSYCH: Nl behavior, nl affect  NEUROLOGY: AAOx3, non-focal, cranial nerves intact  SKIN: Normal color, No rashes or lesions                           11.1   6.54  )-----------( 219      ( 17 Jun 2022 06:29 )             37.8     06-17    142  |  98  |  47<H>  ----------------------------<  100<H>  4.2   |  35<H>  |  1.50<H>    Ca    9.2      17 Jun 2022 06:32  Phos  3.4     06-16  Mg     2.4     06-16    CARDIAC MARKERS ( 10 Jarrett 2022 12:50 )  31 ng/L / x     / x     / x     / x     / x        Serum Pro-Brain Natriuretic Peptide: 2139 pg/mL (06-13 @ 07:00)  Serum Pro-Brain Natriuretic Peptide: 2152 pg/mL (06-10 @ 12:50) Cardiology Consult Progress Note    Patient seen and examined at bedside.    Interval Events:   Transitioned to PO furosemide; initially net negative over 24 hours and weight down, but overnight, patient had desaturations and placed on NC  CT chest done which demonstrates mild pulmonary congestion and small bilateral pleural effusions with associated atelectasis  Net positive 690CC over 24h   No chest pain, shortness of breath, or palpitations              Current Meds:  acetaminophen     Tablet .. 650 milliGRAM(s) Oral every 6 hours PRN  albuterol/ipratropium for Nebulization 3 milliLiter(s) Nebulizer every 6 hours  apixaban 2.5 milliGRAM(s) Oral every 12 hours  aspirin enteric coated 81 milliGRAM(s) Oral daily  bisacodyl Suppository 10 milliGRAM(s) Rectal daily PRN  brimonidine 0.2% Ophthalmic Solution 1 Drop(s) Both EYES two times a day  diltiazem    milliGRAM(s) Oral daily  diltiazem    milliGRAM(s) Oral at bedtime  furosemide    Tablet 40 milliGRAM(s) Oral every 12 hours  hydrocortisone 1% Ointment 1 Application(s) Topical two times a day  levothyroxine 50 MICROGram(s) Oral daily  multivitamin 1 Tablet(s) Oral daily  polyethylene glycol 3350 17 Gram(s) Oral daily  senna 2 Tablet(s) Oral at bedtime  simvastatin 40 milliGRAM(s) Oral at bedtime  triamcinolone 0.1% Cream 1 Application(s) Topical two times a day PRN  triamcinolone 0.1% Ointment 1 Application(s) Topical every 12 hours  zolpidem 5 milliGRAM(s) Oral at bedtime      ROS:  neg other than above    Vitals:  T(F): 97.8 (06-17), Max: 98.1 (06-16)  HR: 90 (06-17) (73 - 90)  BP: 138/69 (06-17) (137/68 - 139/67)  RR: 18 (06-17)  SpO2: 93% (06-17)    Physical Exam:  GENERAL: Elderly woman, NAD  HEAD:  Atraumatic, Normocephalic  ENT: EOMI, PERRLA, conjunctiva and sclera clear, Neck supple, No JVD, moist mucosa  CHEST/LUNG: Clear to auscultation bilaterally  HEART: Irregular; 2/6 systolic murmur best heard at LUSB,   ABDOMEN: Soft, Nontender, Nondistended; Bowel sounds present  EXTREMITIES:  Legs wrapped, 1+ edema at feet  PSYCH: Nl behavior, nl affect  NEUROLOGY: AAOx3, non-focal, cranial nerves intact  SKIN: Normal color, No rashes or lesions       I&O's Summary  16 Jun 2022 07:01  -  17 Jun 2022 07:00  --------------------------------------------------------  IN: 840 mL / OUT: 150 mL / NET: 690 mL      LABS:                      11.1   6.54  )-----------( 219      ( 17 Jun 2022 06:29 )             37.8     06-17  142  |  98  |  47<H>  ----------------------------<  100<H>  4.2   |  35<H>  |  1.50<H>    Ca    9.2      17 Jun 2022 06:32  Phos  3.4     06-16  Mg     2.4     06-16    CARDIAC MARKERS ( 10 Jarrett 2022 12:50 )  31 ng/L / x     / x     / x     / x     / x        Serum Pro-Brain Natriuretic Peptide: 2139 pg/mL (06-13 @ 07:00)  Serum Pro-Brain Natriuretic Peptide: 2152 pg/mL (06-10 @ 12:50)

## 2022-06-17 NOTE — PROGRESS NOTE ADULT - SUBJECTIVE AND OBJECTIVE BOX
Patient is a 89y old  Female who presents with a chief complaint of LE swelling (17 Jun 2022 10:47)      SUBJECTIVE / OVERNIGHT EVENTS:  Patient seen and examined.   Still hypoxic now requiring 3 L NC.       Vital Signs Last 24 Hrs  T(C): 36.3 (17 Jun 2022 11:19), Max: 36.7 (16 Jun 2022 20:51)  T(F): 97.3 (17 Jun 2022 11:19), Max: 98.1 (16 Jun 2022 20:51)  HR: 87 (17 Jun 2022 11:19) (76 - 90)  BP: 116/67 (17 Jun 2022 11:19) (116/67 - 138/69)  BP(mean): --  RR: 18 (17 Jun 2022 11:19) (18 - 18)  SpO2: 95% (17 Jun 2022 11:19) (86% - 96%)  I&O's Summary    16 Jun 2022 07:01  -  17 Jun 2022 07:00  --------------------------------------------------------  IN: 840 mL / OUT: 150 mL / NET: 690 mL    17 Jun 2022 07:01  -  17 Jun 2022 13:42  --------------------------------------------------------  IN: 320 mL / OUT: 0 mL / NET: 320 mL        PHYSICAL EXAM:  GENERAL: NAD, AAO  HEAD:  Atraumatic, Normocephalic  EYES: EOMI; conjunctiva and sclera clear  NECK: Supple, No JVD, No LAD  CHEST/LUNG: B/L air entry; No wheezes, rales or rhonci   HEART: Regular rate and rhythm; No murmurs, rubs, or gallops  ABDOMEN: Soft, Nontender, Nondistended; Bowel sounds present  EXTREMITIES:  2+ Peripheral Pulses, No clubbing, cyanosis, or edema  SKIN: No rashes or lesions    LABS:                        11.1   6.54  )-----------( 219      ( 17 Jun 2022 06:29 )             37.8     06-17    142  |  98  |  47<H>  ----------------------------<  100<H>  4.2   |  35<H>  |  1.50<H>    Ca    9.2      17 Jun 2022 06:32  Phos  3.4     06-16  Mg     2.4     06-16        CAPILLARY BLOOD GLUCOSE                RADIOLOGY & ADDITIONAL TESTS:    Imaging Personally Reviewed:  [x] YES  [ ] NO    Consultant(s) Notes Reviewed:  [x] YES  [ ] NO      MEDICATIONS  (STANDING):  albuterol/ipratropium for Nebulization 3 milliLiter(s) Nebulizer every 6 hours  apixaban 2.5 milliGRAM(s) Oral every 12 hours  aspirin enteric coated 81 milliGRAM(s) Oral daily  brimonidine 0.2% Ophthalmic Solution 1 Drop(s) Both EYES two times a day  diltiazem    milliGRAM(s) Oral daily  diltiazem    milliGRAM(s) Oral at bedtime  furosemide   Injectable 40 milliGRAM(s) IV Push two times a day  hydrocortisone 1% Ointment 1 Application(s) Topical two times a day  levothyroxine 50 MICROGram(s) Oral daily  multivitamin 1 Tablet(s) Oral daily  polyethylene glycol 3350 17 Gram(s) Oral daily  senna 2 Tablet(s) Oral at bedtime  simvastatin 40 milliGRAM(s) Oral at bedtime  triamcinolone 0.1% Ointment 1 Application(s) Topical every 12 hours  zolpidem 5 milliGRAM(s) Oral at bedtime    MEDICATIONS  (PRN):  acetaminophen     Tablet .. 650 milliGRAM(s) Oral every 6 hours PRN Moderate Pain (4 - 6)  bisacodyl Suppository 10 milliGRAM(s) Rectal daily PRN Constipation  triamcinolone 0.1% Cream 1 Application(s) Topical two times a day PRN rash      Care Discussed with Consultants/Other Providers [x] YES  [ ] NO    HEALTH ISSUES - PROBLEM Dx:  Chronic heart failure with preserved ejection fraction (HFpEF)    Lower extremity edema    Acute on chronic heart failure with preserved ejection fraction (HFpEF)    Chronic atrial fibrillation    HTN (hypertension)    Stage 3 chronic kidney disease    DVT prophylaxis    Hypothyroidism    HLD (hyperlipidemia)    CAD (coronary artery disease)

## 2022-06-17 NOTE — PROGRESS NOTE ADULT - SUBJECTIVE AND OBJECTIVE BOX
[facility] [place] [facility]OLOG[facility]ACT[facility]   MD XAVIER ARMSTRONG MD, PA KRISTINE SOLTANPOUR, DO INJUNG KO, NP    TEL:  OFFICE: [phone]    From 5pm-7am Answering Service [phone]    -- RENAL FOLLOW UP NOTE ---Date of Service 06-17-22 @ 10:47    Patient is a 89y old  Female who presents with a chief complaint of LE swelling (17 Jun 2022 09:42)      Patient seen and examined at bedside. No chest pain/sob    VITALS:  T(F): 97.8 (06-17-22 @ 04:05), Max: 98.1 (06-16-22 @ 20:51)  HR: 90 (06-17-22 @ 07:00)  BP: 138/69 (06-17-22 @ 04:05)  RR: 18 (06-17-22 @ 07:00)  SpO2: 93% (06-17-22 @ 07:18)  Wt(kg): --    06-16 @ 07:01  -  06-17 @ 07:00  --------------------------------------------------------  IN: 840 mL / OUT: 150 mL / NET: 690 mL          PHYSICAL EXAM:  Constitutional: NAD  Neck: No JVD  Respiratory: CTAB, no wheezes, rales or rhonchi  Cardiovascular: S1, S2, RRR  Gastrointestinal: BS+, soft, NT/ND  Extremities: + peripheral edema    Hospital Medications:   MEDICATIONS  (STANDING):  albuterol/ipratropium for Nebulization 3 milliLiter(s) Nebulizer every 6 hours  apixaban 2.5 milliGRAM(s) Oral every 12 hours  aspirin enteric coated 81 milliGRAM(s) Oral daily  brimonidine 0.2% Ophthalmic Solution 1 Drop(s) Both EYES two times a day  diltiazem    milliGRAM(s) Oral daily  diltiazem    milliGRAM(s) Oral at bedtime  furosemide    Tablet 40 milliGRAM(s) Oral every 12 hours  hydrocortisone 1% Ointment 1 Application(s) Topical two times a day  levothyroxine 50 MICROGram(s) Oral daily  multivitamin 1 Tablet(s) Oral daily  polyethylene glycol 3350 17 Gram(s) Oral daily  senna 2 Tablet(s) Oral at bedtime  simvastatin 40 milliGRAM(s) Oral at bedtime  triamcinolone 0.1% Ointment 1 Application(s) Topical every 12 hours  zolpidem 5 milliGRAM(s) Oral at bedtime      LABS:  06-17    142  |  98  |  47<H>  ----------------------------<  100<H>  4.2   |  35<H>  |  1.50<H>    Ca    9.2      17 Jun 2022 06:32  Phos  3.4     06-16  Mg     2.4     06-16      Creatinine Trend: 1.50 <--, 1.31 <--, 1.31 <--, 1.26 <--, 1.43 <--, 1.35 <--, 0.93 <--, 1.22 <--                                11.1   6.54  )-----------( 219      ( 17 Jun 2022 06:29 )             37.8     Urine Studies:  Urinalysis - [06-12-22 @ 18:46]      Color Yellow / Appearance Clear / SG 1.016 / pH 6.5      Gluc Negative / Ketone Negative  / Bili Negative / Urobili Negative       Blood Negative / Protein 100 / Leuk Est Small / Nitrite Negative      RBC 3 / WBC 2 / Hyaline  / Gran  / Sq Epi  / Non Sq Epi 0 / Bacteria Negative    Urine Creatinine 78      [06-13-22 @ 22:30]  Urine Protein 119      [06-12-22 @ 18:46]  Urine Sodium 59      [06-12-22 @ 18:46]  Urine Urea Nitrogen 447      [06-13-22 @ 14:55]  Urine Chloride 44      [06-12-22 @ 18:46]    Iron 28, TIBC 268, %sat 11      [06-13-22 @ 07:00]    HBsAg Nonreact      [06-15-22 @ 07:40]  HCV 0.07, Nonreact      [06-15-22 @ 07:40]      RADIOLOGY & ADDITIONAL STUDIES:

## 2022-06-18 LAB
ANION GAP SERPL CALC-SCNC: 12 MMOL/L — SIGNIFICANT CHANGE UP (ref 5–17)
BUN SERPL-MCNC: 50 MG/DL — HIGH (ref 7–23)
CALCIUM SERPL-MCNC: 9.1 MG/DL — SIGNIFICANT CHANGE UP (ref 8.4–10.5)
CHLORIDE SERPL-SCNC: 98 MMOL/L — SIGNIFICANT CHANGE UP (ref 96–108)
CO2 SERPL-SCNC: 33 MMOL/L — HIGH (ref 22–31)
CREAT SERPL-MCNC: 1.52 MG/DL — HIGH (ref 0.5–1.3)
EGFR: 31 ML/MIN/1.73M2 — LOW
GLUCOSE SERPL-MCNC: 98 MG/DL — SIGNIFICANT CHANGE UP (ref 70–99)
HCT VFR BLD CALC: 37.2 % — SIGNIFICANT CHANGE UP (ref 34.5–45)
HGB BLD-MCNC: 11.1 G/DL — LOW (ref 11.5–15.5)
MAGNESIUM SERPL-MCNC: 2.2 MG/DL — SIGNIFICANT CHANGE UP (ref 1.6–2.6)
MCHC RBC-ENTMCNC: 29.1 PG — SIGNIFICANT CHANGE UP (ref 27–34)
MCHC RBC-ENTMCNC: 29.8 GM/DL — LOW (ref 32–36)
MCV RBC AUTO: 97.6 FL — SIGNIFICANT CHANGE UP (ref 80–100)
NRBC # BLD: 0 /100 WBCS — SIGNIFICANT CHANGE UP (ref 0–0)
PHOSPHATE SERPL-MCNC: 3.8 MG/DL — SIGNIFICANT CHANGE UP (ref 2.5–4.5)
PLATELET # BLD AUTO: 208 K/UL — SIGNIFICANT CHANGE UP (ref 150–400)
POTASSIUM SERPL-MCNC: 4 MMOL/L — SIGNIFICANT CHANGE UP (ref 3.5–5.3)
POTASSIUM SERPL-SCNC: 4 MMOL/L — SIGNIFICANT CHANGE UP (ref 3.5–5.3)
RBC # BLD: 3.81 M/UL — SIGNIFICANT CHANGE UP (ref 3.8–5.2)
RBC # FLD: 14.3 % — SIGNIFICANT CHANGE UP (ref 10.3–14.5)
SODIUM SERPL-SCNC: 143 MMOL/L — SIGNIFICANT CHANGE UP (ref 135–145)
WBC # BLD: 6.65 K/UL — SIGNIFICANT CHANGE UP (ref 3.8–10.5)
WBC # FLD AUTO: 6.65 K/UL — SIGNIFICANT CHANGE UP (ref 3.8–10.5)

## 2022-06-18 PROCEDURE — 99232 SBSQ HOSP IP/OBS MODERATE 35: CPT | Mod: GC

## 2022-06-18 RX ORDER — ZOLPIDEM TARTRATE 10 MG/1
5 TABLET ORAL AT BEDTIME
Refills: 0 | Status: DISCONTINUED | OUTPATIENT
Start: 2022-06-18 | End: 2022-06-21

## 2022-06-18 RX ADMIN — Medication 1 APPLICATION(S): at 05:43

## 2022-06-18 RX ADMIN — APIXABAN 2.5 MILLIGRAM(S): 2.5 TABLET, FILM COATED ORAL at 17:25

## 2022-06-18 RX ADMIN — Medication 3 MILLILITER(S): at 14:11

## 2022-06-18 RX ADMIN — BRIMONIDINE TARTRATE 1 DROP(S): 2 SOLUTION/ DROPS OPHTHALMIC at 22:08

## 2022-06-18 RX ADMIN — Medication 81 MILLIGRAM(S): at 14:11

## 2022-06-18 RX ADMIN — POLYETHYLENE GLYCOL 3350 17 GRAM(S): 17 POWDER, FOR SOLUTION ORAL at 14:11

## 2022-06-18 RX ADMIN — SENNA PLUS 2 TABLET(S): 8.6 TABLET ORAL at 22:09

## 2022-06-18 RX ADMIN — Medication 40 MILLIGRAM(S): at 05:49

## 2022-06-18 RX ADMIN — Medication 1 APPLICATION(S): at 17:26

## 2022-06-18 RX ADMIN — Medication 120 MILLIGRAM(S): at 22:07

## 2022-06-18 RX ADMIN — Medication 1 TABLET(S): at 14:11

## 2022-06-18 RX ADMIN — Medication 40 MILLIGRAM(S): at 14:11

## 2022-06-18 RX ADMIN — Medication 650 MILLIGRAM(S): at 19:23

## 2022-06-18 RX ADMIN — Medication 3 MILLILITER(S): at 17:25

## 2022-06-18 RX ADMIN — Medication 1 APPLICATION(S): at 05:44

## 2022-06-18 RX ADMIN — ZOLPIDEM TARTRATE 5 MILLIGRAM(S): 10 TABLET ORAL at 22:07

## 2022-06-18 RX ADMIN — Medication 3 MILLILITER(S): at 22:08

## 2022-06-18 RX ADMIN — Medication 3 MILLILITER(S): at 05:49

## 2022-06-18 RX ADMIN — BRIMONIDINE TARTRATE 1 DROP(S): 2 SOLUTION/ DROPS OPHTHALMIC at 05:42

## 2022-06-18 RX ADMIN — SIMVASTATIN 40 MILLIGRAM(S): 20 TABLET, FILM COATED ORAL at 22:08

## 2022-06-18 RX ADMIN — Medication 650 MILLIGRAM(S): at 18:53

## 2022-06-18 RX ADMIN — APIXABAN 2.5 MILLIGRAM(S): 2.5 TABLET, FILM COATED ORAL at 05:41

## 2022-06-18 RX ADMIN — Medication 240 MILLIGRAM(S): at 05:42

## 2022-06-18 RX ADMIN — Medication 50 MICROGRAM(S): at 05:41

## 2022-06-18 RX ADMIN — Medication 3 MILLILITER(S): at 00:15

## 2022-06-18 NOTE — PROGRESS NOTE ADULT - ASSESSMENT
89 yo F with diastolic HF, CAD s/p CABG, CKD3B, atrial fibrillation (on apixaban), HTN, moderate AS and pulmonary HTN p/w progressive worsening LE edema despite increasing dose of PO diuretic currently  being aggressively diuresed.     REC:  1. HFpEF, venous insufficiency LE edema  - cont IV Lasix 40 mg BID, please record I/O accurately and standing wt (not bed wt) is able   - Trend Cr daily  - Strict I/Os and daily weights please  - Appreciate wound care/derm recs    2. Atrial fibrillation  - Continue home diltiazem  - Continue home reduced dose apixaban    3. CAD s/p CABG  - Continue home asa and statin    4. JACK on CKD  - Appreciate nephrology recommendations

## 2022-06-18 NOTE — PROGRESS NOTE ADULT - SUBJECTIVE AND OBJECTIVE BOX
[facility] [place] [facility]ROLOG[facility]ACT[facility]   MD XAVIER ARMSTRONG MD RUORU WONG, PA    TEL:  FROM 9 AM to 5 PM ---OFFICE: [phone]    FROM 5 PM - 9 AM PLEASE CALL ANSWERING SERVICE: [phone]    RENAL FOLLOW UP NOTE--Date of Service 06-18-22 @ 08:00  --------------------------------------------------------------------------------  HPI:      Pt seen and examined at bedside.       PAST HISTORY  --------------------------------------------------------------------------------  No significant changes to PMH, PSH, FHx, SHx, unless otherwise noted    ALLERGIES & MEDICATIONS  --------------------------------------------------------------------------------  Allergies    No Known Allergies    Intolerances      Standing Inpatient Medications  albuterol/ipratropium for Nebulization 3 milliLiter(s) Nebulizer every 6 hours  apixaban 2.5 milliGRAM(s) Oral every 12 hours  aspirin enteric coated 81 milliGRAM(s) Oral daily  brimonidine 0.2% Ophthalmic Solution 1 Drop(s) Both EYES two times a day  diltiazem    milliGRAM(s) Oral daily  diltiazem    milliGRAM(s) Oral at bedtime  furosemide   Injectable 40 milliGRAM(s) IV Push two times a day  hydrocortisone 1% Ointment 1 Application(s) Topical two times a day  levothyroxine 50 MICROGram(s) Oral daily  multivitamin 1 Tablet(s) Oral daily  polyethylene glycol 3350 17 Gram(s) Oral daily  senna 2 Tablet(s) Oral at bedtime  simvastatin 40 milliGRAM(s) Oral at bedtime  triamcinolone 0.1% Ointment 1 Application(s) Topical every 12 hours  zolpidem 5 milliGRAM(s) Oral at bedtime    PRN Inpatient Medications  acetaminophen     Tablet .. 650 milliGRAM(s) Oral every 6 hours PRN  bisacodyl Suppository 10 milliGRAM(s) Rectal daily PRN  triamcinolone 0.1% Cream 1 Application(s) Topical two times a day PRN      REVIEW OF SYSTEMS  --------------------------------------------------------------------------------  General: no fever  CVS: no chest pain  RESP: intermittent sob, no cough  ABD: no abdominal pain  : no dysuria,  MSK: + edema     VITALS/PHYSICAL EXAM  --------------------------------------------------------------------------------  T(C): 36.8 (06-18-22 @ 04:00), Max: 37.2 (06-17-22 @ 17:27)  HR: 82 (06-18-22 @ 04:00) (82 - 90)  BP: 126/68 (06-18-22 @ 04:00) (116/66 - 150/74)  RR: 18 (06-18-22 @ 04:00) (18 - 18)  SpO2: 98% (06-18-22 @ 04:00) (95% - 98%)  Wt(kg): --        06-17-22 @ 07:01  -  06-18-22 @ 07:00  --------------------------------------------------------  IN: 635 mL / OUT: 400 mL / NET: 235 mL      Physical Exam:  	Gen: NAD  	HEENT: MMM  	Pulm: CTA B/L  	CV: S1S2  	Abd: Soft, +BS  	Ext: No LE edema B/L                      Neuro: Awake   	Skin: Warm and Dry   	Vascular access: NO HD catheter           [facility]  LABS/STUDIES  --------------------------------------------------------------------------------              11.1   6.65  >-----------<  208      [06-18-22 @ 06:46]              37.2     143  |  98  |  50  ----------------------------<  98      [06-18-22 @ 06:44]  4.0   |  33  |  1.52        Ca     9.1     [06-18-22 @ 06:44]      Mg     2.2     [06-18-22 @ 06:44]      Phos  3.8     [06-18-22 @ 06:44]            Creatinine Trend:  SCr 1.52 [06-18 @ 06:44]  SCr 1.50 [06-17 @ 06:32]  SCr 1.31 [06-16 @ 06:51]  SCr 1.31 [06-15 @ 07:34]  SCr 1.26 [06-14 @ 07:00]    Urinalysis - [06-12-22 @ 18:46]      Color Yellow / Appearance Clear / SG 1.016 / pH 6.5      Gluc Negative / Ketone Negative  / Bili Negative / Urobili Negative       Blood Negative / Protein 100 / Leuk Est Small / Nitrite Negative      RBC 3 / WBC 2 / Hyaline  / Gran  / Sq Epi  / Non Sq Epi 0 / Bacteria Negative    Urine Creatinine 78      [06-13-22 @ 22:30]  Urine Protein 119      [06-12-22 @ 18:46]  Urine Sodium 59      [06-12-22 @ 18:46]  Urine Urea Nitrogen 447      [06-13-22 @ 14:55]  Urine Chloride 44      [06-12-22 @ 18:46]    Iron 28, TIBC 268, %sat 11      [06-13-22 @ 07:00]    HBsAg Nonreact      [06-15-22 @ 07:40]  HCV 0.07, Nonreact      [06-15-22 @ 07:40]     [facility] [place] [facility]   MD XAVIER ARMSTRONG MD RUORU WONG, PA    TEL:  FROM 9 AM to 5 PM ---OFFICE: [phone]    FROM 5 PM - 9 AM PLEASE CALL ANSWERING SERVICE: [phone]    RENAL FOLLOW UP NOTE--Date of Service 06-18-22 @ 08:00  --------------------------------------------------------------------------------  HPI:      Pt seen and examined at bedside.       PAST HISTORY  --------------------------------------------------------------------------------  No significant changes to PMH, PSH, FHx, SHx, unless otherwise noted    ALLERGIES & MEDICATIONS  --------------------------------------------------------------------------------  Allergies    No Known Allergies    Intolerances      Standing Inpatient Medications  albuterol/ipratropium for Nebulization 3 milliLiter(s) Nebulizer every 6 hours  apixaban 2.5 milliGRAM(s) Oral every 12 hours  aspirin enteric coated 81 milliGRAM(s) Oral daily  brimonidine 0.2% Ophthalmic Solution 1 Drop(s) Both EYES two times a day  diltiazem    milliGRAM(s) Oral daily  diltiazem    milliGRAM(s) Oral at bedtime  furosemide   Injectable 40 milliGRAM(s) IV Push two times a day  hydrocortisone 1% Ointment 1 Application(s) Topical two times a day  levothyroxine 50 MICROGram(s) Oral daily  multivitamin 1 Tablet(s) Oral daily  polyethylene glycol 3350 17 Gram(s) Oral daily  senna 2 Tablet(s) Oral at bedtime  simvastatin 40 milliGRAM(s) Oral at bedtime  triamcinolone 0.1% Ointment 1 Application(s) Topical every 12 hours  zolpidem 5 milliGRAM(s) Oral at bedtime    PRN Inpatient Medications  acetaminophen     Tablet .. 650 milliGRAM(s) Oral every 6 hours PRN  bisacodyl Suppository 10 milliGRAM(s) Rectal daily PRN  triamcinolone 0.1% Cream 1 Application(s) Topical two times a day PRN      REVIEW OF SYSTEMS  --------------------------------------------------------------------------------  General: no fever  CVS: no chest pain  RESP: intermittent sob, no cough  ABD: no abdominal pain  : no dysuria,  extremity: no b/l LE edema  Vascular access: no HD cath      VITALS/PHYSICAL EXAM  --------------------------------------------------------------------------------  T(C): 36.8 (06-18-22 @ 04:00), Max: 37.2 (06-17-22 @ 17:27)  HR: 82 (06-18-22 @ 04:00) (82 - 90)  BP: 126/68 (06-18-22 @ 04:00) (116/66 - 150/74)  RR: 18 (06-18-22 @ 04:00) (18 - 18)  SpO2: 98% (06-18-22 @ 04:00) (95% - 98%)  Wt(kg): --        06-17-22 @ 07:01  -  06-18-22 @ 07:00  --------------------------------------------------------  IN: 635 mL / OUT: 400 mL / NET: 235 mL      Physical Exam:  	Gen: NAD  	HEENT: MMM  	Pulm: CTA B/L  	CV: S1S2  	Abd: Soft, +BS  	Ext: No LE edema B/L                      Neuro: Awake   	Skin: Warm and Dry   	Vascular access: NO HD catheter            [facility]  LABS/STUDIES  --------------------------------------------------------------------------------              11.1   6.65  >-----------<  208      [06-18-22 @ 06:46]              37.2     143  |  98  |  50  ----------------------------<  98      [06-18-22 @ 06:44]  4.0   |  33  |  1.52        Ca     9.1     [06-18-22 @ 06:44]      Mg     2.2     [06-18-22 @ 06:44]      Phos  3.8     [06-18-22 @ 06:44]            Creatinine Trend:  SCr 1.52 [06-18 @ 06:44]  SCr 1.50 [06-17 @ 06:32]  SCr 1.31 [06-16 @ 06:51]  SCr 1.31 [06-15 @ 07:34]  SCr 1.26 [06-14 @ 07:00]    Urinalysis - [06-12-22 @ 18:46]      Color Yellow / Appearance Clear / SG 1.016 / pH 6.5      Gluc Negative / Ketone Negative  / Bili Negative / Urobili Negative       Blood Negative / Protein 100 / Leuk Est Small / Nitrite Negative      RBC 3 / WBC 2 / Hyaline  / Gran  / Sq Epi  / Non Sq Epi 0 / Bacteria Negative    Urine Creatinine 78      [06-13-22 @ 22:30]  Urine Protein 119      [06-12-22 @ 18:46]  Urine Sodium 59      [06-12-22 @ 18:46]  Urine Urea Nitrogen 447      [06-13-22 @ 14:55]  Urine Chloride 44      [06-12-22 @ 18:46]    Iron 28, TIBC 268, %sat 11      [06-13-22 @ 07:00]    HBsAg Nonreact      [06-15-22 @ 07:40]  HCV 0.07, Nonreact      [06-15-22 @ 07:40]

## 2022-06-18 NOTE — PROGRESS NOTE ADULT - ASSESSMENT
89 y/o F with h/o HFpEF, CAD s/p CABG, CKD3, Chronic AF on Eliquis, HTN, moderate AS, pulm HTN presenting with 1 month of LE edema which has failed outpatient diuretics c/w HF exacerbation combined with underlying chronic venous stasis, admitted for IV diuresis. Nephrology consulted for JACK.     JACK on Chronic Kidney Disease Stage 3  likely cardio renal   FEUrea 42.3%  Renal function stable  Continue IV lasix   Avoid further nephrotoxins, NSAIDS, RCA   monitor on diuretics    Hypernatremia  improved  monitor  serum NA    HTN   optimal   monitor BP closely     Anemia  iron deficiency   consider oral iron   MOnitor HB

## 2022-06-18 NOTE — PROGRESS NOTE ADULT - SUBJECTIVE AND OBJECTIVE BOX
Patient is a 89y old  Female who presents with a chief complaint of LE swelling (18 Jun 2022 08:00)      SUBJECTIVE / OVERNIGHT EVENTS:  Patient seen and examined,   Feels the same. Subjectively no shortness of breath. But becomes hypoxic to 86% on RA.  Daughter at bedside.       Vital Signs Last 24 Hrs  T(C): 36.8 (18 Jun 2022 11:49), Max: 37.2 (17 Jun 2022 17:27)  T(F): 98.3 (18 Jun 2022 11:49), Max: 99 (17 Jun 2022 17:27)  HR: 83 (18 Jun 2022 11:49) (82 - 90)  BP: 130/62 (18 Jun 2022 11:49) (116/66 - 150/74)  BP(mean): --  RR: 18 (18 Jun 2022 11:49) (18 - 18)  SpO2: 97% (18 Jun 2022 11:49) (96% - 98%)  I&O's Summary    17 Jun 2022 07:01  -  18 Jun 2022 07:00  --------------------------------------------------------  IN: 635 mL / OUT: 400 mL / NET: 235 mL    18 Jun 2022 07:01  -  18 Jun 2022 15:32  --------------------------------------------------------  IN: 360 mL / OUT: 0 mL / NET: 360 mL        PHYSICAL EXAM:  GENERAL: NAD, AAOx3  HEAD:  Atraumatic, Normocephalic  EYES: EOMI; conjunctiva and sclera clear  NECK: Supple, No JVD, No LAD  CHEST/LUNG: B/L air entry; No wheezes, rales or rhonci   HEART: Regular rate and rhythm; No murmurs, rubs, or gallops  ABDOMEN: Soft, Nontender, Nondistended; Bowel sounds present  EXTREMITIES:  2+ Peripheral Pulses, No clubbing, cyanosis, or edema  SKIN: No rashes or lesions    LABS:                        11.1   6.65  )-----------( 208      ( 18 Jun 2022 06:46 )             37.2     06-18    143  |  98  |  50<H>  ----------------------------<  98  4.0   |  33<H>  |  1.52<H>    Ca    9.1      18 Jun 2022 06:44  Phos  3.8     06-18  Mg     2.2     06-18        CAPILLARY BLOOD GLUCOSE                RADIOLOGY & ADDITIONAL TESTS:    Imaging Personally Reviewed:  [x] YES  [ ] NO    Consultant(s) Notes Reviewed:  [x] YES  [ ] NO      MEDICATIONS  (STANDING):  albuterol/ipratropium for Nebulization 3 milliLiter(s) Nebulizer every 6 hours  apixaban 2.5 milliGRAM(s) Oral every 12 hours  aspirin enteric coated 81 milliGRAM(s) Oral daily  brimonidine 0.2% Ophthalmic Solution 1 Drop(s) Both EYES two times a day  diltiazem    milliGRAM(s) Oral daily  diltiazem    milliGRAM(s) Oral at bedtime  furosemide   Injectable 40 milliGRAM(s) IV Push two times a day  hydrocortisone 1% Ointment 1 Application(s) Topical two times a day  levothyroxine 50 MICROGram(s) Oral daily  multivitamin 1 Tablet(s) Oral daily  polyethylene glycol 3350 17 Gram(s) Oral daily  senna 2 Tablet(s) Oral at bedtime  simvastatin 40 milliGRAM(s) Oral at bedtime  triamcinolone 0.1% Ointment 1 Application(s) Topical every 12 hours  zolpidem 5 milliGRAM(s) Oral at bedtime    MEDICATIONS  (PRN):  acetaminophen     Tablet .. 650 milliGRAM(s) Oral every 6 hours PRN Moderate Pain (4 - 6)  bisacodyl Suppository 10 milliGRAM(s) Rectal daily PRN Constipation  triamcinolone 0.1% Cream 1 Application(s) Topical two times a day PRN rash      Care Discussed with Consultants/Other Providers [x] YES  [ ] NO    HEALTH ISSUES - PROBLEM Dx:  Chronic heart failure with preserved ejection fraction (HFpEF)    Lower extremity edema    Acute on chronic heart failure with preserved ejection fraction (HFpEF)    Chronic atrial fibrillation    HTN (hypertension)    Stage 3 chronic kidney disease    DVT prophylaxis    Hypothyroidism    HLD (hyperlipidemia)    CAD (coronary artery disease)

## 2022-06-18 NOTE — PROGRESS NOTE ADULT - SUBJECTIVE AND OBJECTIVE BOX
Follow-up Pulm Progress Note    The patient was seen and examined. Notes reviewed and discussed with staff/team as applicable      No new respiratory events overnight.      Denies: SOB, Chest pain, increased cough, colored phlegm, hemoptysis, N/V/D, neck stiffness, dysuria  ROS otherwise within normal limits    Vital Signs Last 24 Hrs  T(C): 36.8 (18 Jun 2022 11:49), Max: 36.8 (17 Jun 2022 23:59)  T(F): 98.3 (18 Jun 2022 11:49), Max: 98.3 (17 Jun 2022 23:59)  HR: 83 (18 Jun 2022 11:49) (82 - 90)  BP: 130/62 (18 Jun 2022 11:49) (123/68 - 150/74)  BP(mean): --  RR: 18 (18 Jun 2022 11:49) (18 - 18)  SpO2: 97% (18 Jun 2022 11:49) (96% - 98%)          06-17 @ 07:01  -  06-18 @ 07:00  --------------------------------------------------------  IN: 635 mL / OUT: 400 mL / NET: 235 mL          Medications:  MEDICATIONS  (STANDING):  albuterol/ipratropium for Nebulization 3 milliLiter(s) Nebulizer every 6 hours  apixaban 2.5 milliGRAM(s) Oral every 12 hours  aspirin enteric coated 81 milliGRAM(s) Oral daily  brimonidine 0.2% Ophthalmic Solution 1 Drop(s) Both EYES two times a day  diltiazem    milliGRAM(s) Oral daily  diltiazem    milliGRAM(s) Oral at bedtime  furosemide   Injectable 40 milliGRAM(s) IV Push two times a day  hydrocortisone 1% Ointment 1 Application(s) Topical two times a day  levothyroxine 50 MICROGram(s) Oral daily  multivitamin 1 Tablet(s) Oral daily  polyethylene glycol 3350 17 Gram(s) Oral daily  senna 2 Tablet(s) Oral at bedtime  simvastatin 40 milliGRAM(s) Oral at bedtime  triamcinolone 0.1% Ointment 1 Application(s) Topical every 12 hours  zolpidem 5 milliGRAM(s) Oral at bedtime    MEDICATIONS  (PRN):  acetaminophen     Tablet .. 650 milliGRAM(s) Oral every 6 hours PRN Moderate Pain (4 - 6)  bisacodyl Suppository 10 milliGRAM(s) Rectal daily PRN Constipation  triamcinolone 0.1% Cream 1 Application(s) Topical two times a day PRN rash      Allergies    No Known Allergies    Intolerances        Vent settings (if applicable)        Physical Examination:    Pleasant  Neck: no JVD, LAD, accessory muscle use  PULM: Clear to auscultation bilaterally, no wheezes, rales, rhonchi  CVS: Regular rate and rhythm, S1S2, no murmurs, rubs, or gallops  Abdomen:  Extremities:  Neuro:      LABS:                        11.1   6.65  )-----------( 208      ( 18 Jun 2022 06:46 )             37.2     06-18    143  |  98  |  50<H>  ----------------------------<  98  4.0   |  33<H>  |  1.52<H>    Ca    9.1      18 Jun 2022 06:44  Phos  3.8     06-18  Mg     2.2     06-18            CAPILLARY BLOOD GLUCOSE                  CULTURES:        RADIOLOGY REVIEWED    CXR:      CT chest:      Other:   Follow-up Pulm Progress Note    The patient was seen and examined. Notes reviewed and discussed with staff/team as applicable      No new respiratory events overnight.      No report of worsening dyspnea, chest pain, increased cough, colored phlegm, hemoptysis, N/V/D, neck stiffness, dysuria  ROS otherwise unremarkable    Vital Signs Last 24 Hrs  T(C): 36.8 (18 Jun 2022 11:49), Max: 36.8 (17 Jun 2022 23:59)  T(F): 98.3 (18 Jun 2022 11:49), Max: 98.3 (17 Jun 2022 23:59)  HR: 83 (18 Jun 2022 11:49) (82 - 90)  BP: 130/62 (18 Jun 2022 11:49) (123/68 - 150/74)  BP(mean): --  RR: 18 (18 Jun 2022 11:49) (18 - 18)  SpO2: 97% (18 Jun 2022 11:49) (96% - 98%)          06-17 @ 07:01  -  06-18 @ 07:00  --------------------------------------------------------  IN: 635 mL / OUT: 400 mL / NET: 235 mL          Medications:  MEDICATIONS  (STANDING):  albuterol/ipratropium for Nebulization 3 milliLiter(s) Nebulizer every 6 hours  apixaban 2.5 milliGRAM(s) Oral every 12 hours  aspirin enteric coated 81 milliGRAM(s) Oral daily  brimonidine 0.2% Ophthalmic Solution 1 Drop(s) Both EYES two times a day  diltiazem    milliGRAM(s) Oral daily  diltiazem    milliGRAM(s) Oral at bedtime  furosemide   Injectable 40 milliGRAM(s) IV Push two times a day  hydrocortisone 1% Ointment 1 Application(s) Topical two times a day  levothyroxine 50 MICROGram(s) Oral daily  multivitamin 1 Tablet(s) Oral daily  polyethylene glycol 3350 17 Gram(s) Oral daily  senna 2 Tablet(s) Oral at bedtime  simvastatin 40 milliGRAM(s) Oral at bedtime  triamcinolone 0.1% Ointment 1 Application(s) Topical every 12 hours  zolpidem 5 milliGRAM(s) Oral at bedtime    MEDICATIONS  (PRN):  acetaminophen     Tablet .. 650 milliGRAM(s) Oral every 6 hours PRN Moderate Pain (4 - 6)  bisacodyl Suppository 10 milliGRAM(s) Rectal daily PRN Constipation  triamcinolone 0.1% Cream 1 Application(s) Topical two times a day PRN rash      Allergies    No Known Allergies    Intolerances        Vent settings (if applicable)        Physical Examination:    Pleasant  Neck: no JVD, LAD, accessory muscle use  PULM: prolonged expiratory phase, decreased breath sounds at the bases  CVS: Regular rate without obvious rub or gallop  Abdomen: soft, normoactive bs  Extremities:  2+pedal edema, without obvious clubbing, cyanosis, or tenderness  Neuro: alert, appropriately responsive to simple questions      LABS:                        11.1   6.65  )-----------( 208      ( 18 Jun 2022 06:46 )             37.2     06-18    143  |  98  |  50<H>  ----------------------------<  98  4.0   |  33<H>  |  1.52<H>    Ca    9.1      18 Jun 2022 06:44  Phos  3.8     06-18  Mg     2.2     06-18            CAPILLARY BLOOD GLUCOSE                  CULTURES:        RADIOLOGY REVIEWED    CXR:      CT chest:      Other:

## 2022-06-18 NOTE — PROGRESS NOTE ADULT - ASSESSMENT
89 y/o F with h/o HFpEF, CAD s/p CABG, CKD3, Chronic AF on Eliquis, HTN, moderate AS, pulm HTN presenting with 1 month of LE edema which has failed outpatient diuretics c/w HF exacerbation combined with underlying chronic venous stasis, admitted for IV diuresis.     # Acute respiratory failure with hypoxia   # Acute on chronic HFpEF  # Chronic atrial fibrillation  # HTN, HLD  # CAD s/p CABG  o2 86% on RA; CT chest consistent with pulmonary edema; appreciate pulm recs;  started on IV lasix 40 iv bid  switch lasix from PO to 40 IV BID. Cardio and Nephro following   VQ scan low prob PE  Monitor I/O, Daily weights, Tele  eliquis   dilt 240 am, 120 pm, rate controlled  cont asa statin  titrate O2, check on RA    # JACK on Stage 3 chronic kidney disease  cardio renal  monitor while on diuretics;; cr slightly rising   renal following    # morbilliform drug eruption  appreciate derm recs  can be related to ckd as well.  Recommend topical triamcinolone 0.1% ointment BID to affected areas of skin     # venous stasis  diuresis as above  Wound carem, ACE wraps  PT recs MIN but pt wants home    # Hypothyroidism   synthroid 50mcg    DVT prophylaxis. eliquis    dw dtr at bedside; wants to take mom home    dispo: pending clinical improvement. on IV lasix; cardio following        Please contact with any questions or concerns [phone].

## 2022-06-18 NOTE — PROGRESS NOTE ADULT - ASSESSMENT
ASSESSMENT:    89 year old gentlewoman, lifelong non-smoker, without known history of intrinsic lung disease; She has a history of HTN, atrial fibrillation maintained on Eliquis, aortic stenosis, CAD/MI/CABG in 2010, CVA and CKD. The patient is followed by Dr. Kwasi Chew in the outpatient setting. Over the last several weeks, the patient has developed lower extremity swelling treated with lasix 40mg daily. Lasix was increased to 80mg daily due to increasing leg swelling without much improvement -> ER. Outpatient ECHO -> normal left ventricular ejection fraction - normal right ventricular size and function - moderate aortic stenosis - moderate pulmonary hypertension. Hospital course has been complicated by JACK on CKD (stage III), contraction alkalosis and hypernatremia all felt to be related to aggressive diuresis. The patient has been noted to have hypoxemia -> 86% on room air. She has no shortness of breath. She has an occasional cough productive of scant sputum. She describes a wheezing sound in her chest. No fevers, chills or sweats. No chest pain/pressure or palpitations.    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, central obesity and respiratory muscle weakness  3) bronchospasm due to "cardiac asthma"  4) low likelihood of VTE disease on full dose A/C for atrial fibrillation    lower extremity swelling  1) pulmonary hypertension without right ventricular enlargement or dysfunction  2) venous stasis changes  3) lymphedema    PLAN/RECOMMENDATIONS:    oxygen supplementation to keep saturation greater than 92% - currently on a 3lpm nasal canula -> trial on room air now -> 82%  chest CT 6/16 -> small bilateral pleural effusions with associated passive atelectasis - bilateral patchy groundglass opacities with interlobular septal thickening c/w pulmonary edema.  V/Q scan -> very low probability of pulmonary embolus.  spirometry    FEV1 0.52 liters - 56% predicted    FVC 0.75 liters - 57% predicted    FEV1% - 70       c/w moderate restrictive lung disease  albuterol/atrovent nebs q6h  incentive spirometry  cardiology follow-up -> needs more diuresis     cardiac meds: eliquis/ASA/zocor/diltiazem CD/lasix (dose to be determined)  bowel regimen  leg elevation and wrapping    Will follow with you. Plan of care discussed with the patient and her family at bedside and with Dr. Stone.    Supa Driscoll MD  [phone]  Pulmonary Medicine     ASSESSMENT:    89 year old gentlewoman, lifelong non-smoker, without known history of intrinsic lung disease; She has a history of HTN, atrial fibrillation maintained on Eliquis, aortic stenosis, CAD/MI/CABG in 2010, CVA and CKD. The patient is followed by Dr. Kwasi Chew in the outpatient setting. Over the last several weeks, the patient has developed lower extremity swelling treated with lasix 40mg daily. Lasix was increased to 80mg daily due to increasing leg swelling without much improvement -> ER. Outpatient ECHO -> normal left ventricular ejection fraction - normal right ventricular size and function - moderate aortic stenosis - moderate pulmonary hypertension. Hospital course has been complicated by JACK on CKD (stage III), contraction alkalosis and hypernatremia all felt to be related to aggressive diuresis. The patient has been noted to have hypoxemia -> 86% on room air. She has no shortness of breath. She has an occasional cough productive of scant sputum. She describes a wheezing sound in her chest. No fevers, chills or sweats. No chest pain/pressure or palpitations.    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, central obesity and respiratory muscle weakness  3) bronchospasm due to "cardiac asthma"  4) low likelihood of VTE disease on full dose A/C for atrial fibrillation    lower extremity swelling  1) pulmonary hypertension without right ventricular enlargement or dysfunction  2) venous stasis changes  3) lymphedema    PLAN/RECOMMENDATIONS:    As outlined previously, oxygen supplementation to keep saturation greater than 92% if needed  chest CT 6/16 -> small bilateral pleural effusions with associated passive atelectasis - bilateral patchy groundglass opacities with interlobular septal thickening c/w pulmonary edema.  V/Q scan -> very low probability of pulmonary embolus.  spirometry    FEV1 0.52 liters - 56% predicted    FVC 0.75 liters - 57% predicted    FEV1% - 70       c/w moderate restrictive lung disease  albuterol/atrovent nebs q6h  incentive spirometry  Encouraging secretion clearance/pulmonary toilet prn  cardiology follow-up -> needs more diuresis     cardiac meds: eliquis/ASA/zocor/diltiazem CD/lasix   Optimization of diuretic dose per cardiology and nephrology  bowel regimen  leg elevation and wrapping    Plan of care discussed with the patient and her daughter    Supa Driscoll MD  [phone]  Pulmonary Medicine

## 2022-06-18 NOTE — PROGRESS NOTE ADULT - SUBJECTIVE AND OBJECTIVE BOX
Patient seen and examined at bedside.    Overnight Events:   no events o/n   I/o not accurately recorded   bed wt is down     REVIEW OF SYSTEMS:  Constitutional:     [x ] negative [ ] fevers [ ] chills [ ] weight loss [ ] weight gain  HEENT:                  [x ] negative [ ] dry eyes [ ] eye irritation [ ] postnasal drip [ ] nasal congestion  CV:                         [ x] negative  [ ] chest pain [ ] orthopnea [ ] palpitations [ ] murmur  Resp:                     [ x] negative [ ] cough [ ] shortness of breath [ ] dyspnea [ ] wheezing [ ] sputum [ ]hemoptysis  GI:                          [ x] negative [ ] nausea [ ] vomiting [ ] diarrhea [ ] constipation [ ] abd pain [ ] dysphagia   :                        [ x] negative [ ] dysuria [ ] nocturia [ ] hematuria [ ] increased urinary frequency  Musculoskeletal: [ x] negative [ ] back pain [ ] myalgias [ ] arthralgias [ ] fracture  Skin:                       [ x] negative [ ] rash [ ] itch  Neurological:        [x ] negative [ ] headache [ ] dizziness [ ] syncope [ ] weakness [ ] numbness  Psychiatric:           [ x] negative [ ] anxiety [ ] depression  Endocrine:            [ x] negative [ ] diabetes [ ] thyroid problem  Heme/Lymph:      [ x] negative [ ] anemia [ ] bleeding problem  Allergic/Immune: [ x] negative [ ] itchy eyes [ ] nasal discharge [ ] hives [ ] angioedema    [ x] All other systems negative  [ ] Unable to assess ROS due to    Current Meds:  acetaminophen     Tablet .. 650 milliGRAM(s) Oral every 6 hours PRN  albuterol/ipratropium for Nebulization 3 milliLiter(s) Nebulizer every 6 hours  apixaban 2.5 milliGRAM(s) Oral every 12 hours  aspirin enteric coated 81 milliGRAM(s) Oral daily  bisacodyl Suppository 10 milliGRAM(s) Rectal daily PRN  brimonidine 0.2% Ophthalmic Solution 1 Drop(s) Both EYES two times a day  diltiazem    milliGRAM(s) Oral daily  diltiazem    milliGRAM(s) Oral at bedtime  furosemide   Injectable 40 milliGRAM(s) IV Push two times a day  hydrocortisone 1% Ointment 1 Application(s) Topical two times a day  levothyroxine 50 MICROGram(s) Oral daily  multivitamin 1 Tablet(s) Oral daily  polyethylene glycol 3350 17 Gram(s) Oral daily  senna 2 Tablet(s) Oral at bedtime  simvastatin 40 milliGRAM(s) Oral at bedtime  triamcinolone 0.1% Cream 1 Application(s) Topical two times a day PRN  triamcinolone 0.1% Ointment 1 Application(s) Topical every 12 hours  zolpidem 5 milliGRAM(s) Oral at bedtime      PAST MEDICAL & SURGICAL HISTORY:  Benign Essential Hypertension      Chronic Sciatica      Personal History of Coronary Artery Disease      Hypertension      Atrial fibrillation      CVA (cerebral vascular accident)      S/P CABG          Vitals:  T(F): 98.2 (06-18), Max: 99 (06-17)  HR: 82 (06-18) (82 - 90)  BP: 126/68 (06-18) (116/66 - 150/74)  RR: 18 (06-18)  SpO2: 98% (06-18)  I&O's Summary    16 Jun 2022 07:01  -  17 Jun 2022 07:00  --------------------------------------------------------  IN: 840 mL / OUT: 150 mL / NET: 690 mL    17 Jun 2022 07:01  -  18 Jun 2022 06:49  --------------------------------------------------------  IN: 560 mL / OUT: 0 mL / NET: 560 mL        Physical Exam:  GENERAL: Elderly woman, NAD  HEAD:  Atraumatic, Normocephalic  ENT: EOMI, PERRLA, conjunctiva and sclera clear, Neck supple, No JVD, moist mucosa  CHEST/LUNG: Clear to auscultation bilaterally  HEART: Irregular; 2/6 systolic murmur best heard at LUSB,   ABDOMEN: Soft, Nontender, Nondistended; Bowel sounds present  EXTREMITIES:  Legs wrapped, 1+ edema at feet  PSYCH: Nl behavior, nl affect  NEUROLOGY: AAOx3, non-focal, cranial nerves intact  SKIN: Normal color, No rashes or lesions                             11.1   6.54  )-----------( 219      ( 17 Jun 2022 06:29 )             37.8     06-17    142  |  98  |  47<H>  ----------------------------<  100<H>  4.2   |  35<H>  |  1.50<H>    Ca    9.2      17 Jun 2022 06:32  Phos  3.4     06-16  Mg     2.4     06-16            Serum Pro-Brain Natriuretic Peptide: 2139 pg/mL (06-13 @ 07:00)          Cardiovascular Testings:       Interpretation of Telemetry:

## 2022-06-19 LAB
ANION GAP SERPL CALC-SCNC: 9 MMOL/L — SIGNIFICANT CHANGE UP (ref 5–17)
BUN SERPL-MCNC: 53 MG/DL — HIGH (ref 7–23)
CALCIUM SERPL-MCNC: 9.3 MG/DL — SIGNIFICANT CHANGE UP (ref 8.4–10.5)
CHLORIDE SERPL-SCNC: 94 MMOL/L — LOW (ref 96–108)
CO2 SERPL-SCNC: 38 MMOL/L — HIGH (ref 22–31)
CREAT SERPL-MCNC: 1.38 MG/DL — HIGH (ref 0.5–1.3)
EGFR: 35 ML/MIN/1.73M2 — LOW
GLUCOSE SERPL-MCNC: 95 MG/DL — SIGNIFICANT CHANGE UP (ref 70–99)
POTASSIUM SERPL-MCNC: 3.9 MMOL/L — SIGNIFICANT CHANGE UP (ref 3.5–5.3)
POTASSIUM SERPL-SCNC: 3.9 MMOL/L — SIGNIFICANT CHANGE UP (ref 3.5–5.3)
SODIUM SERPL-SCNC: 141 MMOL/L — SIGNIFICANT CHANGE UP (ref 135–145)

## 2022-06-19 PROCEDURE — 99232 SBSQ HOSP IP/OBS MODERATE 35: CPT | Mod: GC

## 2022-06-19 RX ADMIN — Medication 3 MILLILITER(S): at 05:50

## 2022-06-19 RX ADMIN — Medication 3 MILLILITER(S): at 18:32

## 2022-06-19 RX ADMIN — Medication 240 MILLIGRAM(S): at 05:51

## 2022-06-19 RX ADMIN — Medication 650 MILLIGRAM(S): at 14:05

## 2022-06-19 RX ADMIN — ZOLPIDEM TARTRATE 5 MILLIGRAM(S): 10 TABLET ORAL at 22:58

## 2022-06-19 RX ADMIN — Medication 40 MILLIGRAM(S): at 05:51

## 2022-06-19 RX ADMIN — BRIMONIDINE TARTRATE 1 DROP(S): 2 SOLUTION/ DROPS OPHTHALMIC at 22:58

## 2022-06-19 RX ADMIN — APIXABAN 2.5 MILLIGRAM(S): 2.5 TABLET, FILM COATED ORAL at 05:51

## 2022-06-19 RX ADMIN — BRIMONIDINE TARTRATE 1 DROP(S): 2 SOLUTION/ DROPS OPHTHALMIC at 05:51

## 2022-06-19 RX ADMIN — POLYETHYLENE GLYCOL 3350 17 GRAM(S): 17 POWDER, FOR SOLUTION ORAL at 08:35

## 2022-06-19 RX ADMIN — SIMVASTATIN 40 MILLIGRAM(S): 20 TABLET, FILM COATED ORAL at 22:58

## 2022-06-19 RX ADMIN — Medication 81 MILLIGRAM(S): at 08:35

## 2022-06-19 RX ADMIN — Medication 50 MICROGRAM(S): at 05:51

## 2022-06-19 RX ADMIN — APIXABAN 2.5 MILLIGRAM(S): 2.5 TABLET, FILM COATED ORAL at 18:32

## 2022-06-19 RX ADMIN — Medication 1 APPLICATION(S): at 18:33

## 2022-06-19 RX ADMIN — Medication 120 MILLIGRAM(S): at 22:58

## 2022-06-19 RX ADMIN — Medication 3 MILLILITER(S): at 08:35

## 2022-06-19 RX ADMIN — Medication 1 TABLET(S): at 08:36

## 2022-06-19 RX ADMIN — Medication 40 MILLIGRAM(S): at 18:32

## 2022-06-19 NOTE — PROGRESS NOTE ADULT - ASSESSMENT
ASSESSMENT:    89 year old gentlewoman, lifelong non-smoker, without known history of intrinsic lung disease; She has a history of HTN, atrial fibrillation maintained on Eliquis, aortic stenosis, CAD/MI/CABG in 2010, CVA and CKD. The patient is followed by Dr. Kwasi Chew in the outpatient setting. Over the last several weeks, the patient has developed lower extremity swelling treated with lasix 40mg daily. Lasix was increased to 80mg daily due to increasing leg swelling without much improvement -> ER. Outpatient ECHO -> normal left ventricular ejection fraction - normal right ventricular size and function - moderate aortic stenosis - moderate pulmonary hypertension. Hospital course has been complicated by JACK on CKD (stage III), contraction alkalosis and hypernatremia all felt to be related to aggressive diuresis. The patient has been noted to have hypoxemia -> 86% on room air. She has no shortness of breath. She has an occasional cough productive of scant sputum. She describes a wheezing sound in her chest. No fevers, chills or sweats. No chest pain/pressure or palpitations.    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, central obesity and respiratory muscle weakness  3) bronchospasm due to "cardiac asthma"  4) low likelihood of VTE disease on full dose A/C for atrial fibrillation    lower extremity swelling  1) pulmonary hypertension without right ventricular enlargement or dysfunction  2) venous stasis changes  3) lymphedema    PLAN/RECOMMENDATIONS:    As outlined previously, oxygen supplementation to keep saturation greater than 92% if needed  chest CT 6/16 -> small bilateral pleural effusions with associated passive atelectasis - bilateral patchy groundglass opacities with interlobular septal thickening c/w pulmonary edema.  V/Q scan -> very low probability of pulmonary embolus.  spirometry    FEV1 0.52 liters - 56% predicted    FVC 0.75 liters - 57% predicted    FEV1% - 70       c/w moderate restrictive lung disease  albuterol/atrovent nebs q6h  incentive spirometry  Encouraging secretion clearance/pulmonary toilet prn  cardiology follow-up -> needs more diuresis     cardiac meds: eliquis/ASA/zocor/diltiazem CD/lasix   Optimization of diuretic dose per cardiology and nephrology  bowel regimen  leg elevation and wrapping    Plan of care discussed with the patient and her daughter    Supa Driscoll MD  [phone]  Pulmonary Medicine     ASSESSMENT:    89 year old gentlewoman, lifelong non-smoker, without known history of intrinsic lung disease; She has a history of HTN, atrial fibrillation maintained on Eliquis, aortic stenosis, CAD/MI/CABG in 2010, CVA and CKD. The patient is followed by Dr. Kwasi Chew in the outpatient setting. Over the last several weeks, the patient has developed lower extremity swelling treated with lasix 40mg daily. Lasix was increased to 80mg daily due to increasing leg swelling without much improvement -> ER. Outpatient ECHO -> normal left ventricular ejection fraction - normal right ventricular size and function - moderate aortic stenosis - moderate pulmonary hypertension. Hospital course has been complicated by JACK on CKD (stage III), contraction alkalosis and hypernatremia all felt to be related to aggressive diuresis. The patient has been noted to have hypoxemia -> 86% on room air. She has no shortness of breath. She has an occasional cough productive of scant sputum. She describes a wheezing sound in her chest. No fevers, chills or sweats. No chest pain/pressure or palpitations.    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, central obesity and respiratory muscle weakness  3) bronchospasm due to "cardiac asthma"  4) low likelihood of VTE disease on full dose A/C for atrial fibrillation    lower extremity swelling  1) pulmonary hypertension without right ventricular enlargement or dysfunction  2) venous stasis changes  3) lymphedema    PLAN/RECOMMENDATIONS:    As outlined previously, oxygen supplementation to keep saturation greater than 92% if needed  chest CT 6/16 -> small bilateral pleural effusions with associated passive atelectasis - bilateral patchy groundglass opacities with interlobular septal thickening c/w pulmonary edema.  V/Q scan -> very low probability of pulmonary embolus.  spirometry    FEV1 0.52 liters - 56% predicted    FVC 0.75 liters - 57% predicted    FEV1% - 70       c/w moderate restrictive lung disease  albuterol/atrovent nebs q6h  incentive spirometry  Encouraging secretion clearance/pulmonary toilet prn  cardiology follow-up -> continuing diuresis.       cardiac meds: eliquis/ASA/zocor/diltiazem CD/lasix   Optimization of diuretic dose (possibly torsemide) per cardiology and nephrology  bowel regimen  leg elevation and wrapping  Mobilization including ambulation as tolerated.    Plan of care discussed with the patient, the [facility] staff, and her daughter    Supa Driscoll MD  [phone]  Pulmonary Medicine

## 2022-06-19 NOTE — PROGRESS NOTE ADULT - SUBJECTIVE AND OBJECTIVE BOX
[facility]   MD XAVIER ARMSTRONG PA MIJUNG SHIN NP     TEL:  OFFICE: [phone]  DR BAKER CELL: [phone]  DR. PHILLIPS CELL: [phone]  MARTIN CASTRO CELL: [phone]  NP Dk Ramírez CELL: [phone]    From 5pm-7am Answering Service [phone]    -- RENAL FOLLOW UP NOTE ---Date of Service 06-19-22 @ 16:47    Patient is a 89y old  Female who presents with a chief complaint of LE swelling (19 Jun 2022 11:29)      Patient seen and examined at bedside. No chest pain/sob    VITALS:  T(F): 97.8 (06-19-22 @ 11:40), Max: 98.5 (06-18-22 @ 21:22)  HR: 93 (06-19-22 @ 11:40)  BP: 131/62 (06-19-22 @ 11:40)  RR: 18 (06-19-22 @ 11:40)  SpO2: 98% (06-19-22 @ 11:40)  Wt(kg): --    06-18 @ 07:01  -  06-19 @ 07:00  --------------------------------------------------------  IN: 870 mL / OUT: 0 mL / NET: 870 mL    06-19 @ 07:01  -  06-19 @ 16:47  --------------------------------------------------------  IN: 360 mL / OUT: 550 mL / NET: -190 mL          PHYSICAL EXAM:  Constitutional: NAD  Neck: No JVD  Respiratory: CTAB, no wheezes, rales or rhonchi  Cardiovascular: S1, S2, RRR  Gastrointestinal: BS+, soft, NT/ND  Neurology: no focal deficits  Extremities: No peripheral edema  Vascular access: no HD cath    Hospital Medications:   MEDICATIONS  (STANDING):  albuterol/ipratropium for Nebulization 3 milliLiter(s) Nebulizer every 6 hours  apixaban 2.5 milliGRAM(s) Oral every 12 hours  aspirin enteric coated 81 milliGRAM(s) Oral daily  brimonidine 0.2% Ophthalmic Solution 1 Drop(s) Both EYES two times a day  diltiazem    milliGRAM(s) Oral daily  diltiazem    milliGRAM(s) Oral at bedtime  furosemide   Injectable 40 milliGRAM(s) IV Push two times a day  hydrocortisone 1% Ointment 1 Application(s) Topical two times a day  levothyroxine 50 MICROGram(s) Oral daily  multivitamin 1 Tablet(s) Oral daily  polyethylene glycol 3350 17 Gram(s) Oral daily  senna 2 Tablet(s) Oral at bedtime  simvastatin 40 milliGRAM(s) Oral at bedtime  triamcinolone 0.1% Ointment 1 Application(s) Topical every 12 hours  zolpidem 5 milliGRAM(s) Oral at bedtime      LABS:  06-19    141  |  94<L>  |  53<H>  ----------------------------<  95  3.9   |  38<H>  |  1.38<H>    Ca    9.3      19 Jun 2022 07:26  Phos  3.8     06-18  Mg     2.2     06-18      Creatinine Trend: 1.38 <--, 1.52 <--, 1.50 <--, 1.31 <--, 1.31 <--, 1.26 <--, 1.43 <--                                11.1   6.65  )-----------( 208      ( 18 Jun 2022 06:46 )             37.2     Urine Studies:  Urinalysis - [06-12-22 @ 18:46]      Color Yellow / Appearance Clear / SG 1.016 / pH 6.5      Gluc Negative / Ketone Negative  / Bili Negative / Urobili Negative       Blood Negative / Protein 100 / Leuk Est Small / Nitrite Negative      RBC 3 / WBC 2 / Hyaline  / Gran  / Sq Epi  / Non Sq Epi 0 / Bacteria Negative    Urine Creatinine 78      [06-13-22 @ 22:30]  Urine Protein 119      [06-12-22 @ 18:46]  Urine Sodium 59      [06-12-22 @ 18:46]  Urine Urea Nitrogen 447      [06-13-22 @ 14:55]  Urine Chloride 44      [06-12-22 @ 18:46]    Iron 28, TIBC 268, %sat 11      [06-13-22 @ 07:00]    HBsAg Nonreact      [06-15-22 @ 07:40]  HCV 0.07, Nonreact      [06-15-22 @ 07:40]      RADIOLOGY & ADDITIONAL STUDIES:

## 2022-06-19 NOTE — PROGRESS NOTE ADULT - SUBJECTIVE AND OBJECTIVE BOX
Follow-up Pulm Progress Note    The patient was seen and examined. Notes reviewed and discussed with staff/team as applicable      No new respiratory events overnight.      No report of worsening dyspnea, chest pain, increased cough, colored phlegm, hemoptysis, N/V/D, neck stiffness, dysuria  ROS otherwise unremarkable    Vital Signs Last 24 Hrs  T(C): 36.8 (18 Jun 2022 11:49), Max: 36.8 (17 Jun 2022 23:59)  T(F): 98.3 (18 Jun 2022 11:49), Max: 98.3 (17 Jun 2022 23:59)  HR: 83 (18 Jun 2022 11:49) (82 - 90)  BP: 130/62 (18 Jun 2022 11:49) (123/68 - 150/74)  BP(mean): --  RR: 18 (18 Jun 2022 11:49) (18 - 18)  SpO2: 97% (18 Jun 2022 11:49) (96% - 98%)          06-17 @ 07:01  -  06-18 @ 07:00  --------------------------------------------------------  IN: 635 mL / OUT: 400 mL / NET: 235 mL          Medications:  MEDICATIONS  (STANDING):  albuterol/ipratropium for Nebulization 3 milliLiter(s) Nebulizer every 6 hours  apixaban 2.5 milliGRAM(s) Oral every 12 hours  aspirin enteric coated 81 milliGRAM(s) Oral daily  brimonidine 0.2% Ophthalmic Solution 1 Drop(s) Both EYES two times a day  diltiazem    milliGRAM(s) Oral daily  diltiazem    milliGRAM(s) Oral at bedtime  furosemide   Injectable 40 milliGRAM(s) IV Push two times a day  hydrocortisone 1% Ointment 1 Application(s) Topical two times a day  levothyroxine 50 MICROGram(s) Oral daily  multivitamin 1 Tablet(s) Oral daily  polyethylene glycol 3350 17 Gram(s) Oral daily  senna 2 Tablet(s) Oral at bedtime  simvastatin 40 milliGRAM(s) Oral at bedtime  triamcinolone 0.1% Ointment 1 Application(s) Topical every 12 hours  zolpidem 5 milliGRAM(s) Oral at bedtime    MEDICATIONS  (PRN):  acetaminophen     Tablet .. 650 milliGRAM(s) Oral every 6 hours PRN Moderate Pain (4 - 6)  bisacodyl Suppository 10 milliGRAM(s) Rectal daily PRN Constipation  triamcinolone 0.1% Cream 1 Application(s) Topical two times a day PRN rash      Allergies    No Known Allergies    Intolerances        Vent settings (if applicable)        Physical Examination:    Pleasant  Neck: no JVD, LAD, accessory muscle use  PULM: prolonged expiratory phase, decreased breath sounds at the bases  CVS: Regular rate without obvious rub or gallop  Abdomen: soft, normoactive bs  Extremities:  2+pedal edema, without obvious clubbing, cyanosis, or tenderness  Neuro: alert, appropriately responsive to simple questions      LABS:                        11.1   6.65  )-----------( 208      ( 18 Jun 2022 06:46 )             37.2     06-18    143  |  98  |  50<H>  ----------------------------<  98  4.0   |  33<H>  |  1.52<H>    Ca    9.1      18 Jun 2022 06:44  Phos  3.8     06-18  Mg     2.2     06-18            CAPILLARY BLOOD GLUCOSE                  CULTURES:        RADIOLOGY REVIEWED    CXR:      CT chest:      Other:   Follow-up Pulm Progress Note    The patient was seen and examined. Notes reviewed and discussed with staff/team as applicable      No new respiratory events overnight.  Somewhat brighter today.     No report of worsening dyspnea, chest pain, increased cough, colored phlegm, hemoptysis, N/V/D, neck stiffness, dysuria  ROS otherwise unremarkable    Vital Signs Last 24 Hrs  T(C): 36.8 (18 Jun 2022 11:49), Max: 36.8 (17 Jun 2022 23:59)  T(F): 98.3 (18 Jun 2022 11:49), Max: 98.3 (17 Jun 2022 23:59)  HR: 83 (18 Jun 2022 11:49) (82 - 90)  BP: 130/62 (18 Jun 2022 11:49) (123/68 - 150/74)  BP(mean): --  RR: 18 (18 Jun 2022 11:49) (18 - 18)  SpO2: 97% (18 Jun 2022 11:49) (96% - 98%)          06-17 @ 07:01  -  06-18 @ 07:00  --------------------------------------------------------  IN: 635 mL / OUT: 400 mL / NET: 235 mL          Medications:  MEDICATIONS  (STANDING):  albuterol/ipratropium for Nebulization 3 milliLiter(s) Nebulizer every 6 hours  apixaban 2.5 milliGRAM(s) Oral every 12 hours  aspirin enteric coated 81 milliGRAM(s) Oral daily  brimonidine 0.2% Ophthalmic Solution 1 Drop(s) Both EYES two times a day  diltiazem    milliGRAM(s) Oral daily  diltiazem    milliGRAM(s) Oral at bedtime  furosemide   Injectable 40 milliGRAM(s) IV Push two times a day  hydrocortisone 1% Ointment 1 Application(s) Topical two times a day  levothyroxine 50 MICROGram(s) Oral daily  multivitamin 1 Tablet(s) Oral daily  polyethylene glycol 3350 17 Gram(s) Oral daily  senna 2 Tablet(s) Oral at bedtime  simvastatin 40 milliGRAM(s) Oral at bedtime  triamcinolone 0.1% Ointment 1 Application(s) Topical every 12 hours  zolpidem 5 milliGRAM(s) Oral at bedtime    MEDICATIONS  (PRN):  acetaminophen     Tablet .. 650 milliGRAM(s) Oral every 6 hours PRN Moderate Pain (4 - 6)  bisacodyl Suppository 10 milliGRAM(s) Rectal daily PRN Constipation  triamcinolone 0.1% Cream 1 Application(s) Topical two times a day PRN rash      Allergies    No Known Allergies    Intolerances        Vent settings (if applicable)        Physical Examination:    Pleasant  Neck: no JVD, LAD, accessory muscle use  PULM: prolonged expiratory phase, decreased breath sounds at the bases  CVS: Regular rate without obvious rub or gallop  Abdomen: soft, normoactive bs  Extremities:  2+pedal edema, without obvious clubbing, cyanosis, or tenderness  Neuro: alert, appropriately responsive to simple questions      LABS:                        11.1   6.65  )-----------( 208      ( 18 Jun 2022 06:46 )             37.2     06-18    143  |  98  |  50<H>  ----------------------------<  98  4.0   |  33<H>  |  1.52<H>    Ca    9.1      18 Jun 2022 06:44  Phos  3.8     06-18  Mg     2.2     06-18            CAPILLARY BLOOD GLUCOSE                  CULTURES:        RADIOLOGY REVIEWED    CXR:      CT chest:      Other:

## 2022-06-19 NOTE — PROGRESS NOTE ADULT - SUBJECTIVE AND OBJECTIVE BOX
Patient is a 89y old  Female who presents with a chief complaint of LE swelling (19 Jun 2022 10:21)      SUBJECTIVE / OVERNIGHT EVENTS:  Feels tired.   Oxygenation with some improvement, now requiring 2L NC.       Vital Signs Last 24 Hrs  T(C): 36.3 (19 Jun 2022 04:10), Max: 36.9 (18 Jun 2022 21:22)  T(F): 97.4 (19 Jun 2022 04:10), Max: 98.5 (18 Jun 2022 21:22)  HR: 83 (19 Jun 2022 04:10) (83 - 90)  BP: 130/63 (19 Jun 2022 04:10) (109/63 - 130/63)  BP(mean): --  RR: 18 (19 Jun 2022 04:10) (18 - 18)  SpO2: 98% (19 Jun 2022 04:10) (93% - 98%)  I&O's Summary    18 Jun 2022 07:01  -  19 Jun 2022 07:00  --------------------------------------------------------  IN: 870 mL / OUT: 0 mL / NET: 870 mL    19 Jun 2022 07:01  -  19 Jun 2022 11:30  --------------------------------------------------------  IN: 180 mL / OUT: 0 mL / NET: 180 mL        PHYSICAL EXAM:  GENERAL: NAD, AAOx3  HEAD:  Atraumatic, Normocephalic  EYES: EOMI; conjunctiva and sclera clear  NECK: Supple, No JVD, No LAD  CHEST/LUNG: B/L air entry; No wheezes, rales or rhonci   HEART: Regular rate and rhythm; No murmurs, rubs, or gallops  ABDOMEN: Soft, Nontender, Nondistended; Bowel sounds present  EXTREMITIES:  2+ Peripheral Pulses, No clubbing, cyanosis, or edema  SKIN: No rashes or lesions    LABS:                        11.1   6.65  )-----------( 208      ( 18 Jun 2022 06:46 )             37.2     06-19    141  |  94<L>  |  53<H>  ----------------------------<  95  3.9   |  38<H>  |  1.38<H>    Ca    9.3      19 Jun 2022 07:26  Phos  3.8     06-18  Mg     2.2     06-18        CAPILLARY BLOOD GLUCOSE                RADIOLOGY & ADDITIONAL TESTS:    Imaging Personally Reviewed:  [x] YES  [ ] NO    Consultant(s) Notes Reviewed:  [x] YES  [ ] NO      MEDICATIONS  (STANDING):  albuterol/ipratropium for Nebulization 3 milliLiter(s) Nebulizer every 6 hours  apixaban 2.5 milliGRAM(s) Oral every 12 hours  aspirin enteric coated 81 milliGRAM(s) Oral daily  brimonidine 0.2% Ophthalmic Solution 1 Drop(s) Both EYES two times a day  diltiazem    milliGRAM(s) Oral daily  diltiazem    milliGRAM(s) Oral at bedtime  furosemide   Injectable 40 milliGRAM(s) IV Push two times a day  hydrocortisone 1% Ointment 1 Application(s) Topical two times a day  levothyroxine 50 MICROGram(s) Oral daily  multivitamin 1 Tablet(s) Oral daily  polyethylene glycol 3350 17 Gram(s) Oral daily  senna 2 Tablet(s) Oral at bedtime  simvastatin 40 milliGRAM(s) Oral at bedtime  triamcinolone 0.1% Ointment 1 Application(s) Topical every 12 hours  zolpidem 5 milliGRAM(s) Oral at bedtime    MEDICATIONS  (PRN):  acetaminophen     Tablet .. 650 milliGRAM(s) Oral every 6 hours PRN Moderate Pain (4 - 6)  bisacodyl Suppository 10 milliGRAM(s) Rectal daily PRN Constipation  triamcinolone 0.1% Cream 1 Application(s) Topical two times a day PRN rash      Care Discussed with Consultants/Other Providers [x] YES  [ ] NO    HEALTH ISSUES - PROBLEM Dx:  Chronic heart failure with preserved ejection fraction (HFpEF)    Lower extremity edema    Acute on chronic heart failure with preserved ejection fraction (HFpEF)    Chronic atrial fibrillation    HTN (hypertension)    Stage 3 chronic kidney disease    DVT prophylaxis    Hypothyroidism    HLD (hyperlipidemia)    CAD (coronary artery disease)

## 2022-06-19 NOTE — PROGRESS NOTE ADULT - ASSESSMENT
89 yo F with diastolic HF, CAD s/p CABG, CKD3B, atrial fibrillation (on apixaban), HTN, moderate AS and pulmonary HTN p/w progressive worsening LE edema despite increasing dose of PO diuretic currently being aggressively diuresed with IV lasix    REC:  1. HFpEF, venous insufficiency LE edema  - cont IV Lasix 40 mg BID for another 24 hours, please make sure there is strict I/O order and daily standing weight (instead of bed weight)  - Trend Cr daily  - Appreciate wound care/derm recs    2. Atrial fibrillation  - Continue home dose diltiazem  - Continue home reduced dose apixaban for systemic embolization prevention   - Primary team to obtain outside cardiology record to determine if Afib is paroxysmal vs permanent, may consider WALE+DCCV if not permanent   - Continue telemetry monitoring    3. CAD s/p CABG  - Continue home asa and statin    4. JACK on CKD  - Appreciate nephrology recommendations    Leeann Marquez MD  PGY5 Cardiology

## 2022-06-19 NOTE — PROGRESS NOTE ADULT - SUBJECTIVE AND OBJECTIVE BOX
CARDIOLOGY FELLOW PROGRESS NOTE    Subjective:    No acute events overnight.   ROS + LE edema    Current Medications:   acetaminophen     Tablet .. 650 milliGRAM(s) Oral every 6 hours PRN  albuterol/ipratropium for Nebulization 3 milliLiter(s) Nebulizer every 6 hours  apixaban 2.5 milliGRAM(s) Oral every 12 hours  aspirin enteric coated 81 milliGRAM(s) Oral daily  bisacodyl Suppository 10 milliGRAM(s) Rectal daily PRN  brimonidine 0.2% Ophthalmic Solution 1 Drop(s) Both EYES two times a day  diltiazem    milliGRAM(s) Oral daily  diltiazem    milliGRAM(s) Oral at bedtime  furosemide   Injectable 40 milliGRAM(s) IV Push two times a day  hydrocortisone 1% Ointment 1 Application(s) Topical two times a day  levothyroxine 50 MICROGram(s) Oral daily  multivitamin 1 Tablet(s) Oral daily  polyethylene glycol 3350 17 Gram(s) Oral daily  senna 2 Tablet(s) Oral at bedtime  simvastatin 40 milliGRAM(s) Oral at bedtime  triamcinolone 0.1% Cream 1 Application(s) Topical two times a day PRN  triamcinolone 0.1% Ointment 1 Application(s) Topical every 12 hours  zolpidem 5 milliGRAM(s) Oral at bedtime      REVIEW OF SYSTEMS:  CONSTITUTIONAL: No weakness, fevers or chills  EYES/ENT: No visual changes;  No dysphagia  NECK: No pain or stiffness  RESPIRATORY: No cough, wheezing, hemoptysis; No shortness of breath  CARDIOVASCULAR: No chest pain or palpitations; lower extremity edema  GASTROINTESTINAL: No abdominal or epigastric pain. No nausea, vomiting, or hematemesis; No diarrhea or constipation. No melena or hematochezia.  BACK: No back pain  GENITOURINARY: No dysuria, frequency or hematuria  NEUROLOGICAL: No numbness or weakness  SKIN: No itching, burning, rashes, or lesions   All other review of systems is negative unless indicated above.    Physical Exam:  T(F): 97.4 (06-19), Max: 98.5 (06-18)  HR: 83 (06-19) (83 - 90)  BP: 130/63 (06-19) (109/63 - 130/63)  BP(mean): --  ABP: --  ABP(mean): --  RR: 18 (06-19)  SpO2: 98% (06-19)  GENERAL: No acute distress, well-developed  HEAD:  Atraumatic, Normocephalic  ENT: EOMI, PERRLA, conjunctiva and sclera clear, Neck supple, No JVD, moist mucosa  CHEST/LUNG: Clear to auscultation bilaterally; No wheeze, equal breath sounds bilaterally   BACK: No spinal tenderness  HEART: Regular rate and rhythm; No murmurs, rubs, or gallops  ABDOMEN: Soft, Nontender, Nondistended; Bowel sounds present  EXTREMITIES: 2+ LE edema b/l  PSYCH: Nl behavior, nl affect  NEUROLOGY: AAOx3, non-focal, cranial nerves intact  SKIN: Normal color, No rashes or lesions    Cardiovascular Diagnostic Testing: personally reviewed    CXR: Personally reviewed    Labs: Personally reviewed                        11.1   6.65  )-----------( 208      ( 18 Jun 2022 06:46 )             37.2     06-19    141  |  94<L>  |  53<H>  ----------------------------<  95  3.9   |  38<H>  |  1.38<H>    Ca    9.3      19 Jun 2022 07:26  Phos  3.8     06-18  Mg     2.2     06-18                Serum Pro-Brain Natriuretic Peptide: 2139 pg/mL (06-13 @ 07:00)

## 2022-06-19 NOTE — PROGRESS NOTE ADULT - ASSESSMENT
89 y/o F with h/o HFpEF, CAD s/p CABG, CKD3, Chronic AF on Eliquis, HTN, moderate AS, pulm HTN presenting with 1 month of LE edema which has failed outpatient diuretics c/w HF exacerbation combined with underlying chronic venous stasis, admitted for IV diuresis.     # Acute respiratory failure with hypoxia   # Acute on chronic HFpEF  # Chronic atrial fibrillation  # HTN, HLD  # CAD s/p CABG  o2 86% on RA; CT chest consistent with pulmonary edema; appreciate pulm recs;  started on IV lasix 40 iv bid. Cardio and Nephro following   VQ scan low prob PE  Monitor I/O, Daily weights, Tele  eliquis   dilt 240 am, 120 pm, rate controlled  cont asa statin  titrate O2, check on RA    # JACK on Stage 3 chronic kidney disease  cardio renal  monitor while on diuretics;; cr stable today.  renal following    # morbilliform drug eruption  appreciate derm recs  can be related to ckd as well.  Recommend topical triamcinolone 0.1% ointment BID to affected areas of skin     # venous stasis  diuresis as above  Wound carem, ACE wraps  PT recs MIN but pt wants home    # Hypothyroidism   synthroid 50mcg    DVT prophylaxis. eliquis    dw dtr at bedside; wants to take mom home    dispo: pending clinical improvement. on IV lasix; if can get pt off of oxygen tmrw 06/20, then dc. cardio following        Please contact with any questions or concerns [phone].

## 2022-06-19 NOTE — PROGRESS NOTE ADULT - ASSESSMENT
89 y/o F with h/o HFpEF, CAD s/p CABG, CKD3, Chronic AF on Eliquis, HTN, moderate AS, pulm HTN presenting with 1 month of LE edema which has failed outpatient diuretics c/w HF exacerbation combined with underlying chronic venous stasis, admitted for IV diuresis. Nephrology consulted for JACK.     JACK on Chronic Kidney Disease Stage 3  likely cardio renal   FEUrea 42.3%  Renal function improving  Continue IV lasix   Avoid further nephrotoxins, NSAIDS, RCA   monitor on diuretics    Hypernatremia  improved  monitor  serum NA    HTN   optimal   monitor BP closely     Anemia  iron deficiency   consider oral iron   MOnitor HB

## 2022-06-20 ENCOUNTER — TRANSCRIPTION ENCOUNTER (OUTPATIENT)
Age: 87
End: 2022-06-20

## 2022-06-20 LAB
ANION GAP SERPL CALC-SCNC: 10 MMOL/L — SIGNIFICANT CHANGE UP (ref 5–17)
BUN SERPL-MCNC: 55 MG/DL — HIGH (ref 7–23)
CALCIUM SERPL-MCNC: 9.1 MG/DL — SIGNIFICANT CHANGE UP (ref 8.4–10.5)
CHLORIDE SERPL-SCNC: 96 MMOL/L — SIGNIFICANT CHANGE UP (ref 96–108)
CO2 SERPL-SCNC: 35 MMOL/L — HIGH (ref 22–31)
CREAT SERPL-MCNC: 1.44 MG/DL — HIGH (ref 0.5–1.3)
EGFR: 33 ML/MIN/1.73M2 — LOW
GLUCOSE SERPL-MCNC: 102 MG/DL — HIGH (ref 70–99)
MAGNESIUM SERPL-MCNC: 2.3 MG/DL — SIGNIFICANT CHANGE UP (ref 1.6–2.6)
PHOSPHATE SERPL-MCNC: 3.9 MG/DL — SIGNIFICANT CHANGE UP (ref 2.5–4.5)
POTASSIUM SERPL-MCNC: 4.2 MMOL/L — SIGNIFICANT CHANGE UP (ref 3.5–5.3)
POTASSIUM SERPL-SCNC: 4.2 MMOL/L — SIGNIFICANT CHANGE UP (ref 3.5–5.3)
SODIUM SERPL-SCNC: 141 MMOL/L — SIGNIFICANT CHANGE UP (ref 135–145)

## 2022-06-20 PROCEDURE — 71045 X-RAY EXAM CHEST 1 VIEW: CPT | Mod: 26

## 2022-06-20 PROCEDURE — 99232 SBSQ HOSP IP/OBS MODERATE 35: CPT

## 2022-06-20 RX ADMIN — Medication 1 TABLET(S): at 13:44

## 2022-06-20 RX ADMIN — Medication 3 MILLILITER(S): at 05:33

## 2022-06-20 RX ADMIN — SENNA PLUS 2 TABLET(S): 8.6 TABLET ORAL at 21:09

## 2022-06-20 RX ADMIN — Medication 240 MILLIGRAM(S): at 05:33

## 2022-06-20 RX ADMIN — Medication 1 APPLICATION(S): at 18:41

## 2022-06-20 RX ADMIN — Medication 40 MILLIGRAM(S): at 05:33

## 2022-06-20 RX ADMIN — BRIMONIDINE TARTRATE 1 DROP(S): 2 SOLUTION/ DROPS OPHTHALMIC at 21:10

## 2022-06-20 RX ADMIN — Medication 1 APPLICATION(S): at 05:34

## 2022-06-20 RX ADMIN — APIXABAN 2.5 MILLIGRAM(S): 2.5 TABLET, FILM COATED ORAL at 05:33

## 2022-06-20 RX ADMIN — Medication 120 MILLIGRAM(S): at 21:10

## 2022-06-20 RX ADMIN — Medication 3 MILLILITER(S): at 13:44

## 2022-06-20 RX ADMIN — APIXABAN 2.5 MILLIGRAM(S): 2.5 TABLET, FILM COATED ORAL at 18:40

## 2022-06-20 RX ADMIN — SIMVASTATIN 40 MILLIGRAM(S): 20 TABLET, FILM COATED ORAL at 21:10

## 2022-06-20 RX ADMIN — BRIMONIDINE TARTRATE 1 DROP(S): 2 SOLUTION/ DROPS OPHTHALMIC at 05:33

## 2022-06-20 RX ADMIN — Medication 81 MILLIGRAM(S): at 13:43

## 2022-06-20 RX ADMIN — Medication 3 MILLILITER(S): at 00:00

## 2022-06-20 RX ADMIN — Medication 40 MILLIGRAM(S): at 13:44

## 2022-06-20 RX ADMIN — Medication 50 MICROGRAM(S): at 05:33

## 2022-06-20 RX ADMIN — Medication 1 APPLICATION(S): at 18:40

## 2022-06-20 RX ADMIN — ZOLPIDEM TARTRATE 5 MILLIGRAM(S): 10 TABLET ORAL at 22:55

## 2022-06-20 RX ADMIN — Medication 3 MILLILITER(S): at 18:40

## 2022-06-20 NOTE — CHART NOTE - NSCHARTNOTEFT_GEN_A_CORE
89 y/o femalw with PMHx of CHF, CKD, A. fib, HTN, pHTN and moderate AS admitted with acute on chronic HF and JACK on CKD.   s/p diuresis with IV Lasix. Now volume status improved, however still requires oxygen   Ambulatory O2 sat 85% on RA, and 93% on Oxygen 2 LPM via NC    ICD 10 : I50.9  GRADY: 99    Cristal Marquez NP-C  #[id number] 89 y/o femalw with PMHx of CHF, CKD, A. fib, HTN, pHTN and moderate AS admitted with acute on chronic HF and JACK on CKD.   s/p diuresis with IV Lasix. Now volume status improved, however still requires oxygen   O2 sat 89% on RA at rest, Ambulatory O2 sat 85% on RA, and 93% with exercise on Oxygen 2 LPM via NC    ICD 10 : I50.9  GRADY: 99    Cristal SU  #[id number]

## 2022-06-20 NOTE — PROGRESS NOTE ADULT - SUBJECTIVE AND OBJECTIVE BOX
Patient is a 89y old  Female who presents with a chief complaint of LE swelling (20 Jun 2022 11:52)      SUBJECTIVE / OVERNIGHT EVENTS:  Patient seen and examined.   No shortness of breath, clinically chf improved but still hypoxic.       Vital Signs Last 24 Hrs  T(C): 36.6 (20 Jun 2022 11:44), Max: 36.7 (20 Jun 2022 04:00)  T(F): 97.9 (20 Jun 2022 11:44), Max: 98 (20 Jun 2022 04:00)  HR: 71 (20 Jun 2022 11:44) (71 - 99)  BP: 122/70 (20 Jun 2022 11:44) (122/70 - 146/72)  BP(mean): --  RR: 18 (20 Jun 2022 11:44) (18 - 18)  SpO2: 83% (20 Jun 2022 12:55) (83% - 96%)  I&O's Summary    19 Jun 2022 07:01  -  20 Jun 2022 07:00  --------------------------------------------------------  IN: 640 mL / OUT: 1100 mL / NET: -460 mL    20 Jun 2022 07:01  -  20 Jun 2022 13:11  --------------------------------------------------------  IN: 60 mL / OUT: 0 mL / NET: 60 mL        PHYSICAL EXAM:  GENERAL: NAD, AAOx3  HEAD:  Atraumatic, Normocephalic  EYES: EOMI; conjunctiva and sclera clear  NECK: Supple, No JVD, No LAD  CHEST/LUNG: B/L air entry; BB crackles  HEART: Regular rate and rhythm; No murmurs, rubs, or gallops  ABDOMEN: Soft, Nontender, Nondistended; Bowel sounds present  EXTREMITIES:  2+ Peripheral Pulses, No clubbing, cyanosis, or edema  SKIN: No rashes or lesions    LABS:    06-20    141  |  96  |  55<H>  ----------------------------<  102<H>  4.2   |  35<H>  |  1.44<H>    Ca    9.1      20 Jun 2022 07:29  Phos  3.9     06-20  Mg     2.3     06-20        CAPILLARY BLOOD GLUCOSE                RADIOLOGY & ADDITIONAL TESTS:    Imaging Personally Reviewed:  [x] YES  [ ] NO    Consultant(s) Notes Reviewed:  [x] YES  [ ] NO      MEDICATIONS  (STANDING):  albuterol/ipratropium for Nebulization 3 milliLiter(s) Nebulizer every 6 hours  apixaban 2.5 milliGRAM(s) Oral every 12 hours  aspirin enteric coated 81 milliGRAM(s) Oral daily  brimonidine 0.2% Ophthalmic Solution 1 Drop(s) Both EYES two times a day  diltiazem    milliGRAM(s) Oral daily  diltiazem    milliGRAM(s) Oral at bedtime  hydrocortisone 1% Ointment 1 Application(s) Topical two times a day  levothyroxine 50 MICROGram(s) Oral daily  multivitamin 1 Tablet(s) Oral daily  polyethylene glycol 3350 17 Gram(s) Oral daily  senna 2 Tablet(s) Oral at bedtime  simvastatin 40 milliGRAM(s) Oral at bedtime  torsemide 40 milliGRAM(s) Oral two times a day  triamcinolone 0.1% Ointment 1 Application(s) Topical every 12 hours  zolpidem 5 milliGRAM(s) Oral at bedtime    MEDICATIONS  (PRN):  acetaminophen     Tablet .. 650 milliGRAM(s) Oral every 6 hours PRN Moderate Pain (4 - 6)  bisacodyl Suppository 10 milliGRAM(s) Rectal daily PRN Constipation  triamcinolone 0.1% Cream 1 Application(s) Topical two times a day PRN rash      Care Discussed with Consultants/Other Providers [x] YES  [ ] NO    HEALTH ISSUES - PROBLEM Dx:  Chronic heart failure with preserved ejection fraction (HFpEF)    Lower extremity edema    Acute on chronic heart failure with preserved ejection fraction (HFpEF)    Chronic atrial fibrillation    HTN (hypertension)    Stage 3 chronic kidney disease    DVT prophylaxis    Hypothyroidism    HLD (hyperlipidemia)    CAD (coronary artery disease)

## 2022-06-20 NOTE — PROGRESS NOTE ADULT - ASSESSMENT
89 yo F with diastolic HF, CAD s/p CABG, CKD3B, atrial fibrillation (on apixaban), HTN, moderate AS and pulmonary HTN p/w progressive worsening LE edema despite increasing dose of PO diuretic currently being aggressively diuresed with IV furosemide.    REC:  1. HFpEF, venous insufficiency LE edema  - Transition to torsemide 40 mg PO BID  - Trend Cr daily  - Strict I/Os and daily standing weight  - Appreciate wound care/derm recs    2. Atrial fibrillation  - Continue home dose diltiazem  - Continue home reduced dose apixaban for systemic embolization prevention   - Continue telemetry monitoring    3. CAD s/p CABG  - Continue home asa and statin    4. JACK on CKD  - Appreciate nephrology recommendations    Bryan Heart MD  Department of Cardiology  Cardiology Fellow, PGY4

## 2022-06-20 NOTE — PROGRESS NOTE ADULT - SUBJECTIVE AND OBJECTIVE BOX
[facility] - [facility] - PROGRESS NOTE    CHIEF COMPLAINT: hypoxemia; pulmonary edema; pleural effusions; pulmonary hypertension; CAD/CABG; aortic stenosis; CKD    INTERVAL HISTORY: sitting in the chair without shortness of breath or hypoxemia on a 2lpm nasal canula; remains hypoxic at rest -> 83%; no cough, sputum production, hemoptysis, chest congestion or wheeze; no fevers, chills or sweats; no chest pain/pressure or palpitations; legs remains swollen above the ACE bandages over the knees; diuretics transitioned from IV -> po; CT scan -> mild pulmonary edema - small bilateral pleural effusions with associated atelectasis - s/p median sternotomy; V/Q scan -> very low probability of pulmonary embolus;     REVIEW OF SYSTEMS:  Constitutional: As per interval history  HEENT: Within normal limits  CV: As per interval history  Resp: As per interval history  GI: Within normal limits   : Within normal limits  Musculoskeletal: Within normal limits  Skin: lower extremity venous stasis changes  Neurological: Within normal limits  Psychiatric: Within normal limits  Endocrine: Within normal limits  Hematologic/Lymphatic: Within normal limits  Allergic/Immunologic: Within normal limits    MEDICATIONS:     Pulmonary "  albuterol/ipratropium for Nebulization 3 milliLiter(s) Nebulizer every 6 hours    Anti-microbials:    Cardiovascular:  diltiazem    milliGRAM(s) Oral daily  diltiazem    milliGRAM(s) Oral at bedtime  torsemide 40 milliGRAM(s) Oral two times a day    Other:  apixaban 2.5 milliGRAM(s) Oral every 12 hours  aspirin enteric coated 81 milliGRAM(s) Oral daily  brimonidine 0.2% Ophthalmic Solution 1 Drop(s) Both EYES two times a day  hydrocortisone 1% Ointment 1 Application(s) Topical two times a day  levothyroxine 50 MICROGram(s) Oral daily  multivitamin 1 Tablet(s) Oral daily  polyethylene glycol 3350 17 Gram(s) Oral daily  senna 2 Tablet(s) Oral at bedtime  simvastatin 40 milliGRAM(s) Oral at bedtime  triamcinolone 0.1% Ointment 1 Application(s) Topical every 12 hours  zolpidem 5 milliGRAM(s) Oral at bedtime    MEDICATIONS  (PRN):  acetaminophen     Tablet .. 650 milliGRAM(s) Oral every 6 hours PRN Moderate Pain (4 - 6)  bisacodyl Suppository 10 milliGRAM(s) Rectal daily PRN Constipation  triamcinolone 0.1% Cream 1 Application(s) Topical two times a day PRN rash    OBJECTIVE:    Daily Weight in k.8 (2022 04:00)    PHYSICAL EXAM:       ICU Vital Signs Last 24 Hrs  T(C): 36.6 (2022 11:44), Max: 36.7 (2022 04:00)  T(F): 97.9 (2022 11:44), Max: 98 (2022 04:00)  HR: 71 (2022 11:44) (71 - 99)  BP: 122/70 (2022 11:44) (122/70 - 146/72)  BP(mean): --  ABP: --  ABP(mean): --  RR: 18 (2022 11:44) (18 - 18)  SpO2: 83% (2022 12:55) (83% room air at rest - 85% room air with exertion - 96% on 2lpm nasal canula @ rest     General: Awake. Alert. Cooperative. No distress. Appears stated age. Sitting in the chair.  HEENT:  Atraumatic. Normocephalic. Anicteric. Normal oral mucosa. PERRL. EOMI.  Neck: Supple. Trachea midline. Thyroid without enlargement/tenderness/nodules. No carotid bruit. Mild JVD.	  Cardiovascular: Irregularly irregular rate and rhythm. S1 S2 normal. II/VI systolic murmur.  Respiratory: Respirations unlabored. Faint bilateral rales. Resolved wheeze. Kyphosis.  Abdomen: Soft. Non-tender. Non-distended. No organomegaly. No masses. Normal bowel sounds. Central obesity  Extremities: Warm to touch. No clubbing or cyanosis. Moderate lower extremity up to the thigh - bilateral venous stasis changes and lymphedema. Lower legs wrapped with ACE bandages  Pulses: 2+ peripheral pulses all extremities.	  Skin: Normal skin color. No rashes or lesions. No ecchymoses. No cyanosis. Warm to touch.  Lymph Nodes: Cervical, supraclavicular and axillary nodes normal  Neurological: Motor and sensory examination equal and normal. A and O x 3  Psychiatry: Appropriate mood and affect.    LABS:      CBC    WBC  6.65 <==, 6.54 <==, 7.35 <==    Hemoglobin  11.1 <<==, 11.1 <<==, 11.5 <<==    Hematocrit  37.2 <==, 37.8 <==, 38.6 <==    Platelets  208 <==, 219 <==, 210 <==      141  |  96  |  55<H>  ----------------------------<  102<H>    06-20  4.2   |  35<H>  |  1.44<H>      LYTES    sodium  141 <==, 141 <==, 143 <==, 142 <==, 144 <==, 142 <==, 144 <==    potassium   4.2 <==, 3.9 <==, 4.0 <==, 4.2 <==, 4.3 <==, 4.3 <==, 4.7 <==    chloride  96 <==, 94 <==, 98 <==, 98 <==, 99 <==, 99 <==, 100 <==    carbon dioxide  35 <==, 38 <==, 33 <==, 35 <==, 33 <==, 32 <==, 32 <==    =============================================================================================  RENAL FUNCTION:    Creatinine:   1.44  <<==, 1.38  <<==, 1.52  <<==, 1.50  <<==, 1.31  <<==, 1.31  <<==, 1.26  <<==    BUN:   55 <==, 53 <==, 50 <==, 47 <==, 48 <==, 45 <==, 40 <==    ============================================================================================    calcium   9.1 <==, 9.3 <==, 9.1 <==, 9.2 <==, 8.9 <==, 8.8 <==, 9.0 <==    phos   3.9 <==, 3.8 <==, 3.4 <==, 3.5 <==    mag   2.3 <==, 2.2 <==, 2.4 <==, 2.4 <==    ============================================================================================  LFTs    AST:   14 <==     ALT:  6  <==     AP:  66  <=    Bili:  0.4  <=    Serum Pro-Brain Natriuretic Peptide: 2139 pg/mL ( @ 07:00)    MICROBIOLOGY:     Respiratory Viral Panel with COVID-19 by YESSICA (22 @ 12:44)   Rapid RVP Result: [facility]   SARS-CoV-2: [facility]  This Respiratory Panel uses polymerase chain reaction (PCR) to detect for   adenovirus; coronavirus (HKU1, NL63, 229E, OC43); human metapneumovirus   (hMPV); human enterovirus/rhinovirus (Entero/RV); influenza A; influenza   A/H1; influenza A/H3; influenza A/H1-2009; influenza B; parainfluenza   viruses 1, 2, 3, 4; respiratory syncytial virus; Mycoplasma pneumoniae;   Chlamydophila pneumoniae; and SARS-CoV-2.       RADIOLOGY:  [x] Chest radiographs reviewed and interpreted by me    EXAM:  CT CHEST                          PROCEDURE DATE:  2022      FINDINGS:    LYMPH NODES: No lymphadenopathy.    HEART/VASCULATURE: Cardiomegaly. No pericardial effusion. Status post  CABG. There are aortic, aortic valve, mitral annular and coronary artery   calcifications. Mildly dilated ascending aorta measuring 4 cm at the   level of the main pulmonary artery.    AIRWAYS/LUNGS/PLEURA: Patent central airways. Small bilateral pleural   effusions, right greater than left. Right middle and bilateral lower lobe   passive atelectasis. Mild interlobular septal thickening. Focal right   upper lobe groundglass opacity. No pleural effusion.    UPPER ABDOMEN: Renal cortical scarring. Cirrhosis.    BONES/SOFT TISSUES: Degenerative changes. Status post sternotomy. Suture   anchors in the right humeral head.    IMPRESSION:  Small bilateral pleural effusions and passive atelectasis, right greater   than left.    Minimal interstitial edema.    THELMA COLORADO MD; Resident Radiologist  This document has been electronically signed.  SHANNON GONZALEZ MD; Attending Radiologist  This document has been electronically signed. 2022 10:22AM  ---------------------------------------------------------------------------------------------------------------  EXAM:  NM PULM VENTILATION PERFUS IMG                          PROCEDURE DATE:  2022      FINDINGS: There is heterogeneous distribution of radiopharmaceutical in   the lungs on the ventilation and perfusion images. There are no segmental   perfusion defects.    IMPRESSION: Very low probability of pulmonary embolus.    JONNY PURCELL MD; Attending Nuclear Medicine  This document has been electronically signed. 2022  7:13PM  ---------------------------------------------------------------------------------------------------------------  EXAM:  XR CHEST PORTABLE ROUTINE 1V                          PROCEDURE DATE:  06/15/2022      INTERPRETATION:    Heart size and the mediastinum cannot be accurately evaluated on this   projection. Calcified mitral valve annulus. Median sternotomy sutures and   surgical clips are again seen. Calcified tortuous aorta.  There are low lung volumes.  There is pulmonary vascular congestion/mild interstitial pulmonary edema.  There is medial left retrocardiac opacity.  No left pleural effusion seen. No pneumothorax.  Orthopedic anchors project over the right humeral head.    IMPRESSION:  Low lung volumes.    Pulmonary vascular congestion/mild interstitial pulmonary edema.    Medial left retrocardiac opacity, possibly subsegmental atelectasis, a   small left pleural effusion with associated passive atelectasis or   developing pneumonia in the appropriate clinical context.    LEILANI SPRINGER MD; Attending Radiologist  This document has been electronically signed. 2022  4:29PM  ---------------------------------------------------------------------------------------------------------------  EXAM:  XR CHEST PORTABLE URGENT 1V                          PROCEDURE DATE:  06/10/2022      FINDINGS:  Heart size and mediastinum difficult to assess on this projection.   Calcified aortic knob.  Status post median sternotomy.  Mild pulmonary vascular congestion.  Trace left pleural effusion with left basilar atelectasis. No   pneumothorax.  Right humeral head orthopedic screws noted.    IMPRESSION:  Mild pulmonary vascular congestion.    Trace left pleural effusion with left basilar atelectasis.    SEBLE HUMPHREYS MD; Resident Radiologist  This document has been electronically signed.  NAVYA OMER MD; Attending Radiologist  This document has been electronically signed. 2022 12:36AM  ---------------------------------------------------------------------------------------------------------------

## 2022-06-20 NOTE — PROGRESS NOTE ADULT - SUBJECTIVE AND OBJECTIVE BOX
Cardiology Consult Progress Note    Patient seen and examined at bedside.    Overnight Events:   Some episodes of desaturation overnight  Tele with Afib 80-100s  No chest pain, shortness of breath, or palpitations            Current Meds:  acetaminophen     Tablet .. 650 milliGRAM(s) Oral every 6 hours PRN  albuterol/ipratropium for Nebulization 3 milliLiter(s) Nebulizer every 6 hours  apixaban 2.5 milliGRAM(s) Oral every 12 hours  aspirin enteric coated 81 milliGRAM(s) Oral daily  bisacodyl Suppository 10 milliGRAM(s) Rectal daily PRN  brimonidine 0.2% Ophthalmic Solution 1 Drop(s) Both EYES two times a day  diltiazem    milliGRAM(s) Oral daily  diltiazem    milliGRAM(s) Oral at bedtime  hydrocortisone 1% Ointment 1 Application(s) Topical two times a day  levothyroxine 50 MICROGram(s) Oral daily  multivitamin 1 Tablet(s) Oral daily  polyethylene glycol 3350 17 Gram(s) Oral daily  senna 2 Tablet(s) Oral at bedtime  simvastatin 40 milliGRAM(s) Oral at bedtime  torsemide 40 milliGRAM(s) Oral two times a day  triamcinolone 0.1% Cream 1 Application(s) Topical two times a day PRN  triamcinolone 0.1% Ointment 1 Application(s) Topical every 12 hours  zolpidem 5 milliGRAM(s) Oral at bedtime      Vitals:  T(F): 97.9 (06-20), Max: 98 (06-20)  HR: 91 (06-20) (84 - 99)  BP: 132/76 (06-20) (127/69 - 146/72)  RR: 18 (06-20)  SpO2: 89% (06-20)  I&O's Summary    19 Jun 2022 07:01  -  20 Jun 2022 07:00  --------------------------------------------------------  IN: 640 mL / OUT: 1100 mL / NET: -460 mL      Physical Exam:  GENERAL: No acute distress, well-developed  HEAD:  Atraumatic, Normocephalic  ENT: EOMI, PERRLA, conjunctiva and sclera clear, Neck supple, No JVD, moist mucosa  CHEST/LUNG: Clear to auscultation bilaterally; No wheeze, equal breath sounds bilaterally   BACK: No spinal tenderness  HEART: Regular rate and rhythm; No murmurs, rubs, or gallops  ABDOMEN: Soft, Nontender, Nondistended; Bowel sounds present  EXTREMITIES: LEs wrapped, 2+ edema at feet (though markedly improved)  PSYCH: Nl behavior, nl affect  NEUROLOGY: AAOx3, non-focal, cranial nerves intact  SKIN: Normal color, No rashes or lesions      06-20    141  |  96  |  55<H>  ----------------------------<  102<H>  4.2   |  35<H>  |  1.44<H>    Ca    9.1      20 Jun 2022 07:29  Phos  3.9     06-20  Mg     2.3     06-20

## 2022-06-20 NOTE — DISCHARGE NOTE PROVIDER - HOSPITAL COURSE
89 y/o F with h/o HFpEF, CAD s/p CABG, CKD3, Chronic AF on Eliquis, HTN, moderate AS, pulm HTN presenting with 1 month of LE edema which has failed outpatient diuretics c/w HF exacerbation combined with underlying chronic venous stasis, admitted for IV diuresis.     # Acute respiratory failure with hypoxia   # Acute on chronic HFpEF  s/p cardiology and pulmonary    o2 86% on RA; CT chest consistent with pulmonary edema,   started on IV lasix 40 iv bid.   VQ scan low prob PE  respiratory status improved, now diuretic transition to Po Torsemide.   Monitor I/O, Daily weights, Tele    # Chronic atrial fibrillation  Eliquis and Diltizem continued , monitored on tele    # HTN, HLD  # CAD s/p CABG  ASA and statin continued     # JACK on Stage 3 chronic kidney disease  cardio renal  monitor while on diuretics;; cr stable today.  renal following    # morbilliform drug eruption  appreciate derm recs  can be related to ckd as well.  Recommend topical triamcinolone 0.1% ointment BID to affected areas of skin     # venous stasis  diuresis as above  Wound carem, ACE wraps  PT recs MIN but pt wants home    # Hypothyroidism   synthroid 50mcg    PT recommended MIN, however patient and daughter want home.   DCP with home oxygen

## 2022-06-20 NOTE — PROGRESS NOTE ADULT - ASSESSMENT
89 y/o F with h/o HFpEF, CAD s/p CABG, CKD3, Chronic AF on Eliquis, HTN, moderate AS, pulm HTN presenting with 1 month of LE edema which has failed outpatient diuretics c/w HF exacerbation combined with underlying chronic venous stasis, admitted for IV diuresis.     # Acute respiratory failure with hypoxia   # Acute on chronic HFpEF  # Chronic atrial fibrillation  # HTN, HLD  # CAD s/p CABG  o2 86% on RA; CT chest consistent with pulmonary edema; V/Q scan low prob PE; also on eliquis.appreciate pulm recs;    Clinical chf improved ; adequately diuresed but still hypoxic on RA.  Swicth Lasix IV to torsemide 40 BID per cardiology    Cardio and Nephro following   Monitor I/O, Daily weights, Tele  eliquis   dilt 240 am, 120 pm, rate controlled  cont asa statin  titrate O2, check on RA    # JACK on Stage 3 chronic kidney disease  cardio renal  monitor while on diuretics; cr stable today.  renal following    # morbilliform drug eruption  appreciate derm recs  can be related to ckd as well.  Recommend topical triamcinolone 0.1% ointment BID to affected areas of skin     # venous stasis  diuresis as above  Wound carem, ACE wraps  PT recs MIN but pt wants home    # Hypothyroidism   synthroid 50mcg    DVT prophylaxis. eliquis      dispo: matthias dtr at bedside; wants to take mom home; will aim for discharge in am 06/21; Home with HOME OXYGEN.      Please contact with any questions or concerns [phone].

## 2022-06-20 NOTE — DISCHARGE NOTE PROVIDER - NSDCCPCAREPLAN_GEN_ALL_CORE_FT
Sometimes is reactive to in the will stimulate the bone marrow which is inflammation or infection.  Other times it may be to an intrinsic abnormality within the bone marrow overproducing platelets.  Her current platelet level is only 8 points higher than the target range of 400, and also lower than the previous CBC. PRINCIPAL DISCHARGE DIAGNOSIS  Diagnosis: Acute decompensated heart failure  Assessment and Plan of Treatment: Continue diuretic a sdirected   Please follow up with your cardiologist in one week   Please follow up with your primary care physician in one week  Weigh yourself daily.  If you gain 3lbs in 3 days, or 5lbs in a week call your Health Care Provider.  Do not eat or drink foods containing more than 2000mg of salt (sodium) in your diet every day.  Call your Health Care Provider if you have any swelling or increased swelling in your feet, ankles, and/or stomach.  The Pt was provided with CHF diet instruction (low sodium diet, daily weights, label reading, Heart Healthy Cooking Tips & Heart Healthy shopping Tips).  Take all of your medication as directed.  If you become dizzy call your Health Care Provider.      SECONDARY DISCHARGE DIAGNOSES  Diagnosis: CAD (coronary artery disease)  Assessment and Plan of Treatment: Continue Aspirin and statin as directed   Please follow up wtih your cardiologist    Diagnosis: Chronic atrial fibrillation  Assessment and Plan of Treatment: Please continue Diltizem and Eliquis as directed   Follow up wtih your cardiologist    Diagnosis: HLD (hyperlipidemia)  Assessment and Plan of Treatment:     Diagnosis: HTN (hypertension)  Assessment and Plan of Treatment:     Diagnosis: Stage 3 chronic kidney disease  Assessment and Plan of Treatment: Renal funtion to be monitored closely  Cr stable

## 2022-06-20 NOTE — DISCHARGE NOTE PROVIDER - NSDCFUADDAPPT_GEN_ALL_CORE_FT
APPTS ARE READY TO BE MADE: [X ] YES    Best Family or Patient Contact (if needed):    Additional Information about above appointments (if needed):    1:   2:   3:     Other comments or requests:    APPTS ARE READY TO BE MADE: [X ] YES    Best Family or Patient Contact (if needed):    Additional Information about above appointments (if needed):    1:   2:   3:     Other comments or requests:   Patient was previously scheduled with Dr. Beck  on 06/24/2022 at 01:30 pm at [address] [facility].  Patient was scheduled with  on 06/29/2022 at 02:00 pm  at [address] Jadon PARSONS through the provider's office. Patient advised of appointment details.

## 2022-06-20 NOTE — DISCHARGE NOTE PROVIDER - CARE PROVIDERS DIRECT ADDRESSES
[email or web address]

## 2022-06-20 NOTE — DISCHARGE NOTE PROVIDER - NSDCMRMEDTOKEN_GEN_ALL_CORE_FT
aspirin 81 mg oral delayed release tablet: 1 tab(s) orally once a day  Combigan 0.2%-0.5% ophthalmic solution: 1 drop(s) to each affected eye every 12 hours  diltiaZEM 60 mg oral tablet: 1 tab(s) orally every 6 hours  levothyroxine 50 mcg (0.05 mg) oral capsule: 1 cap(s) orally once a day  multivitamin: 1 tab(s) orally once a day  simvastatin 40 mg oral tablet: 1 tab(s) orally once a day (at bedtime)   apixaban 2.5 mg oral tablet: 1 tab(s) orally every 12 hours  aspirin 81 mg oral delayed release tablet: 1 tab(s) orally once a day  Combigan 0.2%-0.5% ophthalmic solution: 1 drop(s) to each affected eye every 12 hours  dilTIAZem 120 mg/24 hours oral capsule, extended release: 1 cap(s) orally once a day (at bedtime)  dilTIAZem 240 mg/24 hours oral capsule, extended release: 1 cap(s) orally once a day in AM  ipratropium-albuterol 0.5 mg-2.5 mg/3 mL inhalation solution: 3 milliliter(s) inhaled every 6 hours  levothyroxine 50 mcg (0.05 mg) oral capsule: 1 cap(s) orally once a day  multivitamin: 1 tab(s) orally once a day  polyethylene glycol 3350 oral powder for reconstitution: 17 gram(s) orally once a day  senna oral tablet: 2 tab(s) orally once a day (at bedtime)  simvastatin 40 mg oral tablet: 1 tab(s) orally once a day (at bedtime)  torsemide 40 mg oral tablet: 1 tab(s) orally 2 times a day  triamcinolone 0.1% topical ointment: 1 application topically every 12 hours to affected areas   ( upto 2 weeks at a time)  zolpidem 5 mg oral tablet: 1 tab(s) orally once a day (at bedtime)   apixaban 2.5 mg oral tablet: 1 tab(s) orally every 12 hours  aspirin 81 mg oral delayed release tablet: 1 tab(s) orally once a day  Combigan 0.2%-0.5% ophthalmic solution: 1 drop(s) to each affected eye every 12 hours  dilTIAZem 120 mg/24 hours oral capsule, extended release: 1 cap(s) orally once a day (at bedtime)  dilTIAZem 240 mg/24 hours oral capsule, extended release: 1 cap(s) orally once a day in AM  levothyroxine 50 mcg (0.05 mg) oral capsule: 1 cap(s) orally once a day  multivitamin: 1 tab(s) orally once a day  polyethylene glycol 3350 oral powder for reconstitution: 17 gram(s) orally once a day  senna oral tablet: 2 tab(s) orally once a day (at bedtime)  simvastatin 40 mg oral tablet: 1 tab(s) orally once a day (at bedtime)  torsemide 40 mg oral tablet: 1 tab(s) orally 2 times a day  triamcinolone 0.1% topical ointment: 1 application topically every 12 hours to affected areas   ( upto 2 weeks at a time)  zolpidem 5 mg oral tablet: 1 tab(s) orally once a day (at bedtime)

## 2022-06-20 NOTE — PROGRESS NOTE ADULT - ASSESSMENT
89 y/o F with h/o HFpEF, CAD s/p CABG, CKD3, Chronic AF on Eliquis, HTN, moderate AS, pulm HTN presenting with 1 month of LE edema which has failed outpatient diuretics c/w HF exacerbation combined with underlying chronic venous stasis, admitted for IV diuresis. Nephrology consulted for JACK.     JACK on Chronic Kidney Disease Stage 3  likely cardio renal   FEUrea 42.3%  Renal function is fluctuating 2/2 diuretics   clinically overloaded ; Continue IV lasix   Avoid further nephrotoxins, NSAIDS, RCA   monitor on diuretics    Hypernatremia  improved  monitor  serum NA    HTN   optimal   monitor BP closely     Anemia  iron deficiency   consider oral iron   MOnitor HB

## 2022-06-20 NOTE — DISCHARGE NOTE PROVIDER - PROVIDER TOKENS
PROVIDER:[TOKEN:[[id number]],FOLLOWUP:[1 week]],PROVIDER:[TOKEN:[[id number]],FOLLOWUP:[1 week]]

## 2022-06-20 NOTE — DISCHARGE NOTE PROVIDER - CARE PROVIDER_API CALL
Herb Beck)  Internal Medicine  [address] [place] [address]  Phone: [phone]  Fax: [phone]  Follow Up Time: 1 week    Kwasi Chew)  Internal Medicine; Nuclear Cardiology  [address] [place] [address]  Phone: [phone]  Fax: [phone]  Follow Up Time: 1 week

## 2022-06-20 NOTE — PROGRESS NOTE ADULT - SUBJECTIVE AND OBJECTIVE BOX
[facility]   MD XAVIER ARMSTRONG MD, DO JETT NORTH NP    TEL:  OFFICE: [phone]    From 5pm-7am Answering Service [phone]    -- RENAL FOLLOW UP NOTE ---Date of Service 06-20-22 @ 14:18    Patient is a 89y old  Female who presents with a chief complaint of LE swelling (20 Jun 2022 13:10)      Patient seen and examined at bedside. No chest pain/sob    VITALS:  T(F): 97.9 (06-20-22 @ 11:44), Max: 98 (06-20-22 @ 04:00)  HR: 71 (06-20-22 @ 11:44)  BP: 122/70 (06-20-22 @ 11:44)  RR: 18 (06-20-22 @ 11:44)  SpO2: 83% (06-20-22 @ 12:55)  Wt(kg): --    06-19 @ 07:01  -  06-20 @ 07:00  --------------------------------------------------------  IN: 640 mL / OUT: 1100 mL / NET: -460 mL    06-20 @ 07:01  -  06-20 @ 14:18  --------------------------------------------------------  IN: 180 mL / OUT: 650 mL / NET: -470 mL          PHYSICAL EXAM:  Constitutional: NAD  Neck: No JVD  Respiratory: CTAB, no wheezes, rales or rhonchi  Cardiovascular: S1, S2, RRR  Gastrointestinal: BS+, soft, NT/ND  Extremities: + peripheral edema    Hospital Medications:   MEDICATIONS  (STANDING):  albuterol/ipratropium for Nebulization 3 milliLiter(s) Nebulizer every 6 hours  apixaban 2.5 milliGRAM(s) Oral every 12 hours  aspirin enteric coated 81 milliGRAM(s) Oral daily  brimonidine 0.2% Ophthalmic Solution 1 Drop(s) Both EYES two times a day  diltiazem    milliGRAM(s) Oral daily  diltiazem    milliGRAM(s) Oral at bedtime  hydrocortisone 1% Ointment 1 Application(s) Topical two times a day  levothyroxine 50 MICROGram(s) Oral daily  multivitamin 1 Tablet(s) Oral daily  polyethylene glycol 3350 17 Gram(s) Oral daily  senna 2 Tablet(s) Oral at bedtime  simvastatin 40 milliGRAM(s) Oral at bedtime  torsemide 40 milliGRAM(s) Oral two times a day  triamcinolone 0.1% Ointment 1 Application(s) Topical every 12 hours  zolpidem 5 milliGRAM(s) Oral at bedtime      LABS:  06-20    141  |  96  |  55<H>  ----------------------------<  102<H>  4.2   |  35<H>  |  1.44<H>    Ca    9.1      20 Jun 2022 07:29  Phos  3.9     06-20  Mg     2.3     06-20      Creatinine Trend: 1.44 <--, 1.38 <--, 1.52 <--, 1.50 <--, 1.31 <--, 1.31 <--, 1.26 <--    Phosphorus Level, Serum: 3.9 mg/dL (06-20 @ 07:29)          Urine Studies:  Urinalysis - [06-12-22 @ 18:46]      Color Yellow / Appearance Clear / SG 1.016 / pH 6.5      Gluc Negative / Ketone Negative  / Bili Negative / Urobili Negative       Blood Negative / Protein 100 / Leuk Est Small / Nitrite Negative      RBC 3 / WBC 2 / Hyaline  / Gran  / Sq Epi  / Non Sq Epi 0 / Bacteria Negative    Urine Creatinine 78      [06-13-22 @ 22:30]  Urine Urea Nitrogen 447      [06-13-22 @ 14:55]    Iron 28, TIBC 268, %sat 11      [06-13-22 @ 07:00]    HBsAg Nonreact      [06-15-22 @ 07:40]  HCV 0.07, Nonreact      [06-15-22 @ 07:40]      RADIOLOGY & ADDITIONAL STUDIES:

## 2022-06-20 NOTE — PROGRESS NOTE ADULT - ASSESSMENT
ASSESSMENT:    89 year old gentlewoman, lifelong non-smoker, without known history of intrinsic lung disease; She has a history of HTN, atrial fibrillation maintained on Eliquis, aortic stenosis, CAD/MI/CABG in 2010, CVA and CKD. The patient is followed by Dr. Kwasi Chew in the outpatient setting. Over the last several weeks, the patient has developed lower extremity swelling treated with lasix 40mg daily. Lasix was increased to 80mg daily due to increasing leg swelling without much improvement -> ER. Outpatient ECHO -> normal left ventricular ejection fraction - normal right ventricular size and function - moderate aortic stenosis - moderate pulmonary hypertension. Hospital course has been complicated by JACK on CKD (stage III), contraction alkalosis and hypernatremia all felt to be related to aggressive diuresis. The patient has been noted to have hypoxemia -> 86% on room air. She has no shortness of breath. She has an occasional cough productive of scant sputum. She describes a wheezing sound in her chest. No fevers, chills or sweats. No chest pain/pressure or palpitations.    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, central obesity and respiratory muscle weakness  3) bronchospasm due to "cardiac asthma"  4) low likelihood of VTE disease on full dose A/C for atrial fibrillation    lower extremity swelling  1) pulmonary hypertension without right ventricular enlargement or dysfunction  2) venous stasis changes  3) lymphedema    PLAN/RECOMMENDATIONS:    still requires supplemental oxygen -> 83% room air at rest - 85% room air with exertion - 96% on 2lpm nasal canula @ rest  repeat CXR and check VBG  chest CT 6/16 -> small right > left pleural effusions with associated passive atelectasis - bilateral patchy groundglass opacities with interlobular septal thickening c/w pulmonary edema.  V/Q scan -> very low probability of pulmonary embolus.  spirometry    FEV1 0.52 liters - 56% predicted    FVC 0.75 liters - 57% predicted    FEV1% - 70       c/w moderate restrictive lung disease  albuterol/atrovent nebs q6h  incentive spirometry - the patient uses the device well  cardiology follow-up -> seems to need more diuresis     cardiac meds: eliquis/ASA/zocor/diltiazem CD/torsemide  bowel regimen  leg elevation and wrapping    Will follow with you. Plan of care discussed with the patient and her family at bedside and with Dr. Stone.    Juanjo Rizo MD, [facility]  [phone]  Pulmonary Medicine     ASSESSMENT:    89 year old gentlewoman, lifelong non-smoker, without known history of intrinsic lung disease; She has a history of HTN, atrial fibrillation maintained on Eliquis, aortic stenosis, CAD/MI/CABG in 2010, CVA and CKD. The patient is followed by Dr. Kwasi Chew in the outpatient setting. Over the last several weeks, the patient has developed lower extremity swelling treated with lasix 40mg daily. Lasix was increased to 80mg daily due to increasing leg swelling without much improvement -> ER. Outpatient ECHO -> normal left ventricular ejection fraction - normal right ventricular size and function - moderate aortic stenosis - moderate pulmonary hypertension. Hospital course has been complicated by JACK on CKD (stage III), contraction alkalosis and hypernatremia all felt to be related to aggressive diuresis. The patient has been noted to have hypoxemia -> 86% on room air. She has no shortness of breath. She has an occasional cough productive of scant sputum. She describes a wheezing sound in her chest. No fevers, chills or sweats. No chest pain/pressure or palpitations.    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, central obesity and respiratory muscle weakness  3) bronchospasm due to "cardiac asthma"  4) low likelihood of VTE disease on full dose A/C for atrial fibrillation    lower extremity swelling  1) pulmonary hypertension without right ventricular enlargement or dysfunction  2) venous stasis changes  3) lymphedema    PLAN/RECOMMENDATIONS:    still requires supplemental oxygen -> 83% room air at rest - 85% room air with exertion - 96% on 2lpm nasal canula @ rest  repeat CXR - check VBG and pro-BNP level  perhaps we should repeat the ECHO  chest CT 6/16 -> small right > left pleural effusions with associated passive atelectasis - bilateral patchy groundglass opacities with interlobular septal thickening c/w pulmonary edema.  V/Q scan -> very low probability of pulmonary embolus.  spirometry    FEV1 0.52 liters - 56% predicted    FVC 0.75 liters - 57% predicted    FEV1% - 70       c/w moderate restrictive lung disease  albuterol/atrovent nebs q6h  incentive spirometry - the patient uses the device well  cardiology follow-up -> seems to need more diuresis     cardiac meds: eliquis/ASA/zocor/diltiazem CD/torsemide  bowel regimen  leg elevation and wrapping    Will follow with you. Plan of care discussed with the patient and her family at bedside and with Dr. Stone.    Juanjo Rizo MD, [facility]  [phone]  Pulmonary Medicine

## 2022-06-20 NOTE — DISCHARGE NOTE PROVIDER - NSDCFUSCHEDAPPT_GEN_ALL_CORE_FT
Kwasi Chew Physician [facility]  [address]  Scheduled Appointment: 07/11/2022     Kwasi Chew Physician [facility]  [address]  Scheduled Appointment: 06/29/2022

## 2022-06-21 ENCOUNTER — TRANSCRIPTION ENCOUNTER (OUTPATIENT)
Age: 87
End: 2022-06-21

## 2022-06-21 VITALS
SYSTOLIC BLOOD PRESSURE: 151 MMHG | DIASTOLIC BLOOD PRESSURE: 67 MMHG | HEART RATE: 81 BPM | OXYGEN SATURATION: 96 % | RESPIRATION RATE: 18 BRPM | TEMPERATURE: 98 F

## 2022-06-21 LAB
ANION GAP SERPL CALC-SCNC: 8 MMOL/L — SIGNIFICANT CHANGE UP (ref 5–17)
BUN SERPL-MCNC: 52 MG/DL — HIGH (ref 7–23)
CALCIUM SERPL-MCNC: 9.2 MG/DL — SIGNIFICANT CHANGE UP (ref 8.4–10.5)
CHLORIDE SERPL-SCNC: 96 MMOL/L — SIGNIFICANT CHANGE UP (ref 96–108)
CO2 SERPL-SCNC: 37 MMOL/L — HIGH (ref 22–31)
CREAT SERPL-MCNC: 1.5 MG/DL — HIGH (ref 0.5–1.3)
EGFR: 32 ML/MIN/1.73M2 — LOW
GAS PNL BLDV: SIGNIFICANT CHANGE UP
GLUCOSE SERPL-MCNC: 95 MG/DL — SIGNIFICANT CHANGE UP (ref 70–99)
MAGNESIUM SERPL-MCNC: 2.4 MG/DL — SIGNIFICANT CHANGE UP (ref 1.6–2.6)
NT-PROBNP SERPL-SCNC: 1477 PG/ML — HIGH (ref 0–300)
PHOSPHATE SERPL-MCNC: 4 MG/DL — SIGNIFICANT CHANGE UP (ref 2.5–4.5)
POTASSIUM SERPL-MCNC: 4.1 MMOL/L — SIGNIFICANT CHANGE UP (ref 3.5–5.3)
POTASSIUM SERPL-SCNC: 4.1 MMOL/L — SIGNIFICANT CHANGE UP (ref 3.5–5.3)
SODIUM SERPL-SCNC: 141 MMOL/L — SIGNIFICANT CHANGE UP (ref 135–145)

## 2022-06-21 PROCEDURE — 84100 ASSAY OF PHOSPHORUS: CPT

## 2022-06-21 PROCEDURE — 94010 BREATHING CAPACITY TEST: CPT

## 2022-06-21 PROCEDURE — 85045 AUTOMATED RETICULOCYTE COUNT: CPT

## 2022-06-21 PROCEDURE — 83735 ASSAY OF MAGNESIUM: CPT

## 2022-06-21 PROCEDURE — 78582 LUNG VENTILAT&PERFUS IMAGING: CPT

## 2022-06-21 PROCEDURE — 97110 THERAPEUTIC EXERCISES: CPT

## 2022-06-21 PROCEDURE — 85018 HEMOGLOBIN: CPT

## 2022-06-21 PROCEDURE — 84300 ASSAY OF URINE SODIUM: CPT

## 2022-06-21 PROCEDURE — 82435 ASSAY OF BLOOD CHLORIDE: CPT

## 2022-06-21 PROCEDURE — 71250 CT THORAX DX C-: CPT

## 2022-06-21 PROCEDURE — 81001 URINALYSIS AUTO W/SCOPE: CPT

## 2022-06-21 PROCEDURE — 83605 ASSAY OF LACTIC ACID: CPT

## 2022-06-21 PROCEDURE — 80048 BASIC METABOLIC PNL TOTAL CA: CPT

## 2022-06-21 PROCEDURE — 85014 HEMATOCRIT: CPT

## 2022-06-21 PROCEDURE — 97162 PT EVAL MOD COMPLEX 30 MIN: CPT

## 2022-06-21 PROCEDURE — 84132 ASSAY OF SERUM POTASSIUM: CPT

## 2022-06-21 PROCEDURE — 0225U NFCT DS DNA&RNA 21 SARSCOV2: CPT

## 2022-06-21 PROCEDURE — 82803 BLOOD GASES ANY COMBINATION: CPT

## 2022-06-21 PROCEDURE — 82330 ASSAY OF CALCIUM: CPT

## 2022-06-21 PROCEDURE — A9540: CPT

## 2022-06-21 PROCEDURE — 80074 ACUTE HEPATITIS PANEL: CPT

## 2022-06-21 PROCEDURE — 84484 ASSAY OF TROPONIN QUANT: CPT

## 2022-06-21 PROCEDURE — 83880 ASSAY OF NATRIURETIC PEPTIDE: CPT

## 2022-06-21 PROCEDURE — 97161 PT EVAL LOW COMPLEX 20 MIN: CPT

## 2022-06-21 PROCEDURE — 85027 COMPLETE CBC AUTOMATED: CPT

## 2022-06-21 PROCEDURE — 99285 EMERGENCY DEPT VISIT HI MDM: CPT

## 2022-06-21 PROCEDURE — 94640 AIRWAY INHALATION TREATMENT: CPT

## 2022-06-21 PROCEDURE — 82436 ASSAY OF URINE CHLORIDE: CPT

## 2022-06-21 PROCEDURE — 36415 COLL VENOUS BLD VENIPUNCTURE: CPT

## 2022-06-21 PROCEDURE — 82570 ASSAY OF URINE CREATININE: CPT

## 2022-06-21 PROCEDURE — 85025 COMPLETE CBC W/AUTO DIFF WBC: CPT

## 2022-06-21 PROCEDURE — 84295 ASSAY OF SERUM SODIUM: CPT

## 2022-06-21 PROCEDURE — 87637 SARSCOV2&INF A&B&RSV AMP PRB: CPT

## 2022-06-21 PROCEDURE — 82947 ASSAY GLUCOSE BLOOD QUANT: CPT

## 2022-06-21 PROCEDURE — 83550 IRON BINDING TEST: CPT

## 2022-06-21 PROCEDURE — 97116 GAIT TRAINING THERAPY: CPT

## 2022-06-21 PROCEDURE — 84540 ASSAY OF URINE/UREA-N: CPT

## 2022-06-21 PROCEDURE — 96374 THER/PROPH/DIAG INJ IV PUSH: CPT

## 2022-06-21 PROCEDURE — 97602 WOUND(S) CARE NON-SELECTIVE: CPT

## 2022-06-21 PROCEDURE — A9567: CPT

## 2022-06-21 PROCEDURE — 83540 ASSAY OF IRON: CPT

## 2022-06-21 PROCEDURE — 99231 SBSQ HOSP IP/OBS SF/LOW 25: CPT

## 2022-06-21 PROCEDURE — 71045 X-RAY EXAM CHEST 1 VIEW: CPT

## 2022-06-21 PROCEDURE — 84156 ASSAY OF PROTEIN URINE: CPT

## 2022-06-21 PROCEDURE — 80053 COMPREHEN METABOLIC PANEL: CPT

## 2022-06-21 RX ORDER — SENNA PLUS 8.6 MG/1
2 TABLET ORAL
Qty: 0 | Refills: 0 | DISCHARGE
Start: 2022-06-21

## 2022-06-21 RX ORDER — DILTIAZEM HCL 120 MG
1 CAPSULE, EXT RELEASE 24 HR ORAL
Qty: 30 | Refills: 0
Start: 2022-06-21 | End: 2022-07-20

## 2022-06-21 RX ORDER — APIXABAN 2.5 MG/1
1 TABLET, FILM COATED ORAL
Qty: 60 | Refills: 0
Start: 2022-06-21 | End: 2022-07-20

## 2022-06-21 RX ORDER — ZOLPIDEM TARTRATE 10 MG/1
1 TABLET ORAL
Qty: 0 | Refills: 0 | DISCHARGE
Start: 2022-06-21

## 2022-06-21 RX ORDER — POLYETHYLENE GLYCOL 3350 17 G/17G
17 POWDER, FOR SOLUTION ORAL
Qty: 0 | Refills: 0 | DISCHARGE
Start: 2022-06-21

## 2022-06-21 RX ORDER — IPRATROPIUM/ALBUTEROL SULFATE 18-103MCG
3 AEROSOL WITH ADAPTER (GRAM) INHALATION
Qty: 0 | Refills: 0 | DISCHARGE
Start: 2022-06-21

## 2022-06-21 RX ADMIN — Medication 81 MILLIGRAM(S): at 11:46

## 2022-06-21 RX ADMIN — POLYETHYLENE GLYCOL 3350 17 GRAM(S): 17 POWDER, FOR SOLUTION ORAL at 11:46

## 2022-06-21 RX ADMIN — Medication 50 MICROGRAM(S): at 05:46

## 2022-06-21 RX ADMIN — Medication 650 MILLIGRAM(S): at 04:36

## 2022-06-21 RX ADMIN — Medication 3 MILLILITER(S): at 05:44

## 2022-06-21 RX ADMIN — Medication 650 MILLIGRAM(S): at 03:08

## 2022-06-21 RX ADMIN — BRIMONIDINE TARTRATE 1 DROP(S): 2 SOLUTION/ DROPS OPHTHALMIC at 05:46

## 2022-06-21 RX ADMIN — Medication 1 APPLICATION(S): at 03:09

## 2022-06-21 RX ADMIN — Medication 40 MILLIGRAM(S): at 05:45

## 2022-06-21 RX ADMIN — Medication 240 MILLIGRAM(S): at 05:46

## 2022-06-21 RX ADMIN — APIXABAN 2.5 MILLIGRAM(S): 2.5 TABLET, FILM COATED ORAL at 05:45

## 2022-06-21 RX ADMIN — Medication 1 TABLET(S): at 11:46

## 2022-06-21 NOTE — CHART NOTE - NSCHARTNOTESELECT_GEN_ALL_CORE
Dermatology/Event Note
Supplemental O2 requirement/Event Note
The necessity of oxygen/Event Note
d/c appt/Event Note

## 2022-06-21 NOTE — PROGRESS NOTE ADULT - ASSESSMENT
89 y/o F with h/o HFpEF, CAD s/p CABG, CKD3, Chronic AF on Eliquis, HTN, moderate AS, pulm HTN presenting with 1 month of LE edema which has failed outpatient diuretics c/w HF exacerbation combined with underlying chronic venous stasis, admitted for IV diuresis. Nephrology consulted for JACK.     JACK on Chronic Kidney Disease Stage 3  likely cardio renal   FEUrea 42.3%  Renal function is fluctuating 2/2 diuretics   fluid status is improving, continue diuretics   Avoid further nephrotoxins, NSAIDS, RCA   monitor on diuretics    Hypernatremia  improved  monitor  serum NA    HTN   acceptable   monitor BP closely     Anemia  iron deficiency   consider oral iron   MOnitor HB

## 2022-06-21 NOTE — PROGRESS NOTE ADULT - SUBJECTIVE AND OBJECTIVE BOX
Cardiology Consult Progress Note    Patient seen and examined at bedside.    Interval Events:   - Transitioned from IV furosemide to PO torsemide   - Net neg 330 cc over 24h   - BNP lower at 1477  - No chest pain, shortness of breath, or palpitations            Current Meds:  acetaminophen     Tablet .. 650 milliGRAM(s) Oral every 6 hours PRN  albuterol/ipratropium for Nebulization 3 milliLiter(s) Nebulizer every 6 hours  apixaban 2.5 milliGRAM(s) Oral every 12 hours  aspirin enteric coated 81 milliGRAM(s) Oral daily  bisacodyl Suppository 10 milliGRAM(s) Rectal daily PRN  brimonidine 0.2% Ophthalmic Solution 1 Drop(s) Both EYES two times a day  diltiazem    milliGRAM(s) Oral daily  diltiazem    milliGRAM(s) Oral at bedtime  hydrocortisone 1% Ointment 1 Application(s) Topical two times a day  levothyroxine 50 MICROGram(s) Oral daily  multivitamin 1 Tablet(s) Oral daily  polyethylene glycol 3350 17 Gram(s) Oral daily  senna 2 Tablet(s) Oral at bedtime  simvastatin 40 milliGRAM(s) Oral at bedtime  torsemide 40 milliGRAM(s) Oral two times a day  triamcinolone 0.1% Cream 1 Application(s) Topical two times a day PRN  triamcinolone 0.1% Ointment 1 Application(s) Topical every 12 hours  zolpidem 5 milliGRAM(s) Oral at bedtime      Vitals:  T(F): 97.6 (06-21), Max: 98 (06-20)  HR: 97 (06-21) (71 - 97)  BP: 157/73 (06-21) (122/70 - 157/73)  RR: 18 (06-21)  SpO2: 95% (06-21)  I&O's Summary    20 Jun 2022 07:01  -  21 Jun 2022 07:00  --------------------------------------------------------  IN: 620 mL / OUT: 950 mL / NET: -330 mL    Physical Exam:  GENERAL: No acute distress, well-developed  HEAD:  Atraumatic, Normocephalic  ENT: EOMI, PERRLA, conjunctiva and sclera clear, Neck supple, No JVD, moist mucosa  CHEST/LUNG: Clear to auscultation bilaterally; No wheeze, equal breath sounds bilaterally   BACK: No spinal tenderness  HEART: Regular rate and rhythm; No murmurs, rubs, or gallops  ABDOMEN: Soft, Nontender, Nondistended; Bowel sounds present  EXTREMITIES: LEs wrapped, 2+ edema at feet (though markedly improved)  PSYCH: Nl behavior, nl affect  NEUROLOGY: AAOx3, non-focal, cranial nerves intact  SKIN: Normal color, No rashes or lesions      06-21    141  |  96  |  52<H>  ----------------------------<  95  4.1   |  37<H>  |  1.50<H>    Ca    9.2      21 Jun 2022 07:10  Phos  4.0     06-21  Mg     2.4     06-21    Serum Pro-Brain Natriuretic Peptide: 2152 pg/mL (06.10.22 @ 12:50)   Serum Pro-Brain Natriuretic Peptide: 2139 pg/mL (06.13.22 @ 07:00)   Serum Pro-Brain Natriuretic Peptide: 1477 pg/mL (06-21 @ 07:10)

## 2022-06-21 NOTE — PROGRESS NOTE ADULT - ASSESSMENT
ASSESSMENT:    89 year old gentlewoman, lifelong non-smoker, without known history of intrinsic lung disease; She has a history of HTN, atrial fibrillation maintained on Eliquis, aortic stenosis, CAD/MI/CABG in 2010, CVA and CKD. The patient is followed by Dr. Kwasi Chew in the outpatient setting. Over the last several weeks, the patient has developed lower extremity swelling treated with lasix 40mg daily. Lasix was increased to 80mg daily due to increasing leg swelling without much improvement -> ER. Outpatient ECHO -> normal left ventricular ejection fraction - normal right ventricular size and function - moderate aortic stenosis - moderate pulmonary hypertension. Hospital course has been complicated by JACK on CKD (stage III), contraction alkalosis and hypernatremia all felt to be related to aggressive diuresis. The patient has been noted to have hypoxemia -> 86% on room air. She has no shortness of breath. She has an occasional cough productive of scant sputum. She describes a wheezing sound in her chest. No fevers, chills or sweats. No chest pain/pressure or palpitations.    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, central obesity and respiratory muscle weakness  3) bronchospasm due to "cardiac asthma"  4) low likelihood of VTE disease on full dose A/C for atrial fibrillation    lower extremity swelling  1) pulmonary hypertension without right ventricular enlargement or dysfunction  2) venous stasis changes  3) lymphedema    PLAN/RECOMMENDATIONS:    still requires supplemental oxygen -> 83% room air at rest - 85% room air with exertion - 96% on 2lpm nasal canula @ rest  CXR 6/20 -> resolved pulmonary edema - left basilar atelectasis - small bilateral pleural effusions  VBG 6/21 -> 7.40/67/66/42/94.5 -> chronic and well compensated hypercapnic respiratory failure  chest CT 6/16 -> small right > left pleural effusions with associated passive atelectasis - bilateral patchy groundglass opacities with interlobular septal thickening c/w pulmonary edema.  V/Q scan -> very low probability of pulmonary embolus.  spirometry    FEV1 0.52 liters - 56% predicted    FVC 0.75 liters - 57% predicted    FEV1% - 70       c/w moderate restrictive lung disease  discontinue albuterol/atrovent nebs q6h  incentive spirometry - the patient uses the device well  cardiology follow-up -> continue diuresis watching hemodynamics and renal function     cardiac meds: eliquis/ASA/zocor/diltiazem CD/torsemide     consider repeating the ECHO in the outpatient setting  bowel regimen  leg elevation and wrapping    Will follow with you. Plan of care discussed with the patient and her family at bedside and with Dr. Stone. No pulmonary objection to discharge. Home oxygen has been arranged. The patient will need close outpatient follow-up with her cardiologist Dr. Kwasi Chew.    Juanjo Rizo MD, [facility]  [phone]  Pulmonary Medicine

## 2022-06-21 NOTE — PROGRESS NOTE ADULT - PROVIDER SPECIALTY LIST ADULT
Internal Medicine
Internal Medicine
Pulmonology
Internal Medicine
Nephrology
Nephrology
Pulmonology
Cardiology
Dermatology
Internal Medicine
Nephrology
Pulmonology
Cardiology
Internal Medicine
Nephrology
Internal Medicine

## 2022-06-21 NOTE — DISCHARGE NOTE NURSING/CASE MANAGEMENT/SOCIAL WORK - PATIENT PORTAL LINK FT
You can access the FollowMyHealth Patient Portal offered by [facility] by registering at the following website: [email or web address]. By joining [facility]’s FollowMyHealth portal, you will also be able to view your health information using other applications (apps) compatible with our system.

## 2022-06-21 NOTE — PROGRESS NOTE ADULT - ATTENDING COMMENTS
89 yo woman with decompensated HFpEF; improving. Plan to switch from IV Lasix to PO torsemide. Edema improving. Patient / daughter agree with plan.
Still vol up   continue with IV lasix, monitor renal function test and electrolytes   continue with AC and CCB for afib (rate controlled at the time)  continue with CV meds  will continue to follow.
active issues   1. Acute on chronic diastolic dysfunction   continue lasix IV  trial of torsemide 20m qd tomorrow   stable renal function   2.afib  rate controlled, continue with CCB  on anticoagulation
HFpEF, now switched to PO torsemide. Continued LE wound care. Needs close f/u as outpatient.
I was physically present for the key portions of the evaluation and management (E/M) service provided.  I agree with the above history, physical, and plan which I have reviewed and edited where appropriate.
Patient seen and examined. Agree with assessment and plan as outlined above. Diuresing but still volume overloaded. Continue present diuresis. Strict I and O daily weights. Continue other medications.
Patient seen and examined. Agree with assessment and plan as outlined above. Patient still with significant volume overload. Continue IV diuresis. Strict I and O. Daily weights. Continue other medications. Plan discussed with family at bedside.

## 2022-06-21 NOTE — PROGRESS NOTE ADULT - SUBJECTIVE AND OBJECTIVE BOX
Patient is a 89y old  Female who presents with a chief complaint of LE swelling (21 Jun 2022 12:41)      SUBJECTIVE / OVERNIGHT EVENTS:        Vital Signs Last 24 Hrs  T(C): 36.5 (21 Jun 2022 12:05), Max: 36.7 (20 Jun 2022 16:00)  T(F): 97.7 (21 Jun 2022 12:05), Max: 98 (20 Jun 2022 16:00)  HR: 81 (21 Jun 2022 12:05) (76 - 97)  BP: 151/67 (21 Jun 2022 12:05) (122/70 - 157/73)  BP(mean): 95 (21 Jun 2022 12:05) (95 - 95)  RR: 18 (21 Jun 2022 12:05) (18 - 18)  SpO2: 96% (21 Jun 2022 12:05) (93% - 96%)  I&O's Summary    20 Jun 2022 07:01  -  21 Jun 2022 07:00  --------------------------------------------------------  IN: 620 mL / OUT: 950 mL / NET: -330 mL    21 Jun 2022 07:01  -  21 Jun 2022 13:51  --------------------------------------------------------  IN: 0 mL / OUT: 500 mL / NET: -500 mL      PHYSICAL EXAM:  GENERAL: NAD, AAOx3  HEAD:  Atraumatic, Normocephalic  EYES: EOMI; conjunctiva and sclera clear  NECK: Supple, No JVD, No LAD  CHEST/LUNG: B/L air entry; BB crackles  HEART: Regular rate and rhythm; No murmurs, rubs, or gallops  ABDOMEN: Soft, Nontender, Nondistended; Bowel sounds present  EXTREMITIES:  2+ Peripheral Pulses, No clubbing, cyanosis, or edema  SKIN: No rashes or lesions      LABS:    06-21    141  |  96  |  52<H>  ----------------------------<  95  4.1   |  37<H>  |  1.50<H>    Ca    9.2      21 Jun 2022 07:10  Phos  4.0     06-21  Mg     2.4     06-21        CAPILLARY BLOOD GLUCOSE                RADIOLOGY & ADDITIONAL TESTS:    Imaging Personally Reviewed:  [x] YES  [ ] NO    Consultant(s) Notes Reviewed:  [x] YES  [ ] NO      MEDICATIONS  (STANDING):  albuterol/ipratropium for Nebulization 3 milliLiter(s) Nebulizer every 6 hours  apixaban 2.5 milliGRAM(s) Oral every 12 hours  aspirin enteric coated 81 milliGRAM(s) Oral daily  brimonidine 0.2% Ophthalmic Solution 1 Drop(s) Both EYES two times a day  diltiazem    milliGRAM(s) Oral daily  diltiazem    milliGRAM(s) Oral at bedtime  hydrocortisone 1% Ointment 1 Application(s) Topical two times a day  levothyroxine 50 MICROGram(s) Oral daily  multivitamin 1 Tablet(s) Oral daily  polyethylene glycol 3350 17 Gram(s) Oral daily  senna 2 Tablet(s) Oral at bedtime  simvastatin 40 milliGRAM(s) Oral at bedtime  torsemide 40 milliGRAM(s) Oral two times a day  triamcinolone 0.1% Ointment 1 Application(s) Topical every 12 hours  zolpidem 5 milliGRAM(s) Oral at bedtime    MEDICATIONS  (PRN):  acetaminophen     Tablet .. 650 milliGRAM(s) Oral every 6 hours PRN Moderate Pain (4 - 6)  bisacodyl Suppository 10 milliGRAM(s) Rectal daily PRN Constipation  triamcinolone 0.1% Cream 1 Application(s) Topical two times a day PRN rash      Care Discussed with Consultants/Other Providers [x] YES  [ ] NO    HEALTH ISSUES - PROBLEM Dx:  Chronic heart failure with preserved ejection fraction (HFpEF)    Lower extremity edema    Acute on chronic heart failure with preserved ejection fraction (HFpEF)    Chronic atrial fibrillation    HTN (hypertension)    Stage 3 chronic kidney disease    DVT prophylaxis    Hypothyroidism    HLD (hyperlipidemia)    CAD (coronary artery disease)

## 2022-06-21 NOTE — PROGRESS NOTE ADULT - ASSESSMENT
89 y/o F with h/o HFpEF, CAD s/p CABG, CKD3, Chronic AF on Eliquis, HTN, moderate AS, pulm HTN presenting with 1 month of LE edema which has failed outpatient diuretics c/w HF exacerbation combined with underlying chronic venous stasis, admitted for IV diuresis.     # Acute respiratory failure with hypoxia   # Acute on chronic HFpEF  # Chronic atrial fibrillation  # HTN, HLD  # CAD s/p CABG  o2 86% on RA; CT chest consistent with pulmonary edema; V/Q scan low prob PE; also on eliquis.appreciate pulm recs;    Clinical chf improved ; adequately diuresed but still hypoxic on RA.  Swicth Lasix IV to torsemide 40 BID per cardiology    Cardio and Nephro following   Monitor I/O, Daily weights, Tele  eliquis   dilt 240 am, 120 pm, rate controlled  cont asa statin  titrate O2, check on RA    # JACK on Stage 3 chronic kidney disease  cardio renal  monitor while on diuretics; cr stable today.  renal following    # morbilliform drug eruption  appreciate derm recs  can be related to ckd as well.  Recommend topical triamcinolone 0.1% ointment BID to affected areas of skin     # venous stasis  diuresis as above  Wound carem, ACE wraps  PT recs MIN but pt wants home    # Hypothyroidism   synthroid 50mcg    DVT prophylaxis. eliquis      dispo: matthias dtr at bedside; wants to take mom home; Home with HOME OXYGEN. Follow up with Cardiologist Dr Chew.

## 2022-06-21 NOTE — PROGRESS NOTE ADULT - REASON FOR ADMISSION
LE swelling

## 2022-06-21 NOTE — DISCHARGE NOTE NURSING/CASE MANAGEMENT/SOCIAL WORK - NSDCPEPTCAREGIVEDUMATLIST _GEN_ALL_CORE
Heart Failure/Influenza Vaccination Heart Failure/Stroke/Influenza Vaccination/Apixaban/Eliquis Heart Failure/Apixaban/Eliquis

## 2022-06-21 NOTE — PROGRESS NOTE ADULT - SUBJECTIVE AND OBJECTIVE BOX
[facility] [place] [facility]   MD XAVIER ARMSTRONG MD, PA KRISTINE SOLTANPOUR, DO INJUNG KO, NP    TEL:  OFFICE: [phone]    From 5pm-7am Answering Service [phone]    -- RENAL FOLLOW UP NOTE ---Date of Service 06-21-22 @ 12:41    Patient is a 89y old  Female who presents with a chief complaint of LE swelling (21 Jun 2022 08:03)      Patient seen and examined at bedside. No chest pain/sob    VITALS:  T(F): 97.7 (06-21-22 @ 12:05), Max: 98 (06-20-22 @ 16:00)  HR: 81 (06-21-22 @ 12:05)  BP: 151/67 (06-21-22 @ 12:05)  RR: 18 (06-21-22 @ 12:05)  SpO2: 96% (06-21-22 @ 12:05)  Wt(kg): --    06-20 @ 07:01  -  06-21 @ 07:00  --------------------------------------------------------  IN: 620 mL / OUT: 950 mL / NET: -330 mL          PHYSICAL EXAM:  Constitutional: NAD  Neck: No JVD  Respiratory: CTAB, no wheezes, rales or rhonchi  Cardiovascular: S1, S2, RRR  Gastrointestinal: BS+, soft, NT/ND  Extremities: + peripheral edema    Hospital Medications:   MEDICATIONS  (STANDING):  albuterol/ipratropium for Nebulization 3 milliLiter(s) Nebulizer every 6 hours  apixaban 2.5 milliGRAM(s) Oral every 12 hours  aspirin enteric coated 81 milliGRAM(s) Oral daily  brimonidine 0.2% Ophthalmic Solution 1 Drop(s) Both EYES two times a day  diltiazem    milliGRAM(s) Oral daily  diltiazem    milliGRAM(s) Oral at bedtime  hydrocortisone 1% Ointment 1 Application(s) Topical two times a day  levothyroxine 50 MICROGram(s) Oral daily  multivitamin 1 Tablet(s) Oral daily  polyethylene glycol 3350 17 Gram(s) Oral daily  senna 2 Tablet(s) Oral at bedtime  simvastatin 40 milliGRAM(s) Oral at bedtime  torsemide 40 milliGRAM(s) Oral two times a day  triamcinolone 0.1% Ointment 1 Application(s) Topical every 12 hours  zolpidem 5 milliGRAM(s) Oral at bedtime      LABS:  06-21    141  |  96  |  52<H>  ----------------------------<  95  4.1   |  37<H>  |  1.50<H>    Ca    9.2      21 Jun 2022 07:10  Phos  4.0     06-21  Mg     2.4     06-21      Creatinine Trend: 1.50 <--, 1.44 <--, 1.38 <--, 1.52 <--, 1.50 <--, 1.31 <--, 1.31 <--    Phosphorus Level, Serum: 4.0 mg/dL (06-21 @ 07:10)          Urine Studies:  Urinalysis - [06-12-22 @ 18:46]      Color Yellow / Appearance Clear / SG 1.016 / pH 6.5      Gluc Negative / Ketone Negative  / Bili Negative / Urobili Negative       Blood Negative / Protein 100 / Leuk Est Small / Nitrite Negative      RBC 3 / WBC 2 / Hyaline  / Gran  / Sq Epi  / Non Sq Epi 0 / Bacteria Negative      Iron 28, TIBC 268, %sat 11      [06-13-22 @ 07:00]    HBsAg Nonreact      [06-15-22 @ 07:40]  HCV 0.07, Nonreact      [06-15-22 @ 07:40]      RADIOLOGY & ADDITIONAL STUDIES:

## 2022-06-21 NOTE — PROGRESS NOTE ADULT - SUBJECTIVE AND OBJECTIVE BOX
[facility] - [facility] - PROGRESS NOTE    CHIEF COMPLAINT: hypoxemia; pulmonary edema; pleural effusions; pulmonary hypertension; CAD/CABG; aortic stenosis; CKD    INTERVAL HISTORY: sitting in bed without shortness of breath or hypoxemia on a 2lpm nasal canula; remains hypoxic at rest -> 83% and ambulation -> 85%; no cough, sputum production, hemoptysis, chest congestion or wheeze; no fevers, chills or sweats; no chest pain/pressure or palpitations; legs remains swollen above the ACE bandages; diuretics transitioned from IV -> po; CT scan -> mild pulmonary edema - small bilateral pleural effusions with associated atelectasis - s/p median sternotomy; V/Q scan -> very low probability of pulmonary embolus;     REVIEW OF SYSTEMS:  Constitutional: As per interval history  HEENT: Within normal limits  CV: As per interval history  Resp: As per interval history  GI: Within normal limits   : Within normal limits  Musculoskeletal: Within normal limits  Skin: lower extremity venous stasis changes  Neurological: Within normal limits  Psychiatric: Within normal limits  Endocrine: Within normal limits  Hematologic/Lymphatic: Within normal limits  Allergic/Immunologic: Within normal limits    MEDICATIONS:     Pulmonary "  albuterol/ipratropium for Nebulization 3 milliLiter(s) Nebulizer every 6 hours    Anti-microbials:    Cardiovascular:  diltiazem    milliGRAM(s) Oral daily  diltiazem    milliGRAM(s) Oral at bedtime  torsemide 40 milliGRAM(s) Oral two times a day    Other:  apixaban 2.5 milliGRAM(s) Oral every 12 hours  aspirin enteric coated 81 milliGRAM(s) Oral daily  brimonidine 0.2% Ophthalmic Solution 1 Drop(s) Both EYES two times a day  hydrocortisone 1% Ointment 1 Application(s) Topical two times a day  levothyroxine 50 MICROGram(s) Oral daily  multivitamin 1 Tablet(s) Oral daily  polyethylene glycol 3350 17 Gram(s) Oral daily  senna 2 Tablet(s) Oral at bedtime  simvastatin 40 milliGRAM(s) Oral at bedtime  triamcinolone 0.1% Ointment 1 Application(s) Topical every 12 hours  zolpidem 5 milliGRAM(s) Oral at bedtime    MEDICATIONS  (PRN):  acetaminophen     Tablet .. 650 milliGRAM(s) Oral every 6 hours PRN Moderate Pain (4 - 6)  bisacodyl Suppository 10 milliGRAM(s) Rectal daily PRN Constipation  triamcinolone 0.1% Cream 1 Application(s) Topical two times a day PRN rash    OBJECTIVE:      Daily Weight in k.1 (2022 04:51)    PHYSICAL EXAM:       ICU Vital Signs Last 24 Hrs  T(C): 36.5 (2022 12:05), Max: 36.7 (2022 16:00)  T(F): 97.7 (2022 12:05), Max: 98 (2022 16:00)  HR: 81 (2022 12:) (76 - 97)  BP: 151/67 (2022 12:) (122/70 - 157/73)  BP(mean): 95 (2022 12:) (95 - 95)  ABP: --  ABP(mean): --  RR: 18 (2022 12:05) (18 - 18)  SpO2: 96% (2022 12:05) (83% room air at rest - 85% room air with exertion - 96% on 2lpm nasal canula @ rest)     General: Awake. Alert. Cooperative. No distress. Appears stated age. Sitting up in bed.  HEENT:  Atraumatic. Normocephalic. Anicteric. Normal oral mucosa. PERRL. EOMI.  Neck: Supple. Trachea midline. Thyroid without enlargement/tenderness/nodules. No carotid bruit. Mild JVD.	  Cardiovascular: Irregularly irregular rate and rhythm. S1 S2 normal. II/VI systolic murmur.  Respiratory: Respirations unlabored. Resolved rales and wheeze. Kyphosis.  Abdomen: Soft. Non-tender. Non-distended. No organomegaly. No masses. Normal bowel sounds. Central obesity  Extremities: Warm to touch. No clubbing or cyanosis. Moderate lower extremity up to the thigh - bilateral venous stasis changes and lymphedema. Lower legs wrapped with ACE bandages  Pulses: 2+ peripheral pulses all extremities.	  Skin: Normal skin color. No rashes or lesions. No ecchymoses. No cyanosis. Warm to touch.  Lymph Nodes: Cervical, supraclavicular and axillary nodes normal  Neurological: Motor and sensory examination equal and normal. A and O x 3  Psychiatry: Appropriate mood and affect.    LABS:      CBC    WBC  6.65 <==, 6.54 <==    Hemoglobin  11.1 <<==, 11.1 <<==    Hematocrit  37.2 <==, 37.8 <==    Platelets  208 <==, 219 <==      141  |  96  |  52<H>  ----------------------------<  95    06-21  4.1   |  37<H>  |  1.50<H>      LYTES    sodium  141 <==, 141 <==, 141 <==, 143 <==, 142 <==, 144 <==, 142 <==    potassium   4.1 <==, 4.2 <==, 3.9 <==, 4.0 <==, 4.2 <==, 4.3 <==, 4.3 <==    chloride  96 <==, 96 <==, 94 <==, 98 <==, 98 <==, 99 <==, 99 <==    carbon dioxide  37 <==, 35 <==, 38 <==, 33 <==, 35 <==, 33 <==, 32 <==    =============================================================================================  RENAL FUNCTION:    Creatinine:   1.50  <<==, 1.44  <<==, 1.38  <<==, 1.52  <<==, 1.50  <<==, 1.31  <<==, 1.31  <<==    BUN:   52 <==, 55 <==, 53 <==, 50 <==, 47 <==, 48 <==, 45 <==    ============================================================================================    calcium   9.2 <==, 9.1 <==, 9.3 <==, 9.1 <==, 9.2 <==, 8.9 <==, 8.8 <==    phos   4.0 <==, 3.9 <==, 3.8 <==, 3.4 <==    mag   2.4 <==, 2.3 <==, 2.2 <==, 2.4 <==    ============================================================================================  Venous Blood Gas:   @ 07:18  7.40/67/66/42/94.5  VBG Lactate: 1.3    Serum Pro-Brain Natriuretic Peptide: 1477 pg/mL ( @ 07:10)    Serum Pro-Brain Natriuretic Peptide: 2139 pg/mL ( @ 07:00)    MICROBIOLOGY:     Respiratory Viral Panel with COVID-19 by YESSICA (22 @ 12:44)   [facility]   SARS-CoV-2: [facility]  This Respiratory Panel uses polymerase chain reaction (PCR) to detect for   adenovirus; coronavirus (HKU1, NL63, 229E, OC43); human metapneumovirus   (hMPV); human enterovirus/rhinovirus (Entero/RV); influenza A; influenza   A/H1; influenza A/H3; influenza A/H1-2009; influenza B; parainfluenza   viruses 1, 2, 3, 4; respiratory syncytial virus; Mycoplasma pneumoniae;   Chlamydophila pneumoniae; and SARS-CoV-2.       RADIOLOGY:  [x] Chest radiographs reviewed and interpreted by me    EXAM:  XR CHEST PORTABLE URGENT 1V                          PROCEDURE DATE:  2022      FINDINGS:  Median sternotomy. Mediastinal surgical clips.  The heart is not well assessed in this projection.  Left basilar atelectasis. Small bilateral pleural effusions.  No pneumothorax.  Right humeral head suture anchors.    IMPRESSION:  Left basilar atelectasis. Small bilateral pleural effusions.    CLARENCE BOWMAN MD; Resident Radiologist  This document has been electronically signed.  NAVYA OMER MD; Attending Radiologist  This document has been electronically signed. 2022 10:56AM  ---------------------------------------------------------------------------------------------------------------  EXAM:  CT CHEST                          PROCEDURE DATE:  2022      FINDINGS:    LYMPH NODES: No lymphadenopathy.    HEART/VASCULATURE: Cardiomegaly. No pericardial effusion. Status post  CABG. There are aortic, aortic valve, mitral annular and coronary artery   calcifications. Mildly dilated ascending aorta measuring 4 cm at the   level of the main pulmonary artery.    AIRWAYS/LUNGS/PLEURA: Patent central airways. Small bilateral pleural   effusions, right greater than left. Right middle and bilateral lower lobe   passive atelectasis. Mild interlobular septal thickening. Focal right   upper lobe groundglass opacity. No pleural effusion.    UPPER ABDOMEN: Renal cortical scarring. Cirrhosis.    BONES/SOFT TISSUES: Degenerative changes. Status post sternotomy. Suture   anchors in the right humeral head.    IMPRESSION:  Small bilateral pleural effusions and passive atelectasis, right greater   than left.    Minimal interstitial edema.    THELMA COLORADO MD; Resident Radiologist  This document has been electronically signed.  SHANNON GONZALEZ MD; Attending Radiologist  This document has been electronically signed. 2022 10:22AM  ---------------------------------------------------------------------------------------------------------------  EXAM:  NM PULM VENTILATION PERFUS IMG                          PROCEDURE DATE:  2022      FINDINGS: There is heterogeneous distribution of radiopharmaceutical in   the lungs on the ventilation and perfusion images. There are no segmental   perfusion defects.    IMPRESSION: Very low probability of pulmonary embolus.    JONNY PURCELL MD; Attending Nuclear Medicine  This document has been electronically signed. 2022  7:13PM  ---------------------------------------------------------------------------------------------------------------  EXAM:  XR CHEST PORTABLE ROUTINE 1V                          PROCEDURE DATE:  06/15/2022      INTERPRETATION:    Heart size and the mediastinum cannot be accurately evaluated on this   projection. Calcified mitral valve annulus. Median sternotomy sutures and   surgical clips are again seen. Calcified tortuous aorta.  There are low lung volumes.  There is pulmonary vascular congestion/mild interstitial pulmonary edema.  There is medial left retrocardiac opacity.  No left pleural effusion seen. No pneumothorax.  Orthopedic anchors project over the right humeral head.    IMPRESSION:  Low lung volumes.    Pulmonary vascular congestion/mild interstitial pulmonary edema.    Medial left retrocardiac opacity, possibly subsegmental atelectasis, a   small left pleural effusion with associated passive atelectasis or   developing pneumonia in the appropriate clinical context.    LEILANI SPRINGER MD; Attending Radiologist  This document has been electronically signed. 2022  4:29PM  ---------------------------------------------------------------------------------------------------------------  EXAM:  XR CHEST PORTABLE URGENT 1V                          PROCEDURE DATE:  06/10/2022      FINDINGS:  Heart size and mediastinum difficult to assess on this projection.   Calcified aortic knob.  Status post median sternotomy.  Mild pulmonary vascular congestion.  Trace left pleural effusion with left basilar atelectasis. No   pneumothorax.  Right humeral head orthopedic screws noted.    IMPRESSION:  Mild pulmonary vascular congestion.    Trace left pleural effusion with left basilar atelectasis.    SEBLE HUMPHREYS MD; Resident Radiologist  This document has been electronically signed.  NAVYA OMER MD; Attending Radiologist  This document has been electronically signed. 2022 12:36AM  ---------------------------------------------------------------------------------------------------------------

## 2022-06-21 NOTE — DISCHARGE NOTE NURSING/CASE MANAGEMENT/SOCIAL WORK - NSDCPEFALRISK_GEN_ALL_CORE
For information on Fall & Injury Prevention, visit: [email or web address] OR  [email or web address] OR  https://www.cdc.gov/steadi/patient.html

## 2022-06-21 NOTE — PROGRESS NOTE ADULT - ASSESSMENT
89 yo F with diastolic HF, CAD s/p CABG, CKD3B, atrial fibrillation (on apixaban), HTN, moderate AS and pulmonary HTN p/w progressive worsening LE edema despite increasing dose of PO diuretic currently being aggressively diuresed with IV furosemide. Now transitioned to PO torsemide.     REC:  1. HFpEF, venous insufficiency LE edema  - Continue torsemide 40 mg PO BID  - Trend Cr daily  - Strict I/Os and daily standing weight  - Appreciate wound care/derm recs    2. Atrial fibrillation  - Continue home dose diltiazem  - Continue home reduced dose apixaban for systemic embolization prevention   - Continue telemetry monitoring    3. CAD s/p CABG  - Continue home asa and statin    4. JACK on CKD  - Appreciate nephrology recommendations    Patient needs close outpatient follow up appointment with her cardiologist (Dr. Kwasi Chew).     Bryan Heart MD  Department of Cardiology  Cardiology Fellow, PGY4

## 2022-06-21 NOTE — DISCHARGE NOTE NURSING/CASE MANAGEMENT/SOCIAL WORK - NSDPLANG ASIS_GEN_ALL_CORE
10/29/19 1500   Group 4   Start Time 1500   Stop Time 1530   Length (min) 30 min   Group Name Therapeutic Activity   Focus of Group Self-Care Plan   Attendance Not present     MARKELL Avila     No

## 2022-06-29 ENCOUNTER — APPOINTMENT (OUTPATIENT)
Dept: CARDIOLOGY | Facility: CLINIC | Age: 87
End: 2022-06-29

## 2022-06-29 ENCOUNTER — NON-APPOINTMENT (OUTPATIENT)
Age: 87
End: 2022-06-29

## 2022-06-29 VITALS
WEIGHT: 135 LBS | OXYGEN SATURATION: 94 % | HEART RATE: 75 BPM | HEIGHT: 55 IN | SYSTOLIC BLOOD PRESSURE: 126 MMHG | DIASTOLIC BLOOD PRESSURE: 69 MMHG | BODY MASS INDEX: 31.24 KG/M2

## 2022-06-29 PROCEDURE — 99214 OFFICE O/P EST MOD 30 MIN: CPT

## 2022-06-29 PROCEDURE — 93000 ELECTROCARDIOGRAM COMPLETE: CPT

## 2022-06-29 RX ORDER — FUROSEMIDE 40 MG/1
40 TABLET ORAL TWICE DAILY
Qty: 180 | Refills: 3 | Status: COMPLETED | COMMUNITY
Start: 2017-02-21 | End: 2022-06-29

## 2022-06-29 NOTE — PHYSICAL EXAM
[General Appearance - Well Developed] : well developed [Normal Appearance] : normal appearance [Well Groomed] : well groomed [General Appearance - Well Nourished] : well nourished [No Deformities] : no deformities [General Appearance - In No Acute Distress] : no acute distress [Normal Jugular Venous A Waves Present] : normal jugular venous A waves present [Normal Jugular Venous V Waves Present] : normal jugular venous V waves present [No Jugular Venous Ratliff A Waves] : no jugular venous ratliff A waves [] : no respiratory distress [Respiration, Rhythm And Depth] : normal respiratory rhythm and effort [Exaggerated Use Of Accessory Muscles For Inspiration] : no accessory muscle use [Auscultation Breath Sounds / Voice Sounds] : lungs were clear to auscultation bilaterally [Abdomen Soft] : soft [Abdomen Tenderness] : non-tender [Abdomen Mass (___ Cm)] : no abdominal mass palpated [Oriented To Time, Place, And Person] : oriented to person, place, and time [Affect] : the affect was normal [Mood] : the mood was normal [Bowel Sounds] : normal bowel sounds [FreeTextEntry1] : see above

## 2022-06-29 NOTE — REVIEW OF SYSTEMS
[Lower Ext Edema] : lower extremity edema [Negative] : Heme/Lymph [FreeTextEntry5] : see HPI [FreeTextEntry9] : see HPI [de-identified] : see HPI

## 2022-06-29 NOTE — REASON FOR VISIT
[Initial Evaluation] : an initial evaluation of [FreeTextEntry2] : venous insufficiency / LE edema [FreeTextEntry1] : 6/10/22\par \par Reports weight gain since last visit from 142 lbs to 146 lbs (within 2 weeks).\par \par Has continued Lasix 40 mg PO BID and skipped 2 doses in the past 2 weeks.  \par \par Echo showed: EF now normal. Moderate AS. Moderate pulm HTN.\par \par Denies improvement in LE edema. Denies SOB or C/P. \par \par BNP 1600. Cr. 1.34. Reports increased urination on diuretic. \par \par Daughter has been using ACE wrap compression from ankle to knee.\par \par Medications:\par *Eliquis 2.5 mg BID\par *Diltiazem 120 mg in PM and 240 mm Hg AM\par *Lasix 40 mg BID\par *Zocor 40 mg daily\par *Synthroid\par *Zolpidem\par *ASA 81 mg daily\par \par 5/27/22\par \par 89 y/o F w/ PMHX HTN, HLD,  Afib, CVA, CAD, CKD,\par \par who presents for evaluation for LE edema / venous insufficiency.\par \par Reports possible treatment for sclerotherapy for reticular veins at the time.\par \par Reports previously wearing compression stockings.\par Stopped due to blister formation.\par Reports history of weeping from L LE.\par Resolved several days ago. \par Currently using gauze and then ACE wraps for the past several weeks.\par \par Denies history of VTE, PE or DVT.\par Reports history of LE edema with her sisters.\par \par Recent was increased by Cardiology Dr. Chew and patient reports recently loosing water weight of 3 to 4 lbs.\par \par Medications:\par *Pradaxa 75 mg BID -> transitioning to Eliquis 2.5 mg BID\par *Diltiazem 120 mg in PM and 240 mm Hg AM\par *Lasix 40 mg daily, takes BID PRN \par *Zocor 40 mg daily\par *Synthroid\par *Zolpidem\par *ASA 81 mg daily

## 2022-06-29 NOTE — ASSESSMENT
[FreeTextEntry1] :  Assessment:\par 1. Pitting edema bilaterally\par     (not improved adequately on PO diuretics)\par 2. Moderate Pulm HTN\par 3. Moderate AS\par 4. History of AF\par 5. H/o reduced EF, now improved\par 6. H/o CKD stage 3\par \par Plan:\par 1. Recommend admission for IV diuresis with monitoring of renal function and electrolytes.\par 2. Discussed in detail with daughter and patient in detail.\par 3. General cardiology consult to follow.

## 2022-06-30 ENCOUNTER — LABORATORY RESULT (OUTPATIENT)
Age: 87
End: 2022-06-30

## 2022-07-06 NOTE — HISTORY OF PRESENT ILLNESS
[FreeTextEntry1] : Discharged about 10 days ago for volume overload. Had IV diuresis. Responded well.\par Since discharge o2 sat has been ok. \par \par Wt went up 3 lbs in the last several days. Denies SOB. Still with chronic LE edema.

## 2022-07-06 NOTE — DISCUSSION/SUMMARY
[EKG obtained to assist in diagnosis and management of assessed problem(s)] : EKG obtained to assist in diagnosis and management of assessed problem(s) [FreeTextEntry1] : Discussed diuretic dosing and how to respond to changes in weight. Family demonstrates understanding and now to call with increases in weight. \par \par Patient also notes she was just diagnoses with Shingles. \par \par Still with significant LE edema and changes of chronic venous stasis. External compression is being used.

## 2022-07-06 NOTE — PHYSICAL EXAM

## 2022-07-12 LAB
ANION GAP SERPL CALC-SCNC: 12 MMOL/L
BUN SERPL-MCNC: 51 MG/DL
CALCIUM SERPL-MCNC: 9.7 MG/DL
CHLORIDE SERPL-SCNC: 103 MMOL/L
CO2 SERPL-SCNC: 30 MMOL/L
CREAT SERPL-MCNC: 1.49 MG/DL
EGFR: 32 ML/MIN/1.73M2
GLUCOSE SERPL-MCNC: 110 MG/DL
POTASSIUM SERPL-SCNC: 4.7 MMOL/L
SODIUM SERPL-SCNC: 146 MMOL/L

## 2022-07-18 ENCOUNTER — LABORATORY RESULT (OUTPATIENT)
Age: 87
End: 2022-07-18

## 2022-07-19 ENCOUNTER — LABORATORY RESULT (OUTPATIENT)
Age: 87
End: 2022-07-19

## 2022-07-28 ENCOUNTER — LABORATORY RESULT (OUTPATIENT)
Age: 87
End: 2022-07-28

## 2022-08-01 ENCOUNTER — RX RENEWAL (OUTPATIENT)
Age: 87
End: 2022-08-01

## 2022-08-01 RX ORDER — DILTIAZEM HYDROCHLORIDE 120 MG/1
120 CAPSULE, EXTENDED RELEASE ORAL
Qty: 90 | Refills: 3 | Status: ACTIVE | COMMUNITY
Start: 2016-11-25 | End: 1900-01-01

## 2022-08-08 ENCOUNTER — NON-APPOINTMENT (OUTPATIENT)
Age: 87
End: 2022-08-08

## 2022-08-08 ENCOUNTER — RX RENEWAL (OUTPATIENT)
Age: 87
End: 2022-08-08

## 2022-08-08 ENCOUNTER — APPOINTMENT (OUTPATIENT)
Dept: CARDIOLOGY | Facility: CLINIC | Age: 87
End: 2022-08-08

## 2022-08-08 VITALS
HEIGHT: 55 IN | OXYGEN SATURATION: 96 % | BODY MASS INDEX: 30.32 KG/M2 | SYSTOLIC BLOOD PRESSURE: 145 MMHG | DIASTOLIC BLOOD PRESSURE: 78 MMHG | WEIGHT: 131 LBS | HEART RATE: 76 BPM

## 2022-08-08 VITALS — DIASTOLIC BLOOD PRESSURE: 82 MMHG | SYSTOLIC BLOOD PRESSURE: 130 MMHG

## 2022-08-08 PROCEDURE — 93000 ELECTROCARDIOGRAM COMPLETE: CPT

## 2022-08-08 PROCEDURE — 99214 OFFICE O/P EST MOD 30 MIN: CPT | Mod: 25

## 2022-08-08 RX ORDER — APIXABAN 2.5 MG/1
2.5 TABLET, FILM COATED ORAL
Qty: 180 | Refills: 3 | Status: ACTIVE | COMMUNITY
Start: 2022-03-02 | End: 1900-01-01

## 2022-08-10 NOTE — PHYSICAL EXAM

## 2022-08-10 NOTE — DISCUSSION/SUMMARY
[EKG obtained to assist in diagnosis and management of assessed problem(s)] : EKG obtained to assist in diagnosis and management of assessed problem(s) [FreeTextEntry1] : Same medications for now. \par Check weights and chem as stated. \par Keep legs elevated and wrapped when possible.

## 2022-08-10 NOTE — HISTORY OF PRESENT ILLNESS
[FreeTextEntry1] : Overall has been doing well. \par Weight has been stable ranging 129-131. \par She states breathing is ok. O2 sat has been ok.

## 2022-08-11 ENCOUNTER — LABORATORY RESULT (OUTPATIENT)
Age: 87
End: 2022-08-11

## 2022-08-24 ENCOUNTER — LABORATORY RESULT (OUTPATIENT)
Age: 87
End: 2022-08-24

## 2022-08-25 ENCOUNTER — RX RENEWAL (OUTPATIENT)
Age: 87
End: 2022-08-25

## 2022-09-01 ENCOUNTER — APPOINTMENT (OUTPATIENT)
Dept: OTOLARYNGOLOGY | Facility: CLINIC | Age: 87
End: 2022-09-01

## 2022-09-01 VITALS
HEIGHT: 55 IN | HEART RATE: 78 BPM | BODY MASS INDEX: 29.85 KG/M2 | DIASTOLIC BLOOD PRESSURE: 73 MMHG | SYSTOLIC BLOOD PRESSURE: 145 MMHG | WEIGHT: 129 LBS

## 2022-09-01 DIAGNOSIS — H61.20 IMPACTED CERUMEN, UNSPECIFIED EAR: ICD-10-CM

## 2022-09-01 PROCEDURE — 69210 REMOVE IMPACTED EAR WAX UNI: CPT

## 2022-09-01 PROCEDURE — 99204 OFFICE O/P NEW MOD 45 MIN: CPT | Mod: 25

## 2022-09-01 RX ORDER — DILTIAZEM HYDROCHLORIDE 120 MG/1
120 CAPSULE, EXTENDED RELEASE ORAL
Qty: 90 | Refills: 3 | Status: DISCONTINUED | COMMUNITY
Start: 2020-09-08 | End: 2022-09-01

## 2022-09-01 NOTE — PHYSICAL EXAM
[de-identified] : MIRIAM IMPACTED CERUMEN REMOVED/ HEARING IMPROVED [Normal] : mucosa is normal [Midline] : trachea located in midline position

## 2022-09-01 NOTE — REVIEW OF SYSTEMS
[Hearing Loss] : hearing loss [Ear Drainage] : ear drainage [Sinus Pain] : sinus pain [Sinus Pressure] : sinus pressure [Anxiety] : anxiety [Negative] : Heme/Lymph [Patient Intake Form Reviewed] : Patient intake form was reviewed [de-identified] : panic

## 2022-09-07 ENCOUNTER — LABORATORY RESULT (OUTPATIENT)
Age: 87
End: 2022-09-07

## 2022-09-21 ENCOUNTER — LABORATORY RESULT (OUTPATIENT)
Age: 87
End: 2022-09-21

## 2022-10-05 ENCOUNTER — LABORATORY RESULT (OUTPATIENT)
Age: 87
End: 2022-10-05

## 2022-10-19 ENCOUNTER — LABORATORY RESULT (OUTPATIENT)
Age: 87
End: 2022-10-19

## 2022-10-24 ENCOUNTER — NON-APPOINTMENT (OUTPATIENT)
Age: 87
End: 2022-10-24

## 2022-10-24 ENCOUNTER — APPOINTMENT (OUTPATIENT)
Dept: CARDIOLOGY | Facility: CLINIC | Age: 87
End: 2022-10-24

## 2022-10-24 VITALS
HEIGHT: 55 IN | OXYGEN SATURATION: 93 % | HEART RATE: 67 BPM | SYSTOLIC BLOOD PRESSURE: 155 MMHG | DIASTOLIC BLOOD PRESSURE: 75 MMHG | WEIGHT: 129 LBS | BODY MASS INDEX: 29.85 KG/M2

## 2022-10-24 VITALS — DIASTOLIC BLOOD PRESSURE: 78 MMHG | SYSTOLIC BLOOD PRESSURE: 132 MMHG

## 2022-10-24 DIAGNOSIS — I10 ESSENTIAL (PRIMARY) HYPERTENSION: ICD-10-CM

## 2022-10-24 PROCEDURE — 93000 ELECTROCARDIOGRAM COMPLETE: CPT

## 2022-10-24 PROCEDURE — 99214 OFFICE O/P EST MOD 30 MIN: CPT | Mod: 25

## 2022-11-01 ENCOUNTER — RX RENEWAL (OUTPATIENT)
Age: 87
End: 2022-11-01

## 2022-11-02 ENCOUNTER — LABORATORY RESULT (OUTPATIENT)
Age: 87
End: 2022-11-02

## 2022-11-03 ENCOUNTER — LABORATORY RESULT (OUTPATIENT)
Age: 87
End: 2022-11-03

## 2022-11-07 NOTE — DISCUSSION/SUMMARY
[FreeTextEntry1] : Same medications for now. \par Continue to monitor volume status. \par Follow up 2-3 months.  [EKG obtained to assist in diagnosis and management of assessed problem(s)] : EKG obtained to assist in diagnosis and management of assessed problem(s)

## 2022-11-07 NOTE — PHYSICAL EXAM

## 2022-11-07 NOTE — HISTORY OF PRESENT ILLNESS
[FreeTextEntry1] : No chest pain. No SOB. \par Has an aide that she likes and helps around the health. \par She notes chronic fatigue. She admits to poor sleep. \par \par LE edema better on lasix 2 pills BID. Labs have been stable.

## 2022-11-16 ENCOUNTER — LABORATORY RESULT (OUTPATIENT)
Age: 87
End: 2022-11-16

## 2022-11-30 ENCOUNTER — LABORATORY RESULT (OUTPATIENT)
Age: 87
End: 2022-11-30

## 2022-12-01 ENCOUNTER — LABORATORY RESULT (OUTPATIENT)
Age: 87
End: 2022-12-01

## 2022-12-14 ENCOUNTER — LABORATORY RESULT (OUTPATIENT)
Age: 87
End: 2022-12-14

## 2022-12-29 ENCOUNTER — LABORATORY RESULT (OUTPATIENT)
Age: 87
End: 2022-12-29

## 2023-01-11 ENCOUNTER — LABORATORY RESULT (OUTPATIENT)
Age: 88
End: 2023-01-11

## 2023-01-25 ENCOUNTER — LABORATORY RESULT (OUTPATIENT)
Age: 88
End: 2023-01-25

## 2023-02-06 ENCOUNTER — NON-APPOINTMENT (OUTPATIENT)
Age: 88
End: 2023-02-06

## 2023-02-06 ENCOUNTER — APPOINTMENT (OUTPATIENT)
Dept: CARDIOLOGY | Facility: CLINIC | Age: 88
End: 2023-02-06
Payer: MEDICARE

## 2023-02-06 VITALS
SYSTOLIC BLOOD PRESSURE: 149 MMHG | WEIGHT: 133.5 LBS | HEART RATE: 68 BPM | DIASTOLIC BLOOD PRESSURE: 79 MMHG | OXYGEN SATURATION: 97 % | BODY MASS INDEX: 30.89 KG/M2 | HEIGHT: 55 IN

## 2023-02-06 DIAGNOSIS — R60.0 LOCALIZED EDEMA: ICD-10-CM

## 2023-02-06 PROCEDURE — 99214 OFFICE O/P EST MOD 30 MIN: CPT | Mod: 25

## 2023-02-06 PROCEDURE — 93000 ELECTROCARDIOGRAM COMPLETE: CPT

## 2023-02-08 ENCOUNTER — LABORATORY RESULT (OUTPATIENT)
Age: 88
End: 2023-02-08

## 2023-02-22 ENCOUNTER — LABORATORY RESULT (OUTPATIENT)
Age: 88
End: 2023-02-22

## 2023-03-08 ENCOUNTER — LABORATORY RESULT (OUTPATIENT)
Age: 88
End: 2023-03-08

## 2023-03-15 ENCOUNTER — RX RENEWAL (OUTPATIENT)
Age: 88
End: 2023-03-15

## 2023-03-20 NOTE — HISTORY OF PRESENT ILLNESS
[FreeTextEntry1] : OVerall has felt well. \par No new symptoms. Still with weakness and lack of energy. No shortness of breath at rest. \par It is noted that ideal weight range in 129-131. She is now 132.2 She is currently on the Torsemide 40 mg BID. \par There does not appear to be significant swelling. No orthopnea\par He appetite has been good.

## 2023-03-20 NOTE — DISCUSSION/SUMMARY
[FreeTextEntry1] : Overall seems euvolemic despite weight increase. If wt hits 13 will give additional torsemide 20\par Labs checked biweekly are stable.\par Continue other medications.  [EKG obtained to assist in diagnosis and management of assessed problem(s)] : EKG obtained to assist in diagnosis and management of assessed problem(s)

## 2023-03-20 NOTE — PHYSICAL EXAM

## 2023-03-22 ENCOUNTER — LABORATORY RESULT (OUTPATIENT)
Age: 88
End: 2023-03-22

## 2023-04-05 ENCOUNTER — LABORATORY RESULT (OUTPATIENT)
Age: 88
End: 2023-04-05

## 2023-04-19 ENCOUNTER — LABORATORY RESULT (OUTPATIENT)
Age: 88
End: 2023-04-19

## 2023-05-03 ENCOUNTER — LABORATORY RESULT (OUTPATIENT)
Age: 88
End: 2023-05-03

## 2023-05-10 ENCOUNTER — APPOINTMENT (OUTPATIENT)
Dept: CARDIOLOGY | Facility: CLINIC | Age: 88
End: 2023-05-10
Payer: MEDICARE

## 2023-05-10 ENCOUNTER — NON-APPOINTMENT (OUTPATIENT)
Age: 88
End: 2023-05-10

## 2023-05-10 VITALS
OXYGEN SATURATION: 97 % | SYSTOLIC BLOOD PRESSURE: 148 MMHG | HEART RATE: 57 BPM | DIASTOLIC BLOOD PRESSURE: 76 MMHG | HEIGHT: 55 IN | BODY MASS INDEX: 31.1 KG/M2 | WEIGHT: 134.4 LBS

## 2023-05-10 DIAGNOSIS — I48.91 UNSPECIFIED ATRIAL FIBRILLATION: ICD-10-CM

## 2023-05-10 DIAGNOSIS — I25.10 ATHEROSCLEROTIC HEART DISEASE OF NATIVE CORONARY ARTERY W/OUT ANGINA PECTORIS: ICD-10-CM

## 2023-05-10 PROCEDURE — 93000 ELECTROCARDIOGRAM COMPLETE: CPT

## 2023-05-10 PROCEDURE — 99214 OFFICE O/P EST MOD 30 MIN: CPT | Mod: 25

## 2023-05-13 ENCOUNTER — TRANSCRIPTION ENCOUNTER (OUTPATIENT)
Age: 88
End: 2023-05-13

## 2023-05-14 ENCOUNTER — INPATIENT (INPATIENT)
Facility: HOSPITAL | Age: 88
LOS: 4 days | Discharge: NURSING, REHAB OR LONG-TERM CARE | DRG: 480 | End: 2023-05-19
Attending: INTERNAL MEDICINE | Admitting: INTERNAL MEDICINE
Payer: MEDICARE

## 2023-05-14 ENCOUNTER — TRANSCRIPTION ENCOUNTER (OUTPATIENT)
Age: 88
End: 2023-05-14

## 2023-05-14 VITALS
OXYGEN SATURATION: 95 % | TEMPERATURE: 98 F | RESPIRATION RATE: 18 BRPM | SYSTOLIC BLOOD PRESSURE: 105 MMHG | DIASTOLIC BLOOD PRESSURE: 87 MMHG | HEART RATE: 93 BPM | WEIGHT: 134.92 LBS

## 2023-05-14 DIAGNOSIS — S72.001A FRACTURE OF UNSPECIFIED PART OF NECK OF RIGHT FEMUR, INITIAL ENCOUNTER FOR CLOSED FRACTURE: ICD-10-CM

## 2023-05-14 LAB
ALBUMIN SERPL ELPH-MCNC: 3.3 G/DL — SIGNIFICANT CHANGE UP (ref 3.3–5)
ALP SERPL-CCNC: 57 U/L — SIGNIFICANT CHANGE UP (ref 40–120)
ALT FLD-CCNC: 10 U/L — LOW (ref 12–78)
ANION GAP SERPL CALC-SCNC: 0 MMOL/L — LOW (ref 5–17)
ANION GAP SERPL CALC-SCNC: 1 MMOL/L — LOW (ref 5–17)
ANION GAP SERPL CALC-SCNC: 2 MMOL/L — LOW (ref 5–17)
APTT BLD: 35.5 SEC — SIGNIFICANT CHANGE UP (ref 27.5–35.5)
AST SERPL-CCNC: 15 U/L — SIGNIFICANT CHANGE UP (ref 15–37)
BASOPHILS # BLD AUTO: 0.02 K/UL — SIGNIFICANT CHANGE UP (ref 0–0.2)
BASOPHILS NFR BLD AUTO: 0.2 % — SIGNIFICANT CHANGE UP (ref 0–2)
BILIRUB SERPL-MCNC: 0.5 MG/DL — SIGNIFICANT CHANGE UP (ref 0.2–1.2)
BUN SERPL-MCNC: 44 MG/DL — HIGH (ref 7–23)
BUN SERPL-MCNC: 46 MG/DL — HIGH (ref 7–23)
BUN SERPL-MCNC: 51 MG/DL — HIGH (ref 7–23)
CALCIUM SERPL-MCNC: 9.1 MG/DL — SIGNIFICANT CHANGE UP (ref 8.5–10.1)
CALCIUM SERPL-MCNC: 9.2 MG/DL — SIGNIFICANT CHANGE UP (ref 8.5–10.1)
CALCIUM SERPL-MCNC: 9.3 MG/DL — SIGNIFICANT CHANGE UP (ref 8.5–10.1)
CHLORIDE SERPL-SCNC: 104 MMOL/L — SIGNIFICANT CHANGE UP (ref 96–108)
CHLORIDE SERPL-SCNC: 104 MMOL/L — SIGNIFICANT CHANGE UP (ref 96–108)
CHLORIDE SERPL-SCNC: 105 MMOL/L — SIGNIFICANT CHANGE UP (ref 96–108)
CO2 SERPL-SCNC: 31 MMOL/L — SIGNIFICANT CHANGE UP (ref 22–31)
CO2 SERPL-SCNC: 32 MMOL/L — HIGH (ref 22–31)
CO2 SERPL-SCNC: 33 MMOL/L — HIGH (ref 22–31)
CREAT SERPL-MCNC: 1.2 MG/DL — SIGNIFICANT CHANGE UP (ref 0.5–1.3)
CREAT SERPL-MCNC: 1.3 MG/DL — SIGNIFICANT CHANGE UP (ref 0.5–1.3)
CREAT SERPL-MCNC: 1.5 MG/DL — HIGH (ref 0.5–1.3)
EGFR: 32 ML/MIN/1.73M2 — LOW
EGFR: 38 ML/MIN/1.73M2 — LOW
EGFR: 41 ML/MIN/1.73M2 — LOW
EOSINOPHIL # BLD AUTO: 0.22 K/UL — SIGNIFICANT CHANGE UP (ref 0–0.5)
EOSINOPHIL NFR BLD AUTO: 2.4 % — SIGNIFICANT CHANGE UP (ref 0–6)
GLUCOSE SERPL-MCNC: 110 MG/DL — HIGH (ref 70–99)
GLUCOSE SERPL-MCNC: 113 MG/DL — HIGH (ref 70–99)
GLUCOSE SERPL-MCNC: 128 MG/DL — HIGH (ref 70–99)
HCT VFR BLD CALC: 38.3 % — SIGNIFICANT CHANGE UP (ref 34.5–45)
HCT VFR BLD CALC: 41 % — SIGNIFICANT CHANGE UP (ref 34.5–45)
HGB BLD-MCNC: 11.9 G/DL — SIGNIFICANT CHANGE UP (ref 11.5–15.5)
HGB BLD-MCNC: 13.3 G/DL — SIGNIFICANT CHANGE UP (ref 11.5–15.5)
IMM GRANULOCYTES NFR BLD AUTO: 0.4 % — SIGNIFICANT CHANGE UP (ref 0–0.9)
INR BLD: 1.31 RATIO — HIGH (ref 0.88–1.16)
LYMPHOCYTES # BLD AUTO: 1.42 K/UL — SIGNIFICANT CHANGE UP (ref 1–3.3)
LYMPHOCYTES # BLD AUTO: 15.2 % — SIGNIFICANT CHANGE UP (ref 13–44)
MCHC RBC-ENTMCNC: 29.6 PG — SIGNIFICANT CHANGE UP (ref 27–34)
MCHC RBC-ENTMCNC: 30.3 PG — SIGNIFICANT CHANGE UP (ref 27–34)
MCHC RBC-ENTMCNC: 31.1 GM/DL — LOW (ref 32–36)
MCHC RBC-ENTMCNC: 32.4 GM/DL — SIGNIFICANT CHANGE UP (ref 32–36)
MCV RBC AUTO: 93.4 FL — SIGNIFICANT CHANGE UP (ref 80–100)
MCV RBC AUTO: 95.3 FL — SIGNIFICANT CHANGE UP (ref 80–100)
MONOCYTES # BLD AUTO: 0.8 K/UL — SIGNIFICANT CHANGE UP (ref 0–0.9)
MONOCYTES NFR BLD AUTO: 8.6 % — SIGNIFICANT CHANGE UP (ref 2–14)
NEUTROPHILS # BLD AUTO: 6.84 K/UL — SIGNIFICANT CHANGE UP (ref 1.8–7.4)
NEUTROPHILS NFR BLD AUTO: 73.2 % — SIGNIFICANT CHANGE UP (ref 43–77)
NRBC # BLD: 0 /100 WBCS — SIGNIFICANT CHANGE UP (ref 0–0)
NRBC # BLD: 0 /100 WBCS — SIGNIFICANT CHANGE UP (ref 0–0)
PLATELET # BLD AUTO: 198 K/UL — SIGNIFICANT CHANGE UP (ref 150–400)
PLATELET # BLD AUTO: 218 K/UL — SIGNIFICANT CHANGE UP (ref 150–400)
POTASSIUM SERPL-MCNC: 3.8 MMOL/L — SIGNIFICANT CHANGE UP (ref 3.5–5.3)
POTASSIUM SERPL-MCNC: 3.9 MMOL/L — SIGNIFICANT CHANGE UP (ref 3.5–5.3)
POTASSIUM SERPL-MCNC: 4.2 MMOL/L — SIGNIFICANT CHANGE UP (ref 3.5–5.3)
POTASSIUM SERPL-SCNC: 3.8 MMOL/L — SIGNIFICANT CHANGE UP (ref 3.5–5.3)
POTASSIUM SERPL-SCNC: 3.9 MMOL/L — SIGNIFICANT CHANGE UP (ref 3.5–5.3)
POTASSIUM SERPL-SCNC: 4.2 MMOL/L — SIGNIFICANT CHANGE UP (ref 3.5–5.3)
PROT SERPL-MCNC: 7.1 G/DL — SIGNIFICANT CHANGE UP (ref 6–8.3)
PROTHROM AB SERPL-ACNC: 15.4 SEC — HIGH (ref 10.5–13.4)
RBC # BLD: 4.02 M/UL — SIGNIFICANT CHANGE UP (ref 3.8–5.2)
RBC # BLD: 4.39 M/UL — SIGNIFICANT CHANGE UP (ref 3.8–5.2)
RBC # FLD: 13.5 % — SIGNIFICANT CHANGE UP (ref 10.3–14.5)
RBC # FLD: 13.9 % — SIGNIFICANT CHANGE UP (ref 10.3–14.5)
SODIUM SERPL-SCNC: 136 MMOL/L — SIGNIFICANT CHANGE UP (ref 135–145)
SODIUM SERPL-SCNC: 137 MMOL/L — SIGNIFICANT CHANGE UP (ref 135–145)
SODIUM SERPL-SCNC: 139 MMOL/L — SIGNIFICANT CHANGE UP (ref 135–145)
WBC # BLD: 14.13 K/UL — HIGH (ref 3.8–10.5)
WBC # BLD: 9.34 K/UL — SIGNIFICANT CHANGE UP (ref 3.8–10.5)
WBC # FLD AUTO: 14.13 K/UL — HIGH (ref 3.8–10.5)
WBC # FLD AUTO: 9.34 K/UL — SIGNIFICANT CHANGE UP (ref 3.8–10.5)

## 2023-05-14 PROCEDURE — 99285 EMERGENCY DEPT VISIT HI MDM: CPT

## 2023-05-14 PROCEDURE — 72192 CT PELVIS W/O DYE: CPT | Mod: 26

## 2023-05-14 PROCEDURE — 99223 1ST HOSP IP/OBS HIGH 75: CPT

## 2023-05-14 PROCEDURE — 27245 TREAT THIGH FRACTURE: CPT | Mod: RT

## 2023-05-14 PROCEDURE — 71045 X-RAY EXAM CHEST 1 VIEW: CPT | Mod: 26

## 2023-05-14 PROCEDURE — 73552 X-RAY EXAM OF FEMUR 2/>: CPT | Mod: 26,RT

## 2023-05-14 PROCEDURE — 76376 3D RENDER W/INTRP POSTPROCES: CPT | Mod: 26

## 2023-05-14 PROCEDURE — 73502 X-RAY EXAM HIP UNI 2-3 VIEWS: CPT | Mod: 26,RT

## 2023-05-14 PROCEDURE — 93010 ELECTROCARDIOGRAM REPORT: CPT | Mod: 76

## 2023-05-14 PROCEDURE — 70450 CT HEAD/BRAIN W/O DYE: CPT | Mod: 26,MA

## 2023-05-14 PROCEDURE — 72125 CT NECK SPINE W/O DYE: CPT | Mod: 26,MA

## 2023-05-14 PROCEDURE — 73562 X-RAY EXAM OF KNEE 3: CPT | Mod: 26,RT

## 2023-05-14 DEVICE — STRYKER TROCHANTERIC NAIL 11MM X 180MM 125 DEGREE: Type: IMPLANTABLE DEVICE | Status: FUNCTIONAL

## 2023-05-14 DEVICE — NAIL GAMMA3 LAG SCREW 10.5X100MM: Type: IMPLANTABLE DEVICE | Status: FUNCTIONAL

## 2023-05-14 DEVICE — GUIDEWIRE BALL TIP 3MM X 1000MM FOR T2 R1.5 FEMORAL NAILING SYSTEM: Type: IMPLANTABLE DEVICE | Status: FUNCTIONAL

## 2023-05-14 DEVICE — IMPLANTABLE DEVICE: Type: IMPLANTABLE DEVICE | Status: FUNCTIONAL

## 2023-05-14 DEVICE — K-WIRE STRYKER 3.2M X 450MM: Type: IMPLANTABLE DEVICE | Status: FUNCTIONAL

## 2023-05-14 RX ORDER — DILTIAZEM HCL 120 MG
120 CAPSULE, EXT RELEASE 24 HR ORAL DAILY
Refills: 0 | Status: DISCONTINUED | OUTPATIENT
Start: 2023-05-15 | End: 2023-05-19

## 2023-05-14 RX ORDER — ACETAMINOPHEN 500 MG
975 TABLET ORAL EVERY 8 HOURS
Refills: 0 | Status: DISCONTINUED | OUTPATIENT
Start: 2023-05-15 | End: 2023-05-19

## 2023-05-14 RX ORDER — CEFAZOLIN SODIUM 1 G
2000 VIAL (EA) INJECTION ONCE
Refills: 0 | Status: DISCONTINUED | OUTPATIENT
Start: 2023-05-14 | End: 2023-05-14

## 2023-05-14 RX ORDER — OXYCODONE HYDROCHLORIDE 5 MG/1
5 TABLET ORAL EVERY 4 HOURS
Refills: 0 | Status: DISCONTINUED | OUTPATIENT
Start: 2023-05-15 | End: 2023-05-19

## 2023-05-14 RX ORDER — BRIMONIDINE TARTRATE 2 MG/MG
1 SOLUTION/ DROPS OPHTHALMIC
Refills: 0 | Status: DISCONTINUED | OUTPATIENT
Start: 2023-05-14 | End: 2023-05-14

## 2023-05-14 RX ORDER — SODIUM CHLORIDE 9 MG/ML
1000 INJECTION, SOLUTION INTRAVENOUS
Refills: 0 | Status: DISCONTINUED | OUTPATIENT
Start: 2023-05-14 | End: 2023-05-15

## 2023-05-14 RX ORDER — MORPHINE SULFATE 50 MG/1
2 CAPSULE, EXTENDED RELEASE ORAL ONCE
Refills: 0 | Status: DISCONTINUED | OUTPATIENT
Start: 2023-05-14 | End: 2023-05-14

## 2023-05-14 RX ORDER — ACETAMINOPHEN 500 MG
1000 TABLET ORAL ONCE
Refills: 0 | Status: COMPLETED | OUTPATIENT
Start: 2023-05-14 | End: 2023-05-14

## 2023-05-14 RX ORDER — DILTIAZEM HCL 120 MG
120 CAPSULE, EXT RELEASE 24 HR ORAL AT BEDTIME
Refills: 0 | Status: DISCONTINUED | OUTPATIENT
Start: 2023-05-14 | End: 2023-05-14

## 2023-05-14 RX ORDER — OXYCODONE HYDROCHLORIDE 5 MG/1
10 TABLET ORAL EVERY 4 HOURS
Refills: 0 | Status: DISCONTINUED | OUTPATIENT
Start: 2023-05-15 | End: 2023-05-19

## 2023-05-14 RX ORDER — CEFAZOLIN SODIUM 1 G
2000 VIAL (EA) INJECTION EVERY 8 HOURS
Refills: 0 | Status: COMPLETED | OUTPATIENT
Start: 2023-05-14 | End: 2023-05-15

## 2023-05-14 RX ORDER — TRAMADOL HYDROCHLORIDE 50 MG/1
50 TABLET ORAL EVERY 6 HOURS
Refills: 0 | Status: DISCONTINUED | OUTPATIENT
Start: 2023-05-15 | End: 2023-05-19

## 2023-05-14 RX ORDER — SENNA PLUS 8.6 MG/1
2 TABLET ORAL AT BEDTIME
Refills: 0 | Status: DISCONTINUED | OUTPATIENT
Start: 2023-05-14 | End: 2023-05-14

## 2023-05-14 RX ORDER — DILTIAZEM HCL 120 MG
240 CAPSULE, EXT RELEASE 24 HR ORAL DAILY
Refills: 0 | Status: DISCONTINUED | OUTPATIENT
Start: 2023-05-15 | End: 2023-05-14

## 2023-05-14 RX ORDER — LEVOTHYROXINE SODIUM 125 MCG
50 TABLET ORAL DAILY
Refills: 0 | Status: DISCONTINUED | OUTPATIENT
Start: 2023-05-14 | End: 2023-05-14

## 2023-05-14 RX ORDER — APIXABAN 2.5 MG/1
2.5 TABLET, FILM COATED ORAL EVERY 12 HOURS
Refills: 0 | Status: DISCONTINUED | OUTPATIENT
Start: 2023-05-15 | End: 2023-05-19

## 2023-05-14 RX ORDER — ONDANSETRON 8 MG/1
4 TABLET, FILM COATED ORAL ONCE
Refills: 0 | Status: COMPLETED | OUTPATIENT
Start: 2023-05-14 | End: 2023-05-14

## 2023-05-14 RX ORDER — SODIUM CHLORIDE 9 MG/ML
1000 INJECTION, SOLUTION INTRAVENOUS
Refills: 0 | Status: DISCONTINUED | OUTPATIENT
Start: 2023-05-15 | End: 2023-05-16

## 2023-05-14 RX ORDER — MAGNESIUM HYDROXIDE 400 MG/1
30 TABLET, CHEWABLE ORAL DAILY
Refills: 0 | Status: DISCONTINUED | OUTPATIENT
Start: 2023-05-15 | End: 2023-05-19

## 2023-05-14 RX ORDER — HYDROMORPHONE HYDROCHLORIDE 2 MG/ML
0.5 INJECTION INTRAMUSCULAR; INTRAVENOUS; SUBCUTANEOUS ONCE
Refills: 0 | Status: DISCONTINUED | OUTPATIENT
Start: 2023-05-15 | End: 2023-05-19

## 2023-05-14 RX ORDER — MORPHINE SULFATE 50 MG/1
1 CAPSULE, EXTENDED RELEASE ORAL
Refills: 0 | Status: DISCONTINUED | OUTPATIENT
Start: 2023-05-14 | End: 2023-05-15

## 2023-05-14 RX ORDER — TIMOLOL 0.5 %
1 DROPS OPHTHALMIC (EYE)
Refills: 0 | Status: DISCONTINUED | OUTPATIENT
Start: 2023-05-14 | End: 2023-05-14

## 2023-05-14 RX ORDER — SIMVASTATIN 20 MG/1
40 TABLET, FILM COATED ORAL AT BEDTIME
Refills: 0 | Status: DISCONTINUED | OUTPATIENT
Start: 2023-05-14 | End: 2023-05-14

## 2023-05-14 RX ORDER — POLYETHYLENE GLYCOL 3350 17 G/17G
17 POWDER, FOR SOLUTION ORAL AT BEDTIME
Refills: 0 | Status: DISCONTINUED | OUTPATIENT
Start: 2023-05-15 | End: 2023-05-19

## 2023-05-14 RX ORDER — PANTOPRAZOLE SODIUM 20 MG/1
40 TABLET, DELAYED RELEASE ORAL
Refills: 0 | Status: DISCONTINUED | OUTPATIENT
Start: 2023-05-15 | End: 2023-05-19

## 2023-05-14 RX ORDER — ONDANSETRON 8 MG/1
4 TABLET, FILM COATED ORAL EVERY 6 HOURS
Refills: 0 | Status: DISCONTINUED | OUTPATIENT
Start: 2023-05-15 | End: 2023-05-19

## 2023-05-14 RX ORDER — SENNA PLUS 8.6 MG/1
2 TABLET ORAL AT BEDTIME
Refills: 0 | Status: DISCONTINUED | OUTPATIENT
Start: 2023-05-15 | End: 2023-05-19

## 2023-05-14 RX ORDER — ZOLPIDEM TARTRATE 10 MG/1
5 TABLET ORAL AT BEDTIME
Refills: 0 | Status: DISCONTINUED | OUTPATIENT
Start: 2023-05-15 | End: 2023-05-19

## 2023-05-14 RX ORDER — SODIUM CHLORIDE 9 MG/ML
1000 INJECTION INTRAMUSCULAR; INTRAVENOUS; SUBCUTANEOUS
Refills: 0 | Status: DISCONTINUED | OUTPATIENT
Start: 2023-05-14 | End: 2023-05-14

## 2023-05-14 RX ADMIN — ONDANSETRON 4 MILLIGRAM(S): 8 TABLET, FILM COATED ORAL at 15:44

## 2023-05-14 RX ADMIN — Medication 40 MILLIGRAM(S): at 17:30

## 2023-05-14 RX ADMIN — SODIUM CHLORIDE 30 MILLILITER(S): 9 INJECTION INTRAMUSCULAR; INTRAVENOUS; SUBCUTANEOUS at 18:59

## 2023-05-14 RX ADMIN — Medication 1 DROP(S): at 17:30

## 2023-05-14 RX ADMIN — SODIUM CHLORIDE 75 MILLILITER(S): 9 INJECTION, SOLUTION INTRAVENOUS at 22:36

## 2023-05-14 RX ADMIN — BRIMONIDINE TARTRATE 1 DROP(S): 2 SOLUTION/ DROPS OPHTHALMIC at 17:30

## 2023-05-14 RX ADMIN — MORPHINE SULFATE 2 MILLIGRAM(S): 50 CAPSULE, EXTENDED RELEASE ORAL at 15:44

## 2023-05-14 RX ADMIN — Medication 1000 MILLIGRAM(S): at 23:01

## 2023-05-14 RX ADMIN — Medication 1000 MILLIGRAM(S): at 12:00

## 2023-05-14 RX ADMIN — Medication 400 MILLIGRAM(S): at 22:31

## 2023-05-14 RX ADMIN — Medication 400 MILLIGRAM(S): at 11:00

## 2023-05-14 RX ADMIN — ONDANSETRON 4 MILLIGRAM(S): 8 TABLET, FILM COATED ORAL at 22:28

## 2023-05-14 RX ADMIN — MORPHINE SULFATE 2 MILLIGRAM(S): 50 CAPSULE, EXTENDED RELEASE ORAL at 16:00

## 2023-05-14 NOTE — DISCHARGE NOTE PROVIDER - NSDCCPCAREPLAN_GEN_ALL_CORE_FT
PRINCIPAL DISCHARGE DIAGNOSIS  Diagnosis: Hip fracture, right  Assessment and Plan of Treatment:      PRINCIPAL DISCHARGE DIAGNOSIS  Diagnosis: Hip fracture, right  Assessment and Plan of Treatment: ortho fu in 2-3 wks- staple removal,      SECONDARY DISCHARGE DIAGNOSES  Diagnosis: Acute on chronic systolic congestive heart failure  Assessment and Plan of Treatment: euvolemic on discharge-resume torsemide

## 2023-05-14 NOTE — DISCHARGE NOTE PROVIDER - PROVIDER TOKENS
PROVIDER:[TOKEN:[[id number]]] PROVIDER:[TOKEN:[[id number]]],PROVIDER:[TOKEN:[[id number]]]

## 2023-05-14 NOTE — DISCHARGE NOTE PROVIDER - CARE PROVIDER_API CALL
Paxton Joseph (MD)  Orthopedics  [address] [facility], [facility] [address]  [place] [address]  Phone: [phone]  Fax: [phone]  Follow Up Time:    Paxton Joseph)  Orthopedics  [address] [facility], [address]  [place] [address]  Phone: [phone]  Fax: [phone]  Follow Up Time:     Herb Beck)  Internal Medicine  [address] [place] [address]  Phone: [phone]  Fax: [phone]  Follow Up Time:

## 2023-05-14 NOTE — PATIENT PROFILE ADULT - VISION (WITH CORRECTIVE LENSES IF THE PATIENT USUALLY WEARS THEM):
Cataract surgery  Reading glasses/Partially impaired: cannot see medication labels or newsprint, but can see obstacles in path, and the surrounding layout; can count fingers at arm's length

## 2023-05-14 NOTE — PATIENT PROFILE ADULT - FALL HARM RISK - RISK INTERVENTIONS

## 2023-05-14 NOTE — ED ADULT NURSE NOTE - OBJECTIVE STATEMENT
patient alert and oriented BIBEMS with family for an unwitnessed fall at home . patient heard the phone ring and she was still half asleep while attempting ot get up when she lost her footing and fell.  patient with skin tear to right thumb.   patient is on afib and cardizem for chronic afib.  patient complains of pain to right hip.  right leg appears to be shortened and externally rotated.  no head trauma noted.

## 2023-05-14 NOTE — CONSULT NOTE ADULT - NS ATTEND OPT1 GEN_ALL_CORE
----- Message from Michele Lazar MD sent at 10/12/2020  7:21 AM EDT -----  Normal biopsies from stomach and esophagus  RTC 6 weeks with BG   I attest my time as attending is greater than 50% of the total combined time spent on qualifying patient care activities by the PA/NP and attending.

## 2023-05-14 NOTE — ED PROVIDER NOTE - NS ED ATTENDING STATEMENT MOD
This was a shared visit with the LI. I reviewed and verified the documentation and independently performed the documented:

## 2023-05-14 NOTE — ED PROVIDER NOTE - CLINICAL SUMMARY MEDICAL DECISION MAKING FREE TEXT BOX
89-year-old female with a history of atrial fibrillation on Eliquis, hypertension, chronic sciatica, CVA, CAD, CHF, pulmonary hypertension presents with stood up to answer the phone, and lost her balance and fell.  Patient landed on the right hip.  No head injury or LOC.  No neck or back pain.  No numbness/tingling/focal weakness.  Patient complains of deformity to the right hip.  Increased pain with certain movements.  No aggravating or alleviating factors otherwise noted.  No other acute injury or complaints.  Exam: No external signs of head trauma.  No spinal tenderness in cervical, thoracic, lumbar.  Abdomen soft, nontender, nondistended.  No chest wall tenderness.  Left hip with full range of motion with no deformity.  Right hip externally rotated and shortened with positive tenderness.  Normal distal strength and sensation equal bilaterally.  2+ pulses.  Normal cap refill.  No other acute findings on exam.  Acute right hip fracture status post fall.  Will check CT head and neck, admission for orthopedic surgery.

## 2023-05-14 NOTE — ED ADULT TRIAGE NOTE - CHIEF COMPLAINT QUOTE
89yr old female alert and oriented arrives to ED via ems notes unwitnessed fall on Eliquis and Cardizem for afib, pt denies loc or thinners, no obvious trauma noted to pt head. Speech clear pt notes right hip pain. Denies chest pain or sob. Respiration even and unlabored. Daryn SARAH 89yr old female alert and oriented arrives to ED via ems notes unwitnessed fall on Eliquis and Cardizem for afib, pt denies loc. no obvious trauma noted to pt head. Speech clear pt notes right hip pain. Denies chest pain or sob. Respiration even and unlabored. Daryn SARAH

## 2023-05-14 NOTE — ED PROVIDER NOTE - OBJECTIVE STATEMENT
Patient is a 89-year-old female past medical history of A-fib on Eliquis hypertension chronic sciatica CVA CAD CHF brought in by EMS status post fall.  Patient was unable to sleep last night was sleeping on the sofa got up to get her walker lost her balance and fell because she was still drowsy.  Patient denies any head landed on carpeted floor complaining of right hip pain.  Patient denies any chest pain shortness of breath abdominal pain numbness tingling weakness.  Daughter heard a sound and came to help patient immediately but unable to get patient up so 911 called.    pcp Ebony Patient is a 89-year-old female past medical history of A-fib on Eliquis hypertension chronic sciatica CVA CAD CHF brought in by EMS status post fall.  Patient was unable to sleep last night was sleeping on the sofa got up to get her walker to answer phone which was ringing lost her balance and fell because she was still drowsy.  Patient denies any head landed on carpeted floor complaining of right hip pain.  Patient denies any chest pain shortness of breath abdominal pain numbness tingling weakness.  Daughter heard a sound and came to help patient immediately but unable to get patient up so 911 called.    pcp Ebony

## 2023-05-14 NOTE — CONSULT NOTE ADULT - SUBJECTIVE AND OBJECTIVE BOX
89y Female presents with R Hip pain s/p MF today. Community ambulator w/ walker at baseline. Pt states she was walking, tripped and fell onto R Hip. Pt noticed immediate pain and inability to ambulate s/p MF. - HS/LOC. No other injuries or complaints. No hx of orthopedic surgeries in the past. Denies CP, SOB, fever, chills, numbness/tingling, weakness or any other complaints. On Eliquis, LD 5/13/23 PM.     PAST MEDICAL & SURGICAL HISTORY:  Benign Essential Hypertension      Chronic Sciatica      Personal History of Coronary Artery Disease      Hypertension      Atrial fibrillation      CVA (cerebral vascular accident)      S/P CABG      Home Medications:  aspirin 81 mg oral delayed release tablet: 1 tab(s) orally once a day (23 Nov 2016 15:28)  Combigan 0.2%-0.5% ophthalmic solution: 1 drop(s) to each affected eye every 12 hours (23 Nov 2016 15:28)  levothyroxine 50 mcg (0.05 mg) oral capsule: 1 cap(s) orally once a day (23 Nov 2016 15:28)  multivitamin: 1 tab(s) orally once a day (23 Nov 2016 15:28)  polyethylene glycol 3350 oral powder for reconstitution: 17 gram(s) orally once a day (21 Jun 2022 11:08)  senna oral tablet: 2 tab(s) orally once a day (at bedtime) (21 Jun 2022 11:08)  simvastatin 40 mg oral tablet: 1 tab(s) orally once a day (at bedtime) (23 Nov 2016 15:28)  zolpidem 5 mg oral tablet: 1 tab(s) orally once a day (at bedtime) (21 Jun 2022 11:08)    Allergies    No Known Allergies    Intolerances                              13.3   9.34  )-----------( 198      ( 14 May 2023 11:00 )             41.0     05-14    139  |  104  |  51<H>  ----------------------------<  110<H>  3.9   |  33<H>  |  1.30    Ca    9.3      14 May 2023 11:00    TPro  7.1  /  Alb  3.3  /  TBili  0.5  /  DBili  x   /  AST  15  /  ALT  10<L>  /  AlkPhos  57  05-14    PT/INR - ( 14 May 2023 11:00 )   PT: 15.4 sec;   INR: 1.31 ratio         PTT - ( 14 May 2023 11:00 )  PTT:35.5 sec    Vital Signs Last 24 Hrs  T(C): 36.7 (14 May 2023 09:50), Max: 36.7 (14 May 2023 09:50)  T(F): 98 (14 May 2023 09:50), Max: 98 (14 May 2023 09:50)  HR: 93 (14 May 2023 09:50) (93 - 93)  BP: 105/87 (14 May 2023 09:50) (105/87 - 105/87)  BP(mean): --  RR: 18 (14 May 2023 09:50) (18 - 18)  SpO2: 95% (14 May 2023 09:50) (95% - 95%)    Parameters below as of 14 May 2023 09:50  Patient On (Oxygen Delivery Method): room air    PHYSICAL EXAM  General: NAD, Awake and Alert  RLE:   Skin intact, no ecchymosis/redness/warmth   TTP at hip diffusely   Unable to SLR  + Axial load/Log roll  + EHL/FHL/GS/TA  2+ DP  SILT L2-S1  Compartments soft and compressible  No Calf TTP    IMAGING:  XR : R IT Fx    Assessment/Plan:  89y Female with R IT Fx s/p MF.     - Discussed patient w/ Dr. Joseph and Anesthesia. Hip Fx needs surgical intervention within 48hrs. Pt w/ extensive medical hx, will need med/cards clearance as well as Cardiology workup. Due to extensive medical history, need for further workup and 48hr window for fixation, pt should be transferred to tertiary care center for further management and workup and operative fixation of R hip Fx.   -Pain control as needed  -DVT ppx: SCDs, hold until POD1.   -RLE NWB, Strict Bedrest  - NPO after midnight, IVF while NPO  - Medical management appreciated  - Cardiology management appreciated  - FU Transfer to tertiary care center, ED to ED transfer  - Discussed plan w/ Dr. Joseph who agrees.    89y Female presents with R Hip pain s/p MF today. Community ambulator w/ walker at baseline. Pt states she was walking, tripped and fell onto R Hip. Pt noticed immediate pain and inability to ambulate s/p MF. - HS/LOC. No other injuries or complaints. No hx of orthopedic surgeries in the past. Denies CP, SOB, fever, chills, numbness/tingling, weakness or any other complaints. On Eliquis, LD 5/13/23 PM.     PAST MEDICAL & SURGICAL HISTORY:  Benign Essential Hypertension      Chronic Sciatica      Personal History of Coronary Artery Disease      Hypertension      Atrial fibrillation      CVA (cerebral vascular accident)      S/P CABG      Home Medications:  aspirin 81 mg oral delayed release tablet: 1 tab(s) orally once a day (23 Nov 2016 15:28)  Combigan 0.2%-0.5% ophthalmic solution: 1 drop(s) to each affected eye every 12 hours (23 Nov 2016 15:28)  levothyroxine 50 mcg (0.05 mg) oral capsule: 1 cap(s) orally once a day (23 Nov 2016 15:28)  multivitamin: 1 tab(s) orally once a day (23 Nov 2016 15:28)  polyethylene glycol 3350 oral powder for reconstitution: 17 gram(s) orally once a day (21 Jun 2022 11:08)  senna oral tablet: 2 tab(s) orally once a day (at bedtime) (21 Jun 2022 11:08)  simvastatin 40 mg oral tablet: 1 tab(s) orally once a day (at bedtime) (23 Nov 2016 15:28)  zolpidem 5 mg oral tablet: 1 tab(s) orally once a day (at bedtime) (21 Jun 2022 11:08)    Allergies    No Known Allergies    Intolerances                              13.3   9.34  )-----------( 198      ( 14 May 2023 11:00 )             41.0     05-14    139  |  104  |  51<H>  ----------------------------<  110<H>  3.9   |  33<H>  |  1.30    Ca    9.3      14 May 2023 11:00    TPro  7.1  /  Alb  3.3  /  TBili  0.5  /  DBili  x   /  AST  15  /  ALT  10<L>  /  AlkPhos  57  05-14    PT/INR - ( 14 May 2023 11:00 )   PT: 15.4 sec;   INR: 1.31 ratio         PTT - ( 14 May 2023 11:00 )  PTT:35.5 sec    Vital Signs Last 24 Hrs  T(C): 36.7 (14 May 2023 09:50), Max: 36.7 (14 May 2023 09:50)  T(F): 98 (14 May 2023 09:50), Max: 98 (14 May 2023 09:50)  HR: 93 (14 May 2023 09:50) (93 - 93)  BP: 105/87 (14 May 2023 09:50) (105/87 - 105/87)  BP(mean): --  RR: 18 (14 May 2023 09:50) (18 - 18)  SpO2: 95% (14 May 2023 09:50) (95% - 95%)    Parameters below as of 14 May 2023 09:50  Patient On (Oxygen Delivery Method): room air    PHYSICAL EXAM  General: NAD, Awake and Alert  RLE:   Skin intact, no ecchymosis/redness/warmth   TTP at hip diffusely   Unable to SLR  + Axial load/Log roll  + EHL/FHL/GS/TA  2+ DP  SILT L2-S1  Compartments soft and compressible  No Calf TTP    IMAGING:  XR : R IT Fx    Assessment/Plan:  89y Female with R IT Fx s/p MF.     - Plan for OR Today vs Tomorrow (5/15) For R Hip IMN w/ Dr. Joseph  - NPO, IVF while NPO  -Pain control as needed  -DVT ppx: SCDs, hold until POD1.   -RLE NWB, Strict Bedrest  - Medical management appreciated. PLEASE DOCUMENT MEDICAL CLEARANCE  - Cardiology management appreciated. PLEASE DOCUMENT MEDICAL CLEARANCE  - Discussed plan w/ Dr. Joseph who agrees.

## 2023-05-14 NOTE — H&P ADULT - ASSESSMENT
89 yo F with diastolic HF, CAD s/p CABG, RBBB, CKD, atrial fibrillation (on apixaban), HTN, moderate AS and pulmonary HTN, chronic sciatica, CVA  p/w fall.   Rt displaced intertrochanteric fracture    #Hip Fracture-   ortho cs - plan for ORIF  today  pain control      #HFpEF, , CAD, CABG, RBBB, A fib on Eliquis, HTN, mod AS, mod Pulm HTN  - - No s/s volume overload, euvolemic, afib rate controlled, no cp/sob, BP stable and controlled    cont Home Torsemide 40 mg PO BID, Cardizem 240 am and 120 pm, asa, statin  resume eliquis postop  as per ortho recs     Preop exam  Pt has no active ischemia, decompensated heart failure, unstable arrythmia, or severe stenotic valvular disease.  Pt high risk for moderate risk procedure  Patient is medically optimized from cardiovascular standpoint to proceed with planned procedure with routine hemodynamic monitoring.- cards cs reviewed    DVT ppx- postop per ortho    [facility]   [phone]    plan d/w dgtr at bedside,

## 2023-05-14 NOTE — DISCHARGE NOTE PROVIDER - NSDCFUADDINST_GEN_ALL_CORE_FT
IM Nail DC Instructions:    1. ACTIVITY: Weight bearing as tolerated with assistive devices (i.e. cane/walker).  2. DVT/PE Prophylaxis: Continue home Eliquis.   3. PHYSICAL THERAPY: You should receive physical therapy daily.   4. STAPLES: Your staples are to be removed on post-operative day #14-21  5. BANDAGE: Change bandage when instructed by Dr. Joseph.   6. FOLLOW UP: Follow up outpatient with your Orthopedic Surgeon Dr. Joseph in 2-3 weeks after discharge.

## 2023-05-14 NOTE — ED PROVIDER NOTE - WR ORDER DATE AND TIME 2
----- Message from Kishor Donahue MD sent at 2/2/2023  9:04 AM CST -----  Please notify patient that lab work results are generally acceptable, but thyroid hormone levels are abnormal.  We need to increase his levothyroxine dosage.  Send in new prescription for levothyroxine 100 mcg daily, dispense 90, refill 1.  Recheck TSH with reflex in 3 months.   14-May-2023 10:53

## 2023-05-14 NOTE — H&P ADULT - NSHPPHYSICALEXAM_GEN_ALL_CORE
Vitals last 24 hrs  T(C): 36.7 (05-14-23 @ 09:50), Max: 36.7 (05-14-23 @ 09:50)  HR: 93 (05-14-23 @ 09:50) (93 - 93)  BP: 105/87 (05-14-23 @ 09:50) (105/87 - 105/87)  RR: 18 (05-14-23 @ 09:50) (18 - 18)  SpO2: 95% (05-14-23 @ 09:50) (95% - 95%)    PHYSICAL EXAM:  GENERAL: NAD, well-groomed, well-developed  HEAD:  Atraumatic, Normocephalic  EYES: EOMI, PERRLA, conjunctiva and sclera clear  ENMT: No tonsillar erythema, exudates, or enlargement; Moist mucous membranes  NECK: Supple, No JVD, Normal thyroid  HEART: Regular rate and rhythm; No murmurs, rubs, or gallops  RESPIRATORY: CTA B/L, No W/R/R  ABDOMEN: Soft, Nontender, Nondistended; Bowel sounds present  NEUROLOGY: A&Ox3, nonfocal, dec ROM LE dt pain  EXTREMITIES:  2+ Peripheral Pulses, No clubbing, cyanosis, or edema  SKIN: warm, dry, normal color, no rash or abnormal lesions

## 2023-05-14 NOTE — PROGRESS NOTE ADULT - SUBJECTIVE AND OBJECTIVE BOX
Orthopedics Post-op Check  POD 0  Patient seen and examined at bedside. Pain is controlled. Patient is feeling well and denies any CP, SOB, nausea or vomiting.    LABS:                        13.3   9.34  )-----------( 198      ( 14 May 2023 11:00 )             41.0     05-14    137  |  105  |  46<H>  ----------------------------<  113<H>  3.8   |  31  |  1.20    Ca    9.2      14 May 2023 16:27    TPro  7.1  /  Alb  3.3  /  TBili  0.5  /  DBili  x   /  AST  15  /  ALT  10<L>  /  AlkPhos  57  05-14    PT/INR - ( 14 May 2023 11:00 )   PT: 15.4 sec;   INR: 1.31 ratio         PTT - ( 14 May 2023 11:00 )  PTT:35.5 sec      VITAL SIGNS:  T(C): 36.9 (05-14-23 @ 19:52), Max: 36.9 (05-14-23 @ 17:13)  HR: 74 (05-14-23 @ 19:52) (74 - 93)  BP: 150/74 (05-14-23 @ 19:52) (105/87 - 154/74)  RR: 16 (05-14-23 @ 19:52) (16 - 18)  SpO2: 98% (05-14-23 @ 17:13) (95% - 98%)    Physical Exam:   Gen: NAD, resting comfortably  RLE:   Dressing c/d/i  +EHL/FHL/TA/GS  SILT L2-S1  Compartments soft and compressible  DP/PT pulses palpable  No calf TTP bilaterally    A/P:  89yFemale Stable POD 0  s/p R Hip IMN.     Follow up postoperative labs  WBAT, PT/OT  Pain management PRN  Continue PPx antibiotics x24 hrs.   DVT PPx: hold until POD1. Eliquis 2.5mg BID.   Incentive spirometry  Medical management appreciated.   Cardiology Management appreciated.   Dispo: Pending PT Eval.

## 2023-05-14 NOTE — ED ADULT NURSE NOTE - CHIEF COMPLAINT QUOTE
89yr old female alert and oriented arrives to ED via ems notes unwitnessed fall on Eliquis and Cardizem for afib, pt denies loc. no obvious trauma noted to pt head. Speech clear pt notes right hip pain. Denies chest pain or sob. Respiration even and unlabored. Daryn SARAH

## 2023-05-14 NOTE — CONSULT NOTE ADULT - ATTENDING COMMENTS
89yF w Afib, HTN, pulmonary HTN, CHF pw intertrochanteric hip fracture after a fall.  Pt is ambulatory with a cane at baseline.  Her leg is short and externally rotated with good distal pulses, no knee pain, and the ability to fire her ehl/ta/gs.  XR and CT demonstrate an IT fracture. Her labs are stable and she has been evaluated and optimized by medicine and cardiology without further workup recommended.  The risks, benefits and alternatives to surgery were discussed.   The patient understands the risks include but are not limited to bleeding, infection, wound healing issues, damage to surrounding structures including nerves and arteries, the need for revision surgery, symptomatic hardware and the inability of the surgery to reduce the pain.  No guarantees were given regarding the surgery.  They understand there is a risk of loss of limb, extremity and life.  We discussed the perioperative mortality risk and 1 year mortality risk with regards to epidemiological data.  We discussed the proposed rehabilitation timeline as well as expected postoperative restrictions. The patient voiced a good understanding of treatment options, risks and benefits, postoperative instructions, rehabilitation timeline, and restrictions. The patient was given the opportunity to ask questions, which were all answered to the best of my ability and to their satisfaction.  The patient expressed understanding of his diagnosis and treatment plan and all questions were answered.  The patient wishes to move forward wish surgery.     Paxton Joseph MD 89yF w Afib, HTN, pulmonary HTN, CHF pw intertrochanteric hip fracture after a fall.  Pt is ambulatory with a cane at baseline.  Her leg is short and externally rotated with good distal pulses, no knee pain, and the ability to fire her ehl/ta/gs.  XR and CT demonstrate an IT fracture. Her labs are stable and she has been evaluated and optimized by medicine and cardiology without further workup recommended.  The risks, benefits and alternatives to surgery were discussed.   The patient understands the risks include but are not limited to bleeding, infection, wound healing issues, damage to surrounding structures including nerves and arteries, the need for revision surgery, symptomatic hardware and the inability of the surgery to reduce the pain.  No guarantees were given regarding the surgery.  They understand there is a risk of loss of limb, extremity and life.  We discussed the perioperative mortality risk and 1 year mortality risk with regards to epidemiological data.  We discussed that she is at high risk of perioperative complications including mortality given her comorbidities but that she is otherwise optimized to proceed.  She wishes to proceed with surgery to return to her baseline ambulatory status as quickly as possible.  We discussed the proposed rehabilitation timeline as well as expected postoperative restrictions. The patient voiced a good understanding of treatment options, risks and benefits, postoperative instructions, rehabilitation timeline, and restrictions. The patient was given the opportunity to ask questions, which were all answered to the best of my ability and to their satisfaction.  The patient expressed understanding of his diagnosis and treatment plan and all questions were answered.  The patient wishes to move forward wish surgery.     Paxton Joseph MD

## 2023-05-14 NOTE — PHARMACOTHERAPY INTERVENTION NOTE - COMMENTS
Medication reconciliation completed, updated medication list in OMR. Discussed with Dr. Whitt, patient on Combigan 0.2%-0.5% eye drops, 1 drop in both eyes BID. Combigan non-formulary, recommended brimonidine 0.2% opth 1 drop both eyes BID + timolol 0.5% opth 1 drop both eyes BID. Accepted and orders entered.    Radha Estrada, PharmD  Clinical Pharmacy Specialist [phone]

## 2023-05-14 NOTE — H&P ADULT - NSHPLABSRESULTS_GEN_ALL_CORE
LABS:                        13.3   9.34  )-----------( 198      ( 14 May 2023 11:00 )             41.0     05-14    139  |  104  |  51<H>  ----------------------------<  110<H>  3.9   |  33<H>  |  1.30    Ca    9.3      14 May 2023 11:00    TPro  7.1  /  Alb  3.3  /  TBili  0.5  /  DBili  x   /  AST  15  /  ALT  10<L>  /  AlkPhos  57  05-14    PT/INR - ( 14 May 2023 11:00 )   PT: 15.4 sec;   INR: 1.31 ratio         PTT - ( 14 May 2023 11:00 )  PTT:35.5 sec  CAPILLARY BLOOD GLUCOSE                RADIOLOGY & ADDITIONAL TESTS:  c< from: Xray Hip w/ Pelvis 2 or 3 Views, Right (05.14.23 @ 11:38) >    Displaced right intertrochanteric hip fracture.    < end of copied text >

## 2023-05-14 NOTE — CONSULT NOTE ADULT - ASSESSMENT
DOCUMENTATION IN PROGRESS       - ECHO 06/2022 showed mild to mod MR, mod AS, mild to mod AR, mild MS, sev dilated LA, mod BLAKE, normal LV & RV size and function, mild to mod TR, mod Pulm HTN   - Please obtain transthoracic echocardiogram to assess ventricular function, wall motion and valvular abnormalities.  89 yo F with diastolic HF, CAD s/p CABG, RBBB, CKD, atrial fibrillation (on apixaban), HTN, moderate AS and pulmonary HTN, chronic sciatica, CVA  p/w fall. X ray showed IT fracture. Ortho seen, plan for OR within 48 hours. Cardiology called for clearance.     CHF, CAD, CABG, RBBB, A fib on Eliquis, HTN, mod AS, mod Pulm HTN  - She has h/o CHF and is currently on the Torsemide 40 mg BID with extra 20 mg with 2 lb weight gain  - No evidence of any meaningful volume overload   - Patient non orthopneic and does not require any O2 supplementation  - ECHO 06/2022 showed mild to mod MR, mod AS, mild to mod AR, mild MS, sev dilated LA, mod BLAKE, normal LV & RV size and function, mild to mod TR, mod Pulm HTN   - Please obtain transthoracic echocardiogram to assess ventricular function, wall motion and valvular abnormalities.   - Continue Home Torsemide 40 mg PO BID  - Monitor volume status closely  - Monitor strict I/Os     - Known A fib, rate controlled per flow sheets  - Continue Home Cardizem 240 am and 120 pm  - Can hold Eliquis for OR, resume when appropriate per surgery    - Known CAD, CABG,   - EKG pending  - No anginal complaints  - No evidence of any active ischemia   - Continue aspirin and statin     - BP stable and controlled     - Pt now with IT fracture, ortho seen, plan for OR  - Pt has no active ischemia, decompensated heart failure, unstable arrythmia, or severe stenotic valvular disease. She is at elevated risk given h/o CAD, CABG, h/o CHF mos AS with mod pulm HTN, but these are non modifiable and patient is optimized from cardiovascular standpoint to proceed with planned procedure with routine hemodynamic monitoring.     - Monitor and replete lytes, keep K>4, Mg>2.  - Will continue to follow.    Chavez Mendoza, MS FNP, [facility]  Nurse Practitioner- Cardiology   Spectra # [phone] /[phone]   89 yo F with diastolic HF, CAD s/p CABG, RBBB, CKD, atrial fibrillation (on apixaban), HTN, moderate AS and pulmonary HTN, chronic sciatica, CVA  p/w fall. X ray showed IT fracture. Ortho seen, plan for OR within 48 hours. Cardiology called for clearance.     CHF, CAD, CABG, RBBB, A fib on Eliquis, HTN, mod AS, mod Pulm HTN  - She has h/o CHF and is currently on the Torsemide 40 mg BID with extra 20 mg with 2 lb weight gain  - No evidence of any meaningful volume overload   - Patient non orthopneic and does not require any O2 supplementation  - ECHO 06/2022 showed mild to mod MR, mod AS, mild to mod AR, mild MS, sev dilated LA, mod BLAKE, normal LV & RV size and function, mild to mod TR, mod Pulm HTN   - Please obtain transthoracic echocardiogram to assess ventricular function, wall motion and valvular abnormalities. Can be done post op.   - Continue Home Torsemide 40 mg PO BID  - Monitor volume status closely  - Monitor strict I/Os     - Known A fib, rate controlled per flow sheets  - Continue Home Cardizem 240 am and 120 pm  - Can hold Eliquis for OR, resume when appropriate per surgery    - Known CAD, CABG,   - EKG showed A fib, RBBB @ 92  - No anginal complaints  - No evidence of any active ischemia   - Continue aspirin and statin     - BP stable and controlled     - Pt now with IT fracture, ortho seen, plan for OR  - Pt has no active ischemia, decompensated heart failure, unstable arrythmia, or severe stenotic valvular disease. She is at elevated risk given h/o CAD, CABG, h/o CHF mos AS with mod pulm HTN, but these are non modifiable and patient is optimized from cardiovascular standpoint to proceed with planned procedure with routine hemodynamic monitoring.     - Monitor and replete lytes, keep K>4, Mg>2.  - Will continue to follow.    Chavez Mendoza, MS FNP, [facility]  Nurse Practitioner- Cardiology   Spectra # [phone] /[phone]

## 2023-05-14 NOTE — CONSULT NOTE ADULT - NS ATTEND AMEND GEN_ALL_CORE FT
elderly female with af, mod AS, heart failure managed carefully by her family  has been ambulatory and functional with some memory issues, seemingly minor  hip fracture in need of repair, likely imn  is at elevated but not modifiable risk for planned surgery. routine hemodyn monitoring is recommended  seems sufficiently high risk that transfer to [place] seems ill advised, but seems not to need tertiary care facility

## 2023-05-14 NOTE — DISCHARGE NOTE PROVIDER - CARE PROVIDERS DIRECT ADDRESSES
,DirectAddress_Unknown ,DirectAddress_Unknown,[email or web address]

## 2023-05-14 NOTE — DISCHARGE NOTE PROVIDER - NSDCFUSCHEDAPPT_GEN_ALL_CORE_FT
Kwasi Chew Physician [facility]  CARDIOLOGY [address]  Scheduled Appointment: 08/16/2023

## 2023-05-14 NOTE — DISCHARGE NOTE PROVIDER - NSDCCPTREATMENT_GEN_ALL_CORE_FT
PRINCIPAL PROCEDURE  Procedure: Repair of fracture of right hip using Gamma nail  Findings and Treatment:

## 2023-05-14 NOTE — DISCHARGE NOTE PROVIDER - HOSPITAL COURSE
89 yo F HFrEF, CAD s/p CABG, RBBB, CKD, atrial fibrillation (on apixaban), HTN, moderate AS and pulmonary HTN, chronic sciatica, CVA  p/w fall.   Rt displaced intertrochanteric fracture    #Rt Hip Fracture-   sp IMN 5/14  ortho fu 2-3 wks, stable removal and wound check  pain control-  tramadol, oxyIR  #Postop anemia-   sp transfuse 1u prbc hb stable  monitor for s/s fluid overload  resume home torsemide

## 2023-05-14 NOTE — CONSULT NOTE ADULT - SUBJECTIVE AND OBJECTIVE BOX
[facility] - Heather Roberts, Adelaida, Meir, Pat, Federico; Office Number: [phone]    Initial Consult Note  CHIEF COMPLAINT: Patient is a 89y old  Female who presents with a chief complaint of fall   HPI: 89 yo F with diastolic HF, CAD s/p CABG, RBBB, CKD, atrial fibrillation (on apixaban), HTN, moderate AS and pulmonary HTN, chronic sciatica, CVA  p/w fall.  Patient was unable to sleep last night was sleeping on the sofa, got up to get her walker to answer phone which was ringing , lost her balance and fell because she was still drowsy. Patient denies hitting head, landed on carpeted floor complaining of right hip pain.  Daughter heard a sound and came to help patient immediately but unable to get patient up so 911 called. Denies chest pain, SOB, palpitation, orthopnea, PND, dizziness, lightheadedness, leg swelling. She has h/o CHF and is currently on the Torsemide 40 mg BID.     In ED, T(F): 98, HR: 93, BP: 105/87, RR: 18, SpO2: 95%. Labs remarkable for Creat 1.30. EKG pending   · ALPRAZolam 0.25 MG Oral Tablet; TAKE 1 TABLET Every 8 hours PRN anxiety MDD:0.75  mg   · Aspirin 81 MG TABS; TAKE 1 TABLET DAILY   · dilTIAZem HCl ER Coated Beads 120 MG Oral Capsule Extended Release 24 Hour;  TAKE 1 CAPSULE Daily   · Eliquis 2.5 MG Oral Tablet; TAKE 1 TABLET Twice daily   · Enalapril Maleate 10 MG Oral Tablet   · Levothyroxine Sodium 50 MCG Oral Tablet; TAKE 1 TABLET EVERY DAY   · Pradaxa 75 MG Oral Capsule; TAKE 1 CAPSULE Every twelve hours   · Simvastatin 40 MG Oral Tablet; take 1 tablet at bedtime   · Torsemide 20 MG Oral Tablet; TAKE 2 TABLETS IN THE AM AND 2 TABLETS IN THE  EVENING (DUE TO INCREASED WEIGHT)   · Zolpidem Tartrate 10 MG Oral Tablet; TAKE 1 TABLET AT BEDTIME AS NEEDED    Allergies    No Known Allergies    Intolerances      PAST MEDICAL & SURGICAL HISTORY:  Benign Essential Hypertension      Chronic Sciatica      Personal History of Coronary Artery Disease      Hypertension      Hypertension      Atrial fibrillation      CVA (cerebral vascular accident)      S/P CABG        MEDICATIONS  (STANDING):    MEDICATIONS  (PRN):    FAMILY HISTORY:     No family history of acute MI or sudden cardiac death.    SOCIAL HISTORY: No active tobacco, ethanol, or drug abuse.    REVIEW OF SYSTEMS   All other review of systems is negative unless indicated above.    VITAL SIGNS:   Vital Signs Last 24 Hrs  T(C): 36.7 (14 May 2023 09:50), Max: 36.7 (14 May 2023 09:50)  T(F): 98 (14 May 2023 09:50), Max: 98 (14 May 2023 09:50)  HR: 93 (14 May 2023 09:50) (93 - 93)  BP: 105/87 (14 May 2023 09:50) (105/87 - 105/87)  BP(mean): --  RR: 18 (14 May 2023 09:50) (18 - 18)  SpO2: 95% (14 May 2023 09:50) (95% - 95%)    Parameters below as of 14 May 2023 09:50  Patient On (Oxygen Delivery Method): room air        Physical Exam:  Constitutional: NAD, awake and alert  HEENT: Moist Mucous Membranes, Anicteric  Pulmonary: Non-labored, breath sounds are clear bilaterally, No wheezing, rales or rhonchi  Cardiovascular: Regular, S1 and S2, + murmurs, No rubs, gallops or clicks  Gastrointestinal: Bowel Sounds present, soft, nontender.   Lymph: No peripheral edema. No lymphadenopathy.  Skin: No visible rashes or ulcers.  Psych:  Mood & affect appropriate    I&O's Summary      LABS: All Labs Reviewed:                        13.3   9.34  )-----------( 198      ( 14 May 2023 11:00 )             41.0     14 May 2023 11:00    139    |  104    |  51     ----------------------------<  110    3.9     |  33     |  1.30     Ca    9.3        14 May 2023 11:00    TPro  7.1    /  Alb  3.3    /  TBili  0.5    /  DBili  x      /  AST  15     /  ALT  10     /  AlkPhos  57     14 May 2023 11:00    PT/INR - ( 14 May 2023 11:00 )   PT: 15.4 sec;   INR: 1.31 ratio         PTT - ( 14 May 2023 11:00 )  PTT:35.5 sec        Patient name: JIMMIE BRIAN  YOB: 1926   Age: 89 (F)   MR#: [id number]  Study Date: 11/22/2016  [facility]  Study quality: Technically difficult  Referring Physician: Sally Phipps MD  Blood Pressure: 153/78 mmHg  Height: 140 cm  Weight: 60 kg  BSA: 1.5 m2  ------------------------------------------------------------------------  PROCEDURE: Transthoracic echocardiogram with 2-D, M-Mode  and complete spectral and color flow Doppler.  INDICATION: Unspecified atrial fibrillation (I48.91)  ------------------------------------------------------------------------  Dimensions:    Normal Values:  LA:     4.6    2.0 - 4.0 cm  Ao:     3.4    2.0 - 3.8 cm  SEPTUM: 1.0    0.6 - 1.2 cm  PWT:    1.1  0.6 - 1.1 cm  LVIDd:  3.4    3.0 - 5.6 cm  LVIDs:  2.6    1.8 - 4.0 cm  Derived variables:  LVMI: 72 g/m2  RWT: 0.64  Fractional short: 24 %  EF (Teicholtz): 48 %  ------------------------------------------------------------------------  Observations:  Mitral Valve: Mitral annular calcification. The posterior  annulus is heavily calcified. There appears to be  independently mobile material seen at the posterior  annulus. This may represent endocarditis, clinical  correlation indicated. Mild mitral regurgitation.  Aortic Valve/Aorta: Calcified trileaflet aortic valve with  normal opening. Mild-moderate aortic regurgitation.   msec  Aortic Root: 3.4 cm.  Left Atrium: Mildly dilated left atrium.  LA volume index =  37 cc/m2.  Left Ventricle: Endocardium not well visualized; Mild  segmental left ventricular systolic dysfunction. The  base-mid inferior wall, the mid lateral wall, and the basal  inferoseptum are hypokinetic.  Regional wall motion  variation is noted on the setting of atrial fibrillation.  Increased relative wall thickness with normal left  ventricular mass index, consistent with concentric left  ventricular remodeling.  Right Heart: Mild right atrial enlargement. Normal right  ventricular size with mildly  decreased right ventricular  systolic function. Normal tricuspid valve. Mild-moderate  tricuspid regurgitation. Normal pulmonic valve. Minimal  pulmonic regurgitation.  Pericardium/Pleura: Normal pericardium with no pericardial  effusion.  Hemodynamic: Estimated right atrial pressure is 8 mm Hg.  Estimated right ventricular systolic pressure equals 44 mm  Hg, assuming right atrial pressure equals 8 mm Hg,  consistent with mild pulmonary hypertension.  ------------------------------------------------------------------------  Conclusions:  1. Mitral annular calcification. The posterior annulus is  heavily calcified. There appears to be independently mobile  material seen at the posterior annulus. This may represent  endocarditis, clinical correlation indicated.  2. Calcified trileaflet aortic valve with normal opening.  Mild-moderate aortic regurgitation.  msec  3. Increased relative wall thickness with normal left  ventricular mass index, consistent with concentric left  ventricular remodeling.  4. Endocardium not well visualized; Mild segmental left  ventricular systolic dysfunction. The base-mid inferior  wall, the mid lateral wall, and the basal inferoseptum are  hypokinetic.  Regional wall motion variation is noted on  the setting ofatrial fibrillation.  5. Normal right ventricular size with mildly  decreased  right ventricular systolic function.  6. Normal tricuspid valve. Mild-moderate tricuspid  regurgitation.  7. Estimated pulmonary artery systolic pressure equals 44  mm Hg,assuming right atrial pressure equals 8 mm Hg,  consistent with mild pulmonary pressures.  ------------------------------------------------------------------------  Confirmed on  11/22/2016 - 14:45:39 by Jazzy Phillips M.D.  ------------------------------------------------------------------------ [facility] - Heather Roberts, Adelaida, Meir, Pat, Federico; Office Number: [phone]    Initial Consult Note  CHIEF COMPLAINT: Patient is a 89y old  Female who presents with a chief complaint of fall   HPI: 89 yo F with diastolic HF, CAD s/p CABG, RBBB, CKD, atrial fibrillation (on apixaban), HTN, moderate AS and pulmonary HTN, chronic sciatica, CVA  p/w fall.  Patient was unable to sleep last night was sleeping on the sofa, got up to get her walker to answer phone which was ringing , lost her balance and fell because she was still drowsy. Patient denies hitting head, landed on carpeted floor complaining of right hip pain.  Daughter heard a sound and came to help patient immediately but unable to get patient up so 911 called. Denies chest pain, SOB, palpitation, orthopnea, PND, dizziness, lightheadedness, leg swelling. She has h/o CHF and is currently on the Torsemide 40 mg BID.     In ED, T(F): 98, HR: 93, BP: 105/87, RR: 18, SpO2: 95%. Labs remarkable for Creat 1.30. EKG pending X ray showed IT fracture. Ortho seen, plan for OR within 48 hours. Cardiology called for clearance.       Allergies  No Known Allergies    Intolerances      PAST MEDICAL & SURGICAL HISTORY:  Benign Essential Hypertension      Chronic Sciatica      Personal History of Coronary Artery Disease      Hypertension      Hypertension      Atrial fibrillation      CVA (cerebral vascular accident)      S/P CABG        MEDICATIONS  (STANDING):    MEDICATIONS  (PRN):    FAMILY HISTORY:     No family history of acute MI or sudden cardiac death.    SOCIAL HISTORY: No active tobacco, ethanol, or drug abuse.    REVIEW OF SYSTEMS   All other review of systems is negative unless indicated above.    VITAL SIGNS:   Vital Signs Last 24 Hrs  T(C): 36.7 (14 May 2023 09:50), Max: 36.7 (14 May 2023 09:50)  T(F): 98 (14 May 2023 09:50), Max: 98 (14 May 2023 09:50)  HR: 93 (14 May 2023 09:50) (93 - 93)  BP: 105/87 (14 May 2023 09:50) (105/87 - 105/87)  BP(mean): --  RR: 18 (14 May 2023 09:50) (18 - 18)  SpO2: 95% (14 May 2023 09:50) (95% - 95%)    Parameters below as of 14 May 2023 09:50  Patient On (Oxygen Delivery Method): room air        Physical Exam:  Constitutional: NAD, awake and alert  HEENT: Moist Mucous Membranes, Anicteric  Pulmonary: Non-labored, breath sounds are clear bilaterally, No wheezing, rales or rhonchi  Cardiovascular: Regular, S1 and S2, + murmurs, No rubs, gallops or clicks  Gastrointestinal: Bowel Sounds present, soft, nontender.   Lymph: No peripheral edema. No lymphadenopathy.  Skin: No visible rashes or ulcers.  Psych:  Mood & affect appropriate    I&O's Summary      LABS: All Labs Reviewed:                        13.3   9.34  )-----------( 198      ( 14 May 2023 11:00 )             41.0     14 May 2023 11:00    139    |  104    |  51     ----------------------------<  110    3.9     |  33     |  1.30     Ca    9.3        14 May 2023 11:00    TPro  7.1    /  Alb  3.3    /  TBili  0.5    /  DBili  x      /  AST  15     /  ALT  10     /  AlkPhos  57     14 May 2023 11:00    PT/INR - ( 14 May 2023 11:00 )   PT: 15.4 sec;   INR: 1.31 ratio         PTT - ( 14 May 2023 11:00 )  PTT:35.5 sec        Patient name: JIMMIE BRIAN  YOB: 1926   Age: 89 (F)   MR#: [id number]  Study Date: 11/22/2016  [facility] RDCS  Study quality: Technically difficult  Referring Physician: Sally Phipps MD  Blood Pressure: 153/78 mmHg  Height: 140 cm  Weight: 60 kg  BSA: 1.5 m2  ------------------------------------------------------------------------  PROCEDURE: Transthoracic echocardiogram with 2-D, M-Mode  and complete spectral and color flow Doppler.  INDICATION: Unspecified atrial fibrillation (I48.91)  ------------------------------------------------------------------------  Dimensions:    Normal Values:  LA:     4.6    2.0 - 4.0 cm  Ao:     3.4    2.0 - 3.8 cm  SEPTUM: 1.0    0.6 - 1.2 cm  PWT:    1.1  0.6 - 1.1 cm  LVIDd:  3.4    3.0 - 5.6 cm  LVIDs:  2.6    1.8 - 4.0 cm  Derived variables:  LVMI: 72 g/m2  RWT: 0.64  Fractional short: 24 %  EF (Teicholtz): 48 %  ------------------------------------------------------------------------  Observations:  Mitral Valve: Mitral annular calcification. The posterior  annulus is heavily calcified. There appears to be  independently mobile material seen at the posterior  annulus. This may represent endocarditis, clinical  correlation indicated. Mild mitral regurgitation.  Aortic Valve/Aorta: Calcified trileaflet aortic valve with  normal opening. Mild-moderate aortic regurgitation.   msec  Aortic Root: 3.4 cm.  Left Atrium: Mildly dilated left atrium.  LA volume index =  37 cc/m2.  Left Ventricle: Endocardium not well visualized; Mild  segmental left ventricular systolic dysfunction. The  base-mid inferior wall, the mid lateral wall, and the basal  inferoseptum are hypokinetic.  Regional wall motion  variation is noted on the setting of atrial fibrillation.  Increased relative wall thickness with normal left  ventricular mass index, consistent with concentric left  ventricular remodeling.  Right Heart: Mild right atrial enlargement. Normal right  ventricular size with mildly  decreased right ventricular  systolic function. Normal tricuspid valve. Mild-moderate  tricuspid regurgitation. Normal pulmonic valve. Minimal  pulmonic regurgitation.  Pericardium/Pleura: Normal pericardium with no pericardial  effusion.  Hemodynamic: Estimated right atrial pressure is 8 mm Hg.  Estimated right ventricular systolic pressure equals 44 mm  Hg, assuming right atrial pressure equals 8 mm Hg,  consistent with mild pulmonary hypertension.  ------------------------------------------------------------------------  Conclusions:  1. Mitral annular calcification. The posterior annulus is  heavily calcified. There appears to be independently mobile  material seen at the posterior annulus. This may represent  endocarditis, clinical correlation indicated.  2. Calcified trileaflet aortic valve with normal opening.  Mild-moderate aortic regurgitation.  msec  3. Increased relative wall thickness with normal left  ventricular mass index, consistent with concentric left  ventricular remodeling.  4. Endocardium not well visualized; Mild segmental left  ventricular systolic dysfunction. The base-mid inferior  wall, the mid lateral wall, and the basal inferoseptum are  hypokinetic.  Regional wall motion variation is noted on  the setting ofatrial fibrillation.  5. Normal right ventricular size with mildly  decreased  right ventricular systolic function.  6. Normal tricuspid valve. Mild-moderate tricuspid  regurgitation.  7. Estimated pulmonary artery systolic pressure equals 44  mm Hg,assuming right atrial pressure equals 8 mm Hg,  consistent with mild pulmonary pressures.  ------------------------------------------------------------------------  Confirmed on  11/22/2016 - 14:45:39 by Jazzy Phillips M.D.  ------------------------------------------------------------------------

## 2023-05-14 NOTE — DISCHARGE NOTE PROVIDER - NSDCMRMEDTOKEN_GEN_ALL_CORE_FT
apixaban 2.5 mg oral tablet: 1 tab(s) orally every 12 hours  Centrum Adults oral tablet: 1 tab(s) orally once a day  cholecalciferol 25 mcg (1000 intl units) oral tablet: 1 tab(s) orally once a day  Combigan 0.2%-0.5% ophthalmic solution: 1 drop(s) to each affected eye every 12 hours  dilTIAZem 120 mg/24 hours oral capsule, extended release: 1 cap(s) orally once a day (at bedtime)  dilTIAZem 240 mg/24 hours oral capsule, extended release: 1 cap(s) orally once a day in AM  levothyroxine 50 mcg (0.05 mg) oral tablet: 1 tab(s) orally once a day  PreserVision AREDS 2 oral capsule: 2 cap(s) orally 2 times a day  simvastatin 40 mg oral tablet: 1 tab(s) orally once a day (at bedtime)  torsemide 20 mg oral tablet: 2 tab(s) orally 2 times a day  zolpidem 10 mg oral tablet: 0.5 tab(s) orally once a day (at bedtime) as needed for  insomnia   acetaminophen 325 mg oral tablet: 3 tab(s) orally every 8 hours  apixaban 2.5 mg oral tablet: 1 tab(s) orally every 12 hours  Centrum Adults oral tablet: 1 tab(s) orally once a day  cholecalciferol 25 mcg (1000 intl units) oral tablet: 1 tab(s) orally once a day  Combigan 0.2%-0.5% ophthalmic solution: 1 drop(s) to each affected eye every 12 hours  dilTIAZem 120 mg/24 hours oral capsule, extended release: 1 cap(s) orally once a day (at bedtime)  levothyroxine 50 mcg (0.05 mg) oral tablet: 1 tab(s) orally once a day  oxyCODONE 5 mg oral tablet: 1 tab(s) orally every 4 hours As needed Moderate Pain (4 - 6)  polyethylene glycol 3350 oral powder for reconstitution: 17 gram(s) orally once a day (at bedtime)  PreserVision AREDS 2 oral capsule: 2 cap(s) orally 2 times a day  senna leaf extract oral tablet: 2 tab(s) orally once a day (at bedtime)  simvastatin 40 mg oral tablet: 1 tab(s) orally once a day (at bedtime)  torsemide 20 mg oral tablet: 2 tab(s) orally 2 times a day  zolpidem 10 mg oral tablet: 0.5 tab(s) orally once a day (at bedtime) as needed for  insomnia

## 2023-05-15 LAB
ANION GAP SERPL CALC-SCNC: 4 MMOL/L — LOW (ref 5–17)
BUN SERPL-MCNC: 47 MG/DL — HIGH (ref 7–23)
CALCIUM SERPL-MCNC: 8.7 MG/DL — SIGNIFICANT CHANGE UP (ref 8.5–10.1)
CHLORIDE SERPL-SCNC: 103 MMOL/L — SIGNIFICANT CHANGE UP (ref 96–108)
CO2 SERPL-SCNC: 33 MMOL/L — HIGH (ref 22–31)
CREAT SERPL-MCNC: 1.5 MG/DL — HIGH (ref 0.5–1.3)
EGFR: 32 ML/MIN/1.73M2 — LOW
GLUCOSE SERPL-MCNC: 105 MG/DL — HIGH (ref 70–99)
HCT VFR BLD CALC: 34.5 % — SIGNIFICANT CHANGE UP (ref 34.5–45)
HGB BLD-MCNC: 10.8 G/DL — LOW (ref 11.5–15.5)
MCHC RBC-ENTMCNC: 30.3 PG — SIGNIFICANT CHANGE UP (ref 27–34)
MCHC RBC-ENTMCNC: 31.3 GM/DL — LOW (ref 32–36)
MCV RBC AUTO: 96.6 FL — SIGNIFICANT CHANGE UP (ref 80–100)
NRBC # BLD: 0 /100 WBCS — SIGNIFICANT CHANGE UP (ref 0–0)
PLATELET # BLD AUTO: 173 K/UL — SIGNIFICANT CHANGE UP (ref 150–400)
POTASSIUM SERPL-MCNC: 4.5 MMOL/L — SIGNIFICANT CHANGE UP (ref 3.5–5.3)
POTASSIUM SERPL-SCNC: 4.5 MMOL/L — SIGNIFICANT CHANGE UP (ref 3.5–5.3)
RBC # BLD: 3.57 M/UL — LOW (ref 3.8–5.2)
RBC # FLD: 13.8 % — SIGNIFICANT CHANGE UP (ref 10.3–14.5)
SODIUM SERPL-SCNC: 140 MMOL/L — SIGNIFICANT CHANGE UP (ref 135–145)
WBC # BLD: 13.09 K/UL — HIGH (ref 3.8–10.5)
WBC # FLD AUTO: 13.09 K/UL — HIGH (ref 3.8–10.5)

## 2023-05-15 PROCEDURE — 99232 SBSQ HOSP IP/OBS MODERATE 35: CPT

## 2023-05-15 RX ORDER — TIMOLOL 0.5 %
1 DROPS OPHTHALMIC (EYE)
Refills: 0 | Status: DISCONTINUED | OUTPATIENT
Start: 2023-05-15 | End: 2023-05-19

## 2023-05-15 RX ORDER — ACETAMINOPHEN 500 MG
1000 TABLET ORAL ONCE
Refills: 0 | Status: COMPLETED | OUTPATIENT
Start: 2023-05-15 | End: 2023-05-15

## 2023-05-15 RX ORDER — BRIMONIDINE TARTRATE 2 MG/MG
1 SOLUTION/ DROPS OPHTHALMIC
Refills: 0 | Status: DISCONTINUED | OUTPATIENT
Start: 2023-05-15 | End: 2023-05-19

## 2023-05-15 RX ADMIN — PANTOPRAZOLE SODIUM 40 MILLIGRAM(S): 20 TABLET, DELAYED RELEASE ORAL at 06:09

## 2023-05-15 RX ADMIN — Medication 100 MILLIGRAM(S): at 06:09

## 2023-05-15 RX ADMIN — Medication 975 MILLIGRAM(S): at 06:09

## 2023-05-15 RX ADMIN — Medication 120 MILLIGRAM(S): at 06:09

## 2023-05-15 RX ADMIN — Medication 400 MILLIGRAM(S): at 16:41

## 2023-05-15 RX ADMIN — ONDANSETRON 4 MILLIGRAM(S): 8 TABLET, FILM COATED ORAL at 06:37

## 2023-05-15 RX ADMIN — Medication 1 DROP(S): at 12:22

## 2023-05-15 RX ADMIN — SODIUM CHLORIDE 40 MILLILITER(S): 9 INJECTION, SOLUTION INTRAVENOUS at 06:37

## 2023-05-15 RX ADMIN — ZOLPIDEM TARTRATE 5 MILLIGRAM(S): 10 TABLET ORAL at 22:15

## 2023-05-15 RX ADMIN — APIXABAN 2.5 MILLIGRAM(S): 2.5 TABLET, FILM COATED ORAL at 06:09

## 2023-05-15 RX ADMIN — Medication 1 DROP(S): at 21:20

## 2023-05-15 RX ADMIN — APIXABAN 2.5 MILLIGRAM(S): 2.5 TABLET, FILM COATED ORAL at 19:08

## 2023-05-15 RX ADMIN — Medication 975 MILLIGRAM(S): at 23:22

## 2023-05-15 RX ADMIN — Medication 100 MILLIGRAM(S): at 12:34

## 2023-05-15 RX ADMIN — Medication 975 MILLIGRAM(S): at 22:14

## 2023-05-15 RX ADMIN — BRIMONIDINE TARTRATE 1 DROP(S): 2 SOLUTION/ DROPS OPHTHALMIC at 12:34

## 2023-05-15 RX ADMIN — SODIUM CHLORIDE 40 MILLILITER(S): 9 INJECTION, SOLUTION INTRAVENOUS at 05:01

## 2023-05-15 RX ADMIN — SODIUM CHLORIDE 75 MILLILITER(S): 9 INJECTION, SOLUTION INTRAVENOUS at 00:08

## 2023-05-15 RX ADMIN — Medication 1000 MILLIGRAM(S): at 17:15

## 2023-05-15 RX ADMIN — ONDANSETRON 4 MILLIGRAM(S): 8 TABLET, FILM COATED ORAL at 16:15

## 2023-05-15 RX ADMIN — BRIMONIDINE TARTRATE 1 DROP(S): 2 SOLUTION/ DROPS OPHTHALMIC at 21:20

## 2023-05-15 RX ADMIN — SENNA PLUS 2 TABLET(S): 8.6 TABLET ORAL at 21:20

## 2023-05-15 RX ADMIN — SODIUM CHLORIDE 40 MILLILITER(S): 9 INJECTION, SOLUTION INTRAVENOUS at 12:35

## 2023-05-15 NOTE — PROGRESS NOTE ADULT - SUBJECTIVE AND OBJECTIVE BOX
Patient seen and examined at bedside.  No acute complaints at this time. Pain well controlled. Denies chest pain, shortness of breath, nausea or vomiting.     PE:  Vital Signs Last 24 Hrs  T(C): 36.4 (05-15-23 @ 05:45), Max: 36.9 (05-14-23 @ 17:13)  T(F): 97.5 (05-15-23 @ 05:45), Max: 98.4 (05-14-23 @ 17:13)  HR: 96 (05-15-23 @ 05:45) (74 - 98)  BP: 119/76 (05-15-23 @ 05:45) (105/87 - 173/84)  BP(mean): --  RR: 16 (05-15-23 @ 05:45) (16 - 22)  SpO2: 92% (05-15-23 @ 05:45) (92% - 98%)    General: NAD, resting comfortably in bed  RLE:   Dressing C/D/I  SCDs present bilaterally  Compartments soft and compressible  No calf tenderness bilaterally  +TA/EHL/FHL/GSC  SILT L3-S1  + DP/PT                            10.8   13.09 )-----------( 173      ( 15 May 2023 05:45 )             34.5     05-15    140  |  103  |  47<H>  ----------------------------<  105<H>  4.5   |  33<H>  |  1.50<H>    Ca    8.7      15 May 2023 05:45    TPro  7.1  /  Alb  3.3  /  TBili  0.5  /  DBili  x   /  AST  15  /  ALT  10<L>  /  AlkPhos  57  05-14    PT/INR - ( 14 May 2023 11:00 )   PT: 15.4 sec;   INR: 1.31 ratio         PTT - ( 14 May 2023 11:00 )  PTT:35.5 sec    A/P:  89y f s/p R IMN for IT Fx POD 1    -PT/OT   -WBAT on the b/l LEs  -Pain Control prn  -DVT ppx w/ Eliquis   -Continue perioperative abx x 24 hours  -FU AM Labs  -Rest, ice, compress and elevate the extremity as we needed  -Incentive Spirometry  -Medical management appreciated  -Dispo pending PT eval   -No further acute orthopaedic surgical intervention indicated  -Ortho stable for dc when medically stable

## 2023-05-15 NOTE — SOCIAL WORK PROGRESS NOTE - NSSWPROGRESSNOTE_GEN_ALL_CORE
Per CM, pt would like to d/c home. No SW needs identified at this time. SW to follow and remain available for any needs.

## 2023-05-15 NOTE — PROGRESS NOTE ADULT - SUBJECTIVE AND OBJECTIVE BOX
[facility] [place] [facility] -- Armando Aly,  Heather, Meir Son Savella, Goodger  Office # [phone]    Follow Up:  Cardiac clearance, CAD, HTN    Subjective/Observations: Patient seen and examined, awake, alert, denies chest pain, dyspnea, palpitations or dizziness, orthopnea and PND. Tolerating room air. c/o nausea after working with PT this AM     REVIEW OF SYSTEMS: All other review of systems is negative unless indicated above  PAST MEDICAL & SURGICAL HISTORY:  Benign Essential Hypertension      Chronic Sciatica      Personal History of Coronary Artery Disease      Hypertension      Atrial fibrillation      CVA (cerebral vascular accident)      S/P CABG        MEDICATIONS  (STANDING):  acetaminophen     Tablet .. 975 milliGRAM(s) Oral every 8 hours  apixaban 2.5 milliGRAM(s) Oral every 12 hours  ceFAZolin   IVPB 2000 milliGRAM(s) IV Intermittent every 8 hours  diltiazem    milliGRAM(s) Oral daily  HYDROmorphone  Injectable 0.5 milliGRAM(s) IV Push once  lactated ringers. 1000 milliLiter(s) (40 mL/Hr) IV Continuous <Continuous>  multivitamin 1 Tablet(s) Oral daily  pantoprazole    Tablet 40 milliGRAM(s) Oral before breakfast  polyethylene glycol 3350 17 Gram(s) Oral at bedtime  senna 2 Tablet(s) Oral at bedtime    MEDICATIONS  (PRN):  magnesium hydroxide Suspension 30 milliLiter(s) Oral daily PRN Constipation  ondansetron Injectable 4 milliGRAM(s) IV Push every 6 hours PRN Nausea and/or Vomiting  oxyCODONE    IR 10 milliGRAM(s) Oral every 4 hours PRN Severe Pain (7 - 10)  oxyCODONE    IR 5 milliGRAM(s) Oral every 4 hours PRN Moderate Pain (4 - 6)  traMADol 50 milliGRAM(s) Oral every 6 hours PRN Mild Pain (1 - 3)  zolpidem 5 milliGRAM(s) Oral at bedtime PRN Insomnia    Allergies    No Known Allergies    Intolerances      Vital Signs Last 24 Hrs  T(C): 36.4 (15 May 2023 09:50), Max: 36.9 (14 May 2023 17:13)  T(F): 97.5 (15 May 2023 09:50), Max: 98.4 (14 May 2023 17:13)  HR: 91 (15 May 2023 09:50) (74 - 98)  BP: 107/70 (15 May 2023 09:50) (98/57 - 173/84)  BP(mean): --  RR: 19 (15 May 2023 09:50) (16 - 22)  SpO2: 98% (15 May 2023 09:50) (83% - 98%)    Parameters below as of 15 May 2023 09:50  Patient On (Oxygen Delivery Method): nasal cannula      I&O's Summary    Weight (kg): 61.2 (05-14 @ 19:52)  TELE: Afib 110's up to 130's non sustained   PHYSICAL EXAM:  Constitutional: NAD, awake and alert  HEENT: Moist Mucous Membranes, Anicteric  Pulmonary: Non-labored, breath sounds are clear bilaterally, No wheezing, rales or rhonchi  Cardiovascular: IRRR,  S1 and S2, No murmurs, rubs, gallops or clicks  Gastrointestinal: Bowel Sounds present, soft, nontender.   Lymph: No peripheral edema. No lymphadenopathy.  Skin: No visible rashes or ulcers.  Psych:  Mood & affect appropriate  LABS: All Labs Reviewed:                        10.8   13.09 )-----------( 173      ( 15 May 2023 05:45 )             34.5                         11.9   14.13 )-----------( 218      ( 14 May 2023 22:21 )             38.3                         13.3   9.34  )-----------( 198      ( 14 May 2023 11:00 )             41.0     15 May 2023 05:45    140    |  103    |  47     ----------------------------<  105    4.5     |  33     |  1.50   14 May 2023 22:21    136    |  104    |  44     ----------------------------<  128    4.2     |  32     |  1.50   14 May 2023 16:27    137    |  105    |  46     ----------------------------<  113    3.8     |  31     |  1.20     Ca    8.7        15 May 2023 05:45  Ca    9.1        14 May 2023 22:21  Ca    9.2        14 May 2023 16:27    TPro  7.1    /  Alb  3.3    /  TBili  0.5    /  DBili  x      /  AST  15     /  ALT  10     /  AlkPhos  57     14 May 2023 11:00    PT/INR - ( 14 May 2023 11:00 )   PT: 15.4 sec;   INR: 1.31 ratio         PTT - ( 14 May 2023 11:00 )  PTT:35.5 sec          Patient name: JIMMIE BRIAN  YOB: 1926   Age: 89 (F)   MR#: [id number]  Study Date: 11/22/2016  [facility] Malia Oneill RDCS  Study quality: Technically difficult  Referring Physician: Sally Phipps MD  Blood Pressure: 153/78 mmHg  Height: 140 cm  Weight: 60 kg  BSA: 1.5 m2  ------------------------------------------------------------------------  PROCEDURE: Transthoracic echocardiogram with 2-D, M-Mode  and complete spectral and color flow Doppler.  INDICATION: Unspecified atrial fibrillation (I48.91)  ------------------------------------------------------------------------  Dimensions:    Normal Values:  LA:     4.6    2.0 - 4.0 cm  Ao:     3.4    2.0 - 3.8 cm  SEPTUM: 1.0    0.6 - 1.2 cm  PWT:    1.1  0.6 - 1.1 cm  LVIDd:  3.4    3.0 - 5.6 cm  LVIDs:  2.6    1.8 - 4.0 cm  Derived variables:  LVMI: 72 g/m2  RWT: 0.64  Fractional short: 24 %  EF (Teicholtz): 48 %  ------------------------------------------------------------------------  Observations:  Mitral Valve: Mitral annular calcification. The posterior  annulus is heavily calcified. There appears to be  independently mobile material seen at the posterior  annulus. This may represent endocarditis, clinical  correlation indicated. Mild mitral regurgitation.  Aortic Valve/Aorta: Calcified trileaflet aortic valve with  normal opening. Mild-moderate aortic regurgitation.   msec  Aortic Root: 3.4 cm.  Left Atrium: Mildly dilated left atrium.  LA volume index =  37 cc/m2.  Left Ventricle: Endocardium not well visualized; Mild  segmental left ventricular systolic dysfunction. The  base-mid inferior wall, the mid lateral wall, and the basal  inferoseptum are hypokinetic.  Regional wall motion  variation is noted on the setting of atrial fibrillation.  Increased relative wall thickness with normal left  ventricular mass index, consistent with concentric left  ventricular remodeling.  Right Heart: Mild right atrial enlargement. Normal right  ventricular size with mildly  decreased right ventricular  systolic function. Normal tricuspid valve. Mild-moderate  tricuspid regurgitation. Normal pulmonic valve. Minimal  pulmonic regurgitation.  Pericardium/Pleura: Normal pericardium with no pericardial  effusion.  Hemodynamic: Estimated right atrial pressure is 8 mm Hg.  Estimated right ventricular systolic pressure equals 44 mm  Hg, assuming right atrial pressure equals 8 mm Hg,  consistent with mild pulmonary hypertension.  ------------------------------------------------------------------------  Conclusions:  1. Mitral annular calcification. The posterior annulus is  heavily calcified. There appears to be independently mobile  material seen at the posterior annulus. This may represent  endocarditis, clinical correlation indicated.  2. Calcified trileaflet aortic valve with normal opening.  Mild-moderate aortic regurgitation.  msec  3. Increased relative wall thickness with normal left  ventricular mass index, consistent with concentric left  ventricular remodeling.  4. Endocardium not well visualized; Mild segmental left  ventricular systolic dysfunction. The base-mid inferior  wall, the mid lateral wall, and the basal inferoseptum are  hypokinetic.  Regional wall motion variation is noted on  the setting ofatrial fibrillation.  5. Normal right ventricular size with mildly  decreased  right ventricular systolic function.  6. Normal tricuspid valve. Mild-moderate tricuspid  regurgitation.  7. Estimated pulmonary artery systolic pressure equals 44  mm Hg,assuming right atrial pressure equals 8 mm Hg,  consistent with mild pulmonary pressures.  ------------------------------------------------------------------------  Confirmed on  11/22/2016 - 14:45:39 by Jazzy Phillips M.D.  ------------------------------------------------------------------------

## 2023-05-15 NOTE — PROGRESS NOTE ADULT - ASSESSMENT
89 yo F with diastolic HF, CAD s/p CABG, RBBB, CKD, atrial fibrillation (on apixaban), HTN, moderate AS and pulmonary HTN, chronic sciatica, CVA  p/w fall. X ray showed IT fracture. Ortho seen, plan for OR within 48 hours. Cardiology called for clearance.     CHF, CAD, CABG, RBBB, A fib on Eliquis, HTN, mod AS, mod Pulm HTN  - She has h/o CHF and is currently on the Torsemide 40 mg BID with extra 20 mg with 2 lb weight gain  - No evidence of any meaningful volume overload. NO O2 requirement   - ECHO 06/2022 showed mild to mod MR, mod AS, mild to mod AR, mild MS, sev dilated LA, mod BLAKE, normal LV & RV size and function, mild to mod TR, mod Pulm HTN   - f/u routine TTE  - Continue Home Torsemide 40 mg PO BID  - Monitor volume status closely  - Monitor strict I/Os     - Known A fib, HR mostly 90's u0p to 130's non sustained on tele overnight   - BP stable and controlled   - Continue Home Cardizem 240 am and 120 pm  - continue Eliquis  - continue to monitor routine hemodynamics  - Monitor and replete lytes, keep K>4, Mg>2.    - EKG showed A fib, RBBB @ 92  - No anginal complaints  - No evidence of any active ischemia   - Continue aspirin and statin     - Will continue to follow.   89 yo F with diastolic HF, CAD s/p CABG, RBBB, CKD, atrial fibrillation (on apixaban), HTN, moderate AS and pulmonary HTN, chronic sciatica, CVA  p/w fall.   Cardiac clearance CHF, CAD, CABG, RBBB, A fib on Eliquis, HTN, mod AS, mod Pulm HTN  - s/p fall, sustaining IT fracture, s/p R IMN,  POD# 1, tolerated well from CV POV, ortho following     - No evidence of any meaningful volume overload. NO O2 requirement   - ECHO 06/2022 showed mild to mod MR, mod AS, mild to mod AR, mild MS, sev dilated LA, mod BLAKE, normal LV & RV size and function, mild to mod TR, mod Pulm HTN   - f/u routine TTE   - Continue Home Torsemide 40 mg PO BID  - Monitor volume status closely  - Monitor strict I/Os     - Known A fib, HR mostly 90's up to 130's non sustained on tele overnight   - BP stable and controlled   - Continue Home Cardizem 240 am and 120 pm  - continue Eliquis  - continue to monitor routine hemodynamics  - Monitor and replete lytes, keep K>4, Mg>2.    - EKG showed A fib, RBBB @ 92  - No anginal complaints  - No evidence of any active ischemia   - Continue aspirin and statin     - Will continue to follow.    Elissa Valdovinos [facility]  Nurse Practitioner - Cardiology   Spectra #[id number]/ [phone]   89 yo F with diastolic HF, CAD s/p CABG, RBBB, CKD, atrial fibrillation (on apixaban), HTN, moderate AS and pulmonary HTN, chronic sciatica, CVA  p/w fall.   Cardiac clearance CHF, CAD, CABG, RBBB, A fib on Eliquis, HTN, mod AS, mod Pulm HTN  - s/p fall, sustaining IT fracture, s/p R IMN,  POD# 1, tolerated well from CV POV, ortho following     - No evidence of any meaningful volume overload. NO O2 requirement   - ECHO 06/2022 showed mild to mod MR, mod AS, mild to mod AR, mild MS, sev dilated LA, mod BLAKE, normal LV & RV size and function, mild to mod TR, mod Pulm HTN   - f/u routine TTE   - Continue Home Torsemide 40 mg PO BID for now.   - if cr increases more may need to hold for a day  - Monitor volume status closely  - Monitor strict I/Os     - Known A fib, HR mostly 90's up to 130's non sustained on tele overnight   - BP stable and controlled   - Continue Home Cardizem 240 am and 120 pm  - continue Eliquis  - continue to monitor routine hemodynamics  - Monitor and replete lytes, keep K>4, Mg>2.    - EKG showed A fib, RBBB @ 92  - No anginal complaints  - No evidence of any active ischemia   - Continue aspirin and statin     - Will continue to follow.    Elissa Valdovinos, [facility]  Nurse Practitioner - Cardiology   Spectra #[phone]/ [phone]

## 2023-05-15 NOTE — PHYSICAL THERAPY INITIAL EVALUATION ADULT - PERTINENT HX OF CURRENT PROBLEM, REHAB EVAL
89 yo F with diastolic HF, CAD s/p CABG, RBBB, CKD, atrial fibrillation (on apixaban), HTN, moderate AS and pulmonary HTN, chronic sciatica, CVA  p/w fall.    Patient was unable to sleep, got up to get her walker to answer phone which was ringing , lost her balance and fell because she was still drowsy. Patient denies hitting head, landed on carpeted floor complaining of right hip pain.  Daughter heard a sound and came to help patient immediately but unable to get patient up so 911 called. Denies chest pain, SOB, palpitation, orthopnea, PND, dizziness, lightheadedness, leg swelling. Pt. + fx R femur

## 2023-05-15 NOTE — CONSULT NOTE ADULT - SUBJECTIVE AND OBJECTIVE BOX
[facility], Division of Infectious Diseases  GRADY Parker S. Shah, Y. Patel, G. [facility]  [phone]    JIMMIE BRIAN  89y, Female  [id number]    HPI--  HPI:  89 yo F with diastolic HF, CAD s/p CABG, RBBB, CKD, atrial fibrillation (on apixaban), HTN, moderate AS and pulmonary HTN, chronic sciatica, CVA  p/w fall.    Patient was unable to sleep last night was sleeping on the sofa, got up to get her walker to answer phone which was ringing , lost her balance and fell because she was still drowsy. Patient denies hitting head, landed on carpeted floor complaining of right hip pain.  Daughter heard a sound and came to help patient immediately but unable to get patient up so 911 called. Denies chest pain, SOB, palpitation, orthopnea, PND, dizziness, lightheadedness, leg swelling.   (14 May 2023 14:09)  S/p Repair of fracture of right hip using Gamma nail  ID c/s for leukocytosis  Pt seen at bedside    Active Medications--  acetaminophen     Tablet .. 975 milliGRAM(s) Oral every 8 hours  apixaban 2.5 milliGRAM(s) Oral every 12 hours  brimonidine 0.2% Ophthalmic Solution 1 Drop(s) Both EYES two times a day  diltiazem    milliGRAM(s) Oral daily  HYDROmorphone  Injectable 0.5 milliGRAM(s) IV Push once  lactated ringers. 1000 milliLiter(s) IV Continuous <Continuous>  magnesium hydroxide Suspension 30 milliLiter(s) Oral daily PRN  multivitamin 1 Tablet(s) Oral daily  ondansetron Injectable 4 milliGRAM(s) IV Push every 6 hours PRN  oxyCODONE    IR 10 milliGRAM(s) Oral every 4 hours PRN  oxyCODONE    IR 5 milliGRAM(s) Oral every 4 hours PRN  pantoprazole    Tablet 40 milliGRAM(s) Oral before breakfast  polyethylene glycol 3350 17 Gram(s) Oral at bedtime  senna 2 Tablet(s) Oral at bedtime  timolol 0.5% Solution 1 Drop(s) Both EYES two times a day  traMADol 50 milliGRAM(s) Oral every 6 hours PRN  zolpidem 5 milliGRAM(s) Oral at bedtime PRN    Antimicrobials:     Immunologic:     ROS:  CONSTITUTIONAL: No fevers or chills. No weakness or headache. No weight changes.  EYES/ENT: No visual or hearing changes. No sore throat or throat pain .  NECK: No pain or stiffness  RESPIRATORY: No cough, wheezing, or hemoptysis. No shortness of breath  CARDIOVASCULAR: No chest pain or palpitations  GASTROINTESTINAL: No abdominal pain. No nausea or vomiting. No diarrhea or constipation.  GENITOURINARY: No dysuria, frequency or hematuria  NEUROLOGICAL: No numbness or weakness  SKIN: No itching or rashes  PSYCHIATRIC: Pleasant. Appropriate affect    Allergies: No Known Allergies    PMH -- Benign Essential Hypertension    Chronic Sciatica    Personal History of Coronary Artery Disease    Hypertension    Atrial fibrillation    CVA (cerebral vascular accident)      PSH -- S/P CABG      FH -- No pertinent family history      Social History --  EtOH: denies   Tobacco: denies   Drug Use: denies     Travel/Environmental/Occupational History:    Physical Exam--  Vital Signs Last 24 Hrs  T(F): 98.4 (15 May 2023 11:23), Max: 98.4 (14 May 2023 17:13)  HR: 87 (15 May 2023 11:23) (74 - 98)  BP: 111/65 (15 May 2023 11:23) (98/57 - 173/84)  RR: 18 (15 May 2023 11:23) (16 - 22)  SpO2: 98% (15 May 2023 11:23) (83% - 98%)  General: nontoxic-appearing, no acute distress  HEENT: NC/AT, EOMI  Lungs: Clear bilaterally without rales, wheezing or rhonchi  Heart: Regular rate and rhythm. No murmur, rub or gallop.  Abdomen: Soft. Nondistended. Nontender  Extremities: No cyanosis or clubbing. No edema.   Skin: Warm. Dry. Good turgor.   Laboratory & Imaging Data:  CBC:                       10.8   13.09 )-----------( 173      ( 15 May 2023 05:45 )             34.5     CMP: 05-15    140  |  103  |  47<H>  ----------------------------<  105<H>  4.5   |  33<H>  |  1.50<H>    Ca    8.7      15 May 2023 05:45    TPro  7.1  /  Alb  3.3  /  TBili  0.5  /  DBili  x   /  AST  15  /  ALT  10<L>  /  AlkPhos  57  05-14    LIVER FUNCTIONS - ( 14 May 2023 11:00 )  Alb: 3.3 g/dL / Pro: 7.1 g/dL / ALK PHOS: 57 U/L / ALT: 10 U/L / AST: 15 U/L / GGT: x               Microbiology: reviewed        Radiology: reviewed

## 2023-05-15 NOTE — PROGRESS NOTE ADULT - ASSESSMENT
89 yo F with diastolic HF, CAD s/p CABG, RBBB, CKD, atrial fibrillation (on apixaban), HTN, moderate AS and pulmonary HTN, chronic sciatica, CVA  p/w fall.   Rt displaced intertrochanteric fracture    #Rt Hip Fracture-   sp ORIF 5/14  ortho fu  pain control  PT    #Postop anemia- sp 2u prbc  monitor hb   transfuse prn      #HFpEF, CAD, CABG, RBBB  , A fib on Eliquis, HTN, mod AS, mod Pulm HTN   No s/s volume overload,  cont  home torsemide, cardizem , asa, statin  resumed eliquis postop      DVT ppx- home eliquis resumed    PT eval and dc planning    [facility]       plan d/w dgtr at bedside,                  89 yo F with diastolic HF, CAD s/p CABG, RBBB, CKD, atrial fibrillation (on apixaban), HTN, moderate AS and pulmonary HTN, chronic sciatica, CVA  p/w fall.   Rt displaced intertrochanteric fracture    #Rt Hip Fracture-   sp IMN 5/14  ortho fu  pain control  PT    #Postop anemia-   monitor hb   transfuse for symptoms and hb<8      #HFpEF, CAD, CABG, RBBB  , A fib on Eliquis, HTN, mod AS, mod Pulm HTN   No s/s volume overload,  cont  home torsemide, cardizem , asa, statin  resumed eliquis postop      DVT ppx- home eliquis resumed    PT eval and dc planning    [facility]       plan d/w dgtr at bedside,

## 2023-05-15 NOTE — PHYSICAL THERAPY INITIAL EVALUATION ADULT - GENERAL OBSERVATIONS, REHAB EVAL
Patient received supine in bed. Chart reviewed, events noted, + IV, + 2L O2 via NC, + Cardiac monitor, + External catheter. Patient agreeable to work with PT. Patient tolerated treatment with complaints of pain and nausea.

## 2023-05-15 NOTE — OCCUPATIONAL THERAPY INITIAL EVALUATION ADULT - GENERAL OBSERVATIONS, REHAB EVAL
Patient found supine in bed with +IV right UE, +external catheter, +telemonitor and 2 lpm O2 via nc.

## 2023-05-15 NOTE — CONSULT NOTE ADULT - ASSESSMENT
89 yo F with diastolic HF, CAD s/p CABG, RBBB, CKD, atrial fibrillation (on apixaban), HTN, moderate AS and pulmonary HTN, chronic sciatica, CVA  p/w fall.   S/p Repair of fracture of right hip  ID c/s for leukocytosis    Recommendations  Pt evaluated, no focal sx c/f infection  Leukocytosis likely post-op  C/w trending  Monitor off Abx  Additional management per primary team    Infectious Diseases will continue to follow. Please call with any questions.   Sadaf Palumbo M.D.  [facility] Division of Infectious Diseases [phone]

## 2023-05-15 NOTE — OCCUPATIONAL THERAPY INITIAL EVALUATION ADULT - PERTINENT HX OF CURRENT PROBLEM, REHAB EVAL
89 y/o female with diastolic HF, CAD s/p CABG, RBBB, CKD, atrial fibrillation (on apixaban), HTN, moderate AS and pulmonary HTN, chronic sciatica, CVA  p/w fall.  Pt admitted 5/14 with right displaced intertrochanteric fracture and s/p ORIF right hip 5/14/23.

## 2023-05-15 NOTE — PROGRESS NOTE ADULT - SUBJECTIVE AND OBJECTIVE BOX
Patient is a 89y old  Female who presents with a chief complaint of Fall hip pain (15 May 2023 10:32)      INTERVAL HPI/OVERNIGHT EVENTS: noted  pt seen and examined this am   events noted  sp ORIF yesterday, received 2u prbc periop  this am feels well, denies cp/sob      Vital Signs Last 24 Hrs  T(C): 36.9 (15 May 2023 11:23), Max: 36.9 (14 May 2023 17:13)  T(F): 98.4 (15 May 2023 11:23), Max: 98.4 (14 May 2023 17:13)  HR: 87 (15 May 2023 11:23) (74 - 98)  BP: 111/65 (15 May 2023 11:23) (98/57 - 173/84)  BP(mean): --  RR: 18 (15 May 2023 11:23) (16 - 22)  SpO2: 98% (15 May 2023 11:23) (83% - 98%)    Parameters below as of 15 May 2023 11:23  Patient On (Oxygen Delivery Method): nasal cannula        acetaminophen     Tablet .. 975 milliGRAM(s) Oral every 8 hours  apixaban 2.5 milliGRAM(s) Oral every 12 hours  brimonidine 0.2% Ophthalmic Solution 1 Drop(s) Both EYES two times a day  diltiazem    milliGRAM(s) Oral daily  HYDROmorphone  Injectable 0.5 milliGRAM(s) IV Push once  lactated ringers. 1000 milliLiter(s) IV Continuous <Continuous>  magnesium hydroxide Suspension 30 milliLiter(s) Oral daily PRN  multivitamin 1 Tablet(s) Oral daily  ondansetron Injectable 4 milliGRAM(s) IV Push every 6 hours PRN  oxyCODONE    IR 10 milliGRAM(s) Oral every 4 hours PRN  oxyCODONE    IR 5 milliGRAM(s) Oral every 4 hours PRN  pantoprazole    Tablet 40 milliGRAM(s) Oral before breakfast  polyethylene glycol 3350 17 Gram(s) Oral at bedtime  senna 2 Tablet(s) Oral at bedtime  timolol 0.5% Solution 1 Drop(s) Both EYES two times a day  traMADol 50 milliGRAM(s) Oral every 6 hours PRN  zolpidem 5 milliGRAM(s) Oral at bedtime PRN      PHYSICAL EXAM:  GENERAL: NAD,   EYES: conjunctiva and sclera clear  ENMT: Moist mucous membranes  NECK: Supple, No JVD, Normal thyroid  CHEST/LUNG: non labored, cta b/l   HEART: Regular rate and rhythm; No murmurs, rubs, or gallops  ABDOMEN: Soft, Nontender, Nondistended; Bowel sounds present  EXTREMITIES:  +teds b/l  LYMPH: No lymphadenopathy noted  SKIN: No rashes or lesions    Consultant(s) Notes Reviewed:  [x ] YES  [ ] NO  Care Discussed with Consultants/Other Providers [ x] YES  [ ] NO    LABS:                        10.8   13.09 )-----------( 173      ( 15 May 2023 05:45 )             34.5     05-15    140  |  103  |  47<H>  ----------------------------<  105<H>  4.5   |  33<H>  |  1.50<H>    Ca    8.7      15 May 2023 05:45    TPro  7.1  /  Alb  3.3  /  TBili  0.5  /  DBili  x   /  AST  15  /  ALT  10<L>  /  AlkPhos  57  05-14    PT/INR - ( 14 May 2023 11:00 )   PT: 15.4 sec;   INR: 1.31 ratio         PTT - ( 14 May 2023 11:00 )  PTT:35.5 sec    CAPILLARY BLOOD GLUCOSE                  RADIOLOGY & ADDITIONAL TESTS:    Imaging Personally Reviewed:  [x ] YES  [ ] NO Patient is a 89y old  Female who presents with a chief complaint of Fall hip pain (15 May 2023 10:32)      INTERVAL HPI/OVERNIGHT EVENTS: noted  pt seen and examined this am   events noted  sp IMN hip  this am feels well, denies cp/sob/dizziness      Vital Signs Last 24 Hrs  T(C): 36.9 (15 May 2023 11:23), Max: 36.9 (14 May 2023 17:13)  T(F): 98.4 (15 May 2023 11:23), Max: 98.4 (14 May 2023 17:13)  HR: 87 (15 May 2023 11:23) (74 - 98)  BP: 111/65 (15 May 2023 11:23) (98/57 - 173/84)  BP(mean): --  RR: 18 (15 May 2023 11:23) (16 - 22)  SpO2: 98% (15 May 2023 11:23) (83% - 98%)    Parameters below as of 15 May 2023 11:23  Patient On (Oxygen Delivery Method): nasal cannula        acetaminophen     Tablet .. 975 milliGRAM(s) Oral every 8 hours  apixaban 2.5 milliGRAM(s) Oral every 12 hours  brimonidine 0.2% Ophthalmic Solution 1 Drop(s) Both EYES two times a day  diltiazem    milliGRAM(s) Oral daily  HYDROmorphone  Injectable 0.5 milliGRAM(s) IV Push once  lactated ringers. 1000 milliLiter(s) IV Continuous <Continuous>  magnesium hydroxide Suspension 30 milliLiter(s) Oral daily PRN  multivitamin 1 Tablet(s) Oral daily  ondansetron Injectable 4 milliGRAM(s) IV Push every 6 hours PRN  oxyCODONE    IR 10 milliGRAM(s) Oral every 4 hours PRN  oxyCODONE    IR 5 milliGRAM(s) Oral every 4 hours PRN  pantoprazole    Tablet 40 milliGRAM(s) Oral before breakfast  polyethylene glycol 3350 17 Gram(s) Oral at bedtime  senna 2 Tablet(s) Oral at bedtime  timolol 0.5% Solution 1 Drop(s) Both EYES two times a day  traMADol 50 milliGRAM(s) Oral every 6 hours PRN  zolpidem 5 milliGRAM(s) Oral at bedtime PRN      PHYSICAL EXAM:  GENERAL: NAD,   EYES: conjunctiva and sclera clear  ENMT: Moist mucous membranes  NECK: Supple, No JVD, Normal thyroid  CHEST/LUNG: non labored, cta b/l   HEART: Regular rate and rhythm; No murmurs, rubs, or gallops  ABDOMEN: Soft, Nontender, Nondistended; Bowel sounds present  EXTREMITIES:  +teds b/l  LYMPH: No lymphadenopathy noted  SKIN: No rashes or lesions    Consultant(s) Notes Reviewed:  [x ] YES  [ ] NO  Care Discussed with Consultants/Other Providers [ x] YES  [ ] NO    LABS:                        10.8   13.09 )-----------( 173      ( 15 May 2023 05:45 )             34.5     05-15    140  |  103  |  47<H>  ----------------------------<  105<H>  4.5   |  33<H>  |  1.50<H>    Ca    8.7      15 May 2023 05:45    TPro  7.1  /  Alb  3.3  /  TBili  0.5  /  DBili  x   /  AST  15  /  ALT  10<L>  /  AlkPhos  57  05-14    PT/INR - ( 14 May 2023 11:00 )   PT: 15.4 sec;   INR: 1.31 ratio         PTT - ( 14 May 2023 11:00 )  PTT:35.5 sec    CAPILLARY BLOOD GLUCOSE                  RADIOLOGY & ADDITIONAL TESTS:    Imaging Personally Reviewed:  [x ] YES  [ ] NO

## 2023-05-15 NOTE — PHYSICAL THERAPY INITIAL EVALUATION ADULT - ADDITIONAL COMMENTS
pt. PLOF ADLs with RW and assist from 24 hr HHA. Pt. has + 4 WILFRIDO with bilateral handrail, + 1 step inside home. pt. lives alone with HHA.

## 2023-05-15 NOTE — OCCUPATIONAL THERAPY INITIAL EVALUATION ADULT - ADDITIONAL COMMENTS
Pt lives in a house with 4 steps with handrail to enter. Pt has a bathtub with grab bars. Pt owns a rolling walker, w/c, shower chair and grab bars next to toilet. Pt has a live-in aide. Pt tolerated sitting on EOB for 3-5 minutes with CG. Pt unable to stand/ambulate at this time due to c/o nausea and generalized  weakness. Pt requires assistance with ADL's and transfers due to decreased strength, decreased endurance and impaired sitting/standing balance.

## 2023-05-16 LAB
ANION GAP SERPL CALC-SCNC: 0 MMOL/L — LOW (ref 5–17)
BUN SERPL-MCNC: 56 MG/DL — HIGH (ref 7–23)
CALCIUM SERPL-MCNC: 8 MG/DL — LOW (ref 8.5–10.1)
CHLORIDE SERPL-SCNC: 101 MMOL/L — SIGNIFICANT CHANGE UP (ref 96–108)
CO2 SERPL-SCNC: 35 MMOL/L — HIGH (ref 22–31)
CREAT SERPL-MCNC: 1.9 MG/DL — HIGH (ref 0.5–1.3)
EGFR: 24 ML/MIN/1.73M2 — LOW
GLUCOSE SERPL-MCNC: 113 MG/DL — HIGH (ref 70–99)
HCT VFR BLD CALC: 29.6 % — LOW (ref 34.5–45)
HGB BLD-MCNC: 9.3 G/DL — LOW (ref 11.5–15.5)
MCHC RBC-ENTMCNC: 30.2 PG — SIGNIFICANT CHANGE UP (ref 27–34)
MCHC RBC-ENTMCNC: 31.4 GM/DL — LOW (ref 32–36)
MCV RBC AUTO: 96.1 FL — SIGNIFICANT CHANGE UP (ref 80–100)
NRBC # BLD: 0 /100 WBCS — SIGNIFICANT CHANGE UP (ref 0–0)
PLATELET # BLD AUTO: 146 K/UL — LOW (ref 150–400)
POTASSIUM SERPL-MCNC: 4.5 MMOL/L — SIGNIFICANT CHANGE UP (ref 3.5–5.3)
POTASSIUM SERPL-SCNC: 4.5 MMOL/L — SIGNIFICANT CHANGE UP (ref 3.5–5.3)
RBC # BLD: 3.08 M/UL — LOW (ref 3.8–5.2)
RBC # FLD: 13.9 % — SIGNIFICANT CHANGE UP (ref 10.3–14.5)
SODIUM SERPL-SCNC: 136 MMOL/L — SIGNIFICANT CHANGE UP (ref 135–145)
WBC # BLD: 9.94 K/UL — SIGNIFICANT CHANGE UP (ref 3.8–10.5)
WBC # FLD AUTO: 9.94 K/UL — SIGNIFICANT CHANGE UP (ref 3.8–10.5)

## 2023-05-16 PROCEDURE — 99232 SBSQ HOSP IP/OBS MODERATE 35: CPT

## 2023-05-16 RX ADMIN — Medication 40 MILLIGRAM(S): at 05:00

## 2023-05-16 RX ADMIN — Medication 975 MILLIGRAM(S): at 22:00

## 2023-05-16 RX ADMIN — Medication 10 MILLIGRAM(S): at 18:51

## 2023-05-16 RX ADMIN — Medication 1 DROP(S): at 18:51

## 2023-05-16 RX ADMIN — ZOLPIDEM TARTRATE 5 MILLIGRAM(S): 10 TABLET ORAL at 22:03

## 2023-05-16 RX ADMIN — SODIUM CHLORIDE 40 MILLILITER(S): 9 INJECTION, SOLUTION INTRAVENOUS at 04:59

## 2023-05-16 RX ADMIN — Medication 975 MILLIGRAM(S): at 05:00

## 2023-05-16 RX ADMIN — Medication 1 DROP(S): at 05:01

## 2023-05-16 RX ADMIN — Medication 975 MILLIGRAM(S): at 05:29

## 2023-05-16 RX ADMIN — Medication 120 MILLIGRAM(S): at 05:00

## 2023-05-16 RX ADMIN — BRIMONIDINE TARTRATE 1 DROP(S): 2 SOLUTION/ DROPS OPHTHALMIC at 18:51

## 2023-05-16 RX ADMIN — Medication 975 MILLIGRAM(S): at 13:45

## 2023-05-16 RX ADMIN — BRIMONIDINE TARTRATE 1 DROP(S): 2 SOLUTION/ DROPS OPHTHALMIC at 05:01

## 2023-05-16 RX ADMIN — Medication 1 TABLET(S): at 11:23

## 2023-05-16 RX ADMIN — PANTOPRAZOLE SODIUM 40 MILLIGRAM(S): 20 TABLET, DELAYED RELEASE ORAL at 05:00

## 2023-05-16 RX ADMIN — SENNA PLUS 2 TABLET(S): 8.6 TABLET ORAL at 19:54

## 2023-05-16 RX ADMIN — Medication 975 MILLIGRAM(S): at 22:30

## 2023-05-16 RX ADMIN — APIXABAN 2.5 MILLIGRAM(S): 2.5 TABLET, FILM COATED ORAL at 05:01

## 2023-05-16 RX ADMIN — APIXABAN 2.5 MILLIGRAM(S): 2.5 TABLET, FILM COATED ORAL at 18:52

## 2023-05-16 RX ADMIN — Medication 40 MILLIGRAM(S): at 18:52

## 2023-05-16 RX ADMIN — POLYETHYLENE GLYCOL 3350 17 GRAM(S): 17 POWDER, FOR SOLUTION ORAL at 19:48

## 2023-05-16 RX ADMIN — Medication 975 MILLIGRAM(S): at 14:40

## 2023-05-16 NOTE — PROGRESS NOTE ADULT - SUBJECTIVE AND OBJECTIVE BOX
[facility] -- Armando Aly,  Heather, Meir Son Savella, Goodger  Office # [phone]    Follow Up:   Cardiac clearance, CAD, HTN    Subjective/Observations:  Patient seen and examined, awake, alert, sitting in the chair, eating breakfast, son at bedside, denies chest pain, dyspnea, palpitations or dizziness, orthopnea and PND. Tolerating room air      REVIEW OF SYSTEMS: All other review of systems is negative unless indicated above  PAST MEDICAL & SURGICAL HISTORY:  Benign Essential Hypertension      Chronic Sciatica      Personal History of Coronary Artery Disease      Hypertension      Atrial fibrillation      CVA (cerebral vascular accident)      S/P CABG        MEDICATIONS  (STANDING):  acetaminophen     Tablet .. 975 milliGRAM(s) Oral every 8 hours  apixaban 2.5 milliGRAM(s) Oral every 12 hours  brimonidine 0.2% Ophthalmic Solution 1 Drop(s) Both EYES two times a day  diltiazem    milliGRAM(s) Oral daily  HYDROmorphone  Injectable 0.5 milliGRAM(s) IV Push once  multivitamin 1 Tablet(s) Oral daily  pantoprazole    Tablet 40 milliGRAM(s) Oral before breakfast  polyethylene glycol 3350 17 Gram(s) Oral at bedtime  senna 2 Tablet(s) Oral at bedtime  timolol 0.5% Solution 1 Drop(s) Both EYES two times a day  torsemide 40 milliGRAM(s) Oral every 12 hours    MEDICATIONS  (PRN):  magnesium hydroxide Suspension 30 milliLiter(s) Oral daily PRN Constipation  ondansetron Injectable 4 milliGRAM(s) IV Push every 6 hours PRN Nausea and/or Vomiting  oxyCODONE    IR 10 milliGRAM(s) Oral every 4 hours PRN Severe Pain (7 - 10)  oxyCODONE    IR 5 milliGRAM(s) Oral every 4 hours PRN Moderate Pain (4 - 6)  traMADol 50 milliGRAM(s) Oral every 6 hours PRN Mild Pain (1 - 3)  zolpidem 5 milliGRAM(s) Oral at bedtime PRN Insomnia    Allergies    No Known Allergies    Intolerances      Vital Signs Last 24 Hrs  T(C): 37 (16 May 2023 04:44), Max: 37 (16 May 2023 04:44)  T(F): 98.6 (16 May 2023 04:44), Max: 98.6 (16 May 2023 04:44)  HR: 86 (16 May 2023 08:00) (86 - 100)  BP: 106/69 (16 May 2023 08:00) (100/62 - 122/81)  BP(mean): --  RR: 18 (16 May 2023 04:44) (18 - 19)  SpO2: 95% (16 May 2023 08:00) (95% - 98%)    Parameters below as of 16 May 2023 08:00  Patient On (Oxygen Delivery Method): nasal cannula  O2 Flow (L/min): 2    I&O's Summary    15 May 2023 07:01  -  16 May 2023 07:00  --------------------------------------------------------  IN: 1680 mL / OUT: 450 mL / NET: 1230 mL    TELE: Afib 90's   PHYSICAL EXAM:  Constitutional: NAD, awake and alert  HEENT: Moist Mucous Membranes, Anicteric  Pulmonary: Non-labored, breath sounds are clear bilaterally, No wheezing, rales or rhonchi  Cardiovascular: IRRR,  S1 and S2, No murmurs, rubs, gallops or clicks  Gastrointestinal: Bowel Sounds present, soft, nontender.   Lymph: No peripheral edema. No lymphadenopathy.  Skin: No visible rashes or ulcers.  Psych:  Mood & affect appropriate  LABS: All Labs Reviewed:                        9.3    9.94  )-----------( 146      ( 16 May 2023 06:25 )             29.6                         10.8   13.09 )-----------( 173      ( 15 May 2023 05:45 )             34.5                         11.9   14.13 )-----------( 218      ( 14 May 2023 22:21 )             38.3     16 May 2023 06:25    136    |  101    |  56     ----------------------------<  113    4.5     |  35     |  1.90   15 May 2023 05:45    140    |  103    |  47     ----------------------------<  105    4.5     |  33     |  1.50   14 May 2023 22:21    136    |  104    |  44     ----------------------------<  128    4.2     |  32     |  1.50     Ca    8.0        16 May 2023 06:25  Ca    8.7        15 May 2023 05:45  Ca    9.1        14 May 2023 22:21    TPro  7.1    /  Alb  3.3    /  TBili  0.5    /  DBili  x      /  AST  15     /  ALT  10     /  AlkPhos  57     14 May 2023 11:00    PT/INR - ( 14 May 2023 11:00 )   PT: 15.4 sec;   INR: 1.31 ratio         PTT - ( 14 May 2023 11:00 )  PTT:35.5 sec      12 Lead ECG:   Ventricular Rate 92 BPM    QRS Duration 128 ms    Q-T Interval 392 ms    QTC Calculation(Bazett) 484 ms    R Axis 2 degrees    T Axis -34 degrees    Diagnosis Line Atrial fibrillation  Right bundle branch block  T wave abnormality, consider inferolateral ischemia  Abnormal ECG  When compared with ECG of 14-MAY-2023 14:01, (Unconfirmed)  No significant change was found  Confirmed by VELMA ELLSWORTH ([id number]) on 5/15/2023 5:01:21 PM (05-14-23 @ 14:02)      Patient name: JIMMIE BRIAN  YOB: 1926   Age: 89 (F)   MR#: [id number]  Study Date: 11/22/2016  [facility] RD[facility]  Study quality: Technically difficult  Referring Physician: Sally Phipps MD  Blood Pressure: 153/78 mmHg  Height: 140 cm  Weight: 60 kg  BSA: 1.5 m2  ------------------------------------------------------------------------  PROCEDURE: Transthoracic echocardiogram with 2-D, M-Mode  and complete spectral and color flow Doppler.  INDICATION: Unspecified atrial fibrillation (I48.91)  ------------------------------------------------------------------------  Dimensions:    Normal Values:  LA:     4.6    2.0 - 4.0 cm  Ao:     3.4    2.0 - 3.8 cm  SEPTUM: 1.0    0.6 - 1.2 cm  PWT:    1.1  0.6 - 1.1 cm  LVIDd:  3.4    3.0 - 5.6 cm  LVIDs:  2.6    1.8 - 4.0 cm  Derived variables:  LVMI: 72 g/m2  RWT: 0.64  Fractional short: 24 %  EF (Myronicholtz): 48 %  ------------------------------------------------------------------------  Observations:  Mitral Valve: Mitral annular calcification. The posterior  annulus is heavily calcified. There appears to be  independently mobile material seen at the posterior  annulus. This may represent endocarditis, clinical  correlation indicated. Mild mitral regurgitation.  Aortic Valve/Aorta: Calcified trileaflet aortic valve with  normal opening. Mild-moderate aortic regurgitation.   msec  Aortic Root: 3.4 cm.  Left Atrium: Mildly dilated left atrium.  LA volume index =  37 cc/m2.  Left Ventricle: Endocardium not well visualized; Mild  segmental left ventricular systolic dysfunction. The  base-mid inferior wall, the mid lateral wall, and the basal  inferoseptum are hypokinetic.  Regional wall motion  variation is noted on the setting of atrial fibrillation.  Increased relative wall thickness with normal left  ventricular mass index, consistent with concentric left  ventricular remodeling.  Right Heart: Mild right atrial enlargement. Normal right  ventricular size with mildly  decreased right ventricular  systolic function. Normal tricuspid valve. Mild-moderate  tricuspid regurgitation. Normal pulmonic valve. Minimal  pulmonic regurgitation.  Pericardium/Pleura: Normal pericardium with no pericardial  effusion.  Hemodynamic: Estimated right atrial pressure is 8 mm Hg.  Estimated right ventricular systolic pressure equals 44 mm  Hg, assuming right atrial pressure equals 8 mm Hg,  consistent with mild pulmonary hypertension.  ------------------------------------------------------------------------  Conclusions:  1. Mitral annular calcification. The posterior annulus is  heavily calcified. There appears to be independently mobile  material seen at the posterior annulus. This may represent  endocarditis, clinical correlation indicated.  2. Calcified trileaflet aortic valve with normal opening.  Mild-moderate aortic regurgitation.  msec  3. Increased relative wall thickness with normal left  ventricular mass index, consistent with concentric left  ventricular remodeling.  4. Endocardium not well visualized; Mild segmental left  ventricular systolic dysfunction. The base-mid inferior  wall, the mid lateral wall, and the basal inferoseptum are  hypokinetic.  Regional wall motion variation is noted on  the setting ofatrial fibrillation.  5. Normal right ventricular size with mildly  decreased  right ventricular systolic function.  6. Normal tricuspid valve. Mild-moderate tricuspid  regurgitation.  7. Estimated pulmonary artery systolic pressure equals 44  mm Hg,assuming right atrial pressure equals 8 mm Hg,  consistent with mild pulmonary pressures.  ------------------------------------------------------------------------  Confirmed on  11/22/2016 - 14:45:39 by Jazzy Phillips M.D.  ------------------------------------------------------------------------

## 2023-05-16 NOTE — CARE COORDINATION ASSESSMENT. - NSCAREPROVIDERS_GEN_ALL_CORE_FT
CARE PROVIDERS:  Accepting Physician: Yvette Whitt  Administration: Kostas Mcrae  Administration: Tulio Nino  Administration: Constantine Cosby  Admitting: Yvette Whitt  Attending: Yvette Whitt  Case Management: Gilma Lee  Case Management: Federica Reddy  Consultant: Sadaf Palumbo  Consultant: Jeremy Bear  Consultant: Julio Romero  Consultant: Elissa Valdovinos  Consultant: Lei Son  Consultant: Hunter Worley  Consultant: Julio Rivera  Consultant: Mohinder Dawson  ED ACP: Abelardo Polk  ED Attending: Radames Souza  ED Nurse: Debra Damon  Nurse: Lavern Temple  Nurse: Anjelica Guzmán  Nurse: Ellen Akbar  Occupational Therapy: Jeannette Skaggs  Ordered: Physician, Ordering  Ordered: ADM, User  Ordered: ServiceAccount, SCMMLM  Ordered: ServiceAccount, SCMMLM  Ordered: ServiceAccount, SCMMLM  Ordered: ServiceAccount, SCMMLM  Outpatient Provider: Juanjo Rizo  Outpatient Provider: Yvette Whitt  PCA/Nursing Assistant: Maricruz Shah  Primary Team: Terrie Mcbride  Primary Team: Mohinder Phillips  Primary Team: [place]  Primary Team: Paxton Joseph  Registered Dietitian: Bee Beal

## 2023-05-16 NOTE — PROGRESS NOTE ADULT - ASSESSMENT
89 yo F with diastolic HF, CAD s/p CABG, RBBB, CKD, atrial fibrillation (on apixaban), HTN, moderate AS and pulmonary HTN, chronic sciatica, CVA  p/w fall.   Rt displaced intertrochanteric fracture    #Rt Hip Fracture-   sp ORIF 5/14  ortho fu  pain control  PT    #Postop anemia- sp 2u prbc  monitor hb - downtrending  transfuse prn for symptoms      #HFpEF, CAD, CABG, RBBB  , A fib on Eliquis, HTN, mod AS, mod Pulm HTN   No s/s volume overload,  cont, cardizem , asa, statin  hold torsemide dt worsening Cr  resumed eliquis postop      #Sarah on ckd likely dt blood loss anemia and diuretics  monitor cr trend    DVT ppx- home eliquis resumed    PT eval and dc planning    [facility]       plan d/w dgtr at bedside,                  89 yo F with diastolic HF, CAD s/p CABG, RBBB, CKD, atrial fibrillation (on apixaban), HTN, moderate AS and pulmonary HTN, chronic sciatica, CVA  p/w fall.   Rt displaced intertrochanteric fracture    #Rt Hip Fracture-   sp IMN 5/14  ortho fu  pain control  PT    #Postop anemia-   monitor hb - downtrending  transfuse prn for symptoms and hb<8      #HFpEF, CAD, CABG, RBBB  , A fib on Eliquis, HTN, mod AS, mod Pulm HTN   No s/s volume overload,  cont, cardizem , asa, statin  hold torsemide dt worsening Cr  resumed eliquis postop      #Sarah on ckd likely dt blood loss anemia and diuretics  monitor cr trend    DVT ppx- home eliquis resumed    PT eval and dc planning    [facility]   [phone]    plan d/w dgtr at bedside,

## 2023-05-16 NOTE — PROGRESS NOTE ADULT - SUBJECTIVE AND OBJECTIVE BOX
Patient is a 89y old  Female who presents with a chief complaint of Fall hip pain (16 May 2023 11:23)      INTERVAL HPI/OVERNIGHT EVENTS: noted  pt seen and examined this am   events noted  feels well      Vital Signs Last 24 Hrs  T(C): 36.6 (16 May 2023 12:18), Max: 37 (16 May 2023 04:44)  T(F): 97.8 (16 May 2023 12:18), Max: 98.6 (16 May 2023 04:44)  HR: 90 (16 May 2023 12:18) (86 - 100)  BP: 111/69 (16 May 2023 12:18) (100/62 - 122/81)  BP(mean): --  RR: 18 (16 May 2023 12:18) (18 - 19)  SpO2: 98% (16 May 2023 12:18) (95% - 98%)    Parameters below as of 16 May 2023 12:18  Patient On (Oxygen Delivery Method): room air  O2 Flow (L/min): 2      acetaminophen     Tablet .. 975 milliGRAM(s) Oral every 8 hours  apixaban 2.5 milliGRAM(s) Oral every 12 hours  brimonidine 0.2% Ophthalmic Solution 1 Drop(s) Both EYES two times a day  diltiazem    milliGRAM(s) Oral daily  HYDROmorphone  Injectable 0.5 milliGRAM(s) IV Push once  magnesium hydroxide Suspension 30 milliLiter(s) Oral daily PRN  multivitamin 1 Tablet(s) Oral daily  ondansetron Injectable 4 milliGRAM(s) IV Push every 6 hours PRN  oxyCODONE    IR 10 milliGRAM(s) Oral every 4 hours PRN  oxyCODONE    IR 5 milliGRAM(s) Oral every 4 hours PRN  pantoprazole    Tablet 40 milliGRAM(s) Oral before breakfast  polyethylene glycol 3350 17 Gram(s) Oral at bedtime  senna 2 Tablet(s) Oral at bedtime  timolol 0.5% Solution 1 Drop(s) Both EYES two times a day  torsemide 40 milliGRAM(s) Oral every 12 hours  traMADol 50 milliGRAM(s) Oral every 6 hours PRN  zolpidem 5 milliGRAM(s) Oral at bedtime PRN      PHYSICAL EXAM:  GENERAL: NAD,   EYES: conjunctiva and sclera clear  ENMT: Moist mucous membranes  NECK: Supple, No JVD, Normal thyroid  CHEST/LUNG: non labored, cta b/l  HEART: Regular rate and rhythm; No murmurs, rubs, or gallops  ABDOMEN: Soft, Nontender, Nondistended; Bowel sounds present  EXTREMITIES:  2+ Peripheral Pulses, No clubbing, cyanosis, or edema  LYMPH: No lymphadenopathy noted  SKIN: No rashes or lesions    Consultant(s) Notes Reviewed:  [x ] YES  [ ] NO  Care Discussed with Consultants/Other Providers [ x] YES  [ ] NO    LABS:                        9.3    9.94  )-----------( 146      ( 16 May 2023 06:25 )             29.6     05-16    136  |  101  |  56<H>  ----------------------------<  113<H>  4.5   |  35<H>  |  1.90<H>    Ca    8.0<L>      16 May 2023 06:25          CAPILLARY BLOOD GLUCOSE                  RADIOLOGY & ADDITIONAL TESTS:    Imaging Personally Reviewed:  [x ] YES  [ ] NO

## 2023-05-16 NOTE — PROGRESS NOTE ADULT - ASSESSMENT
89 yo F with diastolic HF, CAD s/p CABG, RBBB, CKD, atrial fibrillation (on apixaban), HTN, moderate AS and pulmonary HTN, chronic sciatica, CVA  p/w fall.   S/p Repair of fracture of right hip  ID c/s for leukocytosis    Recommendations  Pt evaluated, no focal sx c/f infection  Leukocytosis likely post-op -- resolved  Monitor off Abx  Additional management per primary team    Infectious Diseases will continue to follow. Please call with any questions.   Sadaf Palumbo M.D.  [facility] Division of Infectious Diseases [phone]

## 2023-05-16 NOTE — PROGRESS NOTE ADULT - ASSESSMENT
89 yo F with diastolic HF, CAD s/p CABG, RBBB, CKD, atrial fibrillation (on apixaban), HTN, moderate AS and pulmonary HTN, chronic sciatica, CVA  p/w fall.     Cardiac clearance CHF, CAD, CABG, RBBB, A fib on Eliquis, HTN, mod AS, mod Pulm HTN  - s/p fall, sustaining IT fracture, s/p R IMN,  POD# 2, tolerated well from CV POV, ortho following     - No evidence of any meaningful volume overload. NO O2 requirement   - ECHO 06/2022 showed mild to mod MR, mod AS, mild to mod AR, mild MS, sev dilated LA, mod BLAKE, normal LV & RV size and function, mild to mod TR, mod Pulm HTN   - f/u routine TTE   - on Torsemide 40 mg PO BID(home dose), creat increasing, would hold for now  - continue to monitor renal indices  - Monitor volume status closely, strict I/Os     - Known A fib, HR mostly 90's, no events noted x 48 hours, would dc telemonitoring   - BP stable and controlled   - Continue Home Cardizem 240 am and 120 pm  - continue Eliquis  - continue to monitor routine hemodynamics  - Monitor and replete lytes, keep K>4, Mg>2.    - EKG showed A fib, RBBB @ 92  - No anginal complaints  - No evidence of any active ischemia   - Continue ASA, statin     - Will continue to follow.    Elissa Valdovinos, [facility]  Nurse Practitioner - Cardiology   Spectra #[id number]/ [phone]

## 2023-05-16 NOTE — CARE COORDINATION ASSESSMENT. - OTHER PERTINENT REFERRAL INFORMATION
Met with patient and daughter at the bedside. Explained the role of case management/discharge planning. Daughter verbalized understanding. Provided contact information and discharge planning packet. Patient resides in a pvt home with a pvt hire 24hr aide. Patient has a walker, commode an a purewick at home. Patient s/p fall sustained a Rt displaced intertrochanteric fracture. Patient underwent a IM nailing. PT recommending MIN however patient and daughter requesting that patient be discharge home. Discussed home care services of PT, OT and VN. Patients daughter in agreement. Will arrange home care services upon discharge. Daughter would like [facility] to provided services upon discharge

## 2023-05-16 NOTE — CARE COORDINATION ASSESSMENT. - NSPASTMEDSURGHISTORY_GEN_ALL_CORE_FT
PAST MEDICAL & SURGICAL HISTORY:  Chronic Sciatica      Benign Essential Hypertension      Personal History of Coronary Artery Disease      S/P CABG      CVA (cerebral vascular accident)      Atrial fibrillation      Hypertension

## 2023-05-16 NOTE — PROGRESS NOTE ADULT - SUBJECTIVE AND OBJECTIVE BOX
Patient seen and examined at bedside.  No acute complaints at this time. Pain well controlled. Denies chest pain, shortness of breath, nausea or vomiting.     LABS:                        10.8   13.09 )-----------( 173      ( 15 May 2023 05:45 )             34.5     05-15    140  |  103  |  47<H>  ----------------------------<  105<H>  4.5   |  33<H>  |  1.50<H>    Ca    8.7      15 May 2023 05:45    TPro  7.1  /  Alb  3.3  /  TBili  0.5  /  DBili  x   /  AST  15  /  ALT  10<L>  /  AlkPhos  57  05-14    PT/INR - ( 14 May 2023 11:00 )   PT: 15.4 sec;   INR: 1.31 ratio         PTT - ( 14 May 2023 11:00 )  PTT:35.5 sec      VITAL SIGNS:  T(C): 37 (05-16-23 @ 04:44), Max: 37 (05-16-23 @ 04:44)  HR: 100 (05-16-23 @ 04:44) (87 - 100)  BP: 122/81 (05-16-23 @ 04:44) (98/57 - 122/81)  RR: 18 (05-16-23 @ 04:44) (18 - 19)  SpO2: 96% (05-16-23 @ 04:44) (83% - 98%)    General: NAD, resting comfortably in bed  RLE:   Dressing C/D/I  SCDs present bilaterally  Compartments soft and compressible  No calf tenderness bilaterally  +TA/EHL/FHL/GSC  SILT L3-S1  + DP/PT                    A/P:  89y f s/p R IMN for IT Fx POD 2    -PT/OT   -WBAT on the b/l LEs  -Pain Control prn  -DVT ppx w/ Eliquis   -Continue perioperative abx x 24 hours  -FU AM Labs  -Rest, ice, compress and elevate the extremity as we needed  -Incentive Spirometry  -Medical management appreciated  -Dispo: MIN  -No further acute orthopaedic surgical intervention indicated  -Ortho stable for dc when medically stable

## 2023-05-16 NOTE — PROGRESS NOTE ADULT - SUBJECTIVE AND OBJECTIVE BOX
[facility], Division of Infectious Diseases  GRADY Parker Y. Patel, S. Shah, G. [facility]  [phone]    Name: JIMMIE BRIAN  Age: 89y  Gender: Female  MRN: [id number]    Interval History:  Patient seen and examined at bedside this morning  No acute overnight events. Afebrile  No complaints  Feeling better  Notes reviewed    Antibiotics:      Medications:  acetaminophen     Tablet .. 975 milliGRAM(s) Oral every 8 hours  apixaban 2.5 milliGRAM(s) Oral every 12 hours  brimonidine 0.2% Ophthalmic Solution 1 Drop(s) Both EYES two times a day  diltiazem    milliGRAM(s) Oral daily  HYDROmorphone  Injectable 0.5 milliGRAM(s) IV Push once  magnesium hydroxide Suspension 30 milliLiter(s) Oral daily PRN  multivitamin 1 Tablet(s) Oral daily  ondansetron Injectable 4 milliGRAM(s) IV Push every 6 hours PRN  oxyCODONE    IR 10 milliGRAM(s) Oral every 4 hours PRN  oxyCODONE    IR 5 milliGRAM(s) Oral every 4 hours PRN  pantoprazole    Tablet 40 milliGRAM(s) Oral before breakfast  polyethylene glycol 3350 17 Gram(s) Oral at bedtime  senna 2 Tablet(s) Oral at bedtime  timolol 0.5% Solution 1 Drop(s) Both EYES two times a day  torsemide 40 milliGRAM(s) Oral every 12 hours  traMADol 50 milliGRAM(s) Oral every 6 hours PRN  zolpidem 5 milliGRAM(s) Oral at bedtime PRN      Review of Systems:  A 10-point review of systems was obtained.     Pertinent positives and negatives--  Constitutional: No fevers. No Chills. No Rigors.   Cardiovascular: No chest pain. No palpitations.  Respiratory: No shortness of breath. No cough.  Gastrointestinal: No nausea or vomiting. No diarrhea or constipation.   Psychiatric: Pleasant. Appropriate affect.    Review of systems otherwise negative except as previously noted.    Allergies: No Known Allergies    For details regarding the patient's past medical history, social history, family history, and other miscellaneous elements, please refer the initial infectious diseases consultation and/or the admitting history and physical examination for this admission.    Objective:  Vitals:   T(C): 37 (05-16-23 @ 04:44), Max: 37 (05-16-23 @ 04:44)  HR: 86 (05-16-23 @ 08:00) (86 - 100)  BP: 106/69 (05-16-23 @ 08:00) (100/62 - 122/81)  RR: 18 (05-16-23 @ 04:44) (18 - 19)  SpO2: 95% (05-16-23 @ 08:00) (95% - 96%)    Physical Examination:  General: no acute distress  HEENT: NC/AT, EOMI,   Cardio: S1, S2 heard, RRR, no murmurs  Resp: breath sounds heard bilaterally, no rales, wheezes or rhonchi  Abd: soft, NT, ND  Ext: no edema or cyanosis  Skin: warm, dry, no visible rash      Laboratory Studies:  CBC:                       9.3    9.94  )-----------( 146      ( 16 May 2023 06:25 )             29.6     CMP: 05-16    136  |  101  |  56<H>  ----------------------------<  113<H>  4.5   |  35<H>  |  1.90<H>    Ca    8.0<L>      16 May 2023 06:25            Microbiology: reviewed        Radiology: reviewed

## 2023-05-17 LAB
ANION GAP SERPL CALC-SCNC: 0 MMOL/L — LOW (ref 5–17)
BUN SERPL-MCNC: 56 MG/DL — HIGH (ref 7–23)
CALCIUM SERPL-MCNC: 8 MG/DL — LOW (ref 8.5–10.1)
CHLORIDE SERPL-SCNC: 102 MMOL/L — SIGNIFICANT CHANGE UP (ref 96–108)
CO2 SERPL-SCNC: 32 MMOL/L — HIGH (ref 22–31)
CREAT SERPL-MCNC: 1.7 MG/DL — HIGH (ref 0.5–1.3)
EGFR: 27 ML/MIN/1.73M2 — LOW
GLUCOSE SERPL-MCNC: 108 MG/DL — HIGH (ref 70–99)
HCT VFR BLD CALC: 26.7 % — LOW (ref 34.5–45)
HGB BLD-MCNC: 8.3 G/DL — LOW (ref 11.5–15.5)
MCHC RBC-ENTMCNC: 29.2 PG — SIGNIFICANT CHANGE UP (ref 27–34)
MCHC RBC-ENTMCNC: 31.1 GM/DL — LOW (ref 32–36)
MCV RBC AUTO: 94 FL — SIGNIFICANT CHANGE UP (ref 80–100)
NRBC # BLD: 0 /100 WBCS — SIGNIFICANT CHANGE UP (ref 0–0)
PLATELET # BLD AUTO: 166 K/UL — SIGNIFICANT CHANGE UP (ref 150–400)
POTASSIUM SERPL-MCNC: 4.5 MMOL/L — SIGNIFICANT CHANGE UP (ref 3.5–5.3)
POTASSIUM SERPL-SCNC: 4.5 MMOL/L — SIGNIFICANT CHANGE UP (ref 3.5–5.3)
RBC # BLD: 2.84 M/UL — LOW (ref 3.8–5.2)
RBC # FLD: 13.9 % — SIGNIFICANT CHANGE UP (ref 10.3–14.5)
SODIUM SERPL-SCNC: 134 MMOL/L — LOW (ref 135–145)
WBC # BLD: 9.78 K/UL — SIGNIFICANT CHANGE UP (ref 3.8–10.5)
WBC # FLD AUTO: 9.78 K/UL — SIGNIFICANT CHANGE UP (ref 3.8–10.5)

## 2023-05-17 PROCEDURE — 99232 SBSQ HOSP IP/OBS MODERATE 35: CPT

## 2023-05-17 RX ADMIN — ZOLPIDEM TARTRATE 5 MILLIGRAM(S): 10 TABLET ORAL at 22:09

## 2023-05-17 RX ADMIN — Medication 975 MILLIGRAM(S): at 23:09

## 2023-05-17 RX ADMIN — Medication 975 MILLIGRAM(S): at 05:58

## 2023-05-17 RX ADMIN — Medication 1 TABLET(S): at 11:09

## 2023-05-17 RX ADMIN — Medication 975 MILLIGRAM(S): at 14:00

## 2023-05-17 RX ADMIN — APIXABAN 2.5 MILLIGRAM(S): 2.5 TABLET, FILM COATED ORAL at 05:00

## 2023-05-17 RX ADMIN — Medication 1 DROP(S): at 05:00

## 2023-05-17 RX ADMIN — Medication 975 MILLIGRAM(S): at 22:09

## 2023-05-17 RX ADMIN — PANTOPRAZOLE SODIUM 40 MILLIGRAM(S): 20 TABLET, DELAYED RELEASE ORAL at 05:00

## 2023-05-17 RX ADMIN — SENNA PLUS 2 TABLET(S): 8.6 TABLET ORAL at 22:08

## 2023-05-17 RX ADMIN — POLYETHYLENE GLYCOL 3350 17 GRAM(S): 17 POWDER, FOR SOLUTION ORAL at 22:10

## 2023-05-17 RX ADMIN — APIXABAN 2.5 MILLIGRAM(S): 2.5 TABLET, FILM COATED ORAL at 17:57

## 2023-05-17 RX ADMIN — Medication 40 MILLIGRAM(S): at 17:56

## 2023-05-17 RX ADMIN — Medication 975 MILLIGRAM(S): at 05:00

## 2023-05-17 RX ADMIN — Medication 975 MILLIGRAM(S): at 13:09

## 2023-05-17 RX ADMIN — Medication 40 MILLIGRAM(S): at 05:00

## 2023-05-17 RX ADMIN — Medication 120 MILLIGRAM(S): at 05:01

## 2023-05-17 RX ADMIN — Medication 1 DROP(S): at 17:56

## 2023-05-17 RX ADMIN — BRIMONIDINE TARTRATE 1 DROP(S): 2 SOLUTION/ DROPS OPHTHALMIC at 17:56

## 2023-05-17 RX ADMIN — BRIMONIDINE TARTRATE 1 DROP(S): 2 SOLUTION/ DROPS OPHTHALMIC at 05:00

## 2023-05-17 NOTE — PROGRESS NOTE ADULT - SUBJECTIVE AND OBJECTIVE BOX
[facility], Division of Infectious Diseases  GRADY Parker Y. Patel, S. Shah, G. [facility]  [phone]    Name: JIMMIE BRIAN  Age: 89y  Gender: Female  MRN: [id number]    Interval History:  Patient seen and examined at bedside  No acute overnight events. Afebrile  No complaints  Sleeping comfortably in chair  Notes reviewed    Antibiotics:      Medications:  acetaminophen     Tablet .. 975 milliGRAM(s) Oral every 8 hours  apixaban 2.5 milliGRAM(s) Oral every 12 hours  brimonidine 0.2% Ophthalmic Solution 1 Drop(s) Both EYES two times a day  diltiazem    milliGRAM(s) Oral daily  HYDROmorphone  Injectable 0.5 milliGRAM(s) IV Push once  magnesium hydroxide Suspension 30 milliLiter(s) Oral daily PRN  multivitamin 1 Tablet(s) Oral daily  ondansetron Injectable 4 milliGRAM(s) IV Push every 6 hours PRN  oxyCODONE    IR 5 milliGRAM(s) Oral every 4 hours PRN  oxyCODONE    IR 10 milliGRAM(s) Oral every 4 hours PRN  pantoprazole    Tablet 40 milliGRAM(s) Oral before breakfast  polyethylene glycol 3350 17 Gram(s) Oral at bedtime  senna 2 Tablet(s) Oral at bedtime  timolol 0.5% Solution 1 Drop(s) Both EYES two times a day  torsemide 40 milliGRAM(s) Oral every 12 hours  traMADol 50 milliGRAM(s) Oral every 6 hours PRN  zolpidem 5 milliGRAM(s) Oral at bedtime PRN      Review of Systems:  Review of systems otherwise negative except as previously noted.    Allergies: No Known Allergies    For details regarding the patient's past medical history, social history, family history, and other miscellaneous elements, please refer the initial infectious diseases consultation and/or the admitting history and physical examination for this admission.    Objective:  Vitals:   T(C): 36.7 (05-17-23 @ 11:58), Max: 36.7 (05-16-23 @ 21:16)  HR: 83 (05-17-23 @ 11:58) (83 - 100)  BP: 103/61 (05-17-23 @ 11:58) (101/59 - 138/86)  RR: 18 (05-17-23 @ 11:58) (17 - 18)  SpO2: 99% (05-17-23 @ 11:58) (85% - 99%)    Physical Examination:  General: no acute distress  HEENT: NC/AT, EOMI,  Cardio: S1, S2 heard, RRR, no murmurs  Resp: breath sounds heard bilaterally, no rales, wheezes or rhonchi  Abd: soft, NT, ND,  Ext: no edema or cyanosis  Skin: warm, dry, no visible rash      Laboratory Studies:  CBC:                       8.3    9.78  )-----------( 166      ( 17 May 2023 07:30 )             26.7     CMP: 05-17    134<L>  |  102  |  56<H>  ----------------------------<  108<H>  4.5   |  32<H>  |  1.70<H>    Ca    8.0<L>      17 May 2023 07:30            Microbiology: reviewed        Radiology: reviewed

## 2023-05-17 NOTE — PROGRESS NOTE ADULT - SUBJECTIVE AND OBJECTIVE BOX
Patient seen and examined at bedside.  No acute complaints at this time. Pain well controlled. Denies chest pain, shortness of breath, nausea or vomiting.     LABS:                        9.3    9.94  )-----------( 146      ( 16 May 2023 06:25 )             29.6     05-16    136  |  101  |  56<H>  ----------------------------<  113<H>  4.5   |  35<H>  |  1.90<H>    Ca    8.0<L>      16 May 2023 06:25      VITAL SIGNS:  T(C): 36.6 (05-17-23 @ 04:42), Max: 36.7 (05-16-23 @ 21:16)  HR: 99 (05-17-23 @ 04:42) (86 - 100)  BP: 128/72 (05-17-23 @ 04:42) (106/69 - 138/86)  RR: 17 (05-17-23 @ 04:42) (17 - 18)  SpO2: 98% (05-17-23 @ 04:42) (95% - 98%)    General: NAD, resting comfortably in bed  RLE:   Dressing C/D/I  SCDs present bilaterally  Compartments soft and compressible  No calf tenderness bilaterally  +TA/EHL/FHL/GSC  SILT L3-S1  + DP/PT          A/P:  89y f s/p R IMN for IT Fx POD 3    -PT/OT   -WBAT on the b/l LEs  -Pain Control prn  -DVT ppx w/ Eliquis   -FU AM Labs  -Rest, ice, compress and elevate the extremity as we needed  -Incentive Spirometry  -Medical management appreciated  -Dispo: MIN  -No further acute orthopaedic surgical intervention indicated  -Ortho stable for dc when medically stable

## 2023-05-17 NOTE — PROGRESS NOTE ADULT - ASSESSMENT
89 yo F with diastolic HF, CAD s/p CABG, RBBB, CKD, atrial fibrillation (on apixaban), HTN, moderate AS and pulmonary HTN, chronic sciatica, CVA  p/w fall.   S/p Repair of fracture of right hip  ID c/s for leukocytosis    Recommendations  Pt evaluated, no focal sx c/f infection  Leukocytosis likely post-op -- resolved  Monitor off Abx  Additional management per primary team    Please call with any questions.   Sadaf Palumbo M.D.  [facility] Division of Infectious Diseases [phone]

## 2023-05-17 NOTE — PROGRESS NOTE ADULT - ASSESSMENT
89 yo F with diastolic HF, CAD s/p CABG, RBBB, CKD, atrial fibrillation (on apixaban), HTN, moderate AS and pulmonary HTN, chronic sciatica, CVA  p/w fall.   Rt displaced intertrochanteric fracture    #Rt Hip Fracture-   sp IMN 5/14  ortho fu  pain control  PT eval    #Postop anemia-   monitor hb - downtrending  will transfuse 1u prbc today      #HFpEF, CAD, CABG, RBBB  , A fib on Eliquis, HTN, mod AS, mod Pulm HTN   No s/s volume overload,  cont, cardizem , asa, statin  will give torsemide as pt receiving bld transfusion  resumed eliquis postop      #Sarah on ckd likely dt blood loss anemia and diuretics  monitor cr trend- stable    DVT ppx- home eliquis resumed    PT eval and dc planning    [facility]   [phone]    plan d/w dgtr at bedside,                  89 yo F with diastolic HF, CAD s/p CABG, RBBB, CKD, atrial fibrillation (on apixaban), HTN, moderate AS and pulmonary HTN, chronic sciatica, CVA  p/w fall.   Rt displaced intertrochanteric fracture    #Rt Hip Fracture-   sp IMN 5/14  ortho fu  pain control  PT eval    #Postop anemia-   monitor hb - downtrending  will transfuse 1u prbc today  monitor for s/s fluid overload      #HFpEF, CAD, CABG, RBBB  , A fib on Eliquis, HTN, mod AS, mod Pulm HTN   No s/s volume overload,  cont, cardizem , asa, statin  torsemide 40 bid to cont  resumed eliquis postop      #Sarah on ckd likely dt blood loss anemia and diuretics  monitor cr trend- stable    DVT ppx- home eliquis resumed    PT eval and dc planning    [facility]   [phone]    plan d/w dgtr at bedside,

## 2023-05-17 NOTE — PROGRESS NOTE ADULT - SUBJECTIVE AND OBJECTIVE BOX
[facility] -- Armando Aly,  Heather, Meir Son Savella, [place]  Office # [phone]    Follow Up:  Cardiac clearance, CAD, HTN    Subjective/Observations: Patient seen and examined, awake, alert, eating breakfast in bed, daughter at bedside, denies chest pain, dyspnea, palpitations or dizziness, orthopnea and PND. R hip Aquacell intact, c/o mild discomfort sx site, tolerating room air     REVIEW OF SYSTEMS: All other review of systems is negative unless indicated above  PAST MEDICAL & SURGICAL HISTORY:  Benign Essential Hypertension      Chronic Sciatica      Personal History of Coronary Artery Disease      Hypertension      Atrial fibrillation      CVA (cerebral vascular accident)      S/P CABG        MEDICATIONS  (STANDING):  acetaminophen     Tablet .. 975 milliGRAM(s) Oral every 8 hours  apixaban 2.5 milliGRAM(s) Oral every 12 hours  brimonidine 0.2% Ophthalmic Solution 1 Drop(s) Both EYES two times a day  diltiazem    milliGRAM(s) Oral daily  HYDROmorphone  Injectable 0.5 milliGRAM(s) IV Push once  multivitamin 1 Tablet(s) Oral daily  pantoprazole    Tablet 40 milliGRAM(s) Oral before breakfast  polyethylene glycol 3350 17 Gram(s) Oral at bedtime  senna 2 Tablet(s) Oral at bedtime  timolol 0.5% Solution 1 Drop(s) Both EYES two times a day  torsemide 40 milliGRAM(s) Oral every 12 hours    MEDICATIONS  (PRN):  magnesium hydroxide Suspension 30 milliLiter(s) Oral daily PRN Constipation  ondansetron Injectable 4 milliGRAM(s) IV Push every 6 hours PRN Nausea and/or Vomiting  oxyCODONE    IR 10 milliGRAM(s) Oral every 4 hours PRN Severe Pain (7 - 10)  oxyCODONE    IR 5 milliGRAM(s) Oral every 4 hours PRN Moderate Pain (4 - 6)  traMADol 50 milliGRAM(s) Oral every 6 hours PRN Mild Pain (1 - 3)  zolpidem 5 milliGRAM(s) Oral at bedtime PRN Insomnia    Allergies    No Known Allergies    Intolerances      Vital Signs Last 24 Hrs  T(C): 36.6 (17 May 2023 04:42), Max: 36.7 (16 May 2023 21:16)  T(F): 97.8 (17 May 2023 04:42), Max: 98.1 (16 May 2023 21:16)  HR: 99 (17 May 2023 04:42) (90 - 100)  BP: 128/72 (17 May 2023 04:42) (111/60 - 138/86)  BP(mean): --  RR: 17 (17 May 2023 04:42) (17 - 18)  SpO2: 95% (17 May 2023 09:18) (85% - 98%)    Parameters below as of 17 May 2023 09:18  Patient On (Oxygen Delivery Method): nasal cannula  O2 Flow (L/min): 2    I&O's Summary    16 May 2023 07:01  -  17 May 2023 07:00  --------------------------------------------------------  IN: 1060 mL / OUT: 700 mL / NET: 360 mL        TELE: Afib 90's up to 110's non sustained   PHYSICAL EXAM:  Constitutional: NAD, awake and alert  HEENT: Moist Mucous Membranes, Anicteric  Pulmonary: Non-labored, breath sounds are clear bilaterally, No wheezing, rales or rhonchi  Cardiovascular: IRRR,  S1 and S2, No murmurs, rubs, gallops or clicks  Gastrointestinal: Bowel Sounds present, soft, nontender.   Lymph: No peripheral edema. No lymphadenopathy.  Skin: No visible rashes or ulcers. Right hip aqua cell intact   Psych:  Mood & affect appropriate                          8.3    9.78  )-----------( 166      ( 17 May 2023 07:30 )             26.7                         9.3    9.94  )-----------( 146      ( 16 May 2023 06:25 )             29.6                         10.8   13.09 )-----------( 173      ( 15 May 2023 05:45 )             34.5     17 May 2023 07:30    134    |  102    |  56     ----------------------------<  108    4.5     |  32     |  1.70   16 May 2023 06:25    136    |  101    |  56     ----------------------------<  113    4.5     |  35     |  1.90   15 May 2023 05:45    140    |  103    |  47     ----------------------------<  105    4.5     |  33     |  1.50     Ca    8.0        17 May 2023 07:30  Ca    8.0        16 May 2023 06:25  Ca    8.7        15 May 2023 05:45    TPro  7.1    /  Alb  3.3    /  TBili  0.5    /  DBili  x      /  AST  15     /  ALT  10     /  AlkPhos  57     14 May 2023 11:00          12 Lead ECG:   Ventricular Rate 92 BPM    QRS Duration 128 ms    Q-T Interval 392 ms    QTC Calculation(Bazett) 484 ms    R Axis 2 degrees    T Axis -34 degrees    Diagnosis Line Atrial fibrillation  Right bundle branch block  T wave abnormality, consider inferolateral ischemia  Abnormal ECG  When compared with ECG of 14-MAY-2023 14:01, (Unconfirmed)  No significant change was found  Confirmed by VELMA ELLSWORTH ([id number]) on 5/15/2023 5:01:21 PM (05-14-23 @ 14:02)      Patient name: JIMMIE BRIAN  YOB: 1926   Age: 89 (F)   MR#: [id number]  Study Date: 11/22/2016  [facility] Malia Oneill RDCS  Study quality: Technically difficult  Referring Physician: Sally Phipps MD  Blood Pressure: 153/78 mmHg  Height: 140 cm  Weight: 60 kg  BSA: 1.5 m2  ------------------------------------------------------------------------  PROCEDURE: Transthoracic echocardiogram with 2-D, M-Mode  and complete spectral and color flow Doppler.  INDICATION: Unspecified atrial fibrillation (I48.91)  ------------------------------------------------------------------------  Dimensions:    Normal Values:  LA:     4.6    2.0 - 4.0 cm  Ao:     3.4    2.0 - 3.8 cm  SEPTUM: 1.0    0.6 - 1.2 cm  PWT:    1.1  0.6 - 1.1 cm  LVIDd:  3.4    3.0 - 5.6 cm  LVIDs:  2.6    1.8 - 4.0 cm  Derived variables:  LVMI: 72 g/m2  RWT: 0.64  Fractional short: 24 %  EF (Teicholtz): 48 %  ------------------------------------------------------------------------  Observations:  Mitral Valve: Mitral annular calcification. The posterior  annulus is heavily calcified. There appears to be  independently mobile material seen at the posterior  annulus. This may represent endocarditis, clinical  correlation indicated. Mild mitral regurgitation.  Aortic Valve/Aorta: Calcified trileaflet aortic valve with  normal opening. Mild-moderate aortic regurgitation.   msec  Aortic Root: 3.4 cm.  Left Atrium: Mildly dilated left atrium.  LA volume index =  37 cc/m2.  Left Ventricle: Endocardium not well visualized; Mild  segmental left ventricular systolic dysfunction. The  base-mid inferior wall, the mid lateral wall, and the basal  inferoseptum are hypokinetic.  Regional wall motion  variation is noted on the setting of atrial fibrillation.  Increased relative wall thickness with normal left  ventricular mass index, consistent with concentric left  ventricular remodeling.  Right Heart: Mild right atrial enlargement. Normal right  ventricular size with mildly  decreased right ventricular  systolic function. Normal tricuspid valve. Mild-moderate  tricuspid regurgitation. Normal pulmonic valve. Minimal  pulmonic regurgitation.  Pericardium/Pleura: Normal pericardium with no pericardial  effusion.  Hemodynamic: Estimated right atrial pressure is 8 mm Hg.  Estimated right ventricular systolic pressure equals 44 mm  Hg, assuming right atrial pressure equals 8 mm Hg,  consistent with mild pulmonary hypertension.  ------------------------------------------------------------------------  Conclusions:  1. Mitral annular calcification. The posterior annulus is  heavily calcified. There appears to be independently mobile  material seen at the posterior annulus. This may represent  endocarditis, clinical correlation indicated.  2. Calcified trileaflet aortic valve with normal opening.  Mild-moderate aortic regurgitation.  msec  3. Increased relative wall thickness with normal left  ventricular mass index, consistent with concentric left  ventricular remodeling.  4. Endocardium not well visualized; Mild segmental left  ventricular systolic dysfunction. The base-mid inferior  wall, the mid lateral wall, and the basal inferoseptum are  hypokinetic.  Regional wall motion variation is noted on  the setting ofatrial fibrillation.  5. Normal right ventricular size with mildly  decreased  right ventricular systolic function.  6. Normal tricuspid valve. Mild-moderate tricuspid  regurgitation.  7. Estimated pulmonary artery systolic pressure equals 44  mm Hg,assuming right atrial pressure equals 8 mm Hg,  consistent with mild pulmonary pressures.  ------------------------------------------------------------------------  Confirmed on  11/22/2016 - 14:45:39 by Jazzy Phillips M.D.  ------------------------------------------------------------------------

## 2023-05-17 NOTE — PROGRESS NOTE ADULT - ASSESSMENT
89 yo F with diastolic HF, CAD s/p CABG, RBBB, CKD, atrial fibrillation (on apixaban), HTN, moderate AS and pulmonary HTN, chronic sciatica, CVA  p/w fall.     Cardiac clearance CHF, CAD, CABG, RBBB, A fib on Eliquis, HTN, mod AS, mod Pulm HTN  - s/p fall, sustaining IT fracture, s/p R IMN,  POD# 3, tolerated well from CV POV, ortho following     - No evidence of any meaningful volume overload. NO O2 requirement   - ECHO 06/2022 showed mild to mod MR, mod AS, mild to mod AR, mild MS, sev dilated LA, mod BLAKE, normal LV & RV size and function, mild to mod TR, mod Pulm HTN   - f/u routine TTE   - on Torsemide 40 mg PO BID (home dose), creat increasing, would hold for now  - continue to monitor renal indices  - Monitor volume status closely, strict I/Os     - Known A fib, HR mostly 90's, no events noted x 48 hours, would dc telemonitoring   - BP stable and controlled   - Continue Home Cardizem 240 am and 120 pm  - continue Eliquis  - continue to monitor routine hemodynamics  - Monitor and replete lytes, keep K>4, Mg>2.    - EKG showed A fib, RBBB @ 92  - No anginal complaints  - No evidence of any active ischemia   - Continue ASA, statin     - DC planning per primary   - Will continue to follow.    Elissa Valdovinos [facility]  Nurse Practitioner - Cardiology   Spectra #[id number]/ [phone]

## 2023-05-17 NOTE — PROGRESS NOTE ADULT - SUBJECTIVE AND OBJECTIVE BOX
Patient is a 89y old  Female who presents with a chief complaint of Fall hip pain (17 May 2023 12:08)      INTERVAL HPI/OVERNIGHT EVENTS: noted  pt seen and examined this am   events noted  sob on min exertion  pain well controlled      Vital Signs Last 24 Hrs  T(C): 36.7 (17 May 2023 14:27), Max: 36.7 (16 May 2023 21:16)  T(F): 98 (17 May 2023 14:27), Max: 98.1 (16 May 2023 21:16)  HR: 90 (17 May 2023 14:27) (83 - 100)  BP: 104/57 (17 May 2023 14:27) (101/59 - 138/86)  BP(mean): --  RR: 19 (17 May 2023 14:27) (17 - 19)  SpO2: 98% (17 May 2023 14:27) (85% - 99%)    Parameters below as of 17 May 2023 14:27  Patient On (Oxygen Delivery Method): nasal cannula  O2 Flow (L/min): 2      acetaminophen     Tablet .. 975 milliGRAM(s) Oral every 8 hours  apixaban 2.5 milliGRAM(s) Oral every 12 hours  brimonidine 0.2% Ophthalmic Solution 1 Drop(s) Both EYES two times a day  diltiazem    milliGRAM(s) Oral daily  HYDROmorphone  Injectable 0.5 milliGRAM(s) IV Push once  magnesium hydroxide Suspension 30 milliLiter(s) Oral daily PRN  multivitamin 1 Tablet(s) Oral daily  ondansetron Injectable 4 milliGRAM(s) IV Push every 6 hours PRN  oxyCODONE    IR 5 milliGRAM(s) Oral every 4 hours PRN  oxyCODONE    IR 10 milliGRAM(s) Oral every 4 hours PRN  pantoprazole    Tablet 40 milliGRAM(s) Oral before breakfast  polyethylene glycol 3350 17 Gram(s) Oral at bedtime  senna 2 Tablet(s) Oral at bedtime  timolol 0.5% Solution 1 Drop(s) Both EYES two times a day  torsemide 40 milliGRAM(s) Oral every 12 hours  traMADol 50 milliGRAM(s) Oral every 6 hours PRN  zolpidem 5 milliGRAM(s) Oral at bedtime PRN      PHYSICAL EXAM:  GENERAL: NAD,   EYES: conjunctiva and sclera clear  ENMT: Moist mucous membranes  NECK: Supple, No JVD, Normal thyroid  CHEST/LUNG: non labored, cta b/l  HEART: Regular rate and rhythm; No murmurs, rubs, or gallops  ABDOMEN: Soft, Nontender, Nondistended; Bowel sounds present  EXTREMITIES:  2+ Peripheral Pulses, No clubbing, cyanosis, or edema  LYMPH: No lymphadenopathy noted  SKIN: No rashes or lesions    Consultant(s) Notes Reviewed:  [x ] YES  [ ] NO  Care Discussed with Consultants/Other Providers [ x] YES  [ ] NO    LABS:                        8.3    9.78  )-----------( 166      ( 17 May 2023 07:30 )             26.7     05-17    134<L>  |  102  |  56<H>  ----------------------------<  108<H>  4.5   |  32<H>  |  1.70<H>    Ca    8.0<L>      17 May 2023 07:30          CAPILLARY BLOOD GLUCOSE                  RADIOLOGY & ADDITIONAL TESTS:    Imaging Personally Reviewed:  [x ] YES  [ ] NO

## 2023-05-18 LAB
ANION GAP SERPL CALC-SCNC: 2 MMOL/L — LOW (ref 5–17)
BUN SERPL-MCNC: 61 MG/DL — HIGH (ref 7–23)
CALCIUM SERPL-MCNC: 8.3 MG/DL — LOW (ref 8.5–10.1)
CHLORIDE SERPL-SCNC: 101 MMOL/L — SIGNIFICANT CHANGE UP (ref 96–108)
CO2 SERPL-SCNC: 33 MMOL/L — HIGH (ref 22–31)
CREAT SERPL-MCNC: 1.5 MG/DL — HIGH (ref 0.5–1.3)
EGFR: 32 ML/MIN/1.73M2 — LOW
GLUCOSE SERPL-MCNC: 106 MG/DL — HIGH (ref 70–99)
HCT VFR BLD CALC: 32.3 % — LOW (ref 34.5–45)
HGB BLD-MCNC: 10.5 G/DL — LOW (ref 11.5–15.5)
MCHC RBC-ENTMCNC: 30.6 PG — SIGNIFICANT CHANGE UP (ref 27–34)
MCHC RBC-ENTMCNC: 32.5 GM/DL — SIGNIFICANT CHANGE UP (ref 32–36)
MCV RBC AUTO: 94.2 FL — SIGNIFICANT CHANGE UP (ref 80–100)
NRBC # BLD: 0 /100 WBCS — SIGNIFICANT CHANGE UP (ref 0–0)
PLATELET # BLD AUTO: 169 K/UL — SIGNIFICANT CHANGE UP (ref 150–400)
POTASSIUM SERPL-MCNC: 4.5 MMOL/L — SIGNIFICANT CHANGE UP (ref 3.5–5.3)
POTASSIUM SERPL-SCNC: 4.5 MMOL/L — SIGNIFICANT CHANGE UP (ref 3.5–5.3)
RBC # BLD: 3.43 M/UL — LOW (ref 3.8–5.2)
RBC # FLD: 13.7 % — SIGNIFICANT CHANGE UP (ref 10.3–14.5)
SODIUM SERPL-SCNC: 136 MMOL/L — SIGNIFICANT CHANGE UP (ref 135–145)
WBC # BLD: 9.21 K/UL — SIGNIFICANT CHANGE UP (ref 3.8–10.5)
WBC # FLD AUTO: 9.21 K/UL — SIGNIFICANT CHANGE UP (ref 3.8–10.5)

## 2023-05-18 PROCEDURE — 99232 SBSQ HOSP IP/OBS MODERATE 35: CPT

## 2023-05-18 RX ADMIN — Medication 975 MILLIGRAM(S): at 07:16

## 2023-05-18 RX ADMIN — PANTOPRAZOLE SODIUM 40 MILLIGRAM(S): 20 TABLET, DELAYED RELEASE ORAL at 06:16

## 2023-05-18 RX ADMIN — Medication 975 MILLIGRAM(S): at 22:30

## 2023-05-18 RX ADMIN — APIXABAN 2.5 MILLIGRAM(S): 2.5 TABLET, FILM COATED ORAL at 18:09

## 2023-05-18 RX ADMIN — POLYETHYLENE GLYCOL 3350 17 GRAM(S): 17 POWDER, FOR SOLUTION ORAL at 21:18

## 2023-05-18 RX ADMIN — ONDANSETRON 4 MILLIGRAM(S): 8 TABLET, FILM COATED ORAL at 14:13

## 2023-05-18 RX ADMIN — Medication 1 TABLET(S): at 11:54

## 2023-05-18 RX ADMIN — BRIMONIDINE TARTRATE 1 DROP(S): 2 SOLUTION/ DROPS OPHTHALMIC at 06:17

## 2023-05-18 RX ADMIN — Medication 40 MILLIGRAM(S): at 06:16

## 2023-05-18 RX ADMIN — APIXABAN 2.5 MILLIGRAM(S): 2.5 TABLET, FILM COATED ORAL at 06:16

## 2023-05-18 RX ADMIN — Medication 40 MILLIGRAM(S): at 18:09

## 2023-05-18 RX ADMIN — Medication 975 MILLIGRAM(S): at 21:18

## 2023-05-18 RX ADMIN — ZOLPIDEM TARTRATE 5 MILLIGRAM(S): 10 TABLET ORAL at 21:19

## 2023-05-18 RX ADMIN — BRIMONIDINE TARTRATE 1 DROP(S): 2 SOLUTION/ DROPS OPHTHALMIC at 18:09

## 2023-05-18 RX ADMIN — Medication 975 MILLIGRAM(S): at 14:13

## 2023-05-18 RX ADMIN — OXYCODONE HYDROCHLORIDE 10 MILLIGRAM(S): 5 TABLET ORAL at 12:55

## 2023-05-18 RX ADMIN — Medication 975 MILLIGRAM(S): at 06:16

## 2023-05-18 RX ADMIN — Medication 120 MILLIGRAM(S): at 06:16

## 2023-05-18 RX ADMIN — SENNA PLUS 2 TABLET(S): 8.6 TABLET ORAL at 21:18

## 2023-05-18 RX ADMIN — Medication 1 DROP(S): at 06:17

## 2023-05-18 RX ADMIN — OXYCODONE HYDROCHLORIDE 10 MILLIGRAM(S): 5 TABLET ORAL at 11:54

## 2023-05-18 RX ADMIN — Medication 1 DROP(S): at 18:08

## 2023-05-18 NOTE — PROGRESS NOTE ADULT - SUBJECTIVE AND OBJECTIVE BOX
[facility] -- Heather Roberts Pannella, Patel, Savella, Goodger  Office # [phone]    Follow Up:      Subjective/Observations:     REVIEW OF SYSTEMS: All other review of systems is negative unless indicated above  PAST MEDICAL & SURGICAL HISTORY:  Benign Essential Hypertension      Chronic Sciatica      Personal History of Coronary Artery Disease      Hypertension      Atrial fibrillation      CVA (cerebral vascular accident)      S/P CABG        MEDICATIONS  (STANDING):  acetaminophen     Tablet .. 975 milliGRAM(s) Oral every 8 hours  apixaban 2.5 milliGRAM(s) Oral every 12 hours  brimonidine 0.2% Ophthalmic Solution 1 Drop(s) Both EYES two times a day  diltiazem    milliGRAM(s) Oral daily  HYDROmorphone  Injectable 0.5 milliGRAM(s) IV Push once  multivitamin 1 Tablet(s) Oral daily  pantoprazole    Tablet 40 milliGRAM(s) Oral before breakfast  polyethylene glycol 3350 17 Gram(s) Oral at bedtime  senna 2 Tablet(s) Oral at bedtime  timolol 0.5% Solution 1 Drop(s) Both EYES two times a day  torsemide 40 milliGRAM(s) Oral every 12 hours    MEDICATIONS  (PRN):  magnesium hydroxide Suspension 30 milliLiter(s) Oral daily PRN Constipation  ondansetron Injectable 4 milliGRAM(s) IV Push every 6 hours PRN Nausea and/or Vomiting  oxyCODONE    IR 5 milliGRAM(s) Oral every 4 hours PRN Moderate Pain (4 - 6)  oxyCODONE    IR 10 milliGRAM(s) Oral every 4 hours PRN Severe Pain (7 - 10)  traMADol 50 milliGRAM(s) Oral every 6 hours PRN Mild Pain (1 - 3)  zolpidem 5 milliGRAM(s) Oral at bedtime PRN Insomnia    Allergies    No Known Allergies    Intolerances      Vital Signs Last 24 Hrs  T(C): 37 (18 May 2023 04:58), Max: 37 (18 May 2023 04:58)  T(F): 98.6 (18 May 2023 04:58), Max: 98.6 (18 May 2023 04:58)  HR: 92 (18 May 2023 04:58) (83 - 94)  BP: 154/73 (18 May 2023 04:58) (101/59 - 154/73)  BP(mean): --  RR: 18 (18 May 2023 04:58) (18 - 20)  SpO2: 96% (18 May 2023 04:58) (96% - 99%)    Parameters below as of 18 May 2023 04:58  Patient On (Oxygen Delivery Method): nasal cannula      I&O's Summary    17 May 2023 07:01  -  18 May 2023 07:00  --------------------------------------------------------  IN: 1179 mL / OUT: 1450 mL / NET: -271 mL        PHYSICAL EXAM:  TELE:   Constitutional: NAD, awake and alert, well-developed  HEENT: Moist Mucous Membranes, Anicteric  Pulmonary: Non-labored, breath sounds are clear bilaterally, No wheezing, rales or rhonchi  Cardiovascular: Regular, S1 and S2, No murmurs, rubs, gallops or clicks  Gastrointestinal: Bowel Sounds present, soft, nontender.   Lymph: No peripheral edema. No lymphadenopathy.  Skin: No visible rashes or ulcers.  Psych:  Mood & affect appropriate  LABS: All Labs Reviewed:                        10.5   9.21  )-----------( 169      ( 18 May 2023 08:31 )             32.3                         8.3    9.78  )-----------( 166      ( 17 May 2023 07:30 )             26.7                         9.3    9.94  )-----------( 146      ( 16 May 2023 06:25 )             29.6     18 May 2023 08:31    136    |  101    |  61     ----------------------------<  106    4.5     |  33     |  1.50   17 May 2023 07:30    134    |  102    |  56     ----------------------------<  108    4.5     |  32     |  1.70   16 May 2023 06:25    136    |  101    |  56     ----------------------------<  113    4.5     |  35     |  1.90     Ca    8.3        18 May 2023 08:31  Ca    8.0        17 May 2023 07:30  Ca    8.0        16 May 2023 06:25               Shahla Yeager DNP, NP-C, AGACNP-C  Cardiology   Spectra #[phone]  Call TEAMS      [facility] -- Heathre Roberts Pannella, Patel, Savella, Goodger  Office # [phone]    Follow Up:  Cardiac Optimization    Subjective/Observations: Sitting on the chair, c/o mild postop pain.  Comfortable on RA.  Denies any form of respiratory or cardiac discomfort.  Denies dizziness or lightheadedness      REVIEW OF SYSTEMS: All other review of systems is negative unless indicated above  PAST MEDICAL & SURGICAL HISTORY:  Benign Essential Hypertension  Chronic Sciatica  Personal History of Coronary Artery Disease  Hypertension  Atrial fibrillation  CVA (cerebral vascular accident)  S/P CABG    MEDICATIONS  (STANDING):  acetaminophen     Tablet .. 975 milliGRAM(s) Oral every 8 hours  apixaban 2.5 milliGRAM(s) Oral every 12 hours  brimonidine 0.2% Ophthalmic Solution 1 Drop(s) Both EYES two times a day  diltiazem    milliGRAM(s) Oral daily  HYDROmorphone  Injectable 0.5 milliGRAM(s) IV Push once  multivitamin 1 Tablet(s) Oral daily  pantoprazole    Tablet 40 milliGRAM(s) Oral before breakfast  polyethylene glycol 3350 17 Gram(s) Oral at bedtime  senna 2 Tablet(s) Oral at bedtime  timolol 0.5% Solution 1 Drop(s) Both EYES two times a day  torsemide 40 milliGRAM(s) Oral every 12 hours    MEDICATIONS  (PRN):  magnesium hydroxide Suspension 30 milliLiter(s) Oral daily PRN Constipation  ondansetron Injectable 4 milliGRAM(s) IV Push every 6 hours PRN Nausea and/or Vomiting  oxyCODONE    IR 5 milliGRAM(s) Oral every 4 hours PRN Moderate Pain (4 - 6)  oxyCODONE    IR 10 milliGRAM(s) Oral every 4 hours PRN Severe Pain (7 - 10)  traMADol 50 milliGRAM(s) Oral every 6 hours PRN Mild Pain (1 - 3)  zolpidem 5 milliGRAM(s) Oral at bedtime PRN Insomnia    Allergies    No Known Allergies    Intolerances    Vital Signs Last 24 Hrs  T(C): 37 (18 May 2023 04:58), Max: 37 (18 May 2023 04:58)  T(F): 98.6 (18 May 2023 04:58), Max: 98.6 (18 May 2023 04:58)  HR: 92 (18 May 2023 04:58) (83 - 94)  BP: 154/73 (18 May 2023 04:58) (101/59 - 154/73)  BP(mean): --  RR: 18 (18 May 2023 04:58) (18 - 20)  SpO2: 96% (18 May 2023 04:58) (96% - 99%)    Parameters below as of 18 May 2023 04:58  Patient On (Oxygen Delivery Method): nasal cannula    I&O's Summary    17 May 2023 07:01  -  18 May 2023 07:00  --------------------------------------------------------  IN: 1179 mL / OUT: 1450 mL / NET: -271 mL      PHYSICAL EXAM:  TELE: Afib  Constitutional: NAD, awake and alert, well-developed  HEENT: Moist Mucous Membranes, Anicteric  Pulmonary: Non-labored, breath sounds are clear bilaterally, No wheezing, rales or rhonchi  Cardiovascular: IRRR, S1 and S2, No murmurs, rubs, gallops or clicks  Gastrointestinal: Bowel Sounds present, soft, nontender.   Lymph: No peripheral edema. No lymphadenopathy.  Skin: No visible rashes or ulcers.  right hip dressing dry and intact  Psych:  Mood & affect appropriate  LABS: All Labs Reviewed:                        10.5   9.21  )-----------( 169      ( 18 May 2023 08:31 )             32.3                         8.3    9.78  )-----------( 166      ( 17 May 2023 07:30 )             26.7                         9.3    9.94  )-----------( 146      ( 16 May 2023 06:25 )             29.6     18 May 2023 08:31    136    |  101    |  61     ----------------------------<  106    4.5     |  33     |  1.50   17 May 2023 07:30    134    |  102    |  56     ----------------------------<  108    4.5     |  32     |  1.70   16 May 2023 06:25    136    |  101    |  56     ----------------------------<  113    4.5     |  35     |  1.90     Ca    8.3        18 May 2023 08:31  Ca    8.0        17 May 2023 07:30  Ca    8.0        16 May 2023 06:25      Patient name: JIMMIE BRIAN  YOB: 1926   Age: 89 (F)   MR#: [id number]  Study Date: 11/22/2016  [facility]  Study quality: Technically difficult  Referring Physician: Sally Phipps MD  Blood Pressure: 153/78 mmHg  Height: 140 cm  Weight: 60 kg  BSA: 1.5 m2  ------------------------------------------------------------------------  PROCEDURE: Transthoracic echocardiogram with 2-D, M-Mode  and complete spectral and color flow Doppler.  INDICATION: Unspecified atrial fibrillation (I48.91)  ------------------------------------------------------------------------  Dimensions:    Normal Values:  LA:     4.6    2.0 - 4.0 cm  Ao:     3.4    2.0 - 3.8 cm  SEPTUM: 1.0    0.6 - 1.2 cm  PWT:    1.1  0.6 - 1.1 cm  LVIDd:  3.4    3.0 - 5.6 cm  LVIDs:  2.6    1.8 - 4.0 cm  Derived variables:  LVMI: 72 g/m2  RWT: 0.64  Fractional short: 24 %  EF (Nikhiltz): 48 %  ------------------------------------------------------------------------  Observations:  Mitral Valve: Mitral annular calcification. The posterior  annulus is heavily calcified. There appears to be  independently mobile material seen at the posterior  annulus. This may represent endocarditis, clinical  correlation indicated. Mild mitral regurgitation.  Aortic Valve/Aorta: Calcified trileaflet aortic valve with  normal opening. Mild-moderate aortic regurgitation.   msec  Aortic Root: 3.4 cm.  Left Atrium: Mildly dilated left atrium.  LA volume index =  37 cc/m2.  Left Ventricle: Endocardium not well visualized; Mild  segmental left ventricular systolic dysfunction. The  base-mid inferior wall, the mid lateral wall, and the basal  inferoseptum are hypokinetic.  Regional wall motion  variation is noted on the setting of atrial fibrillation.  Increased relative wall thickness with normal left  ventricular mass index, consistent with concentric left  ventricular remodeling.  Right Heart: Mild right atrial enlargement. Normal right  ventricular size with mildly  decreased right ventricular  systolic function. Normal tricuspid valve. Mild-moderate  tricuspid regurgitation. Normal pulmonic valve. Minimal  pulmonic regurgitation.  Pericardium/Pleura: Normal pericardium with no pericardial  effusion.  Hemodynamic: Estimated right atrial pressure is 8 mm Hg.  Estimated right ventricular systolic pressure equals 44 mm  Hg, assuming right atrial pressure equals 8 mm Hg,  consistent with mild pulmonary hypertension.  ------------------------------------------------------------------------  Conclusions:  1. Mitral annular calcification. The posterior annulus is  heavily calcified. There appears to be independently mobile  material seen at the posterior annulus. This may represent  endocarditis, clinical correlation indicated.  2. Calcified trileaflet aortic valve with normal opening.  Mild-moderate aortic regurgitation.  msec  3. Increased relative wall thickness with normal left  ventricular mass index, consistent with concentric left  ventricular remodeling.  4. Endocardium not well visualized; Mild segmental left  ventricular systolic dysfunction. The base-mid inferior  wall, the mid lateral wall, and the basal inferoseptum are  hypokinetic.  Regional wall motion variation is noted on  the setting ofatrial fibrillation.  5. Normal right ventricular size with mildly  decreased  right ventricular systolic function.  6. Normal tricuspid valve. Mild-moderate tricuspid  regurgitation.  7. Estimated pulmonary artery systolic pressure equals 44  mm Hg,assuming right atrial pressure equals 8 mm Hg,  consistent with mild pulmonary pressures.  ------------------------------------------------------------------------  Confirmed on  11/22/2016 - 14:45:39 by Jazzy Phillips M.D.  ------------------------------------------------------------------------    ACC: [id number] EXAM:  XR CHEST AP OR PA 1V   ORDERED BY: YENNY MUÑOZ     PROCEDURE DATE:  05/14/2023      INTERPRETATION:  CLINICAL STATEMENT: Fall    TECHNIQUE: AP view of the chest.  COMPARISON: 6/20/2022    Sternotomy sutures and CABG clips    Cardiomediastinal silhouette is enlarged, may be exaggerated by AP   technique.    Chronic lung changes. Small left effusion. No focal lung consolidation.   No pneumothorax.    Visualized bony thorax is grossly intact. Degenerative changes of the   spine and shoulders. Merritt screw right shoulder again noted.    IMPRESSION:    Small left effusion.    --- End of Report ---  REUBEN LANE MD; Attending Radiologist  This document has been electronically signed. May 14 2023  2:20PM    Ventricular Rate 92 BPM    QRS Duration 128 ms    Q-T Interval 392 ms    QTC Calculation(Bazett) 484 ms    R Axis 2 degrees    T Axis -34 degrees    Diagnosis Line Atrial fibrillation  Right bundle branch block  T wave abnormality, consider inferolateral ischemia  Abnormal ECG  When compared with ECG of 14-MAY-2023 14:01, (Unconfirmed)  No significant change was found  Confirmed by VELMA ELLSWORTH ([id number]) on 5/15/2023 5:01:21 PM

## 2023-05-18 NOTE — PROGRESS NOTE ADULT - SUBJECTIVE AND OBJECTIVE BOX
Patient is a 89y old  Female who presents with a chief complaint of Fall hip pain (18 May 2023 10:09)      INTERVAL HPI/OVERNIGHT EVENTS: noted  pt seen and examined this am   events noted  feels well      Vital Signs Last 24 Hrs  T(C): 36.6 (18 May 2023 12:20), Max: 37 (18 May 2023 04:58)  T(F): 97.9 (18 May 2023 12:20), Max: 98.6 (18 May 2023 04:58)  HR: 83 (18 May 2023 18:05) (81 - 94)  BP: 157/74 (18 May 2023 18:05) (119/77 - 157/74)  BP(mean): --  RR: 18 (18 May 2023 12:20) (18 - 20)  SpO2: 98% (18 May 2023 12:20) (96% - 98%)    Parameters below as of 18 May 2023 12:20  Patient On (Oxygen Delivery Method): nasal cannula        acetaminophen     Tablet .. 975 milliGRAM(s) Oral every 8 hours  apixaban 2.5 milliGRAM(s) Oral every 12 hours  brimonidine 0.2% Ophthalmic Solution 1 Drop(s) Both EYES two times a day  diltiazem    milliGRAM(s) Oral daily  HYDROmorphone  Injectable 0.5 milliGRAM(s) IV Push once  magnesium hydroxide Suspension 30 milliLiter(s) Oral daily PRN  multivitamin 1 Tablet(s) Oral daily  ondansetron Injectable 4 milliGRAM(s) IV Push every 6 hours PRN  oxyCODONE    IR 10 milliGRAM(s) Oral every 4 hours PRN  oxyCODONE    IR 5 milliGRAM(s) Oral every 4 hours PRN  pantoprazole    Tablet 40 milliGRAM(s) Oral before breakfast  polyethylene glycol 3350 17 Gram(s) Oral at bedtime  senna 2 Tablet(s) Oral at bedtime  timolol 0.5% Solution 1 Drop(s) Both EYES two times a day  torsemide 40 milliGRAM(s) Oral every 12 hours  traMADol 50 milliGRAM(s) Oral every 6 hours PRN  zolpidem 5 milliGRAM(s) Oral at bedtime PRN      PHYSICAL EXAM:  GENERAL: NAD,   EYES: conjunctiva and sclera clear  ENMT: Moist mucous membranes  NECK: Supple, No JVD, Normal thyroid  CHEST/LUNG: non labored, cta b/l  HEART: Regular rate and rhythm; No murmurs, rubs, or gallops  ABDOMEN: Soft, Nontender, Nondistended; Bowel sounds present  EXTREMITIES:  2+ Peripheral Pulses, No clubbing, cyanosis, or edema  LYMPH: No lymphadenopathy noted  SKIN: No rashes or lesions    Consultant(s) Notes Reviewed:  [x ] YES  [ ] NO  Care Discussed with Consultants/Other Providers [ x] YES  [ ] NO    LABS:                        10.5   9.21  )-----------( 169      ( 18 May 2023 08:31 )             32.3     05-18    136  |  101  |  61<H>  ----------------------------<  106<H>  4.5   |  33<H>  |  1.50<H>    Ca    8.3<L>      18 May 2023 08:31          CAPILLARY BLOOD GLUCOSE                  RADIOLOGY & ADDITIONAL TESTS:    Imaging Personally Reviewed:  [x ] YES  [ ] NO

## 2023-05-18 NOTE — SOCIAL WORK PROGRESS NOTE - NSSWPROGRESSNOTE_GEN_ALL_CORE
Spoke with pt dtr/ pt's aide on unit. Aide reported that she is unable to meet pt needs at this time in home due to increased weakness. SW was provided with choices 1) [facility] 2) [facility] 3) [facility]. Family requesting that private pay aide be able to accompany pt over night. [facility] offered bed, will allow aide to be with pt and is in network. They initiated auth - Pending ref#: [id number]. SW to follow and remain available for any needs.

## 2023-05-18 NOTE — PROGRESS NOTE ADULT - ASSESSMENT
89 yo F with diastolic HF, CAD s/p CABG, RBBB, CKD, atrial fibrillation (on apixaban), HTN, moderate AS and pulmonary HTN, chronic sciatica, CVA  p/w fall.     Cardiac clearance CHF, CAD, CABG, RBBB, A fib on Eliquis, HTN, mod AS, mod Pulm HTN  - s/p fall, sustaining IT fracture, s/p R IMN, tolerated well from CV POV, ortho following   - Pain control per Primary  - Encourage incentive spirometer    - No evidence of any meaningful volume overload. No O2 requirement   - ECHO 06/2022 showed mild to mod MR, mod AS, mild to mod AR, mild MS, sev dilated LA, mod BLAKE, normal LV & RV size and function, mild to mod TR, mod Pulm HTN   - f/u routine TTE   - On home Torsemide 40 mg PO BID.  She is intravascularly depleted.  Would continue to hold for now and monitor volume status closely  - Continue to monitor renal indices    - Known A fib, HR mostly 90's, no events noted x 48 hours, would dc telemonitoring   - EKG showed A fib, RBBB @ 92  - Continue home Cardizem 240 am and 120 pm  - Continue Eliquis  - Monitor and replete lytes, keep K>4, Mg>2.    - No anginal complaints  - No evidence of any active ischemia   - Continue ASA, statin     - DC planning per primary   - Will continue to follow.    Shahla Yeager DNP, NP-C, AGACNP-C  Cardiology   Spectra #[phone]  Call TEAMS

## 2023-05-18 NOTE — PROGRESS NOTE ADULT - SUBJECTIVE AND OBJECTIVE BOX
Patient seen and examined at bedside.  No acute complaints at this time. Pain well controlled. Denies chest pain, shortness of breath, nausea or vomiting.     LABS:                        8.3    9.78  )-----------( 166      ( 17 May 2023 07:30 )             26.7     05-17    134<L>  |  102  |  56<H>  ----------------------------<  108<H>  4.5   |  32<H>  |  1.70<H>    Ca    8.0<L>      17 May 2023 07:30      VITAL SIGNS:  T(C): 37 (05-18-23 @ 04:58), Max: 37 (05-18-23 @ 04:58)  HR: 92 (05-18-23 @ 04:58) (83 - 94)  BP: 154/73 (05-18-23 @ 04:58) (101/59 - 154/73)  RR: 18 (05-18-23 @ 04:58) (18 - 20)  SpO2: 96% (05-18-23 @ 04:58) (85% - 99%)    Physical Exam:   General: NAD, resting comfortably in bed  RLE:   Dressing C/D/I  SCDs present bilaterally  Compartments soft and compressible  No calf tenderness bilaterally  +TA/EHL/FHL/GSC  SILT L3-S1  + DP/PT          A/P:  89y f s/p R IMN for IT Fx POD 4    -PT/OT   -WBAT on the b/l LEs  -Pain Control prn  -DVT ppx w/ Eliquis   -FU AM Labs  -Rest, ice, compress and elevate the extremity as we needed  -Incentive Spirometry  -Medical management appreciated  -Dispo: MIN  -No further acute orthopaedic surgical intervention indicated  -Ortho stable for dc when medically stable

## 2023-05-18 NOTE — PROGRESS NOTE ADULT - ASSESSMENT
89 yo F with diastolic HF, CAD s/p CABG, RBBB, CKD, atrial fibrillation (on apixaban), HTN, moderate AS and pulmonary HTN, chronic sciatica, CVA  p/w fall.   Rt displaced intertrochanteric fracture    #Rt Hip Fracture-   sp IMN 5/14  ortho fu  pain control  PT eval    #Postop anemia-   monitor hb - downtrending  sp transfuse 1u prbc hb stable  monitor for s/s fluid overload      #HFpEF, CAD, CABG, RBBB  , A fib on Eliquis, HTN, mod AS, mod Pulm HTN   No s/s volume overload,  cont, cardizem , asa, statin  torsemide 40 bid to cont  resumed eliquis postop      #Sarah on ckd likely dt blood loss anemia and diuretics  monitor cr trend- stable    DVT ppx- home eliquis resumed    PT eval and dc planning    [facility]   [phone]    plan d/w dgtr at bedside,

## 2023-05-18 NOTE — PATIENT CHOICE NOTE. - NSPTCHOICESTATE_GEN_ALL_CORE

## 2023-05-19 ENCOUNTER — TRANSCRIPTION ENCOUNTER (OUTPATIENT)
Age: 88
End: 2023-05-19

## 2023-05-19 VITALS
OXYGEN SATURATION: 98 % | SYSTOLIC BLOOD PRESSURE: 138 MMHG | RESPIRATION RATE: 18 BRPM | TEMPERATURE: 98 F | HEART RATE: 76 BPM | DIASTOLIC BLOOD PRESSURE: 73 MMHG

## 2023-05-19 LAB
ANION GAP SERPL CALC-SCNC: 2 MMOL/L — LOW (ref 5–17)
BUN SERPL-MCNC: 65 MG/DL — HIGH (ref 7–23)
CALCIUM SERPL-MCNC: 8.6 MG/DL — SIGNIFICANT CHANGE UP (ref 8.5–10.1)
CHLORIDE SERPL-SCNC: 102 MMOL/L — SIGNIFICANT CHANGE UP (ref 96–108)
CO2 SERPL-SCNC: 34 MMOL/L — HIGH (ref 22–31)
CREAT SERPL-MCNC: 1.5 MG/DL — HIGH (ref 0.5–1.3)
EGFR: 32 ML/MIN/1.73M2 — LOW
GLUCOSE SERPL-MCNC: 118 MG/DL — HIGH (ref 70–99)
HCT VFR BLD CALC: 32.9 % — LOW (ref 34.5–45)
HGB BLD-MCNC: 10.4 G/DL — LOW (ref 11.5–15.5)
MCHC RBC-ENTMCNC: 30.8 PG — SIGNIFICANT CHANGE UP (ref 27–34)
MCHC RBC-ENTMCNC: 31.6 GM/DL — LOW (ref 32–36)
MCV RBC AUTO: 97.3 FL — SIGNIFICANT CHANGE UP (ref 80–100)
NRBC # BLD: 0 /100 WBCS — SIGNIFICANT CHANGE UP (ref 0–0)
PLATELET # BLD AUTO: 198 K/UL — SIGNIFICANT CHANGE UP (ref 150–400)
POTASSIUM SERPL-MCNC: 4.4 MMOL/L — SIGNIFICANT CHANGE UP (ref 3.5–5.3)
POTASSIUM SERPL-SCNC: 4.4 MMOL/L — SIGNIFICANT CHANGE UP (ref 3.5–5.3)
RBC # BLD: 3.38 M/UL — LOW (ref 3.8–5.2)
RBC # FLD: 14.1 % — SIGNIFICANT CHANGE UP (ref 10.3–14.5)
SODIUM SERPL-SCNC: 138 MMOL/L — SIGNIFICANT CHANGE UP (ref 135–145)
WBC # BLD: 7.97 K/UL — SIGNIFICANT CHANGE UP (ref 3.8–10.5)
WBC # FLD AUTO: 7.97 K/UL — SIGNIFICANT CHANGE UP (ref 3.8–10.5)

## 2023-05-19 PROCEDURE — 72192 CT PELVIS W/O DYE: CPT | Mod: MG

## 2023-05-19 PROCEDURE — 86901 BLOOD TYPING SEROLOGIC RH(D): CPT

## 2023-05-19 PROCEDURE — 85027 COMPLETE CBC AUTOMATED: CPT

## 2023-05-19 PROCEDURE — 97162 PT EVAL MOD COMPLEX 30 MIN: CPT

## 2023-05-19 PROCEDURE — 36415 COLL VENOUS BLD VENIPUNCTURE: CPT

## 2023-05-19 PROCEDURE — 71045 X-RAY EXAM CHEST 1 VIEW: CPT

## 2023-05-19 PROCEDURE — C1713: CPT

## 2023-05-19 PROCEDURE — P9016: CPT

## 2023-05-19 PROCEDURE — 99232 SBSQ HOSP IP/OBS MODERATE 35: CPT

## 2023-05-19 PROCEDURE — 85025 COMPLETE CBC W/AUTO DIFF WBC: CPT

## 2023-05-19 PROCEDURE — 36430 TRANSFUSION BLD/BLD COMPNT: CPT

## 2023-05-19 PROCEDURE — 73502 X-RAY EXAM HIP UNI 2-3 VIEWS: CPT

## 2023-05-19 PROCEDURE — 80048 BASIC METABOLIC PNL TOTAL CA: CPT

## 2023-05-19 PROCEDURE — 76376 3D RENDER W/INTRP POSTPROCES: CPT

## 2023-05-19 PROCEDURE — 73552 X-RAY EXAM OF FEMUR 2/>: CPT

## 2023-05-19 PROCEDURE — 70450 CT HEAD/BRAIN W/O DYE: CPT | Mod: MA

## 2023-05-19 PROCEDURE — 99285 EMERGENCY DEPT VISIT HI MDM: CPT

## 2023-05-19 PROCEDURE — G1004: CPT

## 2023-05-19 PROCEDURE — 86850 RBC ANTIBODY SCREEN: CPT

## 2023-05-19 PROCEDURE — 97530 THERAPEUTIC ACTIVITIES: CPT

## 2023-05-19 PROCEDURE — 96374 THER/PROPH/DIAG INJ IV PUSH: CPT

## 2023-05-19 PROCEDURE — C1776: CPT

## 2023-05-19 PROCEDURE — 85610 PROTHROMBIN TIME: CPT

## 2023-05-19 PROCEDURE — 86923 COMPATIBILITY TEST ELECTRIC: CPT

## 2023-05-19 PROCEDURE — 86900 BLOOD TYPING SEROLOGIC ABO: CPT

## 2023-05-19 PROCEDURE — 76000 FLUOROSCOPY <1 HR PHYS/QHP: CPT

## 2023-05-19 PROCEDURE — 72125 CT NECK SPINE W/O DYE: CPT | Mod: MA

## 2023-05-19 PROCEDURE — 73562 X-RAY EXAM OF KNEE 3: CPT

## 2023-05-19 PROCEDURE — C1889: CPT

## 2023-05-19 PROCEDURE — 93005 ELECTROCARDIOGRAM TRACING: CPT

## 2023-05-19 PROCEDURE — C1769: CPT

## 2023-05-19 PROCEDURE — 97535 SELF CARE MNGMENT TRAINING: CPT

## 2023-05-19 PROCEDURE — 85730 THROMBOPLASTIN TIME PARTIAL: CPT

## 2023-05-19 PROCEDURE — 97110 THERAPEUTIC EXERCISES: CPT

## 2023-05-19 PROCEDURE — 80053 COMPREHEN METABOLIC PANEL: CPT

## 2023-05-19 PROCEDURE — 97116 GAIT TRAINING THERAPY: CPT

## 2023-05-19 RX ORDER — POLYETHYLENE GLYCOL 3350 17 G/17G
17 POWDER, FOR SOLUTION ORAL
Qty: 0 | Refills: 0 | DISCHARGE
Start: 2023-05-19

## 2023-05-19 RX ORDER — ACETAMINOPHEN 500 MG
3 TABLET ORAL
Qty: 0 | Refills: 0 | DISCHARGE
Start: 2023-05-19

## 2023-05-19 RX ORDER — OXYCODONE HYDROCHLORIDE 5 MG/1
1 TABLET ORAL
Qty: 0 | Refills: 0 | DISCHARGE
Start: 2023-05-19

## 2023-05-19 RX ORDER — SENNA PLUS 8.6 MG/1
2 TABLET ORAL
Qty: 0 | Refills: 0 | DISCHARGE
Start: 2023-05-19

## 2023-05-19 RX ADMIN — Medication 975 MILLIGRAM(S): at 05:40

## 2023-05-19 RX ADMIN — Medication 1 TABLET(S): at 11:05

## 2023-05-19 RX ADMIN — PANTOPRAZOLE SODIUM 40 MILLIGRAM(S): 20 TABLET, DELAYED RELEASE ORAL at 05:40

## 2023-05-19 RX ADMIN — Medication 975 MILLIGRAM(S): at 14:20

## 2023-05-19 RX ADMIN — Medication 120 MILLIGRAM(S): at 05:39

## 2023-05-19 RX ADMIN — Medication 975 MILLIGRAM(S): at 06:50

## 2023-05-19 RX ADMIN — APIXABAN 2.5 MILLIGRAM(S): 2.5 TABLET, FILM COATED ORAL at 05:41

## 2023-05-19 RX ADMIN — Medication 1 DROP(S): at 05:41

## 2023-05-19 RX ADMIN — BRIMONIDINE TARTRATE 1 DROP(S): 2 SOLUTION/ DROPS OPHTHALMIC at 05:41

## 2023-05-19 RX ADMIN — Medication 40 MILLIGRAM(S): at 05:40

## 2023-05-19 RX ADMIN — Medication 975 MILLIGRAM(S): at 13:21

## 2023-05-19 NOTE — PROGRESS NOTE ADULT - SUBJECTIVE AND OBJECTIVE BOX
Patient seen and examined at bedside.  No acute complaints at this time. Pain well controlled. Denies chest pain, shortness of breath, nausea or vomiting.     LABS:                        10.5   9.21  )-----------( 169      ( 18 May 2023 08:31 )             32.3     05-18    136  |  101  |  61<H>  ----------------------------<  106<H>  4.5   |  33<H>  |  1.50<H>    Ca    8.3<L>      18 May 2023 08:31      VITAL SIGNS:  T(C): 36.9 (05-19-23 @ 04:16), Max: 36.9 (05-19-23 @ 04:16)  HR: 94 (05-19-23 @ 04:16) (81 - 94)  BP: 132/72 (05-19-23 @ 04:16) (125/68 - 157/74)  RR: 18 (05-19-23 @ 04:16) (18 - 18)  SpO2: 97% (05-19-23 @ 04:16) (91% - 98%)    Physical Exam:   General: NAD, resting comfortably in bed  RLE:   Dressing C/D/I  SCDs present bilaterally  Compartments soft and compressible  No calf tenderness bilaterally  +TA/EHL/FHL/GSC  SILT L3-S1  + DP/PT          A/P:  89y f s/p R IMN for IT Fx POD 5    -PT/OT   -WBAT on the b/l LEs  -Pain Control prn  -DVT ppx w/ Eliquis   -FU AM Labs  -Rest, ice, compress and elevate the extremity as we needed  -Incentive Spirometry  -Medical management appreciated  -Dispo: MIN  -No further acute orthopaedic surgical intervention indicated  -Ortho stable for dc when medically stable

## 2023-05-19 NOTE — PROGRESS NOTE ADULT - REASON FOR ADMISSION
Fall hip pain

## 2023-05-19 NOTE — PROGRESS NOTE ADULT - NS ATTEND AMEND GEN_ALL_CORE FT
I have personally seen and examined the patient in detail.  I have spoken to the provider regarding the assessment and plan of care.  I have made changes to the note accordingly.
Tolerated OR.  Continue management as above.
diastolic HF, CAD s/p CABG   s/p fall, now s/p imn  exam as above   no evidence of any meaningful volume overload   remains somewhat vol depl and can try and hold torsemide for now, though some azotemia is likely needed in order to approach euvolemia  af controlled cont ac and ccb
diastolic HF, CAD s/p CABG   s/p fall, now s/p imn  exam as above   no evidence of any meaningful volume overload   remains somewhat vol depl and can try and hold torsemide for now, though some azotemia is likely needed in order to approach euvolemia  af controlled cont ac and ccb.
Appears compensated from HF POV. may need to hold torsemide for mildly inc cr. Further cardiac workup will depend on clinical course.

## 2023-05-19 NOTE — DISCHARGE NOTE NURSING/CASE MANAGEMENT/SOCIAL WORK - PATIENT PORTAL LINK FT
You can access the FollowMyHealth Patient Portal offered by [facility] by registering at the following website: [email or web address]. By joining [facility] FollowMyHealth portal, you will also be able to view your health information using other applications (apps) compatible with our system.

## 2023-05-19 NOTE — PROGRESS NOTE ADULT - ATTENDING COMMENTS
Pt is POD2 sp IMN surgery.  Vitals reviewed and stable, tachycardia to 100 noted, no blood products given.  Pain is well controlled overnight.  Denies cp/sob/palpitations/f/c, denies numbness/paresthesias.   Working w PT, disposition pending
Pt is POD3 sp IMN - Vitals reviewed and stable, no blood products given.  Hb 9.3  Pain is well controlled overnight.  Denies cp/sob/palpitations/f/c, denies numbness/paresthesias.   Dressing cdi, NVI, compartments soft.  Steps w PT  On eliquis, dispo pending
Pt is POD4 sp IMN.  Vitals reviewed and stable, no blood products given.  Pain is well controlled overnight.  Denies cp/sob/palpitations/f/c, denies numbness/paresthesias.   Dressing cdi, compartments soft, NVI distally.  Steps w PT.  Dispo pending  Paxton Joseph MD
Pt is POD5 sp IMN.  Vitals reviewed and stable, no blood products given.  O2 Sat 91-98%, no dyspnea or tachypnea.  Pain is well controlled overnight.  Denies cp/sob/palpitations/f/c, denies numbness/paresthesias.   Dressing cdi, compartments soft, NVI distally.  Steps w PT.  Dispo to SNF pending  Paxton Joseph MD
Pt is POD1 sp IMN.  Vitals reviewed and stable, no blood products given.  Pain is well controlled overnight.  Denies cp/sob/palpitations/f/c, denies numbness/paresthesias.   PT eval pending

## 2023-05-19 NOTE — DISCHARGE NOTE NURSING/CASE MANAGEMENT/SOCIAL WORK - NSDCPEFALRISK_GEN_ALL_CORE
For information on Fall & Injury Prevention, visit: [email or web address] OR  [email or web address] OR  https://www.cdc.gov/steadi/patient.html

## 2023-05-19 NOTE — PROGRESS NOTE ADULT - ASSESSMENT
89 yo F with diastolic HF, CAD s/p CABG, RBBB, CKD, atrial fibrillation (on apixaban), HTN, moderate AS and pulmonary HTN, chronic sciatica, CVA  p/w fall.     Cardiac clearance CHF, CAD, CABG, RBBB, A fib on Eliquis, HTN, mod AS, mod Pulm HTN  - s/p fall, sustaining IT fracture, s/p Rt IMN, tolerated well from CV POV, ortho following   - Pain control per Primary  - Encourage incentive spirometer    - No evidence of any meaningful volume overload. No O2 requirement   - ECHO 06/2022 showed mild to mod MR, mod AS, mild to mod AR, mild MS, sev dilated LA, mod BLAKE, normal LV & RV size and function, mild to mod TR, mod Pulm HTN   - Can have routine TTE but can be done as outpatient   - On home Torsemide 40 mg PO BID.  She is intravascularly depleted.  Would continue to hold for now and monitor volume status closely  - Continue to monitor renal indices    - Known A fib, rate-controlled  - EKG showed A fib, RBBB @ 92.  No anginal complaints  - Continue home Cardizem regimen  - Continue Eliquis  - Monitor and replete lytes, keep K>4, Mg>2.    - No anginal complaints  - No evidence of any active ischemia   - Continue ASA, statin     - DC planning per primary   - Stable from cardiac standpoint.  Will continue to follow as inpatient    Shahla Yeager DNP, NP-C, AGACNP-C  Cardiology   Spectra #[phone]  Call TEAMS

## 2023-05-19 NOTE — PROGRESS NOTE ADULT - PROVIDER SPECIALTY LIST ADULT
Cardiology
Cardiology
Internal Medicine
Orthopedics
Infectious Disease
Internal Medicine
Orthopedics
Cardiology
Cardiology
Infectious Disease
Internal Medicine
Orthopedics
Orthopedics
Cardiology
Internal Medicine

## 2023-05-19 NOTE — SOCIAL WORK PROGRESS NOTE - NSSWPROGRESSNOTE_GEN_ALL_CORE
Confirmed with MD that pt is cleared for d/c. Spoke with Li MONTILLA from [facility]. Pt approved for MIN 5/19-5/21 CM Melissa Simpson. Confirmed with ills bed available today. SW set up rickie transport for 2PM via [facility]. SW met with pt and pt family at bedside and they are in agreement. SW will follow and remain available.

## 2023-05-19 NOTE — PROGRESS NOTE ADULT - SUBJECTIVE AND OBJECTIVE BOX
[facility] [place] [facility] -- Heather Roberts Pannella, Patel, Savella, Goodger  Office # [phone]    Follow Up:  Cardiac Optimization    Subjective/Observations: Sitting on the chair.  Admits to mild postop pain.  Tolerating RA.  Denies SOB, COBOS or orthopnea.  Denies CP or palpitations    REVIEW OF SYSTEMS: All other review of systems is negative unless indicated above  PAST MEDICAL & SURGICAL HISTORY:  Benign Essential Hypertension  Chronic Sciatica  Personal History of Coronary Artery Disease  Hypertension  Atrial fibrillation  CVA (cerebral vascular accident)  S/P CABG    MEDICATIONS  (STANDING):  acetaminophen     Tablet .. 975 milliGRAM(s) Oral every 8 hours  apixaban 2.5 milliGRAM(s) Oral every 12 hours  brimonidine 0.2% Ophthalmic Solution 1 Drop(s) Both EYES two times a day  diltiazem    milliGRAM(s) Oral daily  HYDROmorphone  Injectable 0.5 milliGRAM(s) IV Push once  multivitamin 1 Tablet(s) Oral daily  pantoprazole    Tablet 40 milliGRAM(s) Oral before breakfast  polyethylene glycol 3350 17 Gram(s) Oral at bedtime  senna 2 Tablet(s) Oral at bedtime  timolol 0.5% Solution 1 Drop(s) Both EYES two times a day  torsemide 40 milliGRAM(s) Oral every 12 hours    MEDICATIONS  (PRN):  magnesium hydroxide Suspension 30 milliLiter(s) Oral daily PRN Constipation  ondansetron Injectable 4 milliGRAM(s) IV Push every 6 hours PRN Nausea and/or Vomiting  oxyCODONE    IR 10 milliGRAM(s) Oral every 4 hours PRN Severe Pain (7 - 10)  oxyCODONE    IR 5 milliGRAM(s) Oral every 4 hours PRN Moderate Pain (4 - 6)  traMADol 50 milliGRAM(s) Oral every 6 hours PRN Mild Pain (1 - 3)  zolpidem 5 milliGRAM(s) Oral at bedtime PRN Insomnia    Allergies    No Known Allergies    Intolerances    Vital Signs Last 24 Hrs  T(C): 36.9 (19 May 2023 04:16), Max: 36.9 (19 May 2023 04:16)  T(F): 98.4 (19 May 2023 04:16), Max: 98.4 (19 May 2023 04:16)  HR: 94 (19 May 2023 04:16) (81 - 94)  BP: 132/72 (19 May 2023 04:16) (125/68 - 157/74)  BP(mean): --  RR: 18 (19 May 2023 04:16) (18 - 18)  SpO2: 97% (19 May 2023 04:16) (91% - 98%)    Parameters below as of 19 May 2023 04:16  Patient On (Oxygen Delivery Method): nasal cannula    I&O's Summary    18 May 2023 07:01  -  19 May 2023 07:00  --------------------------------------------------------  IN: 560 mL / OUT: 1150 mL / NET: -590 mL    PHYSICAL EXAM:  TELE: Afib  Constitutional: NAD, awake and alert, well-developed  HEENT: Moist Mucous Membranes, Anicteric  Pulmonary: Non-labored, breath sounds are clear bilaterally, No wheezing, rales or rhonchi  Cardiovascular: IRRR, S1 and S2, + murmurs, no rubs, gallops or clicks  Gastrointestinal: Bowel Sounds present, soft, nontender.   Lymph: No peripheral edema. No lymphadenopathy.  Skin: No visible rashes or ulcers.  right hip dressing dry and intact  Psych:  Mood & affect appropriate    LABS: All Labs Reviewed:                        10.4   7.97  )-----------( 198      ( 19 May 2023 06:06 )             32.9                         10.5   9.21  )-----------( 169      ( 18 May 2023 08:31 )             32.3                         8.3    9.78  )-----------( 166      ( 17 May 2023 07:30 )             26.7     19 May 2023 06:06    138    |  102    |  65     ----------------------------<  118    4.4     |  34     |  1.50   18 May 2023 08:31    136    |  101    |  61     ----------------------------<  106    4.5     |  33     |  1.50   17 May 2023 07:30    134    |  102    |  56     ----------------------------<  108    4.5     |  32     |  1.70     Ca    8.6        19 May 2023 06:06  Ca    8.3        18 May 2023 08:31  Ca    8.0        17 May 2023 07:30      Patient name: JIMMIE BRIAN  YOB: 1926   Age: 89 (F)   MR#: [id number]  Study Date: 11/22/2016  [facility]  Study quality: Technically difficult  Referring Physician: Sally Phipps MD  Blood Pressure: 153/78 mmHg  Height: 140 cm  Weight: 60 kg  BSA: 1.5 m2  ------------------------------------------------------------------------  PROCEDURE: Transthoracic echocardiogram with 2-D, M-Mode  and complete spectral and color flow Doppler.  INDICATION: Unspecified atrial fibrillation (I48.91)  ------------------------------------------------------------------------  Dimensions:    Normal Values:  LA:     4.6    2.0 - 4.0 cm  Ao:     3.4    2.0 - 3.8 cm  SEPTUM: 1.0    0.6 - 1.2 cm  PWT:    1.1  0.6 - 1.1 cm  LVIDd:  3.4    3.0 - 5.6 cm  LVIDs:  2.6    1.8 - 4.0 cm  Derived variables:  LVMI: 72 g/m2  RWT: 0.64  Fractional short: 24 %  EF (Isadora): 48 %  ------------------------------------------------------------------------  Observations:  Mitral Valve: Mitral annular calcification. The posterior  annulus is heavily calcified. There appears to be  independently mobile material seen at the posterior  annulus. This may represent endocarditis, clinical  correlation indicated. Mild mitral regurgitation.  Aortic Valve/Aorta: Calcified trileaflet aortic valve with  normal opening. Mild-moderate aortic regurgitation.   msec  Aortic Root: 3.4 cm.  Left Atrium: Mildly dilated left atrium.  LA volume index =  37 cc/m2.  Left Ventricle: Endocardium not well visualized; Mild  segmental left ventricular systolic dysfunction. The  base-mid inferior wall, the mid lateral wall, and the basal  inferoseptum are hypokinetic.  Regional wall motion  variation is noted on the setting of atrial fibrillation.  Increased relative wall thickness with normal left  ventricular mass index, consistent with concentric left  ventricular remodeling.  Right Heart: Mild right atrial enlargement. Normal right  ventricular size with mildly  decreased right ventricular  systolic function. Normal tricuspid valve. Mild-moderate  tricuspid regurgitation. Normal pulmonic valve. Minimal  pulmonic regurgitation.  Pericardium/Pleura: Normal pericardium with no pericardial  effusion.  Hemodynamic: Estimated right atrial pressure is 8 mm Hg.  Estimated right ventricular systolic pressure equals 44 mm  Hg, assuming right atrial pressure equals 8 mm Hg,  consistent with mild pulmonary hypertension.  ------------------------------------------------------------------------  Conclusions:  1. Mitral annular calcification. The posterior annulus is  heavily calcified. There appears to be independently mobile  material seen at the posterior annulus. This may represent  endocarditis, clinical correlation indicated.  2. Calcified trileaflet aortic valve with normal opening.  Mild-moderate aortic regurgitation.  msec  3. Increased relative wall thickness with normal left  ventricular mass index, consistent with concentric left  ventricular remodeling.  4. Endocardium not well visualized; Mild segmental left  ventricular systolic dysfunction. The base-mid inferior  wall, the mid lateral wall, and the basal inferoseptum are  hypokinetic.  Regional wall motion variation is noted on  the setting ofatrial fibrillation.  5. Normal right ventricular size with mildly  decreased  right ventricular systolic function.  6. Normal tricuspid valve. Mild-moderate tricuspid  regurgitation.  7. Estimated pulmonary artery systolic pressure equals 44  mm Hg,assuming right atrial pressure equals 8 mm Hg,  consistent with mild pulmonary pressures.  ------------------------------------------------------------------------  Confirmed on  11/22/2016 - 14:45:39 by Jazzy Phillips M.D.  ------------------------------------------------------------------------    ACC: [id number] EXAM:  XR CHEST AP OR PA 1V   ORDERED BY: YENNY MUÑOZ     PROCEDURE DATE:  05/14/2023      INTERPRETATION:  CLINICAL STATEMENT: Fall    TECHNIQUE: AP view of the chest.  COMPARISON: 6/20/2022    Sternotomy sutures and CABG clips    Cardiomediastinal silhouette is enlarged, may be exaggerated by AP   technique.    Chronic lung changes. Small left effusion. No focal lung consolidation.   No pneumothorax.    Visualized bony thorax is grossly intact. Degenerative changes of the   spine and shoulders. Ursa screw right shoulder again noted.    IMPRESSION:    Small left effusion.    --- End of Report ---  REUBEN LANE MD; Attending Radiologist  This document has been electronically signed. May 14 2023  2:20PM    Ventricular Rate 92 BPM    QRS Duration 128 ms    Q-T Interval 392 ms    QTC Calculation(Bazett) 484 ms    R Axis 2 degrees    T Axis -34 degrees    Diagnosis Line Atrial fibrillation  Right bundle branch block  T wave abnormality, consider inferolateral ischemia  Abnormal ECG  When compared with ECG of 14-MAY-2023 14:01, (Unconfirmed)  No significant change was found  Confirmed by VELMA ELLSWORTH ([id number]) on 5/15/2023 5:01:21 PM

## 2023-05-31 ENCOUNTER — LABORATORY RESULT (OUTPATIENT)
Age: 88
End: 2023-05-31

## 2023-06-13 ENCOUNTER — LABORATORY RESULT (OUTPATIENT)
Age: 88
End: 2023-06-13

## 2023-06-14 ENCOUNTER — NON-APPOINTMENT (OUTPATIENT)
Age: 88
End: 2023-06-14

## 2023-06-16 ENCOUNTER — INPATIENT (INPATIENT)
Facility: HOSPITAL | Age: 88
LOS: 11 days | Discharge: HOME | DRG: 291 | End: 2023-06-28
Attending: INTERNAL MEDICINE | Admitting: INTERNAL MEDICINE
Payer: MEDICARE

## 2023-06-16 VITALS
SYSTOLIC BLOOD PRESSURE: 118 MMHG | RESPIRATION RATE: 20 BRPM | OXYGEN SATURATION: 98 % | HEART RATE: 100 BPM | WEIGHT: 138.01 LBS | HEIGHT: 56 IN | DIASTOLIC BLOOD PRESSURE: 72 MMHG | TEMPERATURE: 97 F

## 2023-06-16 DIAGNOSIS — E03.9 HYPOTHYROIDISM, UNSPECIFIED: ICD-10-CM

## 2023-06-16 DIAGNOSIS — R06.2 WHEEZING: ICD-10-CM

## 2023-06-16 DIAGNOSIS — G47.00 INSOMNIA, UNSPECIFIED: ICD-10-CM

## 2023-06-16 DIAGNOSIS — I50.9 HEART FAILURE, UNSPECIFIED: ICD-10-CM

## 2023-06-16 DIAGNOSIS — I63.9 CEREBRAL INFARCTION, UNSPECIFIED: ICD-10-CM

## 2023-06-16 DIAGNOSIS — K59.00 CONSTIPATION, UNSPECIFIED: ICD-10-CM

## 2023-06-16 DIAGNOSIS — I25.10 ATHEROSCLEROTIC HEART DISEASE OF NATIVE CORONARY ARTERY WITHOUT ANGINA PECTORIS: ICD-10-CM

## 2023-06-16 DIAGNOSIS — I10 ESSENTIAL (PRIMARY) HYPERTENSION: ICD-10-CM

## 2023-06-16 DIAGNOSIS — I48.91 UNSPECIFIED ATRIAL FIBRILLATION: ICD-10-CM

## 2023-06-16 DIAGNOSIS — R06.02 SHORTNESS OF BREATH: ICD-10-CM

## 2023-06-16 DIAGNOSIS — Z29.9 ENCOUNTER FOR PROPHYLACTIC MEASURES, UNSPECIFIED: ICD-10-CM

## 2023-06-16 DIAGNOSIS — N18.9 CHRONIC KIDNEY DISEASE, UNSPECIFIED: ICD-10-CM

## 2023-06-16 LAB
ALBUMIN SERPL ELPH-MCNC: 3.1 G/DL — LOW (ref 3.3–5)
ALP SERPL-CCNC: 98 U/L — SIGNIFICANT CHANGE UP (ref 40–120)
ALT FLD-CCNC: 9 U/L — LOW (ref 12–78)
ANION GAP SERPL CALC-SCNC: 4 MMOL/L — LOW (ref 5–17)
APPEARANCE UR: CLEAR — SIGNIFICANT CHANGE UP
APTT BLD: 39.6 SEC — HIGH (ref 27.5–35.5)
AST SERPL-CCNC: 19 U/L — SIGNIFICANT CHANGE UP (ref 15–37)
BACTERIA # UR AUTO: ABNORMAL /HPF
BASOPHILS # BLD AUTO: 0.02 K/UL — SIGNIFICANT CHANGE UP (ref 0–0.2)
BASOPHILS NFR BLD AUTO: 0.4 % — SIGNIFICANT CHANGE UP (ref 0–2)
BILIRUB SERPL-MCNC: 0.4 MG/DL — SIGNIFICANT CHANGE UP (ref 0.2–1.2)
BILIRUB UR-MCNC: NEGATIVE — SIGNIFICANT CHANGE UP
BUN SERPL-MCNC: 45 MG/DL — HIGH (ref 7–23)
CALCIUM SERPL-MCNC: 8.8 MG/DL — SIGNIFICANT CHANGE UP (ref 8.5–10.1)
CHLORIDE SERPL-SCNC: 99 MMOL/L — SIGNIFICANT CHANGE UP (ref 96–108)
CO2 SERPL-SCNC: 36 MMOL/L — HIGH (ref 22–31)
COLOR SPEC: YELLOW — SIGNIFICANT CHANGE UP
COMMENT - URINE: SIGNIFICANT CHANGE UP
CREAT SERPL-MCNC: 1.4 MG/DL — HIGH (ref 0.5–1.3)
DIFF PNL FLD: NEGATIVE — SIGNIFICANT CHANGE UP
EGFR: 34 ML/MIN/1.73M2 — LOW
EOSINOPHIL # BLD AUTO: 0.25 K/UL — SIGNIFICANT CHANGE UP (ref 0–0.5)
EOSINOPHIL NFR BLD AUTO: 4.6 % — SIGNIFICANT CHANGE UP (ref 0–6)
FLUAV AG NPH QL: SIGNIFICANT CHANGE UP
FLUBV AG NPH QL: SIGNIFICANT CHANGE UP
GLUCOSE SERPL-MCNC: 112 MG/DL — HIGH (ref 70–99)
GLUCOSE UR QL: NEGATIVE MG/DL — SIGNIFICANT CHANGE UP
HCT VFR BLD CALC: 37.8 % — SIGNIFICANT CHANGE UP (ref 34.5–45)
HGB BLD-MCNC: 11.5 G/DL — SIGNIFICANT CHANGE UP (ref 11.5–15.5)
IMM GRANULOCYTES NFR BLD AUTO: 0.2 % — SIGNIFICANT CHANGE UP (ref 0–0.9)
INR BLD: 1.55 RATIO — HIGH (ref 0.88–1.16)
KETONES UR-MCNC: NEGATIVE MG/DL — SIGNIFICANT CHANGE UP
LACTATE SERPL-SCNC: 1.2 MMOL/L — SIGNIFICANT CHANGE UP (ref 0.7–2)
LEUKOCYTE ESTERASE UR-ACNC: ABNORMAL
LYMPHOCYTES # BLD AUTO: 1.05 K/UL — SIGNIFICANT CHANGE UP (ref 1–3.3)
LYMPHOCYTES # BLD AUTO: 19.4 % — SIGNIFICANT CHANGE UP (ref 13–44)
MCHC RBC-ENTMCNC: 30.4 GM/DL — LOW (ref 32–36)
MCHC RBC-ENTMCNC: 31.3 PG — SIGNIFICANT CHANGE UP (ref 27–34)
MCV RBC AUTO: 102.7 FL — HIGH (ref 80–100)
MONOCYTES # BLD AUTO: 0.74 K/UL — SIGNIFICANT CHANGE UP (ref 0–0.9)
MONOCYTES NFR BLD AUTO: 13.7 % — SIGNIFICANT CHANGE UP (ref 2–14)
NEUTROPHILS # BLD AUTO: 3.35 K/UL — SIGNIFICANT CHANGE UP (ref 1.8–7.4)
NEUTROPHILS NFR BLD AUTO: 61.7 % — SIGNIFICANT CHANGE UP (ref 43–77)
NITRITE UR-MCNC: NEGATIVE — SIGNIFICANT CHANGE UP
NRBC # BLD: 0 /100 WBCS — SIGNIFICANT CHANGE UP (ref 0–0)
NT-PROBNP SERPL-SCNC: 4522 PG/ML — HIGH (ref 0–450)
PH UR: 6 — SIGNIFICANT CHANGE UP (ref 5–8)
PLATELET # BLD AUTO: 229 K/UL — SIGNIFICANT CHANGE UP (ref 150–400)
POTASSIUM SERPL-MCNC: 3.7 MMOL/L — SIGNIFICANT CHANGE UP (ref 3.5–5.3)
POTASSIUM SERPL-SCNC: 3.7 MMOL/L — SIGNIFICANT CHANGE UP (ref 3.5–5.3)
PROCALCITONIN SERPL-MCNC: 0.1 NG/ML — HIGH
PROT SERPL-MCNC: 6.8 G/DL — SIGNIFICANT CHANGE UP (ref 6–8.3)
PROT UR-MCNC: SIGNIFICANT CHANGE UP MG/DL
PROTHROM AB SERPL-ACNC: 18.2 SEC — HIGH (ref 10.5–13.4)
RBC # BLD: 3.68 M/UL — LOW (ref 3.8–5.2)
RBC # FLD: 16.9 % — HIGH (ref 10.3–14.5)
RBC CASTS # UR COMP ASSIST: 15 /HPF — HIGH (ref 0–4)
RSV RNA NPH QL NAA+NON-PROBE: SIGNIFICANT CHANGE UP
SARS-COV-2 RNA SPEC QL NAA+PROBE: SIGNIFICANT CHANGE UP
SODIUM SERPL-SCNC: 139 MMOL/L — SIGNIFICANT CHANGE UP (ref 135–145)
SP GR SPEC: 1.01 — SIGNIFICANT CHANGE UP (ref 1–1.03)
TROPONIN I, HIGH SENSITIVITY RESULT: 34.1 NG/L — SIGNIFICANT CHANGE UP
UROBILINOGEN FLD QL: 0.2 MG/DL — SIGNIFICANT CHANGE UP (ref 0.2–1)
WBC # BLD: 5.42 K/UL — SIGNIFICANT CHANGE UP (ref 3.8–10.5)
WBC # FLD AUTO: 5.42 K/UL — SIGNIFICANT CHANGE UP (ref 3.8–10.5)
WBC UR QL: 90 /HPF — HIGH (ref 0–5)

## 2023-06-16 PROCEDURE — 71045 X-RAY EXAM CHEST 1 VIEW: CPT | Mod: 26

## 2023-06-16 PROCEDURE — 99285 EMERGENCY DEPT VISIT HI MDM: CPT

## 2023-06-16 PROCEDURE — 93010 ELECTROCARDIOGRAM REPORT: CPT

## 2023-06-16 PROCEDURE — 93970 EXTREMITY STUDY: CPT | Mod: 26

## 2023-06-16 PROCEDURE — 99223 1ST HOSP IP/OBS HIGH 75: CPT

## 2023-06-16 RX ORDER — CEFTRIAXONE 500 MG/1
1000 INJECTION, POWDER, FOR SOLUTION INTRAMUSCULAR; INTRAVENOUS ONCE
Refills: 0 | Status: COMPLETED | OUTPATIENT
Start: 2023-06-16 | End: 2023-06-16

## 2023-06-16 RX ORDER — ACETAMINOPHEN 500 MG
650 TABLET ORAL EVERY 6 HOURS
Refills: 0 | Status: DISCONTINUED | OUTPATIENT
Start: 2023-06-16 | End: 2023-06-28

## 2023-06-16 RX ORDER — MULTIVIT-MIN/FERROUS GLUCONATE 9 MG/15 ML
2 LIQUID (ML) ORAL
Refills: 0 | DISCHARGE

## 2023-06-16 RX ORDER — ZOLPIDEM TARTRATE 10 MG/1
5 TABLET ORAL AT BEDTIME
Refills: 0 | Status: DISCONTINUED | OUTPATIENT
Start: 2023-06-16 | End: 2023-06-23

## 2023-06-16 RX ORDER — POLYETHYLENE GLYCOL 3350 17 G/17G
17 POWDER, FOR SOLUTION ORAL AT BEDTIME
Refills: 0 | Status: DISCONTINUED | OUTPATIENT
Start: 2023-06-16 | End: 2023-06-28

## 2023-06-16 RX ORDER — MULTIVIT-MIN/FERROUS GLUCONATE 9 MG/15 ML
1 LIQUID (ML) ORAL
Refills: 0 | DISCHARGE

## 2023-06-16 RX ORDER — CHOLECALCIFEROL (VITAMIN D3) 125 MCG
1 CAPSULE ORAL
Refills: 0 | DISCHARGE

## 2023-06-16 RX ORDER — SIMVASTATIN 20 MG/1
40 TABLET, FILM COATED ORAL AT BEDTIME
Refills: 0 | Status: DISCONTINUED | OUTPATIENT
Start: 2023-06-16 | End: 2023-06-28

## 2023-06-16 RX ORDER — FUROSEMIDE 40 MG
60 TABLET ORAL
Refills: 0 | Status: DISCONTINUED | OUTPATIENT
Start: 2023-06-16 | End: 2023-06-17

## 2023-06-16 RX ORDER — ZOLPIDEM TARTRATE 10 MG/1
0.5 TABLET ORAL
Refills: 0 | DISCHARGE

## 2023-06-16 RX ORDER — ASPIRIN/CALCIUM CARB/MAGNESIUM 324 MG
81 TABLET ORAL DAILY
Refills: 0 | Status: DISCONTINUED | OUTPATIENT
Start: 2023-06-16 | End: 2023-06-23

## 2023-06-16 RX ORDER — LANOLIN ALCOHOL/MO/W.PET/CERES
3 CREAM (GRAM) TOPICAL AT BEDTIME
Refills: 0 | Status: DISCONTINUED | OUTPATIENT
Start: 2023-06-16 | End: 2023-06-28

## 2023-06-16 RX ORDER — SENNA PLUS 8.6 MG/1
2 TABLET ORAL AT BEDTIME
Refills: 0 | Status: DISCONTINUED | OUTPATIENT
Start: 2023-06-16 | End: 2023-06-28

## 2023-06-16 RX ORDER — DILTIAZEM HCL 120 MG
120 CAPSULE, EXT RELEASE 24 HR ORAL DAILY
Refills: 0 | Status: DISCONTINUED | OUTPATIENT
Start: 2023-06-16 | End: 2023-06-28

## 2023-06-16 RX ORDER — SODIUM CHLORIDE 9 MG/ML
500 INJECTION INTRAMUSCULAR; INTRAVENOUS; SUBCUTANEOUS ONCE
Refills: 0 | Status: COMPLETED | OUTPATIENT
Start: 2023-06-16 | End: 2023-06-16

## 2023-06-16 RX ORDER — LEVOTHYROXINE SODIUM 125 MCG
1 TABLET ORAL
Refills: 0 | DISCHARGE

## 2023-06-16 RX ORDER — ONDANSETRON 8 MG/1
4 TABLET, FILM COATED ORAL EVERY 8 HOURS
Refills: 0 | Status: DISCONTINUED | OUTPATIENT
Start: 2023-06-16 | End: 2023-06-28

## 2023-06-16 RX ORDER — LEVOTHYROXINE SODIUM 125 MCG
50 TABLET ORAL DAILY
Refills: 0 | Status: DISCONTINUED | OUTPATIENT
Start: 2023-06-16 | End: 2023-06-28

## 2023-06-16 RX ORDER — APIXABAN 2.5 MG/1
2.5 TABLET, FILM COATED ORAL EVERY 12 HOURS
Refills: 0 | Status: DISCONTINUED | OUTPATIENT
Start: 2023-06-16 | End: 2023-06-23

## 2023-06-16 RX ADMIN — CEFTRIAXONE 1000 MILLIGRAM(S): 500 INJECTION, POWDER, FOR SOLUTION INTRAMUSCULAR; INTRAVENOUS at 16:15

## 2023-06-16 RX ADMIN — SIMVASTATIN 40 MILLIGRAM(S): 20 TABLET, FILM COATED ORAL at 22:50

## 2023-06-16 RX ADMIN — CEFTRIAXONE 100 MILLIGRAM(S): 500 INJECTION, POWDER, FOR SOLUTION INTRAMUSCULAR; INTRAVENOUS at 15:40

## 2023-06-16 RX ADMIN — ZOLPIDEM TARTRATE 5 MILLIGRAM(S): 10 TABLET ORAL at 22:50

## 2023-06-16 RX ADMIN — SODIUM CHLORIDE 500 MILLILITER(S): 9 INJECTION INTRAMUSCULAR; INTRAVENOUS; SUBCUTANEOUS at 16:41

## 2023-06-16 RX ADMIN — SODIUM CHLORIDE 500 MILLILITER(S): 9 INJECTION INTRAMUSCULAR; INTRAVENOUS; SUBCUTANEOUS at 15:39

## 2023-06-16 RX ADMIN — SENNA PLUS 2 TABLET(S): 8.6 TABLET ORAL at 22:51

## 2023-06-16 NOTE — CONSULT NOTE ADULT - ASSESSMENT
89 yo F with diastolic HF, CAD s/p CABG, RBBB, CKD, atrial fibrillation (on apixaban), HTN, moderate AS and pulmonary HTN, chronic sciatica, CVA who was recently hospitalized  for fall sp IMN 5/14 s/p and discharge on 5/19/23 who is presented to ED  for fatigue and wheezing 89 yo F with diastolic HF, CAD s/p CABG, RBBB, CKD, atrial fibrillation (on apixaban), HTN, moderate AS and pulmonary HTN, chronic sciatica, CVA who was recently hospitalized  for fall sp IMN 5/14 s/p and discharge on 5/19/23 who is presented to ED  for fatigue and wheezing. Appear to be moderately  VOL.      EKG: atrial fibrillation 102 with no acute  ischemic changes     Appear moderately  VOL  no murmur on exam  consider Bumex 2mg IVP x1  continue torsemide 40mg Po BID- monitor for JACK  strict i/o    [id number] 6  continue apixaban for atrial fibrillation  dilTIAZem 120 mg Po daily    continue Aspirin 81mg Po daily  continue  simvastatin 40 mg oral table    GOC discussion     89 yo F with diastolic HF, CAD s/p CABG, RBBB, CKD, atrial fibrillation (on apixaban), HTN, moderate AS and pulmonary HTN, chronic sciatica, CVA who was recently hospitalized  for fall sp IMN 5/14 s/p and discharge on 5/19/23 who is presented to ED  for fatigue and wheezing. Appear to be moderately  VOL.    EKG: atrial fibrillation 102 with no acute  ischemic changes     Appear moderately  VOL  Would give lasix 60mg q12 IV  Please continue to maintain strict I/Os, monitor daily weights, Cr, and K.       [id number] 6  continue apixaban for atrial fibrillation  dilTIAZem 120 mg Po daily    trops neg.   continue Aspirin 81mg Po daily  continue  simvastatin 40 mg oral table    GOC discussion

## 2023-06-16 NOTE — H&P ADULT - PROBLEM SELECTOR PLAN 11
DVT ppx: Eliquis as above    Dispo  - PT    GOC  - Pt full code for now, wishes to have further discussion with palliative care team. Daughter bedside agrees.

## 2023-06-16 NOTE — ED ADULT TRIAGE NOTE - PRO INTERPRETER NEED 2
Patient called asking for a refill on ketoconazole shampoo. She is asking if she can be prescribed meloxicam for her hip issue that she is having. She has been taking her husbands medication and it seen to be helping.  I advised her to schedule an appoin
English

## 2023-06-16 NOTE — H&P ADULT - PROBLEM SELECTOR PLAN 8
DVT ppx: Eliquis as above    Dispo  - PT Chronic  - Bowel regimen: Senna+Miralax, low threshold for suppository/enema

## 2023-06-16 NOTE — ED ADULT NURSE REASSESSMENT NOTE - NSFALLUNIVINTERV_ED_ALL_ED
Bed/Stretcher in lowest position, wheels locked, appropriate side rails in place/Call bell, personal items and telephone in reach/Instruct patient to call for assistance before getting out of bed/chair/stretcher/Non-slip footwear applied when patient is off stretcher/[place] to call system/Physically safe environment - no spills, clutter or unnecessary equipment/Purposeful proactive rounding/Room/bathroom lighting operational, light cord in reach

## 2023-06-16 NOTE — H&P ADULT - HISTORY OF PRESENT ILLNESS
89F w/ PMHx of CHFrEF, CAD s/p CABG, RBBB, CKD, atrial fibrillation (on apixaban), HTN, moderate AS and pulmonary HTN, chronic sciatica, CVA who presents to the ED for wheezing. Pt recently discharged from [facility] s/p R hip fracture. While in rehab she developed pneumonia. Was on antibiotics for IV and then oral, discharged home 5 days ago. According to the daughter she has been more fatigued than usual over this past 24 hours, and was presenting with audible wheezing this afternoon. Daughter called EMS, and was found to have an oxygen saturation at home of 85% on room air.  EMS administered 1 sublingual nitroglycerin started IV lock, and administered not nasal cannula oxygen with good relief of her hypoxia and symptoms. Patient currently feels much better, denies any fever/chills or GI symptoms.    IN THE ED:  Temp XX F, HR XX, BP XX/XX, RR XX, XX SpO2 on room air  Labs significant for:  Imaging  CXR:  EKG:  Received in ED:  Consults:    89F w/ PMHx of CHFrEF, CAD s/p CABG, CKD, Afib (on apixaban), HTN, moderate AS and pulmonary HTN, chronic sciatica, CVA who presents to the ED for wheezing. Pt recently discharged from [facility] s/p R hip fracture. While in rehab she developed pneumonia. Was on antibiotics for IV and then oral, discharged home 5 days ago. According to the daughter she has been more fatigued than usual over this past 24 hours, and was presenting with audible wheezing this afternoon. Daughter called EMS, and was found to have an oxygen saturation at home of 85% on room air.  EMS administered 1 sublingual nitroglycerin started IV lock, and administered not nasal cannula oxygen with good relief of her hypoxia and symptoms. Patient currently feels much better, denies any fever/chills or GI symptoms.    IN THE ED:  Temp 97.3F, , /72, RR 20, 98 SpO2 on room air  Labs significant for: Hb 11.5 (borderline), .7, INR 1.55, BUN 45, Cr. 1.4, Glucose 112, BNP 4.5k, RVP negative  Imaging  CXR: Wet read: poor inspiraiton. No acute pathology.  EKG: Afib 102 BPM, incomplete RBBB  B/l LE venous doppler: No evidence of DVT in either lower extremity in the visualized venous   segments  Received in ED: 0.5L NS, Ceftriaxone 1g IV. 89F w/ PMHx of CHFrEF, CAD s/p CABG, CKD, Afib (on apixaban), HTN, moderate AS and pulmonary HTN, CVA who presents to the ED for wheezing. Pt recently discharged from [facility] s/p R hip fracture. While in rehab she developed pneumonia. Was on antibiotics for IV and then oral, discharged home 5 days ago. According to the daughter she has been more fatigued than usual over this past 24 hours, and was presenting with audible wheezing this afternoon. Daughter called EMS, and was found to have an oxygen saturation at home of 85% on room air.  EMS administered 1 sublingual nitroglycerin started IV lock, and administered not nasal cannula oxygen with good relief of her hypoxia and symptoms. Patient currently feels much better, denies any fever/chills or GI symptoms.    IN THE ED:  Temp 97.3F, , /72, RR 20, 98 SpO2 on room air  Labs significant for: Hb 11.5 (borderline), .7, INR 1.55, BUN 45, Cr. 1.4, Glucose 112, BNP 4.5k, RVP negative  Imaging  CXR: Wet read: poor inspiraiton. No acute pathology.  EKG: Afib 102 BPM, incomplete RBBB  B/l LE venous doppler: No evidence of DVT in either lower extremity in the visualized venous   segments  Received in ED: 0.5L NS, Ceftriaxone 1g IV.

## 2023-06-16 NOTE — H&P ADULT - PROBLEM SELECTOR PLAN 6
CKD  - Baseline 1.2-1.3  - Cr 1.4 on admission  - Monitor BMP  - Avoid nephrotoxic medications as able

## 2023-06-16 NOTE — H&P ADULT - PROBLEM SELECTOR PLAN 1
Recently diagnosed with   - Pt without fever/leukocytosis  does not meet SIRS criteria  - CXR: Wet read: poor inspiraiton. No acute pathology.  - Lactate 1.2  - RVP negative  - S/p ceftriaxone in ED, continue   - CT chest ordered  - Tylenol PRN for fever  - Incentive spirometer  - F/u blood/urine/sputum culture + MRSA PCR + Ur legionella/step  - Continuous pulse ox  - Pt currently satting well without O2 support as needed  - Monitor for signs and symptoms of worsening infection Recently diagnosed with pneumonia s/p course of ABs  - Pt without fever/leukocytosis  does not meet SIRS criteria  - CXR: Wet read: poor inspiration. No acute pathology.  - Lactate 1.2, RVP negative  - S/p ceftriaxone in ED, monitor off abx for now  - CT chest ordered  - Tylenol PRN for fever  - Incentive spirometer  - F/u blood/urine/sputum culture  - Continuous pulse ox  - Pt currently satting well on 6L NC, ween and titrate O2 support as needed  - Monitor for signs and symptoms of infection Recently diagnosed with pneumonia s/p course of ABs  - Pt without fever/leukocytosis  does not meet SIRS criteria  - CXR: Wet read: poor inspiration. No acute pathology.  - Lactate 1.2, RVP negative  - S/p ceftriaxone in ED, monitor off abx for now  - CT chest ordered  - Tylenol PRN for fever  - Incentive spirometer  - F/u blood/urine/sputum culture  - Continuous pulse ox  - Pt currently satting well on 6L NC, ween and titrate O2 support as needed  - Monitor for signs and symptoms of infection  - Pulmonology Dr. Acosta consulted.

## 2023-06-16 NOTE — ED ADULT NURSE NOTE - NSFALLRISKFACTORS_ED_ALL_ED
broke hip 1 month ago from a fall/Age: 85 years old or older/Bone Condition: Including osteoporosis, prolonged steroid use or metastatic bone disease/cancer

## 2023-06-16 NOTE — ED PROVIDER NOTE - ENMT, MLM
Airway patent, Nasal mucosa clear. Mouth with normal mucosa. Throat has no vesicles, no oropharyngeal exudates and uvula is midline. no g f r no pallor no jvd

## 2023-06-16 NOTE — H&P ADULT - NSHPSOCIALHISTORY_GEN_ALL_CORE
Lives at home with 24/7 aid  Needs assistance with ADLs  Ambulates without walker former, needs wheelchair s/p ORIF  Denies Alcohol use  Denies smoking history  Denies drug use

## 2023-06-16 NOTE — H&P ADULT - PROBLEM SELECTOR PLAN 2
CHFrEF w/ hx of AS and pHTN  - BNP 4.5k on admission, increase leg swelling on exam  - Continue home torsemide 20mg qD w/ hold parameters    - CXR as above  - Cardio consulted Dr. Nieves  - Dash/TLC Diet CHFrEF w/ hx of AS and pHTN  - BNP 4.5k on admission, increase leg swelling on exam in the setting of R hip ORIF and  ambulation, without  lung crackles  - Continue home torsemide 20mg qD w/ hold parameters    - CXR as above  - Dash/TLC Diet  - Cardio consulted Dr. Nieves CHFrEF w/ hx of AS and pHTN  - BNP 4.5k on admission, increase leg swelling on exam in the setting of R hip ORIF and  ambulation, without significant lung crackles  - Hold home torsemide  - CXR as above  - Dash/TLC Diet w/ 1.5L fluid restriction, strict I/Os  - Cardio consulted Dr. Nieves: lasix 60mg IV BID

## 2023-06-16 NOTE — H&P ADULT - NSHPPHYSICALEXAM_GEN_ALL_CORE
GENERAL:  Not in acute distress, lying in bed comfortably  HEENT:  PERRLA  CHEST:  Mild wheezing without crackles on 6L NC  HEART:  +Irregularly irregular.  ABDOMEN: +Abd with some distension, pt endorses as baseline  EXTREMITIES: +LE pitting edema R>L  SKIN:  Warm, well perfused  NEURO:  Alert, Conversing appropriately, A&Ox3  PSYCH: Not emotionally labile, appropriate affect

## 2023-06-16 NOTE — ED PROVIDER NOTE - CONSTITUTIONAL, MLM
Well appearing, chronically ill, [place] but otherwise awake, alert, oriented to person, place, time/situation and in no apparent distress. normal...

## 2023-06-16 NOTE — ED PROVIDER NOTE - CLINICAL SUMMARY MEDICAL DECISION MAKING FREE TEXT BOX
Patient is a 89-year-old female who has multiple medical problems, recent hospitalization for right hip fracture with ORIF, followed by subsequent admission to rehab which was complicated by pneumonia which was treated with IV and oral antibiotic therapy.  Patient was discharged to home after 3 weeks of therapy and antibiotics because a outbreak of COVID began in the nursing home/rehab and the daughter wanted to take her out of there before the patient got it.  Patient was doing well until today when she appeared more fatigued than usual, was coughing and wheezing this morning.  No fever or chills.  No increase in abdominal girth weight gain or leg edema.  EMS was called because the patient was more short of breath and sounded to be having difficulty.  She was found to have oxygen saturation on room air of 85% EMS provided treatment and the patient got better.  Patient was transported to the hospital for evaluation.  Plan of care includes review prior disposition and chart including laboratory studies and old EKG.  Rule out congestive heart failure rule out pneumonia, rule out COVID.  Patient has no fever at this time is unlikely to be COVID but may be some other viral infection.  Rule out DVT given the swelling of the legs will Doppler of the legs.  Rule out cardiac arrhythmia rule out electrolyte abnormality patient has a history of CKD being followed by the primary care physician.  Patient will require admission to the hospital, provide empiric antibiotic therapy in case this is an infection.  This chart was made with dictation software and may contain typographical errors.

## 2023-06-16 NOTE — ED ADULT NURSE NOTE - NSFALLHARMRISKINTERV_ED_ALL_ED

## 2023-06-16 NOTE — PATIENT PROFILE ADULT - FALL HARM RISK - RISK INTERVENTIONS

## 2023-06-16 NOTE — ED ADULT TRIAGE NOTE - CHIEF COMPLAINT QUOTE
Patient A/Ox4 with clear speech. BIBA from home for SOB that began this AM. Patient with hx of CHF and was recently treated for PNA. 4L NC applied by EMS for 85% room air O2 sat. Also given 1 SL Nitro & duoneb. R hand 22G placed by EMS.

## 2023-06-16 NOTE — H&P ADULT - PROBLEM SELECTOR PLAN 3
EKG on admission afib 102 BPM  - Rate Control: Continue home diltiazem 120mg qD w/ hold parameters   - Anticoagulation: Continue home eliquis 2.5mg BID  - Monitor and replete electrolytes

## 2023-06-16 NOTE — H&P ADULT - NSHPREVIEWOFSYSTEMS_GEN_ALL_CORE
CONSTITUTIONAL: No fever/chills.  HENMT: No HA, lightheadedness/dizziness  RESPIRATORY: +Pt endorses wheezing and phlegm without cough. No shortness of breath currently  CARDIOVASCULAR: No chest pain, palpitations.  GASTROINTESTINAL: No abdominal or epigastric pain. No nausea or vomiting; +Chronic constipation.  GENITOURINARY: No dysuria, changes in frequency, hematuria, or incontinence  NEUROLOGICAL: +Decreased strength s/p R ORIF.  MUSCULOSKELETAL: No joint pain; No muscle, back, or extremity pain

## 2023-06-16 NOTE — CONSULT NOTE ADULT - ASSESSMENT
89F w/ PMHx of CHFrEF, CAD s/p CABG, CKD, Afib (on apixaban), HTN, moderate AS and pulmonary HTN, chronic sciatica, CVA who presents to the ED for wheezing    HF  OP  OA  hx of Hip Fx  CAD  CKD  valvular heart disease  Pulm HTN  GERD  CVA Hx  AF on AC    eval for HF exacerbation  Cardio eval in progress  CXR noted  Planned for CT chest  monitor VS and Sat  keep sat > 88 pct  old records reviewed - spoke with daughter - reviewed TTE -   cvs rx regimen optimization, Diuresis as per Cardio Recs  I and O  dietary discretion  LE doppler - NEG for DVT  GOC discussion

## 2023-06-16 NOTE — H&P ADULT - ATTENDING COMMENTS
89F w/ PMHx of CHFrEF, CAD s/p CABG, CKD, Afib (on apixaban), HTN, moderate AS and pulmonary HTN, chronic sciatica, CVA who presents to the ED for wheezing. Admitted for workup. Admit to medicine. PT/OT. Treatment will be dependent on clinical course. Agree with H&P as outlined above, edited where appropriate.

## 2023-06-16 NOTE — CONSULT NOTE ADULT - SUBJECTIVE AND OBJECTIVE BOX
[facility] - Heather Roberts, Adelaida, Meir, Pat, Federico; Office Number: [phone]    Initial Consult Note  CHIEF COMPLAINT: Patient is a 89y old  Female who presents with a chief complaint of   HPI:   89 yo F with diastolic HF, CAD s/p CABG, RBBB, CKD, atrial fibrillation (on apixaban), HTN, moderate AS and pulmonary HTN, chronic sciatica, CVA who was recently hospitalized  for fall sp IMN 5/14 s/p and discharge on 5/19/23 who is presented to ED for fatigued than usual over this past 24 hours, and was presenting with audible wheezing this afternoon.  The daughter called EMS, and was found to have an oxygen saturation at home of 85% on room air. EMS administered 1 sublingual nitroglycerin started IV lock, and administered not nasal cannula oxygen with good relief of her hypoxia and symptoms. In Ed pt received ceftriaxone and 500ccIVF.  In ED, T(F): 97.3, HR: 100, BP: 118/72, RR: 20, SpO2: 98%.   Labs remarkable for WBC 5.42, Hb 11.5,HCT 37.8, Platelet 229 Na 139, K 3.7, BUN 45, Creat 1.40, AST 19, ALT 9, Alk phos 98,  Troponin HsI 34.1, INR 1.55, PTT 39.6, Lactate 1.2      Images   B/l LE duplex; on evidence of DVT in either lower extremity in the visualized venous segments    < end of copied text >    Allergies    No Known Allergies        PAST MEDICAL & SURGICAL HISTORY:  Benign Essential Hypertension      Chronic Sciatica      Personal History of Coronary Artery Disease      Hypertension      Atrial fibrillation      CVA (cerebral vascular accident)      S/P CABG        MEDICATIONS  (STANDING):    MEDICATIONS  (PRN):    FAMILY HISTORY:     No family history of acute MI or sudden cardiac death.    SOCIAL HISTORY: No active tobacco, ethanol, or drug abuse.    REVIEW OF SYSTEMS   All other review of systems is negative unless indicated above.    VITAL SIGNS:   Vital Signs Last 24 Hrs  T(C): 36.3 (16 Jun 2023 13:41), Max: 36.3 (16 Jun 2023 13:41)  T(F): 97.3 (16 Jun 2023 13:41), Max: 97.3 (16 Jun 2023 13:41)  HR: 100 (16 Jun 2023 13:41) (100 - 100)  BP: 118/72 (16 Jun 2023 13:41) (118/72 - 118/72)  BP(mean): --  RR: 20 (16 Jun 2023 13:41) (20 - 20)  SpO2: 98% (16 Jun 2023 13:41) (98% - 98%)    Parameters below as of 16 Jun 2023 13:41  Patient On (Oxygen Delivery Method): room air        Physical Exam:  Constitutional: NAD, awake and alert,   HEENT: Moist Mucous Membranes, Anicteric  Pulmonary: Non-labored, breath sounds are clear bilaterally, No wheezing, rales or rhonchi  Cardiovascular: Regular, S1 and S2, No murmurs, No rubs, gallops or clicks  Gastrointestinal: Bowel Sounds present, soft, nontender.   Lymph: No peripheral edema. No lymphadenopathy.  Skin: No visible rashes or ulcers.  Psych:  Mood & affect appropriate    I&O's Summary      LABS: All Labs Reviewed:                        11.5   5.42  )-----------( 229      ( 16 Jun 2023 15:20 )             37.8     16 Jun 2023 15:20    139    |  99     |  45     ----------------------------<  112    3.7     |  36     |  1.40     Ca    8.8        16 Jun 2023 15:20    TPro  6.8    /  Alb  3.1    /  TBili  0.4    /  DBili  x      /  AST  19     /  ALT  9      /  AlkPhos  98     16 Jun 2023 15:20    PT/INR - ( 16 Jun 2023 15:20 )   PT: 18.2 sec;   INR: 1.55 ratio         PTT - ( 16 Jun 2023 15:20 )  PTT:39.6 secTroponin I, High Sensitivity Result: 34.1 ng/L (06-16-23 @ 15:20)    Blood Culture:       Patient name: JIMMIE BRIAN  YOB: 1926   Age: 89 (F)   MR#: [id number]  Study Date: 11/22/2016  Location: [facility]her: Malia Oneill RDCS  Study quality: Technically difficult  Referring Physician: Sally Phipps MD  Blood Pressure: 153/78 mmHg  Height: 140 cm  Weight: 60 kg  BSA: 1.5 m2  ------------------------------------------------------------------------  PROCEDURE: Transthoracic echocardiogram with 2-D, M-Mode  and complete spectral and color flow Doppler.  INDICATION: Unspecified atrial fibrillation (I48.91)  ------------------------------------------------------------------------  Dimensions:    Normal Values:  LA:     4.6    2.0 - 4.0 cm  Ao:     3.4    2.0 - 3.8 cm  SEPTUM: 1.0    0.6 - 1.2 cm  PWT:    1.1  0.6 - 1.1 cm  LVIDd:  3.4    3.0 - 5.6 cm  LVIDs:  2.6    1.8 - 4.0 cm  Derived variables:  LVMI: 72 g/m2  RWT: 0.64  Fractional short: 24 %  EF (Teicholtz): 48 %  ------------------------------------------------------------------------  Observations:  Mitral Valve: Mitral annular calcification. The posterior  annulus is heavily calcified. There appears to be  independently mobile material seen at the posterior  annulus. This may represent endocarditis, clinical  correlation indicated. Mild mitral regurgitation.  Aortic Valve/Aorta: Calcified trileaflet aortic valve with  normal opening. Mild-moderate aortic regurgitation.   msec  Aortic Root: 3.4 cm.  Left Atrium: Mildly dilated left atrium.  LA volume index =  37 cc/m2.  Left Ventricle: Endocardium not well visualized; Mild  segmental left ventricular systolic dysfunction. The  base-mid inferior wall, the mid lateral wall, and the basal  inferoseptum are hypokinetic.  Regional wall motion  variation is noted on the setting of atrial fibrillation.  Increased relative wall thickness with normal left  ventricular mass index, consistent with concentric left  ventricular remodeling.  Right Heart: Mild right atrial enlargement. Normal right  ventricular size with mildly  decreased right ventricular  systolic function. Normal tricuspid valve. Mild-moderate  tricuspid regurgitation. Normal pulmonic valve. Minimal  pulmonic regurgitation.  Pericardium/Pleura: Normal pericardium with no pericardial  effusion.  Hemodynamic: Estimated right atrial pressure is 8 mm Hg.  Estimated right ventricular systolic pressure equals 44 mm  Hg, assuming right atrial pressure equals 8 mm Hg,  consistent with mild pulmonary hypertension.  ------------------------------------------------------------------------  Conclusions:  1. Mitral annular calcification. The posterior annulus is  heavily calcified. There appears to be independently mobile  material seen at the posterior annulus. This may represent  endocarditis, clinical correlation indicated.  2. Calcified trileaflet aortic valve with normal opening.  Mild-moderate aortic regurgitation.  msec  3. Increased relative wall thickness with normal left  ventricular mass index, consistent with concentric left  ventricular remodeling.  4. Endocardium not well visualized; Mild segmental left  ventricular systolic dysfunction. The base-mid inferior  wall, the mid lateral wall, and the basal inferoseptum are  hypokinetic.  Regional wall motion variation is noted on  the setting ofatrial fibrillation.  5. Normal right ventricular size with mildly  decreased  right ventricular systolic function.  6. Normal tricuspid valve. Mild-moderate tricuspid  regurgitation.  7. Estimated pulmonary artery systolic pressure equals 44  mm Hg,assuming right atrial pressure equals 8 mm Hg,  consistent with mild pulmonary pressures.  ------------------------------------------------------------------------  Confirmed on  11/22/2016 - 14:45:39 by Jazzy Phillips M.D.  ------------------------------------------------------------------------   [facility] - Heather Roberts, Adelaida, Meir, Pat, Federico; Office Number: [phone]    Initial Consult Note  CHIEF COMPLAINT: Patient is a 89y old  Female who presents with a chief complaint of   HPI:   89 yo F with diastolic HF, CAD s/p CABG, RBBB, CKD, atrial fibrillation (on apixaban), HTN, moderate AS and pulmonary HTN, chronic sciatica, CVA who was recently hospitalized  for fall sp IMN 5/14 s/p and discharge on 5/19/23 who is presented to ED for fatigued than usual over this past 24 hours, and was presenting with audible wheezing this afternoon. The aid noticed pt is wheezing and tired.  The daughter called EMS, and was found to have an oxygen saturation at home of 85% on room air. EMS administered 1 sublingual nitroglycerin started IV lock, and administered not nasal cannula oxygen with good relief of her hypoxia and symptoms. In ED pt received ceftriaxone and 500ccIVF. Patient was discharge from rehab last week. She was treated with IV antibiotics for pna in rehab. patient some SILVER on moderate exertion. Cardiology consulted for concern for VOL. Daughter reports unchanged  right LE swelling after the IMN. Patient denies chest pain, dizziness, palpitation. reports cough and shortness of breath.    In ED, T(F): 97.3, HR: 100, BP: 118/72, RR: 20, SpO2: 98%.   Labs remarkable for WBC 5.42, Hb 11.5,HCT 37.8, Platelet 229 Na 139, K 3.7, BUN 45, Creat 1.40, AST 19, ALT 9, Alk phos 98,  Troponin HsI 34.1, INR 1.55, PTT 39.6, Lactate 1.2      Images   B/l LE duplex; on evidence of DVT in either lower extremity in the visualized venous segments    < end of copied text >    Allergies    No Known Allergies        PAST MEDICAL & SURGICAL HISTORY:  Benign Essential Hypertension      Chronic Sciatica      Personal History of Coronary Artery Disease      Hypertension      Atrial fibrillation      CVA (cerebral vascular accident)      S/P CABG        MEDICATIONS  (STANDING):    MEDICATIONS  (PRN):    FAMILY HISTORY:     No family history of acute MI or sudden cardiac death.    SOCIAL HISTORY: No active tobacco, ethanol, or drug abuse.    REVIEW OF SYSTEMS   All other review of systems is negative unless indicated above.    VITAL SIGNS:   Vital Signs Last 24 Hrs  T(C): 36.3 (16 Jun 2023 13:41), Max: 36.3 (16 Jun 2023 13:41)  T(F): 97.3 (16 Jun 2023 13:41), Max: 97.3 (16 Jun 2023 13:41)  HR: 100 (16 Jun 2023 13:41) (100 - 100)  BP: 118/72 (16 Jun 2023 13:41) (118/72 - 118/72)  BP(mean): --  RR: 20 (16 Jun 2023 13:41) (20 - 20)  SpO2: 98% (16 Jun 2023 13:41) (98% - 98%)    Parameters below as of 16 Jun 2023 13:41  Patient On (Oxygen Delivery Method): room air        Physical Exam:  Constitutional: NAD, awake and alert,   HEENT: Moist Mucous Membranes, Anicteric  Pulmonary: Non-labored, b/l lungs exp wheezing and fine basilar crackles bilaterally   Cardiovascular: Regular, S1 and S2, No murmurs, No rubs, gallops or clicks  Gastrointestinal: firm, distended, Bowel Sounds present, soft, nontender.   Lymph:  + RLE 2+ pitting edema after IMN. left leg trace nonpitting edema. .  Skin: b/l Le ecchymosis and Right hip redness noted.  Psych:  Mood & affect appropriate    I&O's Summary      LABS: All Labs Reviewed:                        11.5   5.42  )-----------( 229      ( 16 Jun 2023 15:20 )             37.8     16 Jun 2023 15:20    139    |  99     |  45     ----------------------------<  112    3.7     |  36     |  1.40     Ca    8.8        16 Jun 2023 15:20    TPro  6.8    /  Alb  3.1    /  TBili  0.4    /  DBili  x      /  AST  19     /  ALT  9      /  AlkPhos  98     16 Jun 2023 15:20    PT/INR - ( 16 Jun 2023 15:20 )   PT: 18.2 sec;   INR: 1.55 ratio         PTT - ( 16 Jun 2023 15:20 )  PTT:39.6 secTroponin I, High Sensitivity Result: 34.1 ng/L (06-16-23 @ 15:20)    Blood Culture:       Patient name: JIMMIE BRIAN  YOB: 1926   Age: 89 (F)   MR#: [id number]  Study Date: 11/22/2016  [facility]  Study quality: Technically difficult  Referring Physician: Sally Phipps MD  Blood Pressure: 153/78 mmHg  Height: 140 cm  Weight: 60 kg  BSA: 1.5 m2  ------------------------------------------------------------------------  PROCEDURE: Transthoracic echocardiogram with 2-D, M-Mode  and complete spectral and color flow Doppler.  INDICATION: Unspecified atrial fibrillation (I48.91)  ------------------------------------------------------------------------  Dimensions:    Normal Values:  LA:     4.6    2.0 - 4.0 cm  Ao:     3.4    2.0 - 3.8 cm  SEPTUM: 1.0    0.6 - 1.2 cm  PWT:    1.1  0.6 - 1.1 cm  LVIDd:  3.4    3.0 - 5.6 cm  LVIDs:  2.6    1.8 - 4.0 cm  Derived variables:  LVMI: 72 g/m2  RWT: 0.64  Fractional short: 24 %  EF (Teicholtz): 48 %  ------------------------------------------------------------------------  Observations:  Mitral Valve: Mitral annular calcification. The posterior  annulus is heavily calcified. There appears to be  independently mobile material seen at the posterior  annulus. This may represent endocarditis, clinical  correlation indicated. Mild mitral regurgitation.  Aortic Valve/Aorta: Calcified trileaflet aortic valve with  normal opening. Mild-moderate aortic regurgitation.   msec  Aortic Root: 3.4 cm.  Left Atrium: Mildly dilated left atrium.  LA volume index =  37 cc/m2.  Left Ventricle: Endocardium not well visualized; Mild  segmental left ventricular systolic dysfunction. The  base-mid inferior wall, the mid lateral wall, and the basal  inferoseptum are hypokinetic.  Regional wall motion  variation is noted on the setting of atrial fibrillation.  Increased relative wall thickness with normal left  ventricular mass index, consistent with concentric left  ventricular remodeling.  Right Heart: Mild right atrial enlargement. Normal right  ventricular size with mildly  decreased right ventricular  systolic function. Normal tricuspid valve. Mild-moderate  tricuspid regurgitation. Normal pulmonic valve. Minimal  pulmonic regurgitation.  Pericardium/Pleura: Normal pericardium with no pericardial  effusion.  Hemodynamic: Estimated right atrial pressure is 8 mm Hg.  Estimated right ventricular systolic pressure equals 44 mm  Hg, assuming right atrial pressure equals 8 mm Hg,  consistent with mild pulmonary hypertension.  ------------------------------------------------------------------------  Conclusions:  1. Mitral annular calcification. The posterior annulus is  heavily calcified. There appears to be independently mobile  material seen at the posterior annulus. This may represent  endocarditis, clinical correlation indicated.  2. Calcified trileaflet aortic valve with normal opening.  Mild-moderate aortic regurgitation.  msec  3. Increased relative wall thickness with normal left  ventricular mass index, consistent with concentric left  ventricular remodeling.  4. Endocardium not well visualized; Mild segmental left  ventricular systolic dysfunction. The base-mid inferior  wall, the mid lateral wall, and the basal inferoseptum are  hypokinetic.  Regional wall motion variation is noted on  the setting ofatrial fibrillation.  5. Normal right ventricular size with mildly  decreased  right ventricular systolic function.  6. Normal tricuspid valve. Mild-moderate tricuspid  regurgitation.  7. Estimated pulmonary artery systolic pressure equals 44  mm Hg,assuming right atrial pressure equals 8 mm Hg,  consistent with mild pulmonary pressures.  ------------------------------------------------------------------------  Confirmed on  11/22/2016 - 14:45:39 by Jazzy Phillips M.D.  ------------------------------------------------------------------------

## 2023-06-16 NOTE — ED ADULT NURSE REASSESSMENT NOTE - NS ED NURSE REASSESS COMMENT FT1
pt. is AOX4. Cm/ in place, no signs of resp distress at this time. Safety precaution in place. Call bell placed within reach.

## 2023-06-16 NOTE — H&P ADULT - PROBLEM SELECTOR PLAN 4
/72 on admission  - Continue home diltiazem and torsemide as above  - Monitor hemodynamics  - DASH/TLC diet /72 on admission  - Continue home diltiazem as above  - Monitor hemodynamics  - DASH/TLC diet

## 2023-06-16 NOTE — ED PROVIDER NOTE - CARE PLAN
Principal Discharge DX:	Shortness of breath  Secondary Diagnosis:	Acute CHF  Secondary Diagnosis:	Hypoxia   1

## 2023-06-16 NOTE — ED PROVIDER NOTE - MUSCULOSKELETAL, MLM
Spine appears kyphotic, range of motion is not limited, no muscle or joint tenderness pt has bilateral pedal edema a recent bruise on r knee that the daughter is unsure when it occurred but does not cause patient any distress or decrease in rom.

## 2023-06-16 NOTE — CONSULT NOTE ADULT - NS ATTEND AMEND GEN_ALL_CORE FT
pt w/ ADHF.   Lasix 60mg IV q12.   Please continue to maintain strict I/Os, monitor daily weights, Cr, and K.  echo  tele  AF controlled for now. cont AVN agents.   pulm eval.   no sig signs of ischemia.  Further cardiac workup will depend on clinical course.

## 2023-06-16 NOTE — PATIENT PROFILE ADULT - VISION (WITH CORRECTIVE LENSES IF THE PATIENT USUALLY WEARS THEM):
Cataract surgery performed in the past/Partially impaired: cannot see medication labels or newsprint, but can see obstacles in path, and the surrounding layout; can count fingers at arm's length

## 2023-06-16 NOTE — ED ADULT NURSE NOTE - OBJECTIVE STATEMENT
patient alert and oriented from home presenting to ED with SOB and wheezing.  patient was diagnosed with pnemonia a week ago and was on IV and PO abx.  patient doesn't appear to be in distress at this time.

## 2023-06-16 NOTE — H&P ADULT - PROBLEM SELECTOR PLAN 9
DVT ppx: Eliquis as above    Dispo  - PT    GOC  - Pt full code for now, wishes to have further discussion with palliative care team. Daughter bedside agrees. -Continue home synthroid.

## 2023-06-16 NOTE — CONSULT NOTE ADULT - SUBJECTIVE AND OBJECTIVE BOX
Date/Time Patient Seen:  		  Referring MD:   Data Reviewed	       Patient is a 89y old  Female who presents with a chief complaint of Wheezing (16 Jun 2023 17:35)      Subjective/HPI   History of Present Illness:   89F w/ PMHx of CHFrEF, CAD s/p CABG, CKD, Afib (on apixaban), HTN, moderate AS and pulmonary HTN, chronic sciatica, CVA who presents to the ED for wheezing. Pt recently discharged from [facility] s/p R hip fracture. While in rehab she developed pneumonia. Was on antibiotics for IV and then oral, discharged home 5 days ago. According to the daughter she has been more fatigued than usual over this past 24 hours, and was presenting with audible wheezing this afternoon. Daughter called EMS, and was found to have an oxygen saturation at home of 85% on room air.  EMS administered 1 sublingual nitroglycerin started IV lock, and administered not nasal cannula oxygen with good relief of her hypoxia and symptoms. Patient currently feels much better, denies any fever/chills or GI symptoms.    PAST MEDICAL & SURGICAL HISTORY:  Benign Essential Hypertension    Chronic Sciatica    Personal History of Coronary Artery Disease    Hypertension    Atrial fibrillation    CVA (cerebral vascular accident)    S/P CABG    PAST MEDICAL HISTORY:  Atrial fibrillation     Benign Essential Hypertension     Chronic Sciatica     CVA (cerebral vascular accident)     Hypertension     Personal History of Coronary Artery Disease.     PAST SURGICAL HISTORY:  S/P CABG.     Tobacco Usage:  · Tobacco Usage	Never smoker    ALLERGIES AND HOME MEDICATIONS:   Allergies:        Allergies:  	No Known Allergies:     Home Medications:   * Patient Currently Takes Medications as of 19-May-2023 12:39 documented in Structured Notes  · 	polyethylene glycol 3350 oral powder for reconstitution: 17 gram(s) orally once a day (at bedtime)  · 	senna leaf extract oral tablet: 2 tab(s) orally once a day (at bedtime)  · 	oxyCODONE 5 mg oral tablet: 1 tab(s) orally every 4 hours As needed Moderate Pain (4 - 6)  · 	acetaminophen 325 mg oral tablet: 3 tab(s) orally every 8 hours  · 	dilTIAZem 120 mg/24 hours oral capsule, extended release: 1 cap(s) orally once a day (at bedtime)  · 	apixaban 2.5 mg oral tablet: 1 tab(s) orally every 12 hours  · 	Combigan 0.2%-0.5% ophthalmic solution: 1 drop(s) to each affected eye every 12 hours  · 	torsemide 20 mg oral tablet: 2 tab(s) orally 2 times a day  · 	Centrum Adults oral tablet: 1 tab(s) orally once a day  · 	cholecalciferol 25 mcg (1000 intl units) oral tablet: 1 tab(s) orally once a day  · 	zolpidem 10 mg oral tablet: 0.5 tab(s) orally once a day (at bedtime) as needed for  insomnia  · 	simvastatin 40 mg oral tablet: 1 tab(s) orally once a day (at bedtime)  · 	levothyroxine 50 mcg (0.05 mg) oral tablet: 1 tab(s) orally once a day  · 	PreserVision AREDS 2 oral capsule: 2 cap(s) orally 2 times a day      Medication list         MEDICATIONS  (STANDING):    MEDICATIONS  (PRN):         Vitals log        ICU Vital Signs Last 24 Hrs  T(C): 36.3 (16 Jun 2023 13:41), Max: 36.3 (16 Jun 2023 13:41)  T(F): 97.3 (16 Jun 2023 13:41), Max: 97.3 (16 Jun 2023 13:41)  HR: 100 (16 Jun 2023 13:41) (100 - 100)  BP: 118/72 (16 Jun 2023 13:41) (118/72 - 118/72)  BP(mean): --  ABP: --  ABP(mean): --  RR: 20 (16 Jun 2023 13:41) (20 - 20)  SpO2: 98% (16 Jun 2023 13:41) (98% - 98%)    O2 Parameters below as of 16 Jun 2023 13:41  Patient On (Oxygen Delivery Method): room air                 Input and Output:  I&O's Detail      Lab Data                        11.5   5.42  )-----------( 229      ( 16 Jun 2023 15:20 )             37.8     06-16    139  |  99  |  45<H>  ----------------------------<  112<H>  3.7   |  36<H>  |  1.40<H>    Ca    8.8      16 Jun 2023 15:20    TPro  6.8  /  Alb  3.1<L>  /  TBili  0.4  /  DBili  x   /  AST  19  /  ALT  9<L>  /  AlkPhos  98  06-16            Review of Systems	  sob  mcdonald  weakness      Objective     Physical Examination  heart 1s2  lung dc BS  head nc  verbal  confused  on o2 support        Pertinent Lab findings & Imaging      [place]:  NO   Adequate UO     I&O's Detail           Discussed with:     Cultures:	        Radiology      ACC: [id number] EXAM:  DUPLEX SCAN EXT VEINS LOWER BI   ORDERED BY: GRICELDA REGALADO     PROCEDURE DATE:  06/16/2023          INTERPRETATION:  CLINICAL INFORMATION: 89-year-old with right lower   extremity swelling and hypoxia status post right right hip replacement.   Assess for DVT    COMPARISON: None available.    TECHNIQUE: Duplex sonography of the BILATERAL LOWER extremity veins with   color and spectral Doppler, with and without compression.    FINDINGS:    RIGHT:  Normal compressibility of the RIGHT common femoral, femoral and popliteal   veins.  Doppler examination shows normal spontaneous and phasic flow.  No RIGHT calf vein thrombosis is detected.    LEFT:  Normal compressibility of the LEFT common femoral, femoral and popliteal   veins.  Doppler examination shows normal spontaneous and phasic flow.  No LEFT calf vein thrombosis is detected.    There is leg edema.    IMPRESSION:  No evidence of DVT in either lower extremity in the visualized venous   segments            --- End of Report ---            MIKE AREVALO MD; Attending Radiologist  This document has been electronically signed. Jun 16 2023  4:28PM              ACC: [id number] EXAM:  CT CHEST                          PROCEDURE DATE:  06/16/2022          INTERPRETATION:  CLINICAL INFORMATION: Hypoxia. Lower extremity edema.    COMPARISON: CT chest 3/11/2011    CONTRAST/COMPLICATIONS:  IV Contrast: None.  Oral Contrast: None.  Complications: None documented.    PROCEDURE:  CT scan of the chest was obtained without intravenous contrast.    FINDINGS:    LYMPH NODES: No lymphadenopathy.    HEART/VASCULATURE: Cardiomegaly. No pericardial effusion. Status post   CABG. There are aortic, aortic valve, mitral annular and coronary artery   calcifications. Mildly dilated ascending aorta measuring 4 cm at the   level of the main pulmonary artery.    AIRWAYS/LUNGS/PLEURA: Patent central airways. Small bilateral pleural   effusions, right greater than left. Right middle and bilateral lower lobe   passive atelectasis. Mild interlobular septal thickening. Focal right   upper lobe groundglass opacity. No pleural effusion.    UPPER ABDOMEN: Renal cortical scarring. Cirrhosis.    BONES/SOFT TISSUES: Degenerative changes. Status post sternotomy. Suture   anchors in the right humeral head.    IMPRESSION:  Small bilateral pleural effusions and passive atelectasis, right greater   than left.    Minimal interstitial edema.    --- End of Report ---          THELMA COLORADO MD; Resident Radiologist  This document has been electronically signed.  SHANNON GONZALEZ MD; Attending Radiologist  This document has been electronically signed. Jun 17 2022 10:22AM      1. Mitral annular calcification. The posterior annulus is  heavily calcified. There appears to be independently mobile  material seen at the posterior annulus. This may represent  endocarditis, clinical correlation indicated.  2. Calcified trileaflet aortic valve with normal opening.  Mild-moderate aortic regurgitation.  msec  3. Increased relative wall thickness with normal left  ventricular mass index, consistent with concentric left  ventricular remodeling.  4. Endocardium not well visualized; Mild segmental left  ventricular systolic dysfunction. The base-mid inferior  wall, the mid lateral wall, and the basal inferoseptum are  hypokinetic.  Regional wall motion variation is noted on  the setting ofatrial fibrillation.  5. Normal right ventricular size with mildly  decreased  right ventricular systolic function.  6. Normal tricuspid valve. Mild-moderate tricuspid  regurgitation.  7. Estimated pulmonary artery systolic pressure equals 44  mm Hg,assuming right atrial pressure equals 8 mm Hg,  consistent with mild pulmonary pressures.

## 2023-06-16 NOTE — H&P ADULT - ASSESSMENT
89F w/ PMHx of CHFrEF, CAD s/p CABG, CKD, Afib (on apixaban), HTN, moderate AS and pulmonary HTN, chronic sciatica, CVA who presents to the ED for wheezing. Admitted for workup.

## 2023-06-17 LAB
ANION GAP SERPL CALC-SCNC: 4 MMOL/L — LOW (ref 5–17)
BASOPHILS # BLD AUTO: 0.02 K/UL — SIGNIFICANT CHANGE UP (ref 0–0.2)
BASOPHILS NFR BLD AUTO: 0.4 % — SIGNIFICANT CHANGE UP (ref 0–2)
BUN SERPL-MCNC: 42 MG/DL — HIGH (ref 7–23)
CALCIUM SERPL-MCNC: 8.6 MG/DL — SIGNIFICANT CHANGE UP (ref 8.5–10.1)
CHLORIDE SERPL-SCNC: 103 MMOL/L — SIGNIFICANT CHANGE UP (ref 96–108)
CO2 SERPL-SCNC: 33 MMOL/L — HIGH (ref 22–31)
CREAT SERPL-MCNC: 1.2 MG/DL — SIGNIFICANT CHANGE UP (ref 0.5–1.3)
EGFR: 41 ML/MIN/1.73M2 — LOW
EOSINOPHIL # BLD AUTO: 0.38 K/UL — SIGNIFICANT CHANGE UP (ref 0–0.5)
EOSINOPHIL NFR BLD AUTO: 6.9 % — HIGH (ref 0–6)
GLUCOSE SERPL-MCNC: 96 MG/DL — SIGNIFICANT CHANGE UP (ref 70–99)
HCT VFR BLD CALC: 36.6 % — SIGNIFICANT CHANGE UP (ref 34.5–45)
HGB BLD-MCNC: 11 G/DL — LOW (ref 11.5–15.5)
IMM GRANULOCYTES NFR BLD AUTO: 0.2 % — SIGNIFICANT CHANGE UP (ref 0–0.9)
LYMPHOCYTES # BLD AUTO: 0.96 K/UL — LOW (ref 1–3.3)
LYMPHOCYTES # BLD AUTO: 17.4 % — SIGNIFICANT CHANGE UP (ref 13–44)
MAGNESIUM SERPL-MCNC: 2.3 MG/DL — SIGNIFICANT CHANGE UP (ref 1.6–2.6)
MCHC RBC-ENTMCNC: 30.1 GM/DL — LOW (ref 32–36)
MCHC RBC-ENTMCNC: 32 PG — SIGNIFICANT CHANGE UP (ref 27–34)
MCV RBC AUTO: 106.4 FL — HIGH (ref 80–100)
MONOCYTES # BLD AUTO: 0.79 K/UL — SIGNIFICANT CHANGE UP (ref 0–0.9)
MONOCYTES NFR BLD AUTO: 14.3 % — HIGH (ref 2–14)
NEUTROPHILS # BLD AUTO: 3.36 K/UL — SIGNIFICANT CHANGE UP (ref 1.8–7.4)
NEUTROPHILS NFR BLD AUTO: 60.8 % — SIGNIFICANT CHANGE UP (ref 43–77)
NRBC # BLD: 0 /100 WBCS — SIGNIFICANT CHANGE UP (ref 0–0)
PHOSPHATE SERPL-MCNC: 4.9 MG/DL — HIGH (ref 2.5–4.5)
PLATELET # BLD AUTO: 187 K/UL — SIGNIFICANT CHANGE UP (ref 150–400)
POTASSIUM SERPL-MCNC: 3.6 MMOL/L — SIGNIFICANT CHANGE UP (ref 3.5–5.3)
POTASSIUM SERPL-SCNC: 3.6 MMOL/L — SIGNIFICANT CHANGE UP (ref 3.5–5.3)
RBC # BLD: 3.44 M/UL — LOW (ref 3.8–5.2)
RBC # FLD: 17.1 % — HIGH (ref 10.3–14.5)
SODIUM SERPL-SCNC: 140 MMOL/L — SIGNIFICANT CHANGE UP (ref 135–145)
WBC # BLD: 5.52 K/UL — SIGNIFICANT CHANGE UP (ref 3.8–10.5)
WBC # FLD AUTO: 5.52 K/UL — SIGNIFICANT CHANGE UP (ref 3.8–10.5)

## 2023-06-17 PROCEDURE — 71250 CT THORAX DX C-: CPT | Mod: 26

## 2023-06-17 PROCEDURE — 99233 SBSQ HOSP IP/OBS HIGH 50: CPT

## 2023-06-17 RX ORDER — FUROSEMIDE 40 MG
60 TABLET ORAL ONCE
Refills: 0 | Status: COMPLETED | OUTPATIENT
Start: 2023-06-17 | End: 2023-06-17

## 2023-06-17 RX ORDER — TIMOLOL 0.5 %
1 DROPS OPHTHALMIC (EYE)
Refills: 0 | Status: DISCONTINUED | OUTPATIENT
Start: 2023-06-17 | End: 2023-06-28

## 2023-06-17 RX ORDER — MULTIVIT-MIN/FERROUS GLUCONATE 9 MG/15 ML
1 LIQUID (ML) ORAL DAILY
Refills: 0 | Status: CANCELLED | OUTPATIENT
Start: 2023-06-17 | End: 2023-06-28

## 2023-06-17 RX ORDER — BUMETANIDE 0.25 MG/ML
1 INJECTION INTRAMUSCULAR; INTRAVENOUS EVERY 12 HOURS
Refills: 0 | Status: DISCONTINUED | OUTPATIENT
Start: 2023-06-17 | End: 2023-06-28

## 2023-06-17 RX ORDER — ASCORBIC ACID 60 MG
500 TABLET,CHEWABLE ORAL DAILY
Refills: 0 | Status: CANCELLED | OUTPATIENT
Start: 2023-06-17 | End: 2023-06-28

## 2023-06-17 RX ORDER — BRIMONIDINE TARTRATE 2 MG/MG
1 SOLUTION/ DROPS OPHTHALMIC
Refills: 0 | Status: DISCONTINUED | OUTPATIENT
Start: 2023-06-17 | End: 2023-06-28

## 2023-06-17 RX ADMIN — ZOLPIDEM TARTRATE 5 MILLIGRAM(S): 10 TABLET ORAL at 22:04

## 2023-06-17 RX ADMIN — Medication 60 MILLIGRAM(S): at 00:38

## 2023-06-17 RX ADMIN — SENNA PLUS 2 TABLET(S): 8.6 TABLET ORAL at 22:03

## 2023-06-17 RX ADMIN — Medication 120 MILLIGRAM(S): at 05:16

## 2023-06-17 RX ADMIN — APIXABAN 2.5 MILLIGRAM(S): 2.5 TABLET, FILM COATED ORAL at 05:16

## 2023-06-17 RX ADMIN — POLYETHYLENE GLYCOL 3350 17 GRAM(S): 17 POWDER, FOR SOLUTION ORAL at 22:04

## 2023-06-17 RX ADMIN — Medication 1 DROP(S): at 17:13

## 2023-06-17 RX ADMIN — BUMETANIDE 1 MILLIGRAM(S): 0.25 INJECTION INTRAMUSCULAR; INTRAVENOUS at 13:35

## 2023-06-17 RX ADMIN — Medication 50 MICROGRAM(S): at 05:16

## 2023-06-17 RX ADMIN — SIMVASTATIN 40 MILLIGRAM(S): 20 TABLET, FILM COATED ORAL at 22:04

## 2023-06-17 RX ADMIN — Medication 650 MILLIGRAM(S): at 17:04

## 2023-06-17 RX ADMIN — BRIMONIDINE TARTRATE 1 DROP(S): 2 SOLUTION/ DROPS OPHTHALMIC at 17:13

## 2023-06-17 RX ADMIN — Medication 650 MILLIGRAM(S): at 16:14

## 2023-06-17 RX ADMIN — APIXABAN 2.5 MILLIGRAM(S): 2.5 TABLET, FILM COATED ORAL at 17:12

## 2023-06-17 NOTE — PROGRESS NOTE ADULT - ASSESSMENT
89F w/ PMHx of CHFrEF, CAD s/p CABG, CKD, Afib (on apixaban), HTN, moderate AS and pulmonary HTN, chronic sciatica, CVA who presents to the ED for wheezing. pt w/ ADHF.   Ct chest- small rt pl effusion    #ADHF  bumex started   strict I/Os, monitor daily weights, Cr, and K.  echo pending  cards fu noted    #AF controlled for now.   cont cardizem   cont eliquis    #wheezing- likley cardiac origin- CHF exac  prn nebs  diuresis  pulm fu noted    #DVt ppx- eliquis    d/w dgtr at bedside plan of care    [facility]   [phone]

## 2023-06-17 NOTE — PROGRESS NOTE ADULT - SUBJECTIVE AND OBJECTIVE BOX
Date/Time Patient Seen:  		  Referring MD:   Data Reviewed	       Patient is a 89y old  Female who presents with a chief complaint of Wheezing (16 Jun 2023 18:06)      Subjective/HPI     PAST MEDICAL & SURGICAL HISTORY:  Benign Essential Hypertension    Chronic Sciatica    Personal History of Coronary Artery Disease    Hypertension    Atrial fibrillation    CVA (cerebral vascular accident)    S/P CABG          Medication list         MEDICATIONS  (STANDING):  apixaban 2.5 milliGRAM(s) Oral every 12 hours  aspirin  chewable 81 milliGRAM(s) Oral daily  diltiazem    milliGRAM(s) Oral daily  furosemide   Injectable 60 milliGRAM(s) IV Push two times a day  levothyroxine 50 MICROGram(s) Oral daily  polyethylene glycol 3350 17 Gram(s) Oral at bedtime  senna 2 Tablet(s) Oral at bedtime  simvastatin 40 milliGRAM(s) Oral at bedtime    MEDICATIONS  (PRN):  acetaminophen     Tablet .. 650 milliGRAM(s) Oral every 6 hours PRN Temp greater or equal to 38C (100.4F), Mild Pain (1 - 3)  aluminum hydroxide/magnesium hydroxide/simethicone Suspension 30 milliLiter(s) Oral every 4 hours PRN Dyspepsia  melatonin 3 milliGRAM(s) Oral at bedtime PRN Insomnia  ondansetron Injectable 4 milliGRAM(s) IV Push every 8 hours PRN Nausea and/or Vomiting  zolpidem 5 milliGRAM(s) Oral at bedtime PRN Insomnia         Vitals log        ICU Vital Signs Last 24 Hrs  T(C): 36.8 (17 Jun 2023 04:25), Max: 36.9 (16 Jun 2023 22:30)  T(F): 98.3 (17 Jun 2023 04:25), Max: 98.4 (16 Jun 2023 22:30)  HR: 100 (17 Jun 2023 04:25) (83 - 100)  BP: 159/80 (17 Jun 2023 04:25) (118/72 - 159/80)  BP(mean): --  ABP: --  ABP(mean): --  RR: 18 (17 Jun 2023 04:25) (18 - 20)  SpO2: 98% (17 Jun 2023 04:25) (97% - 98%)    O2 Parameters below as of 17 Jun 2023 04:25  Patient On (Oxygen Delivery Method): nasal cannula                 Input and Output:  I&O's Detail      Lab Data                        11.5   5.42  )-----------( 229      ( 16 Jun 2023 15:20 )             37.8     06-16    139  |  99  |  45<H>  ----------------------------<  112<H>  3.7   |  36<H>  |  1.40<H>    Ca    8.8      16 Jun 2023 15:20    TPro  6.8  /  Alb  3.1<L>  /  TBili  0.4  /  DBili  x   /  AST  19  /  ALT  9<L>  /  AlkPhos  98  06-16            Review of Systems	      Objective     Physical Examination    heart s1s2  lung dc bS  head nc      Pertinent Lab findings & Imaging      [place]:  NO   Adequate UO     I&O's Detail           Discussed with:     Cultures:	        Radiology

## 2023-06-17 NOTE — PROGRESS NOTE ADULT - ASSESSMENT
89F w/ PMHx of CHFrEF, CAD s/p CABG, CKD, Afib (on apixaban), HTN, moderate AS and pulmonary HTN, chronic sciatica, CVA who presents to the ED for wheezing    HF  OP  OA  hx of Hip Fx  CAD  CKD  valvular heart disease  Pulm HTN  GERD  CVA Hx  AF on AC    intermittent wheezing, suspect mostly CHF - CKD component  on LASIX  cardio f/u  CT chest pending  tachy on VS check    eval for HF exacerbation  Cardio eval in progress  CXR noted  Planned for CT chest  monitor VS and Sat  keep sat > 88 pct  old records reviewed - spoke with daughter - reviewed TTE -   [facility] rx regimen optimization, Diuresis as per Cardio Recs  I and O  dietary discretion  LE doppler - NEG for DVT  GOC discussion

## 2023-06-17 NOTE — PHYSICAL THERAPY INITIAL EVALUATION ADULT - ADDITIONAL COMMENTS
Patient lives in private home with 24/7 aide. patient was in rehab for 4 weeks secondary to R hip ORIF. Patient home for 5 days receiving home PT Patient lives in private home with 24/7 aide. patient was in rehab for 4 weeks secondary to R hip ORIF. Patient home for 5 days receiving home PT. Patient amb with rolling walker short distances, uses w/c for longer distance.

## 2023-06-17 NOTE — PROGRESS NOTE ADULT - ASSESSMENT
89 yo F with diastolic HF, CAD s/p CABG, RBBB, CKD, atrial fibrillation (on apixaban), HTN, moderate AS and pulmonary HTN, chronic sciatica, CVA who was recently hospitalized  for fall sp IMN 5/14 s/p and discharge on 5/19/23 who is presented to ED  for fatigue and wheezing    ADHF, CAD, VHD,   - volume overloaded on exam, + diffuse wheezing on O2   - continue Lasix 60mg q12 IV  - Please continue to maintain strict I/Os, monitor daily weights, Cr, and K.     - EKG: Afib 102 with no acute  ischemic changes   - continue ASA, statin     - known Afib on apixaban   - [id number]: 6  - rate control on tele overnight   - continue Diltiazem 120 mg PO daily  - continue to monitor routine hemodynamics  - Monitor and replete Lytes. Keep K > 4 and Mg > 2    - at risk for abrupt decompensation   - rec GOC discussion  - Will continue to follow.    Elissa Valdovinos [facility]  Nurse Practitioner - Cardiology   Spectra #[phone]/ [phone]  call TEAMS 89 yo F with diastolic HF, CAD s/p CABG, RBBB, CKD, atrial fibrillation (on apixaban), HTN, moderate AS and pulmonary HTN, chronic sciatica, CVA who was recently hospitalized  for fall sp [facility] 5/14 s/p and discharge on 5/19/23 who is presented to ED  for fatigue and wheezing    ADHF, CAD, VHD,   - volume overloaded on exam, + diffuse wheezing on O2   - switch lasix to Bumex 1mg q12.   - Please continue to maintain strict I/Os, monitor daily weights, Cr, and K.   - repeat echo    - EKG: Afib 102 with no acute  ischemic changes   - continue ASA, statin     - known Afib on apixaban   - [id number]: 6  - rate control on tele overnight   - continue Diltiazem 120 mg PO daily  - continue to monitor routine hemodynamics    - trops neg  - Monitor and replete Lytes. Keep K > 4 and Mg > 2    - at risk for abrupt decompensation   - rec GOC discussion  - Will continue to follow.    Elissa Valdovinos [facility]  Nurse Practitioner - Cardiology   Spectra #[phone]/ [phone]  call TEAMS

## 2023-06-17 NOTE — PROGRESS NOTE ADULT - SUBJECTIVE AND OBJECTIVE BOX
[facility] -- Heather Roberts Pannella, Patel, Savella, Goodger  Office # [phone]    Follow Up:  ADHF, Af     Subjective/Observations: seen and examined, asleep, easily awaken, falling back to sleep during exam, unable to provide meaningful information, + wheezing, on On O2 via NC     REVIEW OF SYSTEMS: All other review of systems is negative unless indicated above  PAST MEDICAL & SURGICAL HISTORY:  Benign Essential Hypertension      Chronic Sciatica      Personal History of Coronary Artery Disease      Hypertension      Atrial fibrillation      CVA (cerebral vascular accident)      S/P CABG        MEDICATIONS  (STANDING):  apixaban 2.5 milliGRAM(s) Oral every 12 hours  aspirin  chewable 81 milliGRAM(s) Oral daily  diltiazem    milliGRAM(s) Oral daily  furosemide   Injectable 60 milliGRAM(s) IV Push two times a day  levothyroxine 50 MICROGram(s) Oral daily  polyethylene glycol 3350 17 Gram(s) Oral at bedtime  senna 2 Tablet(s) Oral at bedtime  simvastatin 40 milliGRAM(s) Oral at bedtime    MEDICATIONS  (PRN):  acetaminophen     Tablet .. 650 milliGRAM(s) Oral every 6 hours PRN Temp greater or equal to 38C (100.4F), Mild Pain (1 - 3)  aluminum hydroxide/magnesium hydroxide/simethicone Suspension 30 milliLiter(s) Oral every 4 hours PRN Dyspepsia  melatonin 3 milliGRAM(s) Oral at bedtime PRN Insomnia  ondansetron Injectable 4 milliGRAM(s) IV Push every 8 hours PRN Nausea and/or Vomiting  zolpidem 5 milliGRAM(s) Oral at bedtime PRN Insomnia    Allergies    No Known Allergies    Intolerances      Vital Signs Last 24 Hrs  T(C): 36.8 (17 Jun 2023 04:25), Max: 36.9 (16 Jun 2023 22:30)  T(F): 98.3 (17 Jun 2023 04:25), Max: 98.4 (16 Jun 2023 22:30)  HR: 100 (17 Jun 2023 04:25) (83 - 100)  BP: 159/80 (17 Jun 2023 04:25) (118/72 - 159/80)  BP(mean): --  RR: 18 (17 Jun 2023 04:25) (18 - 20)  SpO2: 98% (17 Jun 2023 04:25) (97% - 98%)    Parameters below as of 17 Jun 2023 04:25  Patient On (Oxygen Delivery Method): nasal cannula      I&O's Summary    Weight (kg): 62.6 (06-16 @ 13:41)  TELE: Afib 60's   PHYSICAL EXAM:  Constitutional: NAD  HEENT: Moist Mucous Membranes, Anicteric  Pulmonary: Non-labored, b/l lungs exp wheezing and fine basilar crackles bilaterally   Cardiovascular: IRRR, S1 and S2, No murmurs, rubs, gallops or clicks  Gastrointestinal: Bowel Sounds present, soft, nontender.   Lymph:  + RLE 2+ pitting edema after IMN. left leg trace nonpitting edema.   Skin: No visible rashes or ulcers.  Psych:  Mood & affect appropriate  LABS: All Labs Reviewed:                        11.0   5.52  )-----------( 187      ( 17 Jun 2023 07:31 )             36.6                         11.5   5.42  )-----------( 229      ( 16 Jun 2023 15:20 )             37.8     17 Jun 2023 07:31    140    |  103    |  42     ----------------------------<  96     3.6     |  33     |  1.20   16 Jun 2023 15:20    139    |  99     |  45     ----------------------------<  112    3.7     |  36     |  1.40     Ca    8.6        17 Jun 2023 07:31  Ca    8.8        16 Jun 2023 15:20  Phos  4.9       17 Jun 2023 07:31  Mg     2.3       17 Jun 2023 07:31    TPro  6.8    /  Alb  3.1    /  TBili  0.4    /  DBili  x      /  AST  19     /  ALT  9      /  AlkPhos  98     16 Jun 2023 15:20    PT/INR - ( 16 Jun 2023 15:20 )   PT: 18.2 sec;   INR: 1.55 ratio         PTT - ( 16 Jun 2023 15:20 )  PTT:39.6 sec          Patient name: JIMMIE BRIAN  YOB: 1926   Age: 89 (F)   MR#: [id number]  Study Date: 11/22/2016  [facility] Malia Oneill [facility]  Study quality: Technically difficult  Referring Physician: Sally Phipps MD  Blood Pressure: 153/78 mmHg  Height: 140 cm  Weight: 60 kg  BSA: 1.5 m2  ------------------------------------------------------------------------  PROCEDURE: Transthoracic echocardiogram with 2-D, M-Mode  and complete spectral and color flow Doppler.  INDICATION: Unspecified atrial fibrillation (I48.91)  ------------------------------------------------------------------------  Dimensions:    Normal Values:  LA:     4.6    2.0 - 4.0 cm  Ao:     3.4    2.0 - 3.8 cm  SEPTUM: 1.0    0.6 - 1.2 cm  PWT:    1.1  0.6 - 1.1 cm  LVIDd:  3.4    3.0 - 5.6 cm  LVIDs:  2.6    1.8 - 4.0 cm  Derived variables:  LVMI: 72 g/m2  RWT: 0.64  Fractional short: 24 %  EF (Teicholtz): 48 %  ------------------------------------------------------------------------  Observations:  Mitral Valve: Mitral annular calcification. The posterior  annulus is heavily calcified. There appears to be  independently mobile material seen at the posterior  annulus. This may represent endocarditis, clinical  correlation indicated. Mild mitral regurgitation.  Aortic Valve/Aorta: Calcified trileaflet aortic valve with  normal opening. Mild-moderate aortic regurgitation.   msec  Aortic Root: 3.4 cm.  Left Atrium: Mildly dilated left atrium.  LA volume index =  37 cc/m2.  Left Ventricle: Endocardium not well visualized; Mild  segmental left ventricular systolic dysfunction. The  base-mid inferior wall, the mid lateral wall, and the basal  inferoseptum are hypokinetic.  Regional wall motion  variation is noted on the setting of atrial fibrillation.  Increased relative wall thickness with normal left  ventricular mass index, consistent with concentric left  ventricular remodeling.  Right Heart: Mild right atrial enlargement. Normal right  ventricular size with mildly  decreased right ventricular  systolic function. Normal tricuspid valve. Mild-moderate  tricuspid regurgitation. Normal pulmonic valve. Minimal  pulmonic regurgitation.  Pericardium/Pleura: Normal pericardium with no pericardial  effusion.  Hemodynamic: Estimated right atrial pressure is 8 mm Hg.  Estimated right ventricular systolic pressure equals 44 mm  Hg, assuming right atrial pressure equals 8 mm Hg,  consistent with mild pulmonary hypertension.  ------------------------------------------------------------------------  Conclusions:  1. Mitral annular calcification. The posterior annulus is  heavily calcified. There appears to be independently mobile  material seen at the posterior annulus. This may represent  endocarditis, clinical correlation indicated.  2. Calcified trileaflet aortic valve with normal opening.  Mild-moderate aortic regurgitation.  msec  3. Increased relative wall thickness with normal left  ventricular mass index, consistent with concentric left  ventricular remodeling.  4. Endocardium not well visualized; Mild segmental left  ventricular systolic dysfunction. The base-mid inferior  wall, the mid lateral wall, and the basal inferoseptum are  hypokinetic.  Regional wall motion variation is noted on  the setting ofatrial fibrillation.  5. Normal right ventricular size with mildly  decreased  right ventricular systolic function.  6. Normal tricuspid valve. Mild-moderate tricuspid  regurgitation.  7. Estimated pulmonary artery systolic pressure equals 44  mm Hg,assuming right atrial pressure equals 8 mm Hg,  consistent with mild pulmonary pressures.  ------------------------------------------------------------------------  Confirmed on  11/22/2016 - 14:45:39 by Jazzy Phillips M.D.  ------------------------------------------------------------------------

## 2023-06-17 NOTE — DIETITIAN INITIAL EVALUATION ADULT - PROBLEM SELECTOR PLAN 1
Recently diagnosed with pneumonia s/p course of ABs  - Pt without fever/leukocytosis  does not meet SIRS criteria  - CXR: Wet read: poor inspiration. No acute pathology.  - Lactate 1.2, RVP negative  - S/p ceftriaxone in ED, monitor off abx for now  - CT chest ordered  - Tylenol PRN for fever  - Incentive spirometer  - F/u blood/urine/sputum culture  - Continuous pulse ox  - Pt currently satting well on 6L NC, ween and titrate O2 support as needed  - Monitor for signs and symptoms of infection  - Pulmonology Dr. Acosta consulted.

## 2023-06-17 NOTE — PHYSICAL THERAPY INITIAL EVALUATION ADULT - BED MOBILITY TRAINING, PT EVAL
Patient will improve supine <-> sit with min assist within 3-5 sessions in order to safely get in and out of bed.

## 2023-06-17 NOTE — DIETITIAN INITIAL EVALUATION ADULT - OTHER INFO
89F w/ PMHx of CHFrEF, CAD s/p CABG, CKD ( baseline cr 1.2-1.3) , Afib (on apixaban), HTN, moderate AS and pulmonary HTN, chronic sciatica, CVA, recent PNA who presents to the ED for wheezing. Admitted for workup, being treated for acute diastolic HF, volume overloaded. Per cardio, at risk for abrupt decompenstation but currently satting well .  At time of RD visit to pts bedside ( with daughter in attendance) , states her appetite is improved from admission and breakfast is delicious , follows a low salt diet, last BM was 2 days ago, wouldn't mind prune juice at breakfast  ( is already receiving senna) , is 4'7" tall weighed 138# 2 weeks ago

## 2023-06-17 NOTE — PROGRESS NOTE ADULT - SUBJECTIVE AND OBJECTIVE BOX
Patient is a 89y old  Female who presents with a chief complaint of Wheezing (2023 10:11)      INTERVAL HPI/OVERNIGHT EVENTS: noted  pt seen and examined this am   events noted      Vital Signs Last 24 Hrs  T(C): 36.8 (2023 20:46), Max: 36.9 (2023 22:30)  T(F): 98.2 (2023 20:46), Max: 98.4 (2023 22:30)  HR: 86 (2023 20:46) (84 - 100)  BP: 111/65 (2023 20:46) (111/65 - 159/80)  BP(mean): --  RR: 19 (2023 20:46) (18 - 19)  SpO2: 97% (2023 20:46) (94% - 98%)    Parameters below as of 2023 20:46  Patient On (Oxygen Delivery Method): nasal cannula        acetaminophen     Tablet .. 650 milliGRAM(s) Oral every 6 hours PRN  aluminum hydroxide/magnesium hydroxide/simethicone Suspension 30 milliLiter(s) Oral every 4 hours PRN  apixaban 2.5 milliGRAM(s) Oral every 12 hours  aspirin  chewable 81 milliGRAM(s) Oral daily  brimonidine 0.2% Ophthalmic Solution 1 Drop(s) Both EYES two times a day  buMETAnide Injectable 1 milliGRAM(s) IV Push every 12 hours  diltiazem    milliGRAM(s) Oral daily  levothyroxine 50 MICROGram(s) Oral daily  melatonin 3 milliGRAM(s) Oral at bedtime PRN  ondansetron Injectable 4 milliGRAM(s) IV Push every 8 hours PRN  polyethylene glycol 3350 17 Gram(s) Oral at bedtime  senna 2 Tablet(s) Oral at bedtime  simvastatin 40 milliGRAM(s) Oral at bedtime  timolol 0.5% Solution 1 Drop(s) Both EYES two times a day  zolpidem 5 milliGRAM(s) Oral at bedtime PRN      PHYSICAL EXAM:  GENERAL: NAD,   EYES: conjunctiva and sclera clear  ENMT: Moist mucous membranes  NECK: Supple, No JVD, Normal thyroid  CHEST/LUNG: non labored, cta b/l  HEART: Regular rate and rhythm; No murmurs, rubs, or gallops  ABDOMEN: Soft, Nontender, Nondistended; Bowel sounds present  EXTREMITIES:  2+ Peripheral Pulses, No clubbing, cyanosis, or edema  LYMPH: No lymphadenopathy noted  SKIN: No rashes or lesions    Consultant(s) Notes Reviewed:  [x ] YES  [ ] NO  Care Discussed with Consultants/Other Providers [ x] YES  [ ] NO    LABS:                        11.0   5.52  )-----------( 187      ( 2023 07:31 )             36.6     06-17    140  |  103  |  42<H>  ----------------------------<  96  3.6   |  33<H>  |  1.20    Ca    8.6      2023 07:31  Phos  4.9     06-17  Mg     2.3     06-17    TPro  6.8  /  Alb  3.1<L>  /  TBili  0.4  /  DBili  x   /  AST  19  /  ALT  9<L>  /  AlkPhos  98  06-16    PT/INR - ( 2023 15:20 )   PT: 18.2 sec;   INR: 1.55 ratio         PTT - ( 2023 15:20 )  PTT:39.6 sec  Urinalysis Basic - ( 2023 18:39 )    Color: Yellow / Appearance: Clear / S.012 / pH: x  Gluc: x / Ketone: Negative mg/dL  / Bili: Negative / Urobili: 0.2 mg/dL   Blood: x / Protein: Trace mg/dL / Nitrite: Negative   Leuk Esterase: Large / RBC: 15 /HPF / WBC 90 /HPF   Sq Epi: x / Non Sq Epi: x / Bacteria: Many /HPF      CAPILLARY BLOOD GLUCOSE            Urinalysis Basic - ( 2023 18:39 )    Color: Yellow / Appearance: Clear / S.012 / pH: x  Gluc: x / Ketone: Negative mg/dL  / Bili: Negative / Urobili: 0.2 mg/dL   Blood: x / Protein: Trace mg/dL / Nitrite: Negative   Leuk Esterase: Large / RBC: 15 /HPF / WBC 90 /HPF   Sq Epi: x / Non Sq Epi: x / Bacteria: Many /HPF          RADIOLOGY & ADDITIONAL TESTS:    Imaging Personally Reviewed:  [x ] YES  [ ] NO

## 2023-06-17 NOTE — DIETITIAN INITIAL EVALUATION ADULT - PERTINENT LABORATORY DATA
06-16    139  |  99  |  45<H>  ----------------------------<  112<H>  3.7   |  36<H>  |  1.40<H>    Ca    8.8      16 Jun 2023 15:20    TPro  6.8  /  Alb  3.1<L>  /  TBili  0.4  /  DBili  x   /  AST  19  /  ALT  9<L>  /  AlkPhos  98  06-16

## 2023-06-17 NOTE — PHYSICAL THERAPY INITIAL EVALUATION ADULT - PERTINENT HX OF CURRENT PROBLEM, REHAB EVAL
89F w/ PMHx of CHFrEF, CAD s/p CABG, CKD, Afib (on apixaban), HTN, moderate AS and pulmonary HTN, CVA who presents to the ED for wheezing. Pt recently discharged from [facility] s/p R hip fracture. While in rehab she developed pneumonia. Was on antibiotics for IV and then oral, discharged home 5 days ago. According to the daughter she has been more fatigued than usual over this past 24 hours, and was presenting with audible wheezing this afternoon. Daughter called EMS, and was found to have an oxygen saturation at home of 85% on room air.  EMS administered 1 sublingual nitroglycerin started IV lock, and administered not nasal cannula oxygen with good relief of her hypoxia and symptoms. Patient currently feels much better, denies any fever/chills or GI symptoms.

## 2023-06-17 NOTE — CHART NOTE - NSCHARTNOTEFT_GEN_A_CORE
Called by RN for patient with audible wheezing on exam. Patient seen and examined at bedside.  Patient state she feels comfortable laying on her right side. She is satting 97-98% on 4L NC.  Does not have any acute complaints.       T(C): 36.9 (06-16-23 @ 22:30), Max: 36.9 (06-16-23 @ 22:30)  HR: 99 (06-16-23 @ 22:30) (83 - 100)  BP: 134/73 (06-16-23 @ 22:30) (118/72 - 134/73)  RR: 18 (06-16-23 @ 22:30) (18 - 20)  SpO2: 98% (06-16-23 @ 22:30) (97% - 98%)  Wt(kg): --    Physical Exam:  Gen: elderly female laying on left side   HEENT: NCAT, EOMI b/l, no conjunctival erythema  Cardio: tachycardia, +s1s2  Pulm: +wheezing b/l w/ crackles at bases  Abdomen: soft, nontender, nondistended  Extremities: 2+ pitting edema b/l  Neuro: AAOx3, moving all 4 extremities, sensation intact  Skin: warm and dry    Assessment/Plan  89F w/ PMHx of CHFrEF, CAD s/p CABG, CKD, Afib (on apixaban), HTN, moderate AS and pulmonary HTN, chronic sciatica, CVA who presents to the ED for wheezing. Admitted for workup.  Called to evaluate wheezing    Wheezing  - s/p rocephin x1 and 500cc NS bolus x1 in ED  - Give STAT lasix 60mg x1  - Avoid albuterol given elevated HR 100s, if wheezing persists s/p lasix can consider levalbuterol   - Hold AM lasix, primary team to re-evaluate in AM  - Cardiology and Pulmonology following  - RN to call if any changes Called by RN for patient with audible wheezing on exam. Patient seen and examined at bedside.  Patient state she feels comfortable laying on her right side. She is satting 97-98% on 4L NC.  Does not have any acute complaints.       T(C): 36.9 (06-16-23 @ 22:30), Max: 36.9 (06-16-23 @ 22:30)  HR: 99 (06-16-23 @ 22:30) (83 - 100)  BP: 134/73 (06-16-23 @ 22:30) (118/72 - 134/73)  RR: 18 (06-16-23 @ 22:30) (18 - 20)  SpO2: 98% (06-16-23 @ 22:30) (97% - 98%)  Wt(kg): --    Physical Exam:  Gen: elderly female laying on left side   HEENT: NCAT, EOMI b/l, no conjunctival erythema  Cardio: tachycardia, +s1s2  Pulm: +wheezing b/l w/ crackles at bases  Abdomen: soft, nontender, nondistended  Extremities: 2+ pitting edema b/l  Neuro: AAOx3, moving all 4 extremities, sensation intact  Skin: warm and dry    Assessment/Plan  89F w/ PMHx of CHFrEF, CAD s/p CABG, CKD, Afib (on apixaban), HTN, moderate AS and pulmonary HTN, chronic sciatica, CVA who presents to the ED for wheezing. Admitted for workup.  Called to evaluate wheezing    Wheezing likely 2/2 volume overload  - s/p rocephin x1 and 500cc NS bolus x1 in ED  - Give STAT lasix 60mg x1  - Avoid albuterol given elevated HR 100s, no known hx of copd/asthma  - Hold AM lasix, primary team to re-evaluate in AM  - Cardiology and Pulmonology following  - RN to call if any changes

## 2023-06-17 NOTE — PHYSICAL THERAPY INITIAL EVALUATION ADULT - BALANCE TRAINING, PT EVAL
Patient will improve balance 1/2 a grade within 3-5 sessions to improve safety with functional mobility.

## 2023-06-17 NOTE — DIETITIAN INITIAL EVALUATION ADULT - NS FNS DIET ORDER
Diet, Regular:   DASH/TLC {Sodium & Cholesterol Restricted}  1500mL Fluid Restriction ([id number]) (06-16-23 @ 18:50)

## 2023-06-17 NOTE — PHYSICAL THERAPY INITIAL EVALUATION ADULT - RANGE OF MOTION EXAMINATION, REHAB EVAL
bilateral upper extremity ROM was WFL (within functional limits)/bilateral lower extremity ROM was WFL (within functional limits) RLE limited secondary to ORIF/bilateral upper extremity ROM was WFL (within functional limits)/Left LE ROM was WFL (within functional limits)

## 2023-06-17 NOTE — DIETITIAN INITIAL EVALUATION ADULT - NSFNSPHYEXAMSKINFT_GEN_A_CORE
Pressure Injury 1: sacrum, Stage II  Pressure Injury 2: Right:, Suspected deep tissue injury  Pressure Injury 3: none, none  Pressure Injury 4: none, none  Pressure Injury 5: none, none  Pressure Injury 6: none, none  Pressure Injury 7: none, none  Pressure Injury 8: none, none  Pressure Injury 9: none, none  Pressure Injury 10: none, none  Pressure Injury 11: none, none Pressure Injury 1: sacrum, Stage II  Pressure Injury 2: Right:, Suspected deep tissue injury

## 2023-06-17 NOTE — PHYSICAL THERAPY INITIAL EVALUATION ADULT - GAIT DEVIATIONS NOTED, PT EVAL
decreased tu/increased time in double stance/decreased step length/decreased stride length/decreased weight-shifting ability

## 2023-06-17 NOTE — PHYSICAL THERAPY INITIAL EVALUATION ADULT - TRANSFER TRAINING, PT EVAL
Patient will improve sit <-> stand with min assist 3-5 sessions in order for patient to safely get in and out of chair

## 2023-06-17 NOTE — DIETITIAN INITIAL EVALUATION ADULT - PERTINENT MEDS FT
MEDICATIONS  (STANDING):  apixaban 2.5 milliGRAM(s) Oral every 12 hours  aspirin  chewable 81 milliGRAM(s) Oral daily  diltiazem    milliGRAM(s) Oral daily  furosemide   Injectable 60 milliGRAM(s) IV Push two times a day  levothyroxine 50 MICROGram(s) Oral daily  polyethylene glycol 3350 17 Gram(s) Oral at bedtime  senna 2 Tablet(s) Oral at bedtime  simvastatin 40 milliGRAM(s) Oral at bedtime    MEDICATIONS  (PRN):  acetaminophen     Tablet .. 650 milliGRAM(s) Oral every 6 hours PRN Temp greater or equal to 38C (100.4F), Mild Pain (1 - 3)  aluminum hydroxide/magnesium hydroxide/simethicone Suspension 30 milliLiter(s) Oral every 4 hours PRN Dyspepsia  melatonin 3 milliGRAM(s) Oral at bedtime PRN Insomnia  ondansetron Injectable 4 milliGRAM(s) IV Push every 8 hours PRN Nausea and/or Vomiting  zolpidem 5 milliGRAM(s) Oral at bedtime PRN Insomnia

## 2023-06-17 NOTE — DIETITIAN INITIAL EVALUATION ADULT - ENERGY INTAKE
89F w/ PMHx of CHFrEF, CAD s/p CABG, CKD, Afib (on apixaban), HTN, moderate AS and pulmonary HTN, chronic sciatica, CVA who presents to the ED for wheezing. Admitted for workup.       Problem/Plan - 1:  ·  Problem: Wheezing.   ·  Plan: Recently diagnosed with pneumonia s/p course of ABs  - Pt without fever/leukocytosis  does not meet SIRS criteria  - CXR: Wet read: poor inspiration. No acute pathology.  - Lactate 1.2, RVP negative  - S/p ceftriaxone in ED, monitor off abx for now  - CT chest ordered  - Tylenol PRN for fever  - Incentive spirometer  - F/u blood/urine/sputum culture  - Continuous pulse ox  - Pt currently satting well on 6L NC, ween and titrate O2 support as needed  - Monitor for signs and symptoms of infection  - Pulmonology Dr. Acosta consulted.     Problem/Plan - 2:  ·  Problem: Chronic HF (heart failure).   ·  Plan: CHFrEF w/ hx of AS and pHTN  - BNP 4.5k on admission, increase leg swelling on exam in the setting of R hip ORIF and  ambulation, without significant lung crackles  - Hold home torsemide  - CXR as above  - Dash/TLC Diet w/ 1.5L fluid restriction, strict I/Os  - Cardio consulted Dr. Nieves: lasix 60mg IV BID.     Problem/Plan - 3:  ·  Problem: Atrial fibrillation.   ·  Plan: EKG on admission afib 102 BPM  - Rate Control: Continue home diltiazem 120mg qD w/ hold parameters   - Anticoagulation: Continue home eliquis 2.5mg BID  - Monitor and replete electrolytes.     Problem/Plan - 4:  ·  Problem: Hypertension.   ·  Plan: /72 on admission  - Continue home diltiazem as above  - Monitor hemodynamics  - DASH/TLC diet.     Problem/Plan - 5:  ·  Problem: CAD (coronary artery disease).   ·  Plan: - Continue home statin.  - BP Control  - Dash/TLC diet.     Problem/Plan - 6:  ·  Problem: Chronic kidney disease (CKD).   ·  Plan: CKD  - Baseline 1.2-1.3  - Cr 1.4 on admission  - Monitor BMP  - Avoid nephrotoxic medications as able.     Problem/Plan - 7:  ·  Problem: CVA (cerebral vascular accident).   ·  Plan: - Statin as above.     Adequate (%)

## 2023-06-18 LAB
ANION GAP SERPL CALC-SCNC: 3 MMOL/L — LOW (ref 5–17)
BUN SERPL-MCNC: 42 MG/DL — HIGH (ref 7–23)
CALCIUM SERPL-MCNC: 8.6 MG/DL — SIGNIFICANT CHANGE UP (ref 8.5–10.1)
CHLORIDE SERPL-SCNC: 104 MMOL/L — SIGNIFICANT CHANGE UP (ref 96–108)
CO2 SERPL-SCNC: 37 MMOL/L — HIGH (ref 22–31)
CREAT SERPL-MCNC: 1.2 MG/DL — SIGNIFICANT CHANGE UP (ref 0.5–1.3)
CULTURE RESULTS: SIGNIFICANT CHANGE UP
EGFR: 41 ML/MIN/1.73M2 — LOW
GLUCOSE SERPL-MCNC: 95 MG/DL — SIGNIFICANT CHANGE UP (ref 70–99)
HCT VFR BLD CALC: 37.1 % — SIGNIFICANT CHANGE UP (ref 34.5–45)
HGB BLD-MCNC: 11 G/DL — LOW (ref 11.5–15.5)
MCHC RBC-ENTMCNC: 29.6 GM/DL — LOW (ref 32–36)
MCHC RBC-ENTMCNC: 32 PG — SIGNIFICANT CHANGE UP (ref 27–34)
MCV RBC AUTO: 107.8 FL — HIGH (ref 80–100)
NRBC # BLD: 0 /100 WBCS — SIGNIFICANT CHANGE UP (ref 0–0)
PLATELET # BLD AUTO: 199 K/UL — SIGNIFICANT CHANGE UP (ref 150–400)
POTASSIUM SERPL-MCNC: 4.1 MMOL/L — SIGNIFICANT CHANGE UP (ref 3.5–5.3)
POTASSIUM SERPL-SCNC: 4.1 MMOL/L — SIGNIFICANT CHANGE UP (ref 3.5–5.3)
RBC # BLD: 3.44 M/UL — LOW (ref 3.8–5.2)
RBC # FLD: 16.5 % — HIGH (ref 10.3–14.5)
SODIUM SERPL-SCNC: 144 MMOL/L — SIGNIFICANT CHANGE UP (ref 135–145)
SPECIMEN SOURCE: SIGNIFICANT CHANGE UP
WBC # BLD: 7.63 K/UL — SIGNIFICANT CHANGE UP (ref 3.8–10.5)
WBC # FLD AUTO: 7.63 K/UL — SIGNIFICANT CHANGE UP (ref 3.8–10.5)

## 2023-06-18 PROCEDURE — 99233 SBSQ HOSP IP/OBS HIGH 50: CPT

## 2023-06-18 RX ADMIN — POLYETHYLENE GLYCOL 3350 17 GRAM(S): 17 POWDER, FOR SOLUTION ORAL at 22:33

## 2023-06-18 RX ADMIN — BUMETANIDE 1 MILLIGRAM(S): 0.25 INJECTION INTRAMUSCULAR; INTRAVENOUS at 05:30

## 2023-06-18 RX ADMIN — Medication 81 MILLIGRAM(S): at 11:35

## 2023-06-18 RX ADMIN — APIXABAN 2.5 MILLIGRAM(S): 2.5 TABLET, FILM COATED ORAL at 05:30

## 2023-06-18 RX ADMIN — APIXABAN 2.5 MILLIGRAM(S): 2.5 TABLET, FILM COATED ORAL at 18:08

## 2023-06-18 RX ADMIN — Medication 1 DROP(S): at 05:31

## 2023-06-18 RX ADMIN — Medication 120 MILLIGRAM(S): at 05:30

## 2023-06-18 RX ADMIN — BRIMONIDINE TARTRATE 1 DROP(S): 2 SOLUTION/ DROPS OPHTHALMIC at 18:08

## 2023-06-18 RX ADMIN — Medication 1 DROP(S): at 18:08

## 2023-06-18 RX ADMIN — Medication 50 MICROGRAM(S): at 05:31

## 2023-06-18 RX ADMIN — SENNA PLUS 2 TABLET(S): 8.6 TABLET ORAL at 22:33

## 2023-06-18 RX ADMIN — BUMETANIDE 1 MILLIGRAM(S): 0.25 INJECTION INTRAMUSCULAR; INTRAVENOUS at 18:08

## 2023-06-18 RX ADMIN — BRIMONIDINE TARTRATE 1 DROP(S): 2 SOLUTION/ DROPS OPHTHALMIC at 05:31

## 2023-06-18 RX ADMIN — SIMVASTATIN 40 MILLIGRAM(S): 20 TABLET, FILM COATED ORAL at 22:33

## 2023-06-18 RX ADMIN — ZOLPIDEM TARTRATE 5 MILLIGRAM(S): 10 TABLET ORAL at 22:40

## 2023-06-18 NOTE — PROGRESS NOTE ADULT - SUBJECTIVE AND OBJECTIVE BOX
Patient is a 89y old  Female who presents with a chief complaint of Wheezing (2023 08:39)      INTERVAL HPI/OVERNIGHT EVENTS: noted  pt seen and examined this am   events noted  feels well      Vital Signs Last 24 Hrs  T(C): 36.4 (2023 11:01), Max: 36.8 (2023 20:46)  T(F): 97.6 (2023 11:01), Max: 98.2 (2023 20:46)  HR: 100 (2023 11:01) (86 - 100)  BP: 118/70 (2023 11:01) (111/65 - 124/74)  BP(mean): --  RR: 21 (2023 11:01) (18 - 21)  SpO2: 97% (2023 11:) (95% - 97%)    Parameters below as of 2023 11:01  Patient On (Oxygen Delivery Method): nasal cannula  O2 Flow (L/min): 4      acetaminophen     Tablet .. 650 milliGRAM(s) Oral every 6 hours PRN  aluminum hydroxide/magnesium hydroxide/simethicone Suspension 30 milliLiter(s) Oral every 4 hours PRN  apixaban 2.5 milliGRAM(s) Oral every 12 hours  aspirin  chewable 81 milliGRAM(s) Oral daily  brimonidine 0.2% Ophthalmic Solution 1 Drop(s) Both EYES two times a day  buMETAnide Injectable 1 milliGRAM(s) IV Push every 12 hours  diltiazem    milliGRAM(s) Oral daily  levothyroxine 50 MICROGram(s) Oral daily  melatonin 3 milliGRAM(s) Oral at bedtime PRN  ondansetron Injectable 4 milliGRAM(s) IV Push every 8 hours PRN  polyethylene glycol 3350 17 Gram(s) Oral at bedtime  senna 2 Tablet(s) Oral at bedtime  simvastatin 40 milliGRAM(s) Oral at bedtime  timolol 0.5% Solution 1 Drop(s) Both EYES two times a day  zolpidem 5 milliGRAM(s) Oral at bedtime PRN      PHYSICAL EXAM:  GENERAL: NAD,   EYES: conjunctiva and sclera clear  ENMT: Moist mucous membranes  NECK: Supple, No JVD, Normal thyroid  CHEST/LUNG: non labored, cta b/l  HEART: Regular rate and rhythm; No murmurs, rubs, or gallops  ABDOMEN: Soft, Nontender, Nondistended; Bowel sounds present  EXTREMITIES:  2+ Peripheral Pulses, No clubbing, cyanosis, or edema  LYMPH: No lymphadenopathy noted  SKIN: No rashes or lesions    Consultant(s) Notes Reviewed:  [x ] YES  [ ] NO  Care Discussed with Consultants/Other Providers [ x] YES  [ ] NO    LABS:                        11.0   7.63  )-----------( 199      ( 2023 06:52 )             37.1     06-18    144  |  104  |  42<H>  ----------------------------<  95  4.1   |  37<H>  |  1.20    Ca    8.6      2023 06:52  Phos  4.9     06-17  Mg     2.3     06-17        Urinalysis Basic - ( 2023 18:39 )    Color: Yellow / Appearance: Clear / S.012 / pH: x  Gluc: x / Ketone: Negative mg/dL  / Bili: Negative / Urobili: 0.2 mg/dL   Blood: x / Protein: Trace mg/dL / Nitrite: Negative   Leuk Esterase: Large / RBC: 15 /HPF / WBC 90 /HPF   Sq Epi: x / Non Sq Epi: x / Bacteria: Many /HPF      CAPILLARY BLOOD GLUCOSE            Urinalysis Basic - ( 2023 18:39 )    Color: Yellow / Appearance: Clear / S.012 / pH: x  Gluc: x / Ketone: Negative mg/dL  / Bili: Negative / Urobili: 0.2 mg/dL   Blood: x / Protein: Trace mg/dL / Nitrite: Negative   Leuk Esterase: Large / RBC: 15 /HPF / WBC 90 /HPF   Sq Epi: x / Non Sq Epi: x / Bacteria: Many /HPF        Culture - Urine (collected 2023 20:53)  Source: Clean Catch Clean Catch (Midstream)  Final Report (2023 18:29):    >=3 organisms. Probable collection contamination.    Culture - Blood (collected 2023 15:20)  Source: .Blood Blood-Peripheral  Preliminary Report (2023 22:02):    No growth to date.    Culture - Blood (collected 2023 15:15)  Source: .Blood Blood-Peripheral  Preliminary Report (2023 22:02):    No growth to date.        RADIOLOGY & ADDITIONAL TESTS:    Imaging Personally Reviewed:  [x ] YES  [ ] NO

## 2023-06-18 NOTE — PROGRESS NOTE ADULT - ASSESSMENT
89F w/ PMHx of CHFrEF, CAD s/p CABG, CKD, Afib (on apixaban), HTN, moderate AS and pulmonary HTN, chronic sciatica, CVA who presents to the ED for wheezing. pt w/ ADHF.   Ct chest- small rt pl effusion    #ADHF  bumex started   strict I/Os, monitor daily weights, Cr, and K.  echo pending  cards fu noted    #AF controlled for now.   cont cardizem   cont eliquis    #wheezing- likley cardiac origin- CHF exac  prn nebs  diuresis  pulm fu noted    #DVt ppx- eliquis    d/w dgtr at bedside plan of care    [facility]   [phone]

## 2023-06-18 NOTE — PROGRESS NOTE ADULT - ASSESSMENT
89 yo F with diastolic HF, CAD s/p CABG, RBBB, CKD, atrial fibrillation (on apixaban), HTN, moderate AS and pulmonary HTN, chronic sciatica, CVA who was recently hospitalized  for fall sp [facility] 5/14 s/p and discharge on 5/19/23 who is presented to ED  for fatigue and wheezing    ADHF, CAD, VHD,   - volume overloaded on exam, + diffuse wheezing on O2   - lasix changed to bumex iv q 12   - Please continue to maintain strict I/Os, monitor daily weights, Cr, and K.   -negative 900 cc 24 hours   - repeat echo pending, last echo as above 2016     - EKG: Afib 102 with no acute  ischemic changes   - continue ASA, statin     - known Afib on apixaban , renally dosed has CKD  - continue Diltiazem 120 mg PO daily  -bp stable 124/74     - Monitor and replete Lytes. Keep K > 4 and Mg > 2   GOC ongoing   Jackie Duran FNP-C  Cardiology NP  call TEAMS 89 yo F with diastolic HF, CAD s/p CABG, RBBB, CKD, atrial fibrillation (on apixaban), HTN, moderate AS and pulmonary HTN, chronic sciatica, CVA who was recently hospitalized  for fall sp IMN 5/14 s/p and discharge on 5/19/23 who is presented to ED  for fatigue and wheezing    ADHF, CAD, VHD,   - volume overloaded on exam, + diffuse wheezing on O2   - lasix changed to bumex iv q 12   - Please continue to maintain strict I/Os, monitor daily weights, Cr, and K.   -negative 900 cc 24 hours   - repeat echo pending, last echo as above 2016     - EKG: Afib 102 with no acute  ischemic changes   - continue ASA, statin     -tele Af 90's  - known Afib on apixaban , renally dosed has CKD  - continue Diltiazem 120 mg PO daily  -bp stable 124/74     - Monitor and replete Lytes. Keep K > 4 and Mg > 2   GOC ongoing   Jackie Duran FNP-C  Cardiology NP  call TEAMS 89 yo F with diastolic HF, CAD s/p CABG, RBBB, CKD, atrial fibrillation (on apixaban), HTN, moderate AS and pulmonary HTN, chronic sciatica, CVA who was recently hospitalized  for fall sp IMN 5/14 s/p and discharge on 5/19/23 who is presented to ED  for fatigue and wheezing    ADHF, CAD, VHD,   - volume overloaded on exam, + diffuse wheezing on O2   - lasix changed to bumex 1 iv q 12 on 6/17  - Please continue to maintain strict I/Os, monitor daily weights, Cr, and K.   -negative 900 cc 24 hours   - repeat echo pending, last echo as above 2016     - EKG: Afib 102 with no acute  ischemic changes   - continue ASA, statin     -tele Af 90's  - known Afib on apixaban , renally dosed has CKD  - continue Diltiazem 120 mg PO daily  -bp stable 124/74     - Monitor and replete Lytes. Keep K > 4 and Mg > 2   GOC ongoing   Jackie Duran FNP-C  Cardiology NP  call TEAMS

## 2023-06-18 NOTE — PROGRESS NOTE ADULT - SUBJECTIVE AND OBJECTIVE BOX
Date/Time Patient Seen:  		  Referring MD:   Data Reviewed	       Patient is a 89y old  Female who presents with a chief complaint of Wheezing (18 Jun 2023 06:41)      Subjective/HPI     PAST MEDICAL & SURGICAL HISTORY:  Benign Essential Hypertension    Chronic Sciatica    Personal History of Coronary Artery Disease    Hypertension    Atrial fibrillation    CVA (cerebral vascular accident)    S/P CABG          Medication list         MEDICATIONS  (STANDING):  apixaban 2.5 milliGRAM(s) Oral every 12 hours  aspirin  chewable 81 milliGRAM(s) Oral daily  brimonidine 0.2% Ophthalmic Solution 1 Drop(s) Both EYES two times a day  buMETAnide Injectable 1 milliGRAM(s) IV Push every 12 hours  diltiazem    milliGRAM(s) Oral daily  levothyroxine 50 MICROGram(s) Oral daily  polyethylene glycol 3350 17 Gram(s) Oral at bedtime  senna 2 Tablet(s) Oral at bedtime  simvastatin 40 milliGRAM(s) Oral at bedtime  timolol 0.5% Solution 1 Drop(s) Both EYES two times a day    MEDICATIONS  (PRN):  acetaminophen     Tablet .. 650 milliGRAM(s) Oral every 6 hours PRN Temp greater or equal to 38C (100.4F), Mild Pain (1 - 3)  aluminum hydroxide/magnesium hydroxide/simethicone Suspension 30 milliLiter(s) Oral every 4 hours PRN Dyspepsia  melatonin 3 milliGRAM(s) Oral at bedtime PRN Insomnia  ondansetron Injectable 4 milliGRAM(s) IV Push every 8 hours PRN Nausea and/or Vomiting  zolpidem 5 milliGRAM(s) Oral at bedtime PRN Insomnia         Vitals log        ICU Vital Signs Last 24 Hrs  T(C): 36.7 (18 Jun 2023 04:30), Max: 36.8 (17 Jun 2023 11:54)  T(F): 98 (18 Jun 2023 04:30), Max: 98.2 (17 Jun 2023 11:54)  HR: 95 (18 Jun 2023 04:30) (84 - 95)  BP: 124/74 (18 Jun 2023 04:30) (111/65 - 140/73)  BP(mean): --  ABP: --  ABP(mean): --  RR: 18 (18 Jun 2023 04:30) (18 - 19)  SpO2: 95% (18 Jun 2023 04:30) (94% - 98%)    O2 Parameters below as of 18 Jun 2023 04:30  Patient On (Oxygen Delivery Method): nasal cannula  O2 Flow (L/min): 4               Input and Output:  I&O's Detail    17 Jun 2023 07:01  -  18 Jun 2023 07:00  --------------------------------------------------------  IN:  Total IN: 0 mL    OUT:    Voided (mL): 900 mL  Total OUT: 900 mL    Total NET: -900 mL          Lab Data                        11.0   7.63  )-----------( 199      ( 18 Jun 2023 06:52 )             37.1     06-18    144  |  104  |  42<H>  ----------------------------<  95  4.1   |  37<H>  |  1.20    Ca    8.6      18 Jun 2023 06:52  Phos  4.9     06-17  Mg     2.3     06-17    TPro  6.8  /  Alb  3.1<L>  /  TBili  0.4  /  DBili  x   /  AST  19  /  ALT  9<L>  /  AlkPhos  98  06-16            Review of Systems	      Objective     Physical Examination    heart s1s2  lung dc BS  head nc      Pertinent Lab findings & Imaging      Burak:  NO   Adequate UO     I&O's Detail    17 Jun 2023 07:01  -  18 Jun 2023 07:00  --------------------------------------------------------  IN:  Total IN: 0 mL    OUT:    Voided (mL): 900 mL  Total OUT: 900 mL    Total NET: -900 mL               Discussed with:     Cultures:	        Radiology

## 2023-06-18 NOTE — PROGRESS NOTE ADULT - ASSESSMENT
89F w/ PMHx of CHFrEF, CAD s/p CABG, CKD, Afib (on apixaban), HTN, moderate AS and pulmonary HTN, chronic sciatica, CVA who presents to the ED for wheezing    HF  OP  OA  hx of Hip Fx  CAD  CKD  valvular heart disease  Pulm HTN  GERD  CVA Hx  AF on AC    ct chest - no lung consolidation  vs noted  wean fio2  cardio f/u noted  on BUMEX for Diuresis  I and O    eval for HF exacerbation  Cardio eval in progress  CXR noted  monitor VS and Sat  keep sat > 88 pct  old records reviewed - spoke with daughter - reviewed TTE -   [facility] rx regimen optimization, Diuresis as per Cardio Recs  I and O  dietary discretion  LE doppler - NEG for DVT  GOC discussion

## 2023-06-18 NOTE — PROGRESS NOTE ADULT - SUBJECTIVE AND OBJECTIVE BOX
[facility] -- Heather Roberts Pannella, Patel, Savella [facility]  Office # [phone]      Follow Up:    ADHF af   Subjective/Observations:    seen and examined, asleep, easily awaken, falling back to sleep during exam, unable to provide meaningful information    REVIEW OF SYSTEMS: All other review of systems is negative unless indicated above    PAST MEDICAL & SURGICAL HISTORY:  Benign Essential Hypertension      Chronic Sciatica      Personal History of Coronary Artery Disease      Hypertension      Atrial fibrillation      CVA (cerebral vascular accident)      S/P CABG          MEDICATIONS  (STANDING):  apixaban 2.5 milliGRAM(s) Oral every 12 hours  aspirin  chewable 81 milliGRAM(s) Oral daily  brimonidine 0.2% Ophthalmic Solution 1 Drop(s) Both EYES two times a day  buMETAnide Injectable 1 milliGRAM(s) IV Push every 12 hours  diltiazem    milliGRAM(s) Oral daily  levothyroxine 50 MICROGram(s) Oral daily  polyethylene glycol 3350 17 Gram(s) Oral at bedtime  senna 2 Tablet(s) Oral at bedtime  simvastatin 40 milliGRAM(s) Oral at bedtime  timolol 0.5% Solution 1 Drop(s) Both EYES two times a day    MEDICATIONS  (PRN):  acetaminophen     Tablet .. 650 milliGRAM(s) Oral every 6 hours PRN Temp greater or equal to 38C (100.4F), Mild Pain (1 - 3)  aluminum hydroxide/magnesium hydroxide/simethicone Suspension 30 milliLiter(s) Oral every 4 hours PRN Dyspepsia  melatonin 3 milliGRAM(s) Oral at bedtime PRN Insomnia  ondansetron Injectable 4 milliGRAM(s) IV Push every 8 hours PRN Nausea and/or Vomiting  zolpidem 5 milliGRAM(s) Oral at bedtime PRN Insomnia      Allergies    No Known Allergies    Intolerances        Vital Signs Last 24 Hrs  T(C): 36.7 (2023 04:30), Max: 36.8 (2023 11:54)  T(F): 98 (2023 04:30), Max: 98.2 (2023 11:54)  HR: 95 (2023 04:30) (84 - 95)  BP: 124/74 (2023 04:30) (111/65 - 140/73)  BP(mean): --  RR: 18 (2023 04:30) (18 - 19)  SpO2: 95% (2023 04:30) (94% - 98%)    Parameters below as of 2023 04:30  Patient On (Oxygen Delivery Method): nasal cannula  O2 Flow (L/min): 4      I&O's Summary    2023 07:01  -  2023 06:41  --------------------------------------------------------  IN: 0 mL / OUT: 900 mL / NET: -900 mL          PHYSICAL EXAM:  TELE: Af  Constitutional: NAD, awake and alert, well-developed  HEENT: Moist Mucous Membranes, Anicteric  Pulmonary: Non-labored, breath sounds are expiratory wheezing   Cardiovascular: IRRegular, S1 and S2 nl, No murmurs, rubs, gallops or clicks  Gastrointestinal: Bowel Sounds present, soft, nontender.   Lymph+ peripheral edema.  Skin: No visible rashes or ulcers.  Psych:  Mood & affect appropriate    LABS: All Labs Reviewed:                        11.0   5.52  )-----------( 187      ( 2023 07:31 )             36.6                         11.5   5.42  )-----------( 229      ( 2023 15:20 )             37.8     2023 07:31    140    |  103    |  42     ----------------------------<  96     3.6     |  33     |  1.20   2023 15:20    139    |  99     |  45     ----------------------------<  112    3.7     |  36     |  1.40     Ca    8.6        2023 07:31  Ca    8.8        2023 15:20  Phos  4.9       2023 07:31  Mg     2.3       2023 07:31    TPro  6.8    /  Alb  3.1    /  TBili  0.4    /  DBili  x      /  AST  19     /  ALT  9      /  AlkPhos  98     2023 15:20    PT/INR - ( 2023 15:20 )   PT: 18.2 sec;   INR: 1.55 ratio         PTT - ( 2023 15:20 )  PTT:39.6 sec         EC Lead ECG:   Ventricular Rate 102 BPM    QRS Duration 114 ms    Q-T Interval 398 ms    QTC Calculation(Bazett) 518 ms    R Axis 0 degrees    T Axis -18 degrees    Diagnosis Line Atrial fibrillation with rapid ventricular response  Low voltage QRS  Right bundle branch block  ST & T wave abnormality, consider anterolateral ischemia  Abnormal ECG  When compared with ECG of 14-MAY-2023 14:02,  No significant change was found  Confirmed by VELMA ELLSWORTH ([id number]) on 2023 5:32:50 PM (23 @ 13:56)      Patient name: JIMMIE BRIAN  YOB: 1926   Age: 89 (F)   MR#: [id number]  Study Date: 2016  [facility]  Study quality: Technically difficult  Referring Physician: Sally Phipps MD  Blood Pressure: 153/78 mmHg  Height: 140 cm  Weight: 60 kg  BSA: 1.5 m2  ------------------------------------------------------------------------  PROCEDURE: Transthoracic echocardiogram with 2-D, M-Mode  and complete spectral and color flow Doppler.  INDICATION: Unspecified atrial fibrillation (I48.91)  ------------------------------------------------------------------------  Dimensions:    Normal Values:  LA:     4.6    2.0 - 4.0 cm  Ao:     3.4    2.0 - 3.8 cm  SEPTUM: 1.0    0.6 - 1.2 cm  PWT:    1.1  0.6 - 1.1 cm  LVIDd:  3.4    3.0 - 5.6 cm  LVIDs:  2.6    1.8 - 4.0 cm  Derived variables:  LVMI: 72 g/m2  RWT: 0.64  Fractional short: 24 %  EF (Teicholtz): 48 %  ------------------------------------------------------------------------  Observations:  Mitral Valve: Mitral annular calcification. The posterior  annulus is heavily calcified. There appears to be  independently mobile material seen at the posterior  annulus. This may represent endocarditis, clinical  correlation indicated. Mild mitral regurgitation.  Aortic Valve/Aorta: Calcified trileaflet aortic valve with  normal opening. Mild-moderate aortic regurgitation.   msec  Aortic Root: 3.4 cm.  Left Atrium: Mildly dilated left atrium.  LA volume index =  37 cc/m2.  Left Ventricle: Endocardium not well visualized; Mild  segmental left ventricular systolic dysfunction. The  base-mid inferior wall, the mid lateral wall, and the basal  inferoseptum are hypokinetic.  Regional wall motion  variation is noted on the setting of atrial fibrillation.  Increased relative wall thickness with normal left  ventricular mass index, consistent with concentric left  ventricular remodeling.  Right Heart: Mild right atrial enlargement. Normal right  ventricular size with mildly  decreased right ventricular  systolic function. Normal tricuspid valve. Mild-moderate  tricuspid regurgitation. Normal pulmonic valve. Minimal  pulmonic regurgitation.  Pericardium/Pleura: Normal pericardium with no pericardial  effusion.  Hemodynamic: Estimated right atrial pressure is 8 mm Hg.  Estimated right ventricular systolic pressure equals 44 mm  Hg, assuming right atrial pressure equals 8 mm Hg,  consistent with mild pulmonary hypertension.  ------------------------------------------------------------------------  Conclusions:  1. Mitral annular calcification. The posterior annulus is  heavily calcified. There appears to be independently mobile  material seen at the posterior annulus. This may represent  endocarditis, clinical correlation indicated.  2. Calcified trileaflet aortic valve with normal opening.  Mild-moderate aortic regurgitation.  msec  3. Increased relative wall thickness with normal left  ventricular mass index, consistent with concentric left  ventricular remodeling.  4. Endocardium not well visualized; Mild segmental left  ventricular systolic dysfunction. The base-mid inferior  wall, the mid lateral wall, and the basal inferoseptum are  hypokinetic.  Regional wall motion variation is noted on  the setting ofatrial fibrillation.  5. Normal right ventricular size with mildly  decreased  right ventricular systolic function.  6. Normal tricuspid valve. Mild-moderate tricuspid  regurgitation.  7. Estimated pulmonary artery systolic pressure equals 44  mm Hg,assuming right atrial pressure equals 8 mm Hg,  consistent with mild pulmonary pressures.  ------------------------------------------------------------------------  Confirmed on  2016 - 14:45:39 by Jazzy Phillips M.D.  ------------------------------------------------------------------------      Radiology:         [facility] -- Heather Roberts Pannella, Patel, Savella [facility]  Office # [phone]      Follow Up:    ADHF af     Subjective/Observations:    seen and examined, remains on nasal cannula 4 liters  + shortness of breath     REVIEW OF SYSTEMS: All other review of systems is negative unless indicated above    PAST MEDICAL & SURGICAL HISTORY:  Benign Essential Hypertension      Chronic Sciatica      Personal History of Coronary Artery Disease      Hypertension      Atrial fibrillation      CVA (cerebral vascular accident)      S/P CABG          MEDICATIONS  (STANDING):  apixaban 2.5 milliGRAM(s) Oral every 12 hours  aspirin  chewable 81 milliGRAM(s) Oral daily  brimonidine 0.2% Ophthalmic Solution 1 Drop(s) Both EYES two times a day  buMETAnide Injectable 1 milliGRAM(s) IV Push every 12 hours  diltiazem    milliGRAM(s) Oral daily  levothyroxine 50 MICROGram(s) Oral daily  polyethylene glycol 3350 17 Gram(s) Oral at bedtime  senna 2 Tablet(s) Oral at bedtime  simvastatin 40 milliGRAM(s) Oral at bedtime  timolol 0.5% Solution 1 Drop(s) Both EYES two times a day    MEDICATIONS  (PRN):  acetaminophen     Tablet .. 650 milliGRAM(s) Oral every 6 hours PRN Temp greater or equal to 38C (100.4F), Mild Pain (1 - 3)  aluminum hydroxide/magnesium hydroxide/simethicone Suspension 30 milliLiter(s) Oral every 4 hours PRN Dyspepsia  melatonin 3 milliGRAM(s) Oral at bedtime PRN Insomnia  ondansetron Injectable 4 milliGRAM(s) IV Push every 8 hours PRN Nausea and/or Vomiting  zolpidem 5 milliGRAM(s) Oral at bedtime PRN Insomnia      Allergies    No Known Allergies    Intolerances        Vital Signs Last 24 Hrs  T(C): 36.7 (2023 04:30), Max: 36.8 (2023 11:54)  T(F): 98 (2023 04:30), Max: 98.2 (2023 11:54)  HR: 95 (2023 04:30) (84 - 95)  BP: 124/74 (2023 04:30) (111/65 - 140/73)  BP(mean): --  RR: 18 (2023 04:30) (18 - 19)  SpO2: 95% (2023 04:30) (94% - 98%)    Parameters below as of 2023 04:30  Patient On (Oxygen Delivery Method): nasal cannula  O2 Flow (L/min): 4      I&O's Summary    2023 07:01  -  2023 06:41  --------------------------------------------------------  IN: 0 mL / OUT: 900 mL / NET: -900 mL          PHYSICAL EXAM:  TELE: Af  Constitutional: NAD, awake and alert, well-developed  HEENT: Moist Mucous Membranes, Anicteric  Pulmonary: Non-labored, breath sounds are expiratory wheezing rales base  Cardiovascular: IRRegular, S1 and S2 nl, No murmurs, rubs, gallops or clicks  Gastrointestinal: Bowel Sounds present, soft, nontender.   Lymph+ peripheral edema.  Skin: No visible rashes or ulcers.  Psych:  Mood & affect appropriate    LABS: All Labs Reviewed:                        11.0   5.52  )-----------( 187      ( 2023 07:31 )             36.6                         11.5   5.42  )-----------( 229      ( 2023 15:20 )             37.8     2023 07:31    140    |  103    |  42     ----------------------------<  96     3.6     |  33     |  1.20   2023 15:20    139    |  99     |  45     ----------------------------<  112    3.7     |  36     |  1.40     Ca    8.6        2023 07:31  Ca    8.8        2023 15:20  Phos  4.9       2023 07:31  Mg     2.3       2023 07:31    TPro  6.8    /  Alb  3.1    /  TBili  0.4    /  DBili  x      /  AST  19     /  ALT  9      /  AlkPhos  98     2023 15:20    PT/INR - ( 2023 15:20 )   PT: 18.2 sec;   INR: 1.55 ratio         PTT - ( 2023 15:20 )  PTT:39.6 sec         EC Lead ECG:   Ventricular Rate 102 BPM    QRS Duration 114 ms    Q-T Interval 398 ms    QTC Calculation(Bazett) 518 ms    R Axis 0 degrees    T Axis -18 degrees    Diagnosis Line Atrial fibrillation with rapid ventricular response  Low voltage QRS  Right bundle branch block  ST & T wave abnormality, consider anterolateral ischemia  Abnormal ECG  When compared with ECG of 14-MAY-2023 14:02,  No significant change was found  Confirmed by VELMA ELLSWORTH ([id number]) on 2023 5:32:50 PM (23 @ 13:56)      Patient name: JIMMIE BRIAN  YOB: 1926   Age: 89 (F)   MR#: [id number]  Study Date: 2016  [facility] Malia Oneill RDCS  Study quality: Technically difficult  Referring Physician: Sally Phipps MD  Blood Pressure: 153/78 mmHg  Height: 140 cm  Weight: 60 kg  BSA: 1.5 m2  ------------------------------------------------------------------------  PROCEDURE: Transthoracic echocardiogram with 2-D, M-Mode  and complete spectral and color flow Doppler.  INDICATION: Unspecified atrial fibrillation (I48.91)  ------------------------------------------------------------------------  Dimensions:    Normal Values:  LA:     4.6    2.0 - 4.0 cm  Ao:     3.4    2.0 - 3.8 cm  SEPTUM: 1.0    0.6 - 1.2 cm  PWT:    1.1  0.6 - 1.1 cm  LVIDd:  3.4    3.0 - 5.6 cm  LVIDs:  2.6    1.8 - 4.0 cm  Derived variables:  LVMI: 72 g/m2  RWT: 0.64  Fractional short: 24 %  EF (Teicholtz): 48 %  ------------------------------------------------------------------------  Observations:  Mitral Valve: Mitral annular calcification. The posterior  annulus is heavily calcified. There appears to be  independently mobile material seen at the posterior  annulus. This may represent endocarditis, clinical  correlation indicated. Mild mitral regurgitation.  Aortic Valve/Aorta: Calcified trileaflet aortic valve with  normal opening. Mild-moderate aortic regurgitation.   msec  Aortic Root: 3.4 cm.  Left Atrium: Mildly dilated left atrium.  LA volume index =  37 cc/m2.  Left Ventricle: Endocardium not well visualized; Mild  segmental left ventricular systolic dysfunction. The  base-mid inferior wall, the mid lateral wall, and the basal  inferoseptum are hypokinetic.  Regional wall motion  variation is noted on the setting of atrial fibrillation.  Increased relative wall thickness with normal left  ventricular mass index, consistent with concentric left  ventricular remodeling.  Right Heart: Mild right atrial enlargement. Normal right  ventricular size with mildly  decreased right ventricular  systolic function. Normal tricuspid valve. Mild-moderate  tricuspid regurgitation. Normal pulmonic valve. Minimal  pulmonic regurgitation.  Pericardium/Pleura: Normal pericardium with no pericardial  effusion.  Hemodynamic: Estimated right atrial pressure is 8 mm Hg.  Estimated right ventricular systolic pressure equals 44 mm  Hg, assuming right atrial pressure equals 8 mm Hg,  consistent with mild pulmonary hypertension.  ------------------------------------------------------------------------  Conclusions:  1. Mitral annular calcification. The posterior annulus is  heavily calcified. There appears to be independently mobile  material seen at the posterior annulus. This may represent  endocarditis, clinical correlation indicated.  2. Calcified trileaflet aortic valve with normal opening.  Mild-moderate aortic regurgitation.  msec  3. Increased relative wall thickness with normal left  ventricular mass index, consistent with concentric left  ventricular remodeling.  4. Endocardium not well visualized; Mild segmental left  ventricular systolic dysfunction. The base-mid inferior  wall, the mid lateral wall, and the basal inferoseptum are  hypokinetic.  Regional wall motion variation is noted on  the setting ofatrial fibrillation.  5. Normal right ventricular size with mildly  decreased  right ventricular systolic function.  6. Normal tricuspid valve. Mild-moderate tricuspid  regurgitation.  7. Estimated pulmonary artery systolic pressure equals 44  mm Hg,assuming right atrial pressure equals 8 mm Hg,  consistent with mild pulmonary pressures.  ------------------------------------------------------------------------  Confirmed on  2016 - 14:45:39 by Jazzy Phillips M.D.  ------------------------------------------------------------------------      Radiology:

## 2023-06-19 LAB
ANION GAP SERPL CALC-SCNC: 3 MMOL/L — LOW (ref 5–17)
BUN SERPL-MCNC: 41 MG/DL — HIGH (ref 7–23)
CALCIUM SERPL-MCNC: 8.5 MG/DL — SIGNIFICANT CHANGE UP (ref 8.5–10.1)
CHLORIDE SERPL-SCNC: 104 MMOL/L — SIGNIFICANT CHANGE UP (ref 96–108)
CO2 SERPL-SCNC: 36 MMOL/L — HIGH (ref 22–31)
CREAT SERPL-MCNC: 1.3 MG/DL — SIGNIFICANT CHANGE UP (ref 0.5–1.3)
EGFR: 38 ML/MIN/1.73M2 — LOW
GLUCOSE SERPL-MCNC: 94 MG/DL — SIGNIFICANT CHANGE UP (ref 70–99)
HCT VFR BLD CALC: 33.8 % — LOW (ref 34.5–45)
HGB BLD-MCNC: 9.9 G/DL — LOW (ref 11.5–15.5)
MCHC RBC-ENTMCNC: 29.3 GM/DL — LOW (ref 32–36)
MCHC RBC-ENTMCNC: 31.5 PG — SIGNIFICANT CHANGE UP (ref 27–34)
MCV RBC AUTO: 107.6 FL — HIGH (ref 80–100)
NRBC # BLD: 0 /100 WBCS — SIGNIFICANT CHANGE UP (ref 0–0)
PLATELET # BLD AUTO: 194 K/UL — SIGNIFICANT CHANGE UP (ref 150–400)
POTASSIUM SERPL-MCNC: 4.4 MMOL/L — SIGNIFICANT CHANGE UP (ref 3.5–5.3)
POTASSIUM SERPL-SCNC: 4.4 MMOL/L — SIGNIFICANT CHANGE UP (ref 3.5–5.3)
RBC # BLD: 3.14 M/UL — LOW (ref 3.8–5.2)
RBC # FLD: 16.2 % — HIGH (ref 10.3–14.5)
SODIUM SERPL-SCNC: 143 MMOL/L — SIGNIFICANT CHANGE UP (ref 135–145)
WBC # BLD: 7.23 K/UL — SIGNIFICANT CHANGE UP (ref 3.8–10.5)
WBC # FLD AUTO: 7.23 K/UL — SIGNIFICANT CHANGE UP (ref 3.8–10.5)

## 2023-06-19 PROCEDURE — 99497 ADVNCD CARE PLAN 30 MIN: CPT

## 2023-06-19 PROCEDURE — 99233 SBSQ HOSP IP/OBS HIGH 50: CPT

## 2023-06-19 RX ADMIN — Medication 650 MILLIGRAM(S): at 04:58

## 2023-06-19 RX ADMIN — BRIMONIDINE TARTRATE 1 DROP(S): 2 SOLUTION/ DROPS OPHTHALMIC at 17:34

## 2023-06-19 RX ADMIN — Medication 1 DROP(S): at 05:03

## 2023-06-19 RX ADMIN — Medication 81 MILLIGRAM(S): at 11:55

## 2023-06-19 RX ADMIN — BUMETANIDE 1 MILLIGRAM(S): 0.25 INJECTION INTRAMUSCULAR; INTRAVENOUS at 05:04

## 2023-06-19 RX ADMIN — SIMVASTATIN 40 MILLIGRAM(S): 20 TABLET, FILM COATED ORAL at 21:53

## 2023-06-19 RX ADMIN — APIXABAN 2.5 MILLIGRAM(S): 2.5 TABLET, FILM COATED ORAL at 05:03

## 2023-06-19 RX ADMIN — BUMETANIDE 1 MILLIGRAM(S): 0.25 INJECTION INTRAMUSCULAR; INTRAVENOUS at 17:34

## 2023-06-19 RX ADMIN — Medication 120 MILLIGRAM(S): at 05:03

## 2023-06-19 RX ADMIN — BRIMONIDINE TARTRATE 1 DROP(S): 2 SOLUTION/ DROPS OPHTHALMIC at 05:03

## 2023-06-19 RX ADMIN — Medication 1 DROP(S): at 17:34

## 2023-06-19 RX ADMIN — ZOLPIDEM TARTRATE 5 MILLIGRAM(S): 10 TABLET ORAL at 21:52

## 2023-06-19 RX ADMIN — APIXABAN 2.5 MILLIGRAM(S): 2.5 TABLET, FILM COATED ORAL at 17:33

## 2023-06-19 RX ADMIN — Medication 50 MICROGRAM(S): at 05:03

## 2023-06-19 RX ADMIN — Medication 650 MILLIGRAM(S): at 08:04

## 2023-06-19 NOTE — PROGRESS NOTE ADULT - SUBJECTIVE AND OBJECTIVE BOX
[facility] -- Heather Roberts, Meir Son Savella, [place]: Office # [phone]    Follow Up:  ADHKIMBERLEE, FELIX benavides     Subjective/Observations: Patient seen and examined. Patient awake, alert, resting in bed. No complaints of chest pain, dyspnea, palpitations or dizziness. No signs of orthopnea or PND. Tolerating O2 via nasal cannula. + LE edema, Lying flat in bed.     REVIEW OF SYSTEMS: All other review of systems are negative unless indicated above    PAST MEDICAL & SURGICAL HISTORY:  Benign Essential Hypertension      Chronic Sciatica      Personal History of Coronary Artery Disease      Hypertension      Hypertension      Atrial fibrillation      CVA (cerebral vascular accident)      S/P CABG          MEDICATIONS  (STANDING):  apixaban 2.5 milliGRAM(s) Oral every 12 hours  aspirin  chewable 81 milliGRAM(s) Oral daily  brimonidine 0.2% Ophthalmic Solution 1 Drop(s) Both EYES two times a day  buMETAnide Injectable 1 milliGRAM(s) IV Push every 12 hours  diltiazem    milliGRAM(s) Oral daily  levothyroxine 50 MICROGram(s) Oral daily  polyethylene glycol 3350 17 Gram(s) Oral at bedtime  senna 2 Tablet(s) Oral at bedtime  simvastatin 40 milliGRAM(s) Oral at bedtime  timolol 0.5% Solution 1 Drop(s) Both EYES two times a day    MEDICATIONS  (PRN):  acetaminophen     Tablet .. 650 milliGRAM(s) Oral every 6 hours PRN Temp greater or equal to 38C (100.4F), Mild Pain (1 - 3)  aluminum hydroxide/magnesium hydroxide/simethicone Suspension 30 milliLiter(s) Oral every 4 hours PRN Dyspepsia  melatonin 3 milliGRAM(s) Oral at bedtime PRN Insomnia  ondansetron Injectable 4 milliGRAM(s) IV Push every 8 hours PRN Nausea and/or Vomiting  zolpidem 5 milliGRAM(s) Oral at bedtime PRN Insomnia    Allergies    No Known Allergies    Intolerances      Vital Signs Last 24 Hrs  T(C): 37 (19 Jun 2023 04:49), Max: 37 (19 Jun 2023 04:49)  T(F): 98.6 (19 Jun 2023 04:49), Max: 98.6 (19 Jun 2023 04:49)  HR: 92 (19 Jun 2023 04:49) (85 - 100)  BP: 106/54 (19 Jun 2023 04:49) (106/54 - 118/70)  BP(mean): --  RR: 18 (19 Jun 2023 04:49) (18 - 21)  SpO2: 97% (19 Jun 2023 04:49) (97% - 97%)    Parameters below as of 19 Jun 2023 04:49  Patient On (Oxygen Delivery Method): nasal cannula      I&O's Summary    18 Jun 2023 07:01  -  19 Jun 2023 07:00  --------------------------------------------------------  IN: 480 mL / OUT: 550 mL / NET: -70 mL          TELE: A fib 90s nonsustained 100s, 7 beats NSVT  PHYSICAL EXAM:  Constitutional: NAD, awake and alert  HEENT: Moist Mucous Membranes, Anicteric  Pulmonary: Non-labored, breath sounds are clear bilaterally, No wheezing, rales or rhonchi  Cardiovascular: Irregular, S1 and S2, + murmurs, No rubs, gallops or clicks  Gastrointestinal:  soft, nontender, nondistended   Lymph: +2 peripheral edema. No lymphadenopathy.   Skin: No visible rashes or ulcers.  Psych:  Mood & affect appropriate      LABS: All Labs Reviewed:                        9.9    7.23  )-----------( 194      ( 19 Jun 2023 07:30 )             33.8                         11.0   7.63  )-----------( 199      ( 18 Jun 2023 06:52 )             37.1                         11.0   5.52  )-----------( 187      ( 17 Jun 2023 07:31 )             36.6     19 Jun 2023 07:30    143    |  104    |  41     ----------------------------<  94     4.4     |  36     |  1.30   18 Jun 2023 06:52    144    |  104    |  42     ----------------------------<  95     4.1     |  37     |  1.20   17 Jun 2023 07:31    140    |  103    |  42     ----------------------------<  96     3.6     |  33     |  1.20     Ca    8.5        19 Jun 2023 07:30  Ca    8.6        18 Jun 2023 06:52  Ca    8.6        17 Jun 2023 07:31  Phos  4.9       17 Jun 2023 07:31  Mg     2.3       17 Jun 2023 07:31    TPro  6.8    /  Alb  3.1    /  TBili  0.4    /  DBili  x      /  AST  19     /  ALT  9      /  AlkPhos  98     16 Jun 2023 15:20     Troponin I, High Sensitivity Result: 34.1 ng/L (06-16-23 @ 15:20)  Lactate, Blood: 1.2 mmol/L (06-16-23 @ 15:20)    12 Lead ECG:   Ventricular Rate 102 BPM    QRS Duration 114 ms    Q-T Interval 398 ms    QTC Calculation(Bazett) 518 ms    R Axis 0 degrees    T Axis -18 degrees    Diagnosis Line Atrial fibrillation with rapid ventricular response  Low voltage QRS  Right bundle branch block  ST & T wave abnormality, consider anterolateral ischemia  Abnormal ECG  When compared with ECG of 14-MAY-2023 14:02,  No significant change was found  Confirmed by VELMA ELLSWORTH ([id number]) on 6/17/2023 5:32:50 PM (06-16-23 @ 13:56)      Patient name: JIMMIE BRIAN  YOB: 1926   Age: 89 (F)   MR#: [id number]  Study Date: 11/22/2016  [facility] Malia One[facility] RDCS  Study quality: Technically difficult  Referring Physician: Sally Phipps MD  Blood Pressure: 153/78 mmHg  Height: 140 cm  Weight: 60 kg  BSA: 1.5 m2  ------------------------------------------------------------------------  PROCEDURE: Transthoracic echocardiogram with 2-D, M-Mode  and complete spectral and color flow Doppler.  INDICATION: Unspecified atrial fibrillation (I48.91)  ------------------------------------------------------------------------  Dimensions:    Normal Values:  LA:     4.6    2.0 - 4.0 cm  Ao:     3.4    2.0 - 3.8 cm  SEPTUM: 1.0    0.6 - 1.2 cm  PWT:    1.1  0.6 - 1.1 cm  LVIDd:  3.4    3.0 - 5.6 cm  LVIDs:  2.6    1.8 - 4.0 cm  Derived variables:  LVMI: 72 g/m2  RWT: 0.64  Fractional short: 24 %  EF (Teicholtz): 48 %  ------------------------------------------------------------------------  Observations:  Mitral Valve: Mitral annular calcification. The posterior  annulus is heavily calcified. There appears to be  independently mobile material seen at the posterior  annulus. This may represent endocarditis, clinical  correlation indicated. Mild mitral regurgitation.  Aortic Valve/Aorta: Calcified trileaflet aortic valve with  normal opening. Mild-moderate aortic regurgitation.   msec  Aortic Root: 3.4 cm.  Left Atrium: Mildly dilated left atrium.  LA volume index =  37 cc/m2.  Left Ventricle: Endocardium not well visualized; Mild  segmental left ventricular systolic dysfunction. The  base-mid inferior wall, the mid lateral wall, and the basal  inferoseptum are hypokinetic.  Regional wall motion  variation is noted on the setting of atrial fibrillation.  Increased relative wall thickness with normal left  ventricular mass index, consistent with concentric left  ventricular remodeling.  Right Heart: Mild right atrial enlargement. Normal right  ventricular size with mildly  decreased right ventricular  systolic function. Normal tricuspid valve. Mild-moderate  tricuspid regurgitation. Normal pulmonic valve. Minimal  pulmonic regurgitation.  Pericardium/Pleura: Normal pericardium with no pericardial  effusion.  Hemodynamic: Estimated right atrial pressure is 8 mm Hg.  Estimated right ventricular systolic pressure equals 44 mm  Hg, assuming right atrial pressure equals 8 mm Hg,  consistent with mild pulmonary hypertension.  ------------------------------------------------------------------------  Conclusions:  1. Mitral annular calcification. The posterior annulus is  heavily calcified. There appears to be independently mobile  material seen at the posterior annulus. This may represent  endocarditis, clinical correlation indicated.  2. Calcified trileaflet aortic valve with normal opening.  Mild-moderate aortic regurgitation.  msec  3. Increased relative wall thickness with normal left  ventricular mass index, consistent with concentric left  ventricular remodeling.  4. Endocardium not well visualized; Mild segmental left  ventricular systolic dysfunction. The base-mid inferior  wall, the mid lateral wall, and the basal inferoseptum are  hypokinetic.  Regional wall motion variation is noted on  the setting ofatrial fibrillation.  5. Normal right ventricular size with mildly  decreased  right ventricular systolic function.  6. Normal tricuspid valve. Mild-moderate tricuspid  regurgitation.  7. Estimated pulmonary artery systolic pressure equals 44  mm Hg,assuming right atrial pressure equals 8 mm Hg,  consistent with mild pulmonary pressures.  ------------------------------------------------------------------------  Confirmed on  11/22/2016 - 14:45:39 by Jazzy Phillips M.D.  ------------------------------------------------------------------------

## 2023-06-19 NOTE — CARE COORDINATION ASSESSMENT. - OTHER PERTINENT REFERRAL INFORMATION
CM met with patient, daughter Virginia and Private HHA Danelle at bedside to explain role and transitions of care. Patient lives alone with private HHA in a private house with 3 steps to get in (ramp in place), one level house.  Daughter Virginia is her caretaker. Patient had [facility] prior to admission for nursing and PT. Patient owns a RW, rollator, commode, shower chair. Daughter will transport patient home when ready to be discharge.  ARNOLDO with [facility].  CM explained  home care expectation, process, insurance provision and home safety with good understanding.  CM to make referral. Patient and daughter verbalized understanding of plans after discharge and are in agreement.  Resource folder left at bedside.  All questions answered to the best of my abilities.  CM remains available throughout the hospital stay.

## 2023-06-19 NOTE — PATIENT CHOICE NOTE. - NSPTCHOICESTATE_GEN_ALL_CORE

## 2023-06-19 NOTE — PROGRESS NOTE ADULT - SUBJECTIVE AND OBJECTIVE BOX
Patient is a 89y old  Female who presents with a chief complaint of Wheezing (19 Jun 2023 10:35)      INTERVAL HPI/OVERNIGHT EVENTS: noted  pt seen and examined this am   events noted  feels well    Vital Signs Last 24 Hrs  T(C): 36.3 (19 Jun 2023 19:55), Max: 37 (19 Jun 2023 04:49)  T(F): 97.3 (19 Jun 2023 19:55), Max: 98.6 (19 Jun 2023 04:49)  HR: 84 (19 Jun 2023 19:55) (79 - 92)  BP: 116/70 (19 Jun 2023 19:55) (106/54 - 134/55)  BP(mean): --  RR: 18 (19 Jun 2023 19:55) (18 - 18)  SpO2: 99% (19 Jun 2023 19:55) (97% - 100%)    Parameters below as of 19 Jun 2023 19:55  Patient On (Oxygen Delivery Method): nasal cannula  O2 Flow (L/min): 4      acetaminophen     Tablet .. 650 milliGRAM(s) Oral every 6 hours PRN  aluminum hydroxide/magnesium hydroxide/simethicone Suspension 30 milliLiter(s) Oral every 4 hours PRN  apixaban 2.5 milliGRAM(s) Oral every 12 hours  aspirin  chewable 81 milliGRAM(s) Oral daily  brimonidine 0.2% Ophthalmic Solution 1 Drop(s) Both EYES two times a day  buMETAnide Injectable 1 milliGRAM(s) IV Push every 12 hours  diltiazem    milliGRAM(s) Oral daily  levothyroxine 50 MICROGram(s) Oral daily  melatonin 3 milliGRAM(s) Oral at bedtime PRN  ondansetron Injectable 4 milliGRAM(s) IV Push every 8 hours PRN  polyethylene glycol 3350 17 Gram(s) Oral at bedtime  senna 2 Tablet(s) Oral at bedtime  simvastatin 40 milliGRAM(s) Oral at bedtime  timolol 0.5% Solution 1 Drop(s) Both EYES two times a day  zolpidem 5 milliGRAM(s) Oral at bedtime PRN      PHYSICAL EXAM:  GENERAL: NAD,   EYES: conjunctiva and sclera clear  ENMT: Moist mucous membranes  NECK: Supple, No JVD, Normal thyroid  CHEST/LUNG: non labored, cta b/l  HEART: Regular rate and rhythm; No murmurs, rubs, or gallops  ABDOMEN: Soft, Nontender, Nondistended; Bowel sounds present  EXTREMITIES:  2+ Peripheral Pulses, No clubbing, cyanosis, or edema  LYMPH: No lymphadenopathy noted  SKIN: No rashes or lesions    Consultant(s) Notes Reviewed:  [x ] YES  [ ] NO  Care Discussed with Consultants/Other Providers [ x] YES  [ ] NO    LABS:                        9.9    7.23  )-----------( 194      ( 19 Jun 2023 07:30 )             33.8     06-19    143  |  104  |  41<H>  ----------------------------<  94  4.4   |  36<H>  |  1.30    Ca    8.5      19 Jun 2023 07:30          CAPILLARY BLOOD GLUCOSE                  RADIOLOGY & ADDITIONAL TESTS:    Imaging Personally Reviewed:  [x ] YES  [ ] NO

## 2023-06-19 NOTE — PROGRESS NOTE ADULT - ASSESSMENT
89 yo F with diastolic HF, CAD s/p CABG, RBBB, CKD, atrial fibrillation (on apixaban), HTN, moderate AS and pulmonary HTN, chronic sciatica, CVA who was recently hospitalized  for fall sp IMN 5/14 s/p and discharge on 5/19/23 who is presented to ED  for fatigue and wheezing    ADHF, CAD, CABG, VHD, RBBB, A fib, HTN, Pulm HTN  - Pt volume overloaded on exam on admission, Lasix changed to Bumex 1 iv q 12 on 6/17, would continue   - BNP:  <--4522  - Volume status improving   - CT chest with small right pleural effusion   - Please continue to maintain strict I/Os, monitor daily weights, Cr, and K.   - Repeat echo pending, last echo as above 2016     - Known CAD, CABG  - Troponin: <-34.1  - EKG: Afib 102 with no acute  ischemic changes   - No evidence of any active ischemia   - Continue ASA, statin     - Known A fib, on apixaban , renally dosed has CKD  - Tele with A  fib 90s   - Continue Eliquis   - Continue Diltiazem 120 mg PO daily    - BP stable and controlled     - Monitor and replete lytes, keep K>4, Mg>2.  - Will continue to follow.    Chavez Mendoza, MS FNP, AGAP  Nurse Practitioner- Cardiology   Spectra # [phone] /[phone]

## 2023-06-19 NOTE — CARE COORDINATION ASSESSMENT. - NSCAREPROVIDERS_GEN_ALL_CORE_FT
CARE PROVIDERS:  Accepting Physician: Yvette Whitt  Administration: Raissa Odell  Administration: Naz Perkins  Administration: Tulio Nino  Administration: Leigh Santos  Admitting: Yvette Whitt  Attending: Yvette Whitt  Consultant: Elissa Valdovinos  Consultant: Lei Son  Consultant: Hunter Worley  Consultant: Marcos Acosta  Consultant: Jackie Duran  Consultant: Chavez Mendoza  Consultant: Noe Maza  ED Attending: Juanjo Dodge  ED Nurse: Liana Anderson  Nurse: Leigh Skaggs  Nurse: Carmine Rocha  Nurse: Mara Kennedy  Nurse: Liana Ramsey  Ordered: Doctor, Unknown  Ordered: ADM, User  Ordered: ServiceAccount, [facility]inaMLM  Outpatient Provider: Yvette Whitt  Outpatient Provider: Juanjo Rizo  Override: Taniya Gaona  PCA/Nursing Assistant: Lopez Beckett  Physical Therapy: Justice Kevin  Primary Team: Elen Benjamin  Primary Team: Blanquita Jose  Primary Team: Johana Jose  Registered Dietitian: Samantha Love  : Jenny Watson  Team: [facility], Team

## 2023-06-19 NOTE — CARE COORDINATION ASSESSMENT. - NS SW READMITTED REASON
prior admission hip fx. - this admission Pneumonia/current reason for admission unrelated to previous admission

## 2023-06-19 NOTE — PROGRESS NOTE ADULT - ASSESSMENT
89F w/ PMHx of CHFrEF, CAD s/p CABG, CKD, Afib (on apixaban), HTN, moderate AS and pulmonary HTN, chronic sciatica, CVA who presents to the ED for wheezing. pt w/ ADHF.   Ct chest- small rt pl effusion    #ADHF  bumex started   strict I/Os, monitor daily weights, Cr, and K.  contraction alkalosis- fu cards may need to lower diuretics  echo pending  cards fu noted    #AF controlled for now.   cont cardizem   cont eliquis    #wheezing- likely cardiac origin- CHF exac  prn nebs  diuresis  pulm fu noted    #DVt ppx- eliquis    d/w dgtr at bedside plan of care    [facility]

## 2023-06-19 NOTE — PROGRESS NOTE ADULT - SUBJECTIVE AND OBJECTIVE BOX
Date/Time Patient Seen:  		  Referring MD:   Data Reviewed	       Patient is a 89y old  Female who presents with a chief complaint of Wheezing (18 Jun 2023 18:29)      Subjective/HPI     PAST MEDICAL & SURGICAL HISTORY:  Benign Essential Hypertension    Chronic Sciatica    Personal History of Coronary Artery Disease    Hypertension    Atrial fibrillation    CVA (cerebral vascular accident)    S/P CABG          Medication list         MEDICATIONS  (STANDING):  apixaban 2.5 milliGRAM(s) Oral every 12 hours  aspirin  chewable 81 milliGRAM(s) Oral daily  brimonidine 0.2% Ophthalmic Solution 1 Drop(s) Both EYES two times a day  buMETAnide Injectable 1 milliGRAM(s) IV Push every 12 hours  diltiazem    milliGRAM(s) Oral daily  levothyroxine 50 MICROGram(s) Oral daily  polyethylene glycol 3350 17 Gram(s) Oral at bedtime  senna 2 Tablet(s) Oral at bedtime  simvastatin 40 milliGRAM(s) Oral at bedtime  timolol 0.5% Solution 1 Drop(s) Both EYES two times a day    MEDICATIONS  (PRN):  acetaminophen     Tablet .. 650 milliGRAM(s) Oral every 6 hours PRN Temp greater or equal to 38C (100.4F), Mild Pain (1 - 3)  aluminum hydroxide/magnesium hydroxide/simethicone Suspension 30 milliLiter(s) Oral every 4 hours PRN Dyspepsia  melatonin 3 milliGRAM(s) Oral at bedtime PRN Insomnia  ondansetron Injectable 4 milliGRAM(s) IV Push every 8 hours PRN Nausea and/or Vomiting  zolpidem 5 milliGRAM(s) Oral at bedtime PRN Insomnia         Vitals log        ICU Vital Signs Last 24 Hrs  T(C): 37 (19 Jun 2023 04:49), Max: 37 (19 Jun 2023 04:49)  T(F): 98.6 (19 Jun 2023 04:49), Max: 98.6 (19 Jun 2023 04:49)  HR: 92 (19 Jun 2023 04:49) (85 - 100)  BP: 106/54 (19 Jun 2023 04:49) (106/54 - 118/70)  BP(mean): --  ABP: --  ABP(mean): --  RR: 18 (19 Jun 2023 04:49) (18 - 21)  SpO2: 97% (19 Jun 2023 04:49) (97% - 97%)    O2 Parameters below as of 19 Jun 2023 04:49  Patient On (Oxygen Delivery Method): nasal cannula                 Input and Output:  I&O's Detail    17 Jun 2023 07:01  -  18 Jun 2023 07:00  --------------------------------------------------------  IN:  Total IN: 0 mL    OUT:    Voided (mL): 900 mL  Total OUT: 900 mL    Total NET: -900 mL      18 Jun 2023 07:01  -  19 Jun 2023 05:32  --------------------------------------------------------  IN:    Oral Fluid: 480 mL  Total IN: 480 mL    OUT:  Total OUT: 0 mL    Total NET: 480 mL          Lab Data                        11.0   7.63  )-----------( 199      ( 18 Jun 2023 06:52 )             37.1     06-18    144  |  104  |  42<H>  ----------------------------<  95  4.1   |  37<H>  |  1.20    Ca    8.6      18 Jun 2023 06:52  Phos  4.9     06-17  Mg     2.3     06-17              Review of Systems	      Objective     Physical Examination    heart s1s2  lung dc BS  head nc      Pertinent Lab findings & Imaging      Burak:  NO   Adequate UO     I&O's Detail    17 Jun 2023 07:01  -  18 Jun 2023 07:00  --------------------------------------------------------  IN:  Total IN: 0 mL    OUT:    Voided (mL): 900 mL  Total OUT: 900 mL    Total NET: -900 mL      18 Jun 2023 07:01  -  19 Jun 2023 05:32  --------------------------------------------------------  IN:    Oral Fluid: 480 mL  Total IN: 480 mL    OUT:  Total OUT: 0 mL    Total NET: 480 mL               Discussed with:     Cultures:	        Radiology

## 2023-06-20 LAB
ANION GAP SERPL CALC-SCNC: 2 MMOL/L — LOW (ref 5–17)
BASE EXCESS BLDA CALC-SCNC: 11.5 MMOL/L — HIGH (ref -2–3)
BLOOD GAS COMMENTS ARTERIAL: SIGNIFICANT CHANGE UP
BUN SERPL-MCNC: 45 MG/DL — HIGH (ref 7–23)
CALCIUM SERPL-MCNC: 9.2 MG/DL — SIGNIFICANT CHANGE UP (ref 8.5–10.1)
CHLORIDE SERPL-SCNC: 102 MMOL/L — SIGNIFICANT CHANGE UP (ref 96–108)
CO2 SERPL-SCNC: 38 MMOL/L — HIGH (ref 22–31)
CREAT SERPL-MCNC: 1.2 MG/DL — SIGNIFICANT CHANGE UP (ref 0.5–1.3)
EGFR: 41 ML/MIN/1.73M2 — LOW
GAS PNL BLDA: SIGNIFICANT CHANGE UP
GLUCOSE SERPL-MCNC: 111 MG/DL — HIGH (ref 70–99)
HCO3 BLDA-SCNC: 37 MMOL/L — HIGH (ref 21–28)
HCT VFR BLD CALC: 37.5 % — SIGNIFICANT CHANGE UP (ref 34.5–45)
HGB BLD-MCNC: 10.9 G/DL — LOW (ref 11.5–15.5)
HOROWITZ INDEX BLDA+IHG-RTO: 50 — SIGNIFICANT CHANGE UP
MCHC RBC-ENTMCNC: 29.1 GM/DL — LOW (ref 32–36)
MCHC RBC-ENTMCNC: 31.6 PG — SIGNIFICANT CHANGE UP (ref 27–34)
MCV RBC AUTO: 108.7 FL — HIGH (ref 80–100)
NRBC # BLD: 0 /100 WBCS — SIGNIFICANT CHANGE UP (ref 0–0)
PCO2 BLDA: 71 MMHG — CRITICAL HIGH (ref 32–35)
PH BLDA: 7.33 — LOW (ref 7.35–7.45)
PLATELET # BLD AUTO: 221 K/UL — SIGNIFICANT CHANGE UP (ref 150–400)
PO2 BLDA: 107 MMHG — SIGNIFICANT CHANGE UP (ref 83–108)
POTASSIUM SERPL-MCNC: 4.8 MMOL/L — SIGNIFICANT CHANGE UP (ref 3.5–5.3)
POTASSIUM SERPL-SCNC: 4.8 MMOL/L — SIGNIFICANT CHANGE UP (ref 3.5–5.3)
RBC # BLD: 3.45 M/UL — LOW (ref 3.8–5.2)
RBC # FLD: 15.9 % — HIGH (ref 10.3–14.5)
SAO2 % BLDA: 99.3 % — HIGH (ref 94–98)
SODIUM SERPL-SCNC: 142 MMOL/L — SIGNIFICANT CHANGE UP (ref 135–145)
WBC # BLD: 8.73 K/UL — SIGNIFICANT CHANGE UP (ref 3.8–10.5)
WBC # FLD AUTO: 8.73 K/UL — SIGNIFICANT CHANGE UP (ref 3.8–10.5)

## 2023-06-20 PROCEDURE — 99291 CRITICAL CARE FIRST HOUR: CPT

## 2023-06-20 PROCEDURE — 71045 X-RAY EXAM CHEST 1 VIEW: CPT | Mod: 26

## 2023-06-20 RX ORDER — BUMETANIDE 0.25 MG/ML
1 INJECTION INTRAMUSCULAR; INTRAVENOUS ONCE
Refills: 0 | Status: COMPLETED | OUTPATIENT
Start: 2023-06-20 | End: 2023-06-20

## 2023-06-20 RX ORDER — IPRATROPIUM/ALBUTEROL SULFATE 18-103MCG
3 AEROSOL WITH ADAPTER (GRAM) INHALATION ONCE
Refills: 0 | Status: COMPLETED | OUTPATIENT
Start: 2023-06-20 | End: 2023-06-20

## 2023-06-20 RX ADMIN — Medication 81 MILLIGRAM(S): at 17:45

## 2023-06-20 RX ADMIN — APIXABAN 2.5 MILLIGRAM(S): 2.5 TABLET, FILM COATED ORAL at 05:21

## 2023-06-20 RX ADMIN — SENNA PLUS 2 TABLET(S): 8.6 TABLET ORAL at 21:26

## 2023-06-20 RX ADMIN — POLYETHYLENE GLYCOL 3350 17 GRAM(S): 17 POWDER, FOR SOLUTION ORAL at 21:26

## 2023-06-20 RX ADMIN — BUMETANIDE 1 MILLIGRAM(S): 0.25 INJECTION INTRAMUSCULAR; INTRAVENOUS at 07:01

## 2023-06-20 RX ADMIN — BUMETANIDE 1 MILLIGRAM(S): 0.25 INJECTION INTRAMUSCULAR; INTRAVENOUS at 05:21

## 2023-06-20 RX ADMIN — Medication 1 DROP(S): at 17:45

## 2023-06-20 RX ADMIN — Medication 120 MILLIGRAM(S): at 05:22

## 2023-06-20 RX ADMIN — Medication 50 MICROGRAM(S): at 05:22

## 2023-06-20 RX ADMIN — Medication 1 DROP(S): at 05:22

## 2023-06-20 RX ADMIN — BRIMONIDINE TARTRATE 1 DROP(S): 2 SOLUTION/ DROPS OPHTHALMIC at 17:46

## 2023-06-20 RX ADMIN — APIXABAN 2.5 MILLIGRAM(S): 2.5 TABLET, FILM COATED ORAL at 17:45

## 2023-06-20 RX ADMIN — Medication 3 MILLILITER(S): at 06:36

## 2023-06-20 RX ADMIN — SIMVASTATIN 40 MILLIGRAM(S): 20 TABLET, FILM COATED ORAL at 21:26

## 2023-06-20 RX ADMIN — ZOLPIDEM TARTRATE 5 MILLIGRAM(S): 10 TABLET ORAL at 21:26

## 2023-06-20 RX ADMIN — BRIMONIDINE TARTRATE 1 DROP(S): 2 SOLUTION/ DROPS OPHTHALMIC at 05:22

## 2023-06-20 RX ADMIN — BUMETANIDE 1 MILLIGRAM(S): 0.25 INJECTION INTRAMUSCULAR; INTRAVENOUS at 17:45

## 2023-06-20 RX ADMIN — ONDANSETRON 4 MILLIGRAM(S): 8 TABLET, FILM COATED ORAL at 03:07

## 2023-06-20 NOTE — PHARMACOTHERAPY INTERVENTION NOTE - COMMENTS
Patient w/PMH A Fib is on eliquis 2.5mg BID (home dose 2.5mg BID). Discussed w/ cardio NP Jackei that patient doesn't meet 2/3 reduced dose criteria (age >80, SCr >1.5, weight <60kg). NP is aware but would like to continue with current dose.

## 2023-06-20 NOTE — PROGRESS NOTE ADULT - SUBJECTIVE AND OBJECTIVE BOX
Date/Time Patient Seen:  		  Referring MD:   Data Reviewed	       Patient is a 89y old  Female who presents with a chief complaint of Wheezing (19 Jun 2023 14:55)      Subjective/HPI     PAST MEDICAL & SURGICAL HISTORY:  Benign Essential Hypertension    Chronic Sciatica    Personal History of Coronary Artery Disease    Hypertension    Atrial fibrillation    CVA (cerebral vascular accident)    S/P CABG          Medication list         MEDICATIONS  (STANDING):  apixaban 2.5 milliGRAM(s) Oral every 12 hours  aspirin  chewable 81 milliGRAM(s) Oral daily  brimonidine 0.2% Ophthalmic Solution 1 Drop(s) Both EYES two times a day  buMETAnide Injectable 1 milliGRAM(s) IV Push every 12 hours  diltiazem    milliGRAM(s) Oral daily  levothyroxine 50 MICROGram(s) Oral daily  polyethylene glycol 3350 17 Gram(s) Oral at bedtime  senna 2 Tablet(s) Oral at bedtime  simvastatin 40 milliGRAM(s) Oral at bedtime  timolol 0.5% Solution 1 Drop(s) Both EYES two times a day    MEDICATIONS  (PRN):  acetaminophen     Tablet .. 650 milliGRAM(s) Oral every 6 hours PRN Temp greater or equal to 38C (100.4F), Mild Pain (1 - 3)  aluminum hydroxide/magnesium hydroxide/simethicone Suspension 30 milliLiter(s) Oral every 4 hours PRN Dyspepsia  melatonin 3 milliGRAM(s) Oral at bedtime PRN Insomnia  ondansetron Injectable 4 milliGRAM(s) IV Push every 8 hours PRN Nausea and/or Vomiting  zolpidem 5 milliGRAM(s) Oral at bedtime PRN Insomnia         Vitals log        ICU Vital Signs Last 24 Hrs  T(C): 36.8 (20 Jun 2023 04:29), Max: 36.9 (19 Jun 2023 12:21)  T(F): 98.3 (20 Jun 2023 04:29), Max: 98.5 (19 Jun 2023 12:21)  HR: 97 (20 Jun 2023 04:29) (79 - 97)  BP: 143/78 (20 Jun 2023 04:29) (116/70 - 143/78)  BP(mean): --  ABP: --  ABP(mean): --  RR: 19 (20 Jun 2023 04:29) (18 - 19)  SpO2: 96% (20 Jun 2023 04:29) (96% - 100%)    O2 Parameters below as of 20 Jun 2023 04:29  Patient On (Oxygen Delivery Method): nasal cannula  O2 Flow (L/min): 4               Input and Output:  I&O's Detail    18 Jun 2023 07:01  -  19 Jun 2023 07:00  --------------------------------------------------------  IN:    Oral Fluid: 480 mL  Total IN: 480 mL    OUT:    Voided (mL): 550 mL  Total OUT: 550 mL    Total NET: -70 mL          Lab Data                        9.9    7.23  )-----------( 194      ( 19 Jun 2023 07:30 )             33.8     06-19    143  |  104  |  41<H>  ----------------------------<  94  4.4   |  36<H>  |  1.30    Ca    8.5      19 Jun 2023 07:30              Review of Systems	      Objective     Physical Examination    heart s1s2  lung dc BS  head nc      Pertinent Lab findings & Imaging      Burak:  NO   Adequate UO     I&O's Detail    18 Jun 2023 07:01  -  19 Jun 2023 07:00  --------------------------------------------------------  IN:    Oral Fluid: 480 mL  Total IN: 480 mL    OUT:    Voided (mL): 550 mL  Total OUT: 550 mL    Total NET: -70 mL               Discussed with:     Cultures:	        Radiology

## 2023-06-20 NOTE — PROGRESS NOTE ADULT - ASSESSMENT
89F w/ PMHx of CHFrEF, CAD s/p CABG, CKD, Afib (on apixaban), HTN, moderate AS and pulmonary HTN, chronic sciatica, CVA who presents to the ED for wheezing. pt w/ ADHF.   Ct chest- small rt pl effusion    #ADHF w acute hypoxic and  hypercapnea respiratory failure  cont bumex, bipap   strict I/Os, monitor daily weights, Cr, and K.  rpt CXR- atelectasis and small effusion  echo pending  cards and pulm fu  elev bicarb -?co2 retention-?acute/chronic    #AF controlled for now.   cont cardizem   cont eliquis    #wheezing- likely cardiac origin- CHF exac  prn nebs  diuresis  pulm fu noted    #DVt ppx- eliquis    d/w dgtr at bedside plan of care    Dr Rodriguez will be covering  starting 6/21/23  please call [facility] @ [phone] for questions or concerns

## 2023-06-20 NOTE — CHART NOTE - NSCHARTNOTEFT_GEN_A_CORE
Called by RN for oxygen desaturation to 80s. Pt seen and examined at bedside. She was placed on a NRB and nasal canula. Pt denies shortness of breath, chest pain, abdominal pain, headache, dizziness.     T(C): 36.4 (06-20-23 @ 06:43), Max: 36.9 (06-19-23 @ 12:21)  HR: 92 (06-20-23 @ 06:43) (79 - 97)  BP: 135/74 (06-20-23 @ 06:43) (116/70 - 143/78)  RR: 20 (06-20-23 @ 06:43) (18 - 20)  SpO2: 96% (06-20-23 @ 06:43) (96% - 100%)  Wt(kg): --    Physical :  Gen- NAD, ncat  Cardio - s+1,s+2, irregularly irregular   Lung - poor air movement with diminished breath sounds b/l   Abdomen- +BS, NT/ND, no guarding, no rebound, no masses  Ext- no edema, 2+ pulses b/l  Neuro- alert and oriented x0, +2 pitting edema on right (chronic per RN)     LABS:                        9.9    7.23  )-----------( 194      ( 19 Jun 2023 07:30 )             33.8     06-19    143  |  104  |  41<H>  ----------------------------<  94  4.4   |  36<H>  |  1.30    Ca    8.5      19 Jun 2023 07:30      Assessment/Plan  89yFemale with PMHX CHFrEF, CAD s/p CABG, CKD, Afib (on apixaban), HTN, moderate AS and pulmonary HTN, chronic sciatica, CVA who presents to the ED for wheezing admitted for acute diastolic heart failure. Patient now with desaturation and poor air movement.     - STAT CXR with worsening pulmonary congestion on right side   - Saturating well on NRB at 97%  - will give bumex 1mg x 1 STAT   - RN to call if any changes    Dr. Vilchis   PGY3  [phone]

## 2023-06-20 NOTE — PROGRESS NOTE ADULT - ASSESSMENT
89 yo F with diastolic HF, CAD s/p CABG, RBBB, CKD, atrial fibrillation (on apixaban), HTN, moderate AS and pulmonary HTN, chronic sciatica, CVA who was recently hospitalized  for fall sp IMN 5/14 s/p and discharge on 5/19/23 who is presented to ED  for fatigue and wheezing    ADHF, CAD, CABG, VHD, RBBB, A fib, HTN, Pulm HTN  - admitted with acute hypoxic and hypercapnic respiratory failure  - CT chest with small right pleural effusion   -placed on bipap this am given additional bumex , ABG reviewed with hypercapnia would hold off on additional diuretic   -negative 700cc 24 hours   - Please continue to maintain strict I/Os, monitor daily weights, Cr, and K.   - Repeat echo pending, last echo as above 2016     - Known CAD, CABG  - Troponin: <-34.1  - EKG: Afib 102 with no acute  ischemic changes   - No evidence of any active ischemia   - Continue ASA, statin     - Known A fib, on apixaban , renally dosed has CKD  - Tele with A    - Continue Eliquis   - Continue Diltiazem 120 mg PO daily    - BP stable and controlled     - Monitor and replete lytes, keep K>4, Mg>2.  DNR DNI  Jackie Duran FNP-C  Cardiology NP  call teams       9   89 yo F with diastolic HF, CAD s/p CABG, RBBB, CKD, atrial fibrillation (on apixaban), HTN, moderate AS and pulmonary HTN, chronic sciatica, CVA who was recently hospitalized  for fall sp IMN 5/14 s/p and discharge on 5/19/23 who is presented to ED  for fatigue and wheezing    ADHF, CAD, CABG, VHD, RBBB, A fib, HTN, Pulm HTN  - admitted with acute hypoxic and hypercapnic respiratory failure  - CT chest with small right pleural effusion   -placed on bipap this am given additional bumex , ABG reviewed with hypercapnia   -negative 700cc 24 hours   - Please continue to maintain strict I/Os, monitor daily weights, Cr, and K.   - Repeat echo pending, last echo as above 2016     - Known CAD, CABG  - Troponin: <-34.1  - EKG: Afib 102 with no acute  ischemic changes   - No evidence of any active ischemia   - Continue ASA, statin     - Known A fib, on apixaban , renally dosed has CKD  - Tele with A    - Continue Eliquis   - Continue Diltiazem 120 mg PO daily    - BP stable and controlled     - Monitor and replete lytes, keep K>4, Mg>2.  DNR DNI  Jackie Duran FNP-C  Cardiology NP  call teams       9   89 yo F with diastolic HF, CAD s/p CABG, RBBB, CKD, atrial fibrillation (on apixaban), HTN, moderate AS and pulmonary HTN, chronic sciatica, CVA who was recently hospitalized  for fall sp IMN 5/14 s/p and discharge on 5/19/23 who is presented to ED  for fatigue and wheezing    ADHF, CAD, CABG, VHD, RBBB, A fib, HTN, Pulm HTN  - admitted with acute hypoxic and hypercapnic respiratory failure  - CT chest with small right pleural effusion   -placed on bipap this am given additional bumex , ABG reviewed with hypercapnia   -negative 700cc 24 hours   - Please continue to maintain strict I/Os, monitor daily weights, Cr, and K.   - Repeat echo pending, last echo as above 2016     - Known CAD, CABG  - Troponin: <-34.1  - EKG: Afib 102 with no acute  ischemic changes   - No evidence of any active ischemia   - Continue ASA, statin     - Known A fib, on apixaban , renally dosed has CKD  - Tele with A    - Continue Eliquis   - Continue Diltiazem 120 mg PO daily    - BP stable and controlled     - Monitor and replete lytes, keep K>4, Mg>2.  DNR DNI  Jackie Duran FNP-C  Cardiology NP  call teams

## 2023-06-20 NOTE — PROGRESS NOTE ADULT - SUBJECTIVE AND OBJECTIVE BOX
Patient is a 89y old  Female who presents with a chief complaint of Wheezing (20 Jun 2023 05:36)      INTERVAL HPI/OVERNIGHT EVENTS: noted  pt seen and examined this am   events noted  was confused and hypoxic earlier today  abg-hypercapnea- started bipap      Vital Signs Last 24 Hrs  T(C): 37.1 (20 Jun 2023 11:12), Max: 37.1 (20 Jun 2023 11:12)  T(F): 98.8 (20 Jun 2023 11:12), Max: 98.8 (20 Jun 2023 11:12)  HR: 94 (20 Jun 2023 11:12) (83 - 97)  BP: 146/73 (20 Jun 2023 11:12) (116/70 - 146/73)  BP(mean): --  RR: 13 (20 Jun 2023 11:12) (13 - 20)  SpO2: 92% (20 Jun 2023 11:12) (92% - 99%)    Parameters below as of 20 Jun 2023 11:12  Patient On (Oxygen Delivery Method): BiPAP/CPAP        acetaminophen     Tablet .. 650 milliGRAM(s) Oral every 6 hours PRN  aluminum hydroxide/magnesium hydroxide/simethicone Suspension 30 milliLiter(s) Oral every 4 hours PRN  apixaban 2.5 milliGRAM(s) Oral every 12 hours  aspirin  chewable 81 milliGRAM(s) Oral daily  brimonidine 0.2% Ophthalmic Solution 1 Drop(s) Both EYES two times a day  buMETAnide Injectable 1 milliGRAM(s) IV Push every 12 hours  diltiazem    milliGRAM(s) Oral daily  levothyroxine 50 MICROGram(s) Oral daily  melatonin 3 milliGRAM(s) Oral at bedtime PRN  ondansetron Injectable 4 milliGRAM(s) IV Push every 8 hours PRN  polyethylene glycol 3350 17 Gram(s) Oral at bedtime  senna 2 Tablet(s) Oral at bedtime  simvastatin 40 milliGRAM(s) Oral at bedtime  timolol 0.5% Solution 1 Drop(s) Both EYES two times a day  zolpidem 5 milliGRAM(s) Oral at bedtime PRN      PHYSICAL EXAM:  GENERAL: on bipap  EYES: conjunctiva and sclera clear  ENMT: Moist mucous membranes  NECK: Supple, No JVD, Normal thyroid  CHEST/LUNG: non labored, cta b/l  HEART: Regular rate and rhythm; No murmurs, rubs, or gallops  ABDOMEN: Soft, Nontender, Nondistended; Bowel sounds present  EXTREMITIES:  2+ Peripheral Pulses, No clubbing, cyanosis, or edema  LYMPH: No lymphadenopathy noted  SKIN: No rashes or lesions    Consultant(s) Notes Reviewed:  [x ] YES  [ ] NO  Care Discussed with Consultants/Other Providers [ x] YES  [ ] NO    LABS:                        10.9   8.73  )-----------( 221      ( 20 Jun 2023 05:30 )             37.5     06-20    142  |  102  |  45<H>  ----------------------------<  111<H>  4.8   |  38<H>  |  1.20    Ca    9.2      20 Jun 2023 05:30          CAPILLARY BLOOD GLUCOSE          ABG - ( 20 Jun 2023 08:26 )  pH, Arterial: 7.33  pH, Blood: x     /  pCO2: 71    /  pO2: 107   / HCO3: 37    / Base Excess: 11.5  /  SaO2: 99.3                    RADIOLOGY & ADDITIONAL TESTS:    Imaging Personally Reviewed:  [x ] YES  [ ] NO

## 2023-06-20 NOTE — PROGRESS NOTE ADULT - ASSESSMENT
89F w/ PMHx of CHFrEF, CAD s/p CABG, CKD, Afib (on apixaban), HTN, moderate AS and pulmonary HTN, chronic sciatica, CVA who presents to the ED for wheezing    HF  OP  OA  hx of Hip Fx  CAD  CKD  valvular heart disease  Pulm HTN  GERD  CVA Hx  AF on AC    ct chest - no lung consolidation  GOC documented - DNR DNI  on BUMEX for Diuresis  I and O    eval for HF exacerbation  Cardio eval in progress  CXR noted  monitor VS and Sat  keep sat > 88 pct  old records reviewed - spoke with daughter - reviewed TTE -   cvs rx regimen optimization, Diuresis as per Cardio Recs  I and O  dietary discretion  LE doppler - NEG for DVT  GOC discussion

## 2023-06-20 NOTE — PROGRESS NOTE ADULT - CRITICAL CARE ATTENDING COMMENT
Upon my evaluation, this patient is at high risk for imminent or life threatening deterioration due to resp failure   and other active medical issues which require my direct attention, intervention, and personal management.  I have personally spent >30 minutes  of critical care time exclusive of time spent on separate billing procedures. This includes review of laboratory data, radiology results, discussion with primary team\patient, and monitoring for potential decompensation. Interventions were performed as documented above.

## 2023-06-21 LAB
ANION GAP SERPL CALC-SCNC: 5 MMOL/L — SIGNIFICANT CHANGE UP (ref 5–17)
BUN SERPL-MCNC: 47 MG/DL — HIGH (ref 7–23)
CALCIUM SERPL-MCNC: 9.1 MG/DL — SIGNIFICANT CHANGE UP (ref 8.5–10.1)
CHLORIDE SERPL-SCNC: 104 MMOL/L — SIGNIFICANT CHANGE UP (ref 96–108)
CO2 SERPL-SCNC: 35 MMOL/L — HIGH (ref 22–31)
CREAT SERPL-MCNC: 1.3 MG/DL — SIGNIFICANT CHANGE UP (ref 0.5–1.3)
CULTURE RESULTS: SIGNIFICANT CHANGE UP
CULTURE RESULTS: SIGNIFICANT CHANGE UP
EGFR: 38 ML/MIN/1.73M2 — LOW
GLUCOSE SERPL-MCNC: 82 MG/DL — SIGNIFICANT CHANGE UP (ref 70–99)
HCT VFR BLD CALC: 36.6 % — SIGNIFICANT CHANGE UP (ref 34.5–45)
HGB BLD-MCNC: 10.4 G/DL — LOW (ref 11.5–15.5)
MCHC RBC-ENTMCNC: 28.4 GM/DL — LOW (ref 32–36)
MCHC RBC-ENTMCNC: 31 PG — SIGNIFICANT CHANGE UP (ref 27–34)
MCV RBC AUTO: 108.9 FL — HIGH (ref 80–100)
NRBC # BLD: 0 /100 WBCS — SIGNIFICANT CHANGE UP (ref 0–0)
PLATELET # BLD AUTO: 209 K/UL — SIGNIFICANT CHANGE UP (ref 150–400)
POTASSIUM SERPL-MCNC: 4.8 MMOL/L — SIGNIFICANT CHANGE UP (ref 3.5–5.3)
POTASSIUM SERPL-SCNC: 4.8 MMOL/L — SIGNIFICANT CHANGE UP (ref 3.5–5.3)
RBC # BLD: 3.36 M/UL — LOW (ref 3.8–5.2)
RBC # FLD: 15.6 % — HIGH (ref 10.3–14.5)
SODIUM SERPL-SCNC: 144 MMOL/L — SIGNIFICANT CHANGE UP (ref 135–145)
SPECIMEN SOURCE: SIGNIFICANT CHANGE UP
SPECIMEN SOURCE: SIGNIFICANT CHANGE UP
WBC # BLD: 6.72 K/UL — SIGNIFICANT CHANGE UP (ref 3.8–10.5)
WBC # FLD AUTO: 6.72 K/UL — SIGNIFICANT CHANGE UP (ref 3.8–10.5)

## 2023-06-21 PROCEDURE — 99233 SBSQ HOSP IP/OBS HIGH 50: CPT

## 2023-06-21 RX ADMIN — BRIMONIDINE TARTRATE 1 DROP(S): 2 SOLUTION/ DROPS OPHTHALMIC at 19:03

## 2023-06-21 RX ADMIN — APIXABAN 2.5 MILLIGRAM(S): 2.5 TABLET, FILM COATED ORAL at 19:03

## 2023-06-21 RX ADMIN — Medication 81 MILLIGRAM(S): at 11:37

## 2023-06-21 RX ADMIN — BRIMONIDINE TARTRATE 1 DROP(S): 2 SOLUTION/ DROPS OPHTHALMIC at 05:55

## 2023-06-21 RX ADMIN — Medication 120 MILLIGRAM(S): at 05:56

## 2023-06-21 RX ADMIN — SENNA PLUS 2 TABLET(S): 8.6 TABLET ORAL at 21:20

## 2023-06-21 RX ADMIN — BUMETANIDE 1 MILLIGRAM(S): 0.25 INJECTION INTRAMUSCULAR; INTRAVENOUS at 19:03

## 2023-06-21 RX ADMIN — Medication 50 MICROGRAM(S): at 05:56

## 2023-06-21 RX ADMIN — Medication 1 DROP(S): at 19:03

## 2023-06-21 RX ADMIN — APIXABAN 2.5 MILLIGRAM(S): 2.5 TABLET, FILM COATED ORAL at 05:55

## 2023-06-21 RX ADMIN — POLYETHYLENE GLYCOL 3350 17 GRAM(S): 17 POWDER, FOR SOLUTION ORAL at 21:20

## 2023-06-21 RX ADMIN — SIMVASTATIN 40 MILLIGRAM(S): 20 TABLET, FILM COATED ORAL at 21:20

## 2023-06-21 RX ADMIN — BUMETANIDE 1 MILLIGRAM(S): 0.25 INJECTION INTRAMUSCULAR; INTRAVENOUS at 05:55

## 2023-06-21 RX ADMIN — Medication 1 DROP(S): at 05:55

## 2023-06-21 NOTE — SOCIAL WORK PROGRESS NOTE - NSSWPROGRESSNOTE_GEN_ALL_CORE
Pt seen by physical therapy who is recommending pt for subacute rehab. Sw will offer choices to pt and family. Pt remains acute on 50% ventimask at this time and iv abx. Sw will continue to follow for safe dc planning.

## 2023-06-21 NOTE — PROGRESS NOTE ADULT - ASSESSMENT
89F w/ PMHx of CHFrEF, CAD s/p CABG, CKD, Afib (on apixaban), HTN, moderate AS and pulmonary HTN, chronic sciatica, CVA who presents to the ED for wheezing    HF  OP  OA  hx of Hip Fx  CAD  CKD  valvular heart disease  Pulm HTN  GERD  CVA Hx  AF on AC    ct chest - no lung consolidation  GOC documented - DNR DNI  on BUMEX for Diuresis  ABG noted  NIV use as tolerated - night time and day time PRN   fio2 wean    eval for HF exacerbation  Cardio eval in progress  CXR noted  monitor VS and Sat  keep sat > 88 pct  old records reviewed - spoke with daughter - reviewed TTE -   cvs rx regimen optimization, Diuresis as per Cardio Recs  I and O  dietary discretion  LE doppler - NEG for DVT  GOC discussion

## 2023-06-21 NOTE — PROGRESS NOTE ADULT - ASSESSMENT
89 yo F with diastolic HF, CAD s/p CABG, RBBB, CKD, atrial fibrillation (on apixaban), HTN, moderate AS and pulmonary HTN, chronic sciatica, CVA who was recently hospitalized  for fall sp IMN 5/14 s/p and discharge on 5/19/23 who is presented to ED  for fatigue and wheezing    ADHF, CAD, CABG, VHD, RBBB, A fib, HTN, Pulm HTN  - admitted with acute hypoxic and hypercapnic respiratory failure  - CT chest with small right pleural effusion   - ABG reviewed with hypercapnia 6/20, AM labs pending   -has decompensated heart failure with better response to bumex   -sp bipap now on  Venti mas  -negative 1050cc 24 hours   - Please continue to maintain strict I/Os, monitor daily weights, Cr, and K.   - Repeat echo pending, last echo as above 2016     - Known CAD, CABG  - Troponin: <-34.1  - EKG: Afib 102 with no acute  ischemic changes   - No evidence of any active ischemia   - Continue ASA, statin     - Known A fib, on apixaban , renally dosed has CKD  - Tele with Af   - Continue Eliquis   - Continue Diltiazem 120 mg PO daily    - BP stable and controlled     - Monitor and replete lytes, keep K>4, Mg>2.  DNR DNI  Jackie Duran FNP-C  Cardiology NP  call teams         89 yo F with diastolic HF, CAD s/p CABG, RBBB, CKD, atrial fibrillation (on apixaban), HTN, moderate AS and pulmonary HTN, chronic sciatica, CVA who was recently hospitalized  for fall sp IMN 5/14 s/p and discharge on 5/19/23 who is presented to ED  for fatigue and wheezing    ADHF, CAD, CABG, VHD, RBBB, A fib, HTN, Pulm HTN  - admitted with acute hypoxic and hypercapnic respiratory failure  - CT chest with small right pleural effusion   - ABG reviewed with hypercapnia 6/20, AM labs pending   -has decompensated heart failure with better response to bumex continue 1 mg IV BID will reassess daily   -sp bipap now on  Venti mask  -negative 1050cc 24 hours   - Please continue to maintain strict I/Os, monitor daily weights, Cr, and K.   - Repeat echo pending, last echo as above 2016     - Known CAD sp CABG  - Troponin: <-34.1  - EKG: Afib 102 with no acute  ischemic changes   - No evidence of any active ischemia   - Continue ASA, statin     - Known A fib, on apixaban , renally dosed has CKD   - Continue Eliquis   - Continue Diltiazem 120 mg PO daily    - BP stable and controlled 149/81   -continue cardizem     - Monitor and replete lytes, keep K>4, Mg>2.  DNR DNI  Jackie Duran FNP-C  Cardiology NP  call teams

## 2023-06-21 NOTE — PROGRESS NOTE ADULT - SUBJECTIVE AND OBJECTIVE BOX
Date/Time Patient Seen:  		  Referring MD:   Data Reviewed	       Patient is a 89y old  Female who presents with a chief complaint of Wheezing (20 Jun 2023 14:57)      Subjective/HPI     PAST MEDICAL & SURGICAL HISTORY:  Benign Essential Hypertension    Chronic Sciatica    Personal History of Coronary Artery Disease    Hypertension    Atrial fibrillation    CVA (cerebral vascular accident)    S/P CABG          Medication list         MEDICATIONS  (STANDING):  apixaban 2.5 milliGRAM(s) Oral every 12 hours  aspirin  chewable 81 milliGRAM(s) Oral daily  brimonidine 0.2% Ophthalmic Solution 1 Drop(s) Both EYES two times a day  buMETAnide Injectable 1 milliGRAM(s) IV Push every 12 hours  diltiazem    milliGRAM(s) Oral daily  levothyroxine 50 MICROGram(s) Oral daily  polyethylene glycol 3350 17 Gram(s) Oral at bedtime  senna 2 Tablet(s) Oral at bedtime  simvastatin 40 milliGRAM(s) Oral at bedtime  timolol 0.5% Solution 1 Drop(s) Both EYES two times a day    MEDICATIONS  (PRN):  acetaminophen     Tablet .. 650 milliGRAM(s) Oral every 6 hours PRN Temp greater or equal to 38C (100.4F), Mild Pain (1 - 3)  aluminum hydroxide/magnesium hydroxide/simethicone Suspension 30 milliLiter(s) Oral every 4 hours PRN Dyspepsia  melatonin 3 milliGRAM(s) Oral at bedtime PRN Insomnia  ondansetron Injectable 4 milliGRAM(s) IV Push every 8 hours PRN Nausea and/or Vomiting  zolpidem 5 milliGRAM(s) Oral at bedtime PRN Insomnia         Vitals log        ICU Vital Signs Last 24 Hrs  T(C): 36.7 (21 Jun 2023 04:56), Max: 37.1 (20 Jun 2023 11:12)  T(F): 98 (21 Jun 2023 04:56), Max: 98.8 (20 Jun 2023 11:12)  HR: 106 (21 Jun 2023 04:56) (91 - 106)  BP: 149/81 (21 Jun 2023 04:56) (114/56 - 149/81)  BP(mean): --  ABP: --  ABP(mean): --  RR: 18 (21 Jun 2023 04:56) (13 - 20)  SpO2: 97% (21 Jun 2023 04:56) (92% - 97%)    O2 Parameters below as of 21 Jun 2023 04:56  Patient On (Oxygen Delivery Method): mask, Venturi                 Input and Output:  I&O's Detail    20 Jun 2023 07:01  -  21 Jun 2023 05:40  --------------------------------------------------------  IN:  Total IN: 0 mL    OUT:    Voided (mL): 1050 mL  Total OUT: 1050 mL    Total NET: -1050 mL          Lab Data                        10.9   8.73  )-----------( 221      ( 20 Jun 2023 05:30 )             37.5     06-20    142  |  102  |  45<H>  ----------------------------<  111<H>  4.8   |  38<H>  |  1.20    Ca    9.2      20 Jun 2023 05:30      ABG - ( 20 Jun 2023 08:26 )  pH, Arterial: 7.33  pH, Blood: x     /  pCO2: 71    /  pO2: 107   / HCO3: 37    / Base Excess: 11.5  /  SaO2: 99.3                    Review of Systems	      Objective     Physical Examination    heart s1s2  lung dc BS  head nc  head at      Pertinent Lab findings & Imaging      Burak:  NO   Adequate UO     I&O's Detail    20 Jun 2023 07:01  -  21 Jun 2023 05:40  --------------------------------------------------------  IN:  Total IN: 0 mL    OUT:    Voided (mL): 1050 mL  Total OUT: 1050 mL    Total NET: -1050 mL               Discussed with:     Cultures:	        Radiology

## 2023-06-21 NOTE — PROGRESS NOTE ADULT - SUBJECTIVE AND OBJECTIVE BOX
[facility] -- Heather Roberts Pannella, Patel, Savella [facility]  Office # [phone]      Follow Up:    CHF  Subjective/Observations:     Seen at bedside, remains on venti mask   REVIEW OF SYSTEMS: All other review of systems is negative unless indicated above    PAST MEDICAL & SURGICAL HISTORY:  Benign Essential Hypertension      Chronic Sciatica      Personal History of Coronary Artery Disease      Hypertension      Atrial fibrillation      CVA (cerebral vascular accident)      S/P CABG          MEDICATIONS  (STANDING):  apixaban 2.5 milliGRAM(s) Oral every 12 hours  aspirin  chewable 81 milliGRAM(s) Oral daily  brimonidine 0.2% Ophthalmic Solution 1 Drop(s) Both EYES two times a day  buMETAnide Injectable 1 milliGRAM(s) IV Push every 12 hours  diltiazem    milliGRAM(s) Oral daily  levothyroxine 50 MICROGram(s) Oral daily  polyethylene glycol 3350 17 Gram(s) Oral at bedtime  senna 2 Tablet(s) Oral at bedtime  simvastatin 40 milliGRAM(s) Oral at bedtime  timolol 0.5% Solution 1 Drop(s) Both EYES two times a day    MEDICATIONS  (PRN):  acetaminophen     Tablet .. 650 milliGRAM(s) Oral every 6 hours PRN Temp greater or equal to 38C (100.4F), Mild Pain (1 - 3)  aluminum hydroxide/magnesium hydroxide/simethicone Suspension 30 milliLiter(s) Oral every 4 hours PRN Dyspepsia  melatonin 3 milliGRAM(s) Oral at bedtime PRN Insomnia  ondansetron Injectable 4 milliGRAM(s) IV Push every 8 hours PRN Nausea and/or Vomiting  zolpidem 5 milliGRAM(s) Oral at bedtime PRN Insomnia      Allergies    No Known Allergies    Intolerances        Vital Signs Last 24 Hrs  T(C): 36.7 (2023 04:56), Max: 37.1 (2023 11:12)  T(F): 98 (2023 04:56), Max: 98.8 (2023 11:12)  HR: 106 (2023 04:56) (91 - 106)  BP: 149/81 (2023 04:56) (114/56 - 149/81)  BP(mean): --  RR: 18 (2023 04:56) (13 - 18)  SpO2: 97% (2023 04:56) (92% - 97%)    Parameters below as of 2023 04:56  Patient On (Oxygen Delivery Method): mask, Venturi        I&O's Summary    2023 07:01  -  2023 07:00  --------------------------------------------------------  IN: 0 mL / OUT: 1050 mL / NET: -1050 mL          PHYSICAL EXAM:  TELE: Af  Constitutional: NAD, awake and alert, well-developed  HEENT: Moist Mucous Membranes, Anicteric  Pulmonary: Non-labored, breath sounds are dim   Cardiovascular: IRRegular, S1 and S2 nl, murmur   Gastrointestinal: Bowel Sounds present, soft, nontender.   Lymph+ peripheral edema.  Skin: No visible rashes or ulcers.  Psych:  Mood & affect appropriate    LABS: All Labs Reviewed:                        10.4   672  )-----------( 209      ( 2023 07:47 )             36.6                         10.9   8.73  )-----------( 221      ( 2023 05:30 )             37.5                         9.9    7.23  )-----------( 194      ( 2023 07:30 )             33.8     2023 05:30    142    |  102    |  45     ----------------------------<  111    4.8     |  38     |  1.20   2023 07:30    143    |  104    |  41     ----------------------------<  94     4.4     |  36     |  1.30     Ca    9.2        2023 05:30  Ca    8.5        2023 07:30               EC Lead ECG:   Ventricular Rate 102 BPM    QRS Duration 114 ms    Q-T Interval 398 ms    QTC Calculation(Bazett) 518 ms    R Axis 0 degrees    T Axis -18 degrees    Diagnosis Line Atrial fibrillation with rapid ventricular response  Low voltage QRS  Right bundle branch block  ST & T wave abnormality, consider anterolateral ischemia  Abnormal ECG  When compared with ECG of 14-MAY-2023 14:02,  No significant change was found  Confirmed by VELMA ELLSWORTH ([id number]) on 2023 5:32:50 PM (23 @ 13:56)      Patient name: JIMMIE BRIAN  YOB: 1926   Age: 89 (F)   MR#: [id number]  Study Date: 2016  [facility]  Study quality: Technically difficult  Referring Physician: Sally Phipps MD  Blood Pressure: 153/78 mmHg  Height: 140 cm  Weight: 60 kg  BSA: 1.5 m2  ------------------------------------------------------------------------  PROCEDURE: Transthoracic echocardiogram with 2-D, M-Mode  and complete spectral and color flow Doppler.  INDICATION: Unspecified atrial fibrillation (I48.91)  ------------------------------------------------------------------------  Dimensions:    Normal Values:  LA:     4.6    2.0 - 4.0 cm  Ao:     3.4    2.0 - 3.8 cm  SEPTUM: 1.0    0.6 - 1.2 cm  PWT:    1.1  0.6 - 1.1 cm  LVIDd:  3.4    3.0 - 5.6 cm  LVIDs:  2.6    1.8 - 4.0 cm  Derived variables:  LVMI: 72 g/m2  RWT: 0.64  Fractional short: 24 %  EF (Nikhiltz): 48 %  ------------------------------------------------------------------------  Observations:  Mitral Valve: Mitral annular calcification. The posterior  annulus is heavily calcified. There appears to be  independently mobile material seen at the posterior  annulus. This may represent endocarditis, clinical  correlation indicated. Mild mitral regurgitation.  Aortic Valve/Aorta: Calcified trileaflet aortic valve with  normal opening. Mild-moderate aortic regurgitation.   msec  Aortic Root: 3.4 cm.  Left Atrium: Mildly dilated left atrium.  LA volume index =  37 cc/m2.  Left Ventricle: Endocardium not well visualized; Mild  segmental left ventricular systolic dysfunction. The  base-mid inferior wall, the mid lateral wall, and the basal  inferoseptum are hypokinetic.  Regional wall motion  variation is noted on the setting of atrial fibrillation.  Increased relative wall thickness with normal left  ventricular mass index, consistent with concentric left  ventricular remodeling.  Right Heart: Mild right atrial enlargement. Normal right  ventricular size with mildly  decreased right ventricular  systolic function. Normal tricuspid valve. Mild-moderate  tricuspid regurgitation. Normal pulmonic valve. Minimal  pulmonic regurgitation.  Pericardium/Pleura: Normal pericardium with no pericardial  effusion.  Hemodynamic: Estimated right atrial pressure is 8 mm Hg.  Estimated right ventricular systolic pressure equals 44 mm  Hg, assuming right atrial pressure equals 8 mm Hg,  consistent with mild pulmonary hypertension.  ------------------------------------------------------------------------  Conclusions:  1. Mitral annular calcification. The posterior annulus is  heavily calcified. There appears to be independently mobile  material seen at the posterior annulus. This may represent  endocarditis, clinical correlation indicated.  2. Calcified trileaflet aortic valve with normal opening.  Mild-moderate aortic regurgitation.  msec  3. Increased relative wall thickness with normal left  ventricular mass index, consistent with concentric left  ventricular remodeling.  4. Endocardium not well visualized; Mild segmental left  ventricular systolic dysfunction. The base-mid inferior  wall, the mid lateral wall, and the basal inferoseptum are  hypokinetic.  Regional wall motion variation is noted on  the setting ofatrial fibrillation.  5. Normal right ventricular size with mildly  decreased  right ventricular systolic function.  6. Normal tricuspid valve. Mild-moderate tricuspid  regurgitation.  7. Estimated pulmonary artery systolic pressure equals 44  mm Hg,assuming right atrial pressure equals 8 mm Hg,  consistent with mild pulmonary pressures.  ------------------------------------------------------------------------  Confirmed on  2016 - 14:45:39 by Jazzy Phillips M.D.  ------------------------------------------------------------------------      Radiology:         [facility] -- Heather Roberts Pannella, Patel, Savella [facility]  Office # [phone]      Follow Up:    CHF  Subjective/Observations:     Seen at bedside, remains on venti mask   reports she is feeling better from yesterday   denies chest pain shortness of breath dizziness palpitations    REVIEW OF SYSTEMS: All other review of systems is negative unless indicated above    PAST MEDICAL & SURGICAL HISTORY:  Benign Essential Hypertension      Chronic Sciatica      Personal History of Coronary Artery Disease      Hypertension      Atrial fibrillation      CVA (cerebral vascular accident)      S/P CABG          MEDICATIONS  (STANDING):  apixaban 2.5 milliGRAM(s) Oral every 12 hours  aspirin  chewable 81 milliGRAM(s) Oral daily  brimonidine 0.2% Ophthalmic Solution 1 Drop(s) Both EYES two times a day  buMETAnide Injectable 1 milliGRAM(s) IV Push every 12 hours  diltiazem    milliGRAM(s) Oral daily  levothyroxine 50 MICROGram(s) Oral daily  polyethylene glycol 3350 17 Gram(s) Oral at bedtime  senna 2 Tablet(s) Oral at bedtime  simvastatin 40 milliGRAM(s) Oral at bedtime  timolol 0.5% Solution 1 Drop(s) Both EYES two times a day    MEDICATIONS  (PRN):  acetaminophen     Tablet .. 650 milliGRAM(s) Oral every 6 hours PRN Temp greater or equal to 38C (100.4F), Mild Pain (1 - 3)  aluminum hydroxide/magnesium hydroxide/simethicone Suspension 30 milliLiter(s) Oral every 4 hours PRN Dyspepsia  melatonin 3 milliGRAM(s) Oral at bedtime PRN Insomnia  ondansetron Injectable 4 milliGRAM(s) IV Push every 8 hours PRN Nausea and/or Vomiting  zolpidem 5 milliGRAM(s) Oral at bedtime PRN Insomnia      Allergies    No Known Allergies    Intolerances        Vital Signs Last 24 Hrs  T(C): 36.7 (2023 04:56), Max: 37.1 (2023 11:12)  T(F): 98 (2023 04:56), Max: 98.8 (2023 11:12)  HR: 106 (2023 04:56) (91 - 106)  BP: 149/81 (2023 04:56) (114/56 - 149/81)  BP(mean): --  RR: 18 (2023 04:56) (13 - 18)  SpO2: 97% (2023 04:56) (92% - 97%)    Parameters below as of 2023 04:56  Patient On (Oxygen Delivery Method): mask, Venturi        I&O's Summary    2023 07:01  -  2023 07:00  --------------------------------------------------------  IN: 0 mL / OUT: 1050 mL / NET: -1050 mL       PHYSICAL EXAM:  TELE: Af  Constitutional: NAD, awake and alert, well-developed  HEENT: Moist Mucous Membranes, Anicteric  Pulmonary: Non-labored, breath sounds are base rales, diffuse expiratory wheezing  Cardiovascular: IRRegular, S1 and S2 nl, murmur   Gastrointestinal: Bowel Sounds present, soft, nontender.   Lymph+ peripheral edema. chronic right as per family   Skin: No visible rashes or ulcers.  Psych:  Mood & affect appropriate    LABS: All Labs Reviewed:                        10.4   6.72  )-----------( 209      ( 2023 07:47 )             36.6                         10.9   8.73  )-----------( 221      ( 2023 05:30 )             37.5                         9.9    7.23  )-----------( 194      ( 2023 07:30 )             33.8     2023 05:30    142    |  102    |  45     ----------------------------<  111    4.8     |  38     |  1.20   2023 07:30    143    |  104    |  41     ----------------------------<  94     4.4     |  36     |  1.30     Ca    9.2        2023 05:30  Ca    8.5        2023 07:30               EC Lead ECG:   Ventricular Rate 102 BPM    QRS Duration 114 ms    Q-T Interval 398 ms    QTC Calculation(Bazett) 518 ms    R Axis 0 degrees    T Axis -18 degrees    Diagnosis Line Atrial fibrillation with rapid ventricular response  Low voltage QRS  Right bundle branch block  ST & T wave abnormality, consider anterolateral ischemia  Abnormal ECG  When compared with ECG of 14-MAY-2023 14:02,  No significant change was found  Confirmed by VELMA ELLSWORTH ([id number]) on 2023 5:32:50 PM (23 @ 13:56)      Patient name: JIMMIE BRIAN  YOB: 1926   Age: 89 (F)   MR#: [id number]  Study Date: 2016  [facility]  Study quality: Technically difficult  Referring Physician: Sally Phipps MD  Blood Pressure: 153/78 mmHg  Height: 140 cm  Weight: 60 kg  BSA: 1.5 m2  ------------------------------------------------------------------------  PROCEDURE: Transthoracic echocardiogram with 2-D, M-Mode  and complete spectral and color flow Doppler.  INDICATION: Unspecified atrial fibrillation (I48.91)  ------------------------------------------------------------------------  Dimensions:    Normal Values:  LA:     4.6    2.0 - 4.0 cm  Ao:     3.4    2.0 - 3.8 cm  SEPTUM: 1.0    0.6 - 1.2 cm  PWT:    1.1  0.6 - 1.1 cm  LVIDd:  3.4    3.0 - 5.6 cm  LVIDs:  2.6    1.8 - 4.0 cm  Derived variables:  LVMI: 72 g/m2  RWT: 0.64  Fractional short: 24 %  EF (Daniioltz): 48 %  ------------------------------------------------------------------------  Observations:  Mitral Valve: Mitral annular calcification. The posterior  annulus is heavily calcified. There appears to be  independently mobile material seen at the posterior  annulus. This may represent endocarditis, clinical  correlation indicated. Mild mitral regurgitation.  Aortic Valve/Aorta: Calcified trileaflet aortic valve with  normal opening. Mild-moderate aortic regurgitation.   msec  Aortic Root: 3.4 cm.  Left Atrium: Mildly dilated left atrium.  LA volume index =  37 cc/m2.  Left Ventricle: Endocardium not well visualized; Mild  segmental left ventricular systolic dysfunction. The  base-mid inferior wall, the mid lateral wall, and the basal  inferoseptum are hypokinetic.  Regional wall motion  variation is noted on the setting of atrial fibrillation.  Increased relative wall thickness with normal left  ventricular mass index, consistent with concentric left  ventricular remodeling.  Right Heart: Mild right atrial enlargement. Normal right  ventricular size with mildly  decreased right ventricular  systolic function. Normal tricuspid valve. Mild-moderate  tricuspid regurgitation. Normal pulmonic valve. Minimal  pulmonic regurgitation.  Pericardium/Pleura: Normal pericardium with no pericardial  effusion.  Hemodynamic: Estimated right atrial pressure is 8 mm Hg.  Estimated right ventricular systolic pressure equals 44 mm  Hg, assuming right atrial pressure equals 8 mm Hg,  consistent with mild pulmonary hypertension.  ------------------------------------------------------------------------  Conclusions:  1. Mitral annular calcification. The posterior annulus is  heavily calcified. There appears to be independently mobile  material seen at the posterior annulus. This may represent  endocarditis, clinical correlation indicated.  2. Calcified trileaflet aortic valve with normal opening.  Mild-moderate aortic regurgitation.  msec  3. Increased relative wall thickness with normal left  ventricular mass index, consistent with concentric left  ventricular remodeling.  4. Endocardium not well visualized; Mild segmental left  ventricular systolic dysfunction. The base-mid inferior  wall, the mid lateral wall, and the basal inferoseptum are  hypokinetic.  Regional wall motion variation is noted on  the setting ofatrial fibrillation.  5. Normal right ventricular size with mildly  decreased  right ventricular systolic function.  6. Normal tricuspid valve. Mild-moderate tricuspid  regurgitation.  7. Estimated pulmonary artery systolic pressure equals 44  mm Hg,assuming right atrial pressure equals 8 mm Hg,  consistent with mild pulmonary pressures.  ------------------------------------------------------------------------  Confirmed on  2016 - 14:45:39 by Jazzy Phillips M.D.  ------------------------------------------------------------------------      Radiology:

## 2023-06-21 NOTE — PROGRESS NOTE ADULT - ASSESSMENT
89F w/ PMHx of CHFrEF, CAD s/p CABG, CKD, Afib (on apixaban), HTN, moderate AS and pulmonary HTN, chronic sciatica, CVA who presents to the ED for wheezing. pt w/ ADHF.   Ct chest- small rt pl effusion    #ADHF w acute hypoxic and  hypercapnea respiratory failure  cont bumex  strict I/Os, monitor daily weights, Cr, and K.  rpt CXR- atelectasis and small effusion  echo pending  cards and pulm fu  chronic co2 retention    #AF controlled for now.   cont cardizem   cont eliquis    #wheezing- likely cardiac origin- CHF exac  prn nebs  diuresis  pulm fu noted    #DVt ppx- eliquis    d/w dgtr at bedside plan of care

## 2023-06-21 NOTE — PROGRESS NOTE ADULT - SUBJECTIVE AND OBJECTIVE BOX
Patient is a 89y old  Female who presents with a chief complaint of Wheezing (21 Jun 2023 08:38)        INTERVAL HPI/OVERNIGHT EVENTS:   no complaints  pt seen and examined         Vital Signs Last 24 Hrs  T(C): 36.7 (21 Jun 2023 04:56), Max: 36.7 (21 Jun 2023 04:56)  T(F): 98 (21 Jun 2023 04:56), Max: 98 (21 Jun 2023 04:56)  HR: 106 (21 Jun 2023 04:56) (91 - 106)  BP: 149/81 (21 Jun 2023 04:56) (114/56 - 149/81)  BP(mean): --  RR: 18 (21 Jun 2023 04:56) (18 - 18)  SpO2: 97% (21 Jun 2023 04:56) (95% - 97%)    Parameters below as of 21 Jun 2023 04:56  Patient On (Oxygen Delivery Method): mask, Venturi        acetaminophen     Tablet .. 650 milliGRAM(s) Oral every 6 hours PRN  aluminum hydroxide/magnesium hydroxide/simethicone Suspension 30 milliLiter(s) Oral every 4 hours PRN  apixaban 2.5 milliGRAM(s) Oral every 12 hours  aspirin  chewable 81 milliGRAM(s) Oral daily  brimonidine 0.2% Ophthalmic Solution 1 Drop(s) Both EYES two times a day  buMETAnide Injectable 1 milliGRAM(s) IV Push every 12 hours  diltiazem    milliGRAM(s) Oral daily  levothyroxine 50 MICROGram(s) Oral daily  melatonin 3 milliGRAM(s) Oral at bedtime PRN  ondansetron Injectable 4 milliGRAM(s) IV Push every 8 hours PRN  polyethylene glycol 3350 17 Gram(s) Oral at bedtime  senna 2 Tablet(s) Oral at bedtime  simvastatin 40 milliGRAM(s) Oral at bedtime  timolol 0.5% Solution 1 Drop(s) Both EYES two times a day  zolpidem 5 milliGRAM(s) Oral at bedtime PRN      PHYSICAL EXAM:  GENERAL: NAD   EYES: conjunctiva and sclera clear  ENMT: Moist mucous membranes  NECK: Supple, No JVD, Normal thyroid  CHEST/LUNG: non labored, cta b/l  HEART: Regular rate and rhythm; No murmurs, rubs, or gallops  ABDOMEN: Soft, Nontender, Nondistended; Bowel sounds present  EXTREMITIES:  2+ Peripheral Pulses, No clubbing, cyanosis, or edema  LYMPH: No lymphadenopathy noted  SKIN: No rashes or lesions    Consultant(s) Notes Reviewed:  [x ] YES  [ ] NO  Care Discussed with Consultants/Other Providers [ x] YES  [ ] NO    LABS:                        10.4   6.72  )-----------( 209      ( 21 Jun 2023 07:47 )             36.6     06-21    144  |  104  |  47<H>  ----------------------------<  82  4.8   |  35<H>  |  1.30    Ca    9.1      21 Jun 2023 07:47        Urinalysis Basic - ( 21 Jun 2023 07:47 )    Color: x / Appearance: x / SG: x / pH: x  Gluc: 82 mg/dL / Ketone: x  / Bili: x / Urobili: x   Blood: x / Protein: x / Nitrite: x   Leuk Esterase: x / RBC: x / WBC x   Sq Epi: x / Non Sq Epi: x / Bacteria: x      CAPILLARY BLOOD GLUCOSE          ABG - ( 20 Jun 2023 08:26 )  pH, Arterial: 7.33  pH, Blood: x     /  pCO2: 71    /  pO2: 107   / HCO3: 37    / Base Excess: 11.5  /  SaO2: 99.3              Urinalysis Basic - ( 21 Jun 2023 07:47 )    Color: x / Appearance: x / SG: x / pH: x  Gluc: 82 mg/dL / Ketone: x  / Bili: x / Urobili: x   Blood: x / Protein: x / Nitrite: x   Leuk Esterase: x / RBC: x / WBC x   Sq Epi: x / Non Sq Epi: x / Bacteria: x          RADIOLOGY & ADDITIONAL TESTS:    Imaging Personally Reviewed  Reviewed consultants input

## 2023-06-22 LAB
ANION GAP SERPL CALC-SCNC: 1 MMOL/L — LOW (ref 5–17)
BUN SERPL-MCNC: 52 MG/DL — HIGH (ref 7–23)
CALCIUM SERPL-MCNC: 8.9 MG/DL — SIGNIFICANT CHANGE UP (ref 8.5–10.1)
CHLORIDE SERPL-SCNC: 102 MMOL/L — SIGNIFICANT CHANGE UP (ref 96–108)
CO2 SERPL-SCNC: 40 MMOL/L — HIGH (ref 22–31)
CREAT SERPL-MCNC: 1.2 MG/DL — SIGNIFICANT CHANGE UP (ref 0.5–1.3)
EGFR: 41 ML/MIN/1.73M2 — LOW
GLUCOSE SERPL-MCNC: 109 MG/DL — HIGH (ref 70–99)
HCT VFR BLD CALC: 34.8 % — SIGNIFICANT CHANGE UP (ref 34.5–45)
HGB BLD-MCNC: 10.3 G/DL — LOW (ref 11.5–15.5)
MCHC RBC-ENTMCNC: 29.6 GM/DL — LOW (ref 32–36)
MCHC RBC-ENTMCNC: 31.3 PG — SIGNIFICANT CHANGE UP (ref 27–34)
MCV RBC AUTO: 105.8 FL — HIGH (ref 80–100)
NRBC # BLD: 0 /100 WBCS — SIGNIFICANT CHANGE UP (ref 0–0)
PLATELET # BLD AUTO: 188 K/UL — SIGNIFICANT CHANGE UP (ref 150–400)
POTASSIUM SERPL-MCNC: 4.7 MMOL/L — SIGNIFICANT CHANGE UP (ref 3.5–5.3)
POTASSIUM SERPL-SCNC: 4.7 MMOL/L — SIGNIFICANT CHANGE UP (ref 3.5–5.3)
RBC # BLD: 3.29 M/UL — LOW (ref 3.8–5.2)
RBC # FLD: 15 % — HIGH (ref 10.3–14.5)
SODIUM SERPL-SCNC: 143 MMOL/L — SIGNIFICANT CHANGE UP (ref 135–145)
WBC # BLD: 5.76 K/UL — SIGNIFICANT CHANGE UP (ref 3.8–10.5)
WBC # FLD AUTO: 5.76 K/UL — SIGNIFICANT CHANGE UP (ref 3.8–10.5)

## 2023-06-22 PROCEDURE — 99233 SBSQ HOSP IP/OBS HIGH 50: CPT

## 2023-06-22 RX ORDER — IPRATROPIUM/ALBUTEROL SULFATE 18-103MCG
3 AEROSOL WITH ADAPTER (GRAM) INHALATION EVERY 8 HOURS
Refills: 0 | Status: DISCONTINUED | OUTPATIENT
Start: 2023-06-22 | End: 2023-06-28

## 2023-06-22 RX ADMIN — APIXABAN 2.5 MILLIGRAM(S): 2.5 TABLET, FILM COATED ORAL at 17:37

## 2023-06-22 RX ADMIN — SIMVASTATIN 40 MILLIGRAM(S): 20 TABLET, FILM COATED ORAL at 22:48

## 2023-06-22 RX ADMIN — Medication 50 MICROGRAM(S): at 06:37

## 2023-06-22 RX ADMIN — Medication 1 DROP(S): at 06:37

## 2023-06-22 RX ADMIN — Medication 1 DROP(S): at 17:37

## 2023-06-22 RX ADMIN — Medication 3 MILLILITER(S): at 07:18

## 2023-06-22 RX ADMIN — BRIMONIDINE TARTRATE 1 DROP(S): 2 SOLUTION/ DROPS OPHTHALMIC at 06:37

## 2023-06-22 RX ADMIN — Medication 120 MILLIGRAM(S): at 06:37

## 2023-06-22 RX ADMIN — SENNA PLUS 2 TABLET(S): 8.6 TABLET ORAL at 22:48

## 2023-06-22 RX ADMIN — BRIMONIDINE TARTRATE 1 DROP(S): 2 SOLUTION/ DROPS OPHTHALMIC at 17:36

## 2023-06-22 RX ADMIN — APIXABAN 2.5 MILLIGRAM(S): 2.5 TABLET, FILM COATED ORAL at 06:37

## 2023-06-22 RX ADMIN — Medication 81 MILLIGRAM(S): at 11:22

## 2023-06-22 RX ADMIN — Medication 3 MILLILITER(S): at 19:20

## 2023-06-22 RX ADMIN — BUMETANIDE 1 MILLIGRAM(S): 0.25 INJECTION INTRAMUSCULAR; INTRAVENOUS at 06:37

## 2023-06-22 RX ADMIN — Medication 3 MILLILITER(S): at 13:45

## 2023-06-22 RX ADMIN — BUMETANIDE 1 MILLIGRAM(S): 0.25 INJECTION INTRAMUSCULAR; INTRAVENOUS at 17:37

## 2023-06-22 NOTE — CHART NOTE - NSCHARTNOTEFT_GEN_A_CORE
Assessment:     Factors impacting intake: [ ] none [ ] nausea  [ ] vomiting [ ] diarrhea [ ] constipation  [ ]chewing problems [ ] swallowing issues  [ ] other:     Diet Presciption: Diet, Regular:   DASH/TLC {Sodium & Cholesterol Restricted}  1500mL Fluid Restriction ([id number]) (06-16-23 @ 18:50)    Intake:     Current Weight: Weight (kg): 62.6 (06-16 @ 13:41)  % Weight Change    Pertinent Medications: MEDICATIONS  (STANDING):  albuterol/ipratropium for Nebulization 3 milliLiter(s) Nebulizer every 8 hours  apixaban 2.5 milliGRAM(s) Oral every 12 hours  aspirin  chewable 81 milliGRAM(s) Oral daily  brimonidine 0.2% Ophthalmic Solution 1 Drop(s) Both EYES two times a day  buMETAnide Injectable 1 milliGRAM(s) IV Push every 12 hours  diltiazem    milliGRAM(s) Oral daily  levothyroxine 50 MICROGram(s) Oral daily  polyethylene glycol 3350 17 Gram(s) Oral at bedtime  senna 2 Tablet(s) Oral at bedtime  simvastatin 40 milliGRAM(s) Oral at bedtime  timolol 0.5% Solution 1 Drop(s) Both EYES two times a day    MEDICATIONS  (PRN):  acetaminophen     Tablet .. 650 milliGRAM(s) Oral every 6 hours PRN Temp greater or equal to 38C (100.4F), Mild Pain (1 - 3)  aluminum hydroxide/magnesium hydroxide/simethicone Suspension 30 milliLiter(s) Oral every 4 hours PRN Dyspepsia  melatonin 3 milliGRAM(s) Oral at bedtime PRN Insomnia  ondansetron Injectable 4 milliGRAM(s) IV Push every 8 hours PRN Nausea and/or Vomiting  zolpidem 5 milliGRAM(s) Oral at bedtime PRN Insomnia    Pertinent Labs: 06-21 Na144 mmol/L Glu 82 mg/dL K+ 4.8 mmol/L Cr  1.30 mg/dL BUN 47 mg/dL<H> 06-17 Phos 4.9 mg/dL<H> 06-16 Alb 3.1 g/dL<L>     CAPILLARY BLOOD GLUCOSE        Skin:     Estimated Needs:   [ ] no change since previous assessment  [ ] recalculated:     Previous Nutrition Diagnosis:   [ ] Inadequate Energy Intake [ ]Inadequate Oral Intake [ ] Excessive Energy Intake   [ ] Underweight [ ] Increased Nutrient Needs [ ] Overweight/Obesity   [ ] Altered GI Function [ ] Unintended Weight Loss [ ] Food & Nutrition Related Knowledge Deficit [ ] Malnutrition     Nutrition Diagnosis is [ ] ongoing  [ ] resolved [ ] not applicable     New Nutrition Diagnosis: [ ] not applicable       Interventions:   Recommend  [ ] Change Diet To:  [ ] Nutrition Supplement  [ ] Nutrition Support  [ ] Other:     Monitoring and Evaluation:   [ ] PO intake [ x ] Tolerance to diet prescription [ x ] weights [ x ] labs[ x ] follow up per protocol  [ ] other: Assessment: 89F w/ PMHx of CHFrEF, CAD s/p CABG, CKD, Afib (on apixaban), HTN, moderate AS and pulmonary HTN, chronic sciatica, CVA who presents to the ED for wheezing. pt w/ ADHF.   Ct chest- small rt pl effusion    #ADHF w acute hypoxic and  hypercapnea respiratory failure  cont bumex  strict I/Os, monitor daily weights, Cr, and K.  rpt CXR- atelectasis and small effusion  echo pending  cards and pulm fu  chronic co2 retention    #AF controlled for now.   cont cardizem   cont eliquis    #wheezing- likely cardiac origin- CHF exac  prn nebs  diuresis  pulm fu noted    #DVt ppx- eliquis    d/w dgtr at bedside plan of    Factors impacting intake: [ ] none [ ] nausea  [ ] vomiting [ ] diarrhea [ ] constipation  [ ]chewing problems [ ] swallowing issues  [ ] other:     Diet Presciption: Diet, Regular:   DASH/TLC {Sodium & Cholesterol Restricted}  1500mL Fluid Restriction ([id number]) (06-16-23 @ 18:50)    Intake:     Current Weight: Weight (kg): 62.6 (06-16 @ 13:41)  % Weight Change    Pertinent Medications: MEDICATIONS  (STANDING):  albuterol/ipratropium for Nebulization 3 milliLiter(s) Nebulizer every 8 hours  apixaban 2.5 milliGRAM(s) Oral every 12 hours  aspirin  chewable 81 milliGRAM(s) Oral daily  brimonidine 0.2% Ophthalmic Solution 1 Drop(s) Both EYES two times a day  buMETAnide Injectable 1 milliGRAM(s) IV Push every 12 hours  diltiazem    milliGRAM(s) Oral daily  levothyroxine 50 MICROGram(s) Oral daily  polyethylene glycol 3350 17 Gram(s) Oral at bedtime  senna 2 Tablet(s) Oral at bedtime  simvastatin 40 milliGRAM(s) Oral at bedtime  timolol 0.5% Solution 1 Drop(s) Both EYES two times a day    MEDICATIONS  (PRN):  acetaminophen     Tablet .. 650 milliGRAM(s) Oral every 6 hours PRN Temp greater or equal to 38C (100.4F), Mild Pain (1 - 3)  aluminum hydroxide/magnesium hydroxide/simethicone Suspension 30 milliLiter(s) Oral every 4 hours PRN Dyspepsia  melatonin 3 milliGRAM(s) Oral at bedtime PRN Insomnia  ondansetron Injectable 4 milliGRAM(s) IV Push every 8 hours PRN Nausea and/or Vomiting  zolpidem 5 milliGRAM(s) Oral at bedtime PRN Insomnia    Pertinent Labs: 06-21 Na144 mmol/L Glu 82 mg/dL K+ 4.8 mmol/L Cr  1.30 mg/dL BUN 47 mg/dL<H> 06-17 Phos 4.9 mg/dL<H> 06-16 Alb 3.1 g/dL<L>     CAPILLARY BLOOD GLUCOSE        Skin:     Estimated Needs:   [ ] no change since previous assessment  [ ] recalculated:     Previous Nutrition Diagnosis:   [ ] Inadequate Energy Intake [ ]Inadequate Oral Intake [ ] Excessive Energy Intake   [ ] Underweight [ ] Increased Nutrient Needs [ ] Overweight/Obesity   [ ] Altered GI Function [ ] Unintended Weight Loss [ ] Food & Nutrition Related Knowledge Deficit [ ] Malnutrition     Nutrition Diagnosis is [ ] ongoing  [ ] resolved [ ] not applicable     New Nutrition Diagnosis: [ ] not applicable       Interventions:   Recommend  [ ] Change Diet To:  [ ] Nutrition Supplement  [ ] Nutrition Support  [ ] Other:     Monitoring and Evaluation:   [ ] PO intake [ x ] Tolerance to diet prescription [ x ] weights [ x ] labs[ x ] follow up per protocol  [ ] other: Assessment: "89F w/ PMHx of CHFrEF, CAD s/p CABG, CKD, Afib (on apixaban), HTN, moderate AS and pulmonary HTN, chronic sciatica, CVA who presents to the ED for wheezing. pt w/ ADHF and hypercanea resp failure.     At time of visit to pts room, nsg reports eating fair to good with feeding assistance     Factors impacting intake: [ ] none [ ] nausea  [ ] vomiting [ ] diarrhea [ ] constipation  [ ]chewing problems [ ] swallowing issues  [ ] other:     Diet Presciption: Diet, Regular:   DASH/TLC {Sodium & Cholesterol Restricted}  1500mL Fluid Restriction ([id number]) (06-16-23 @ 18:50)    Intake:     Current Weight: Weight (kg): 62.6 (06-16 @ 13:41)  % Weight Change    Pertinent Medications: MEDICATIONS  (STANDING):  albuterol/ipratropium for Nebulization 3 milliLiter(s) Nebulizer every 8 hours  apixaban 2.5 milliGRAM(s) Oral every 12 hours  aspirin  chewable 81 milliGRAM(s) Oral daily  brimonidine 0.2% Ophthalmic Solution 1 Drop(s) Both EYES two times a day  buMETAnide Injectable 1 milliGRAM(s) IV Push every 12 hours  diltiazem    milliGRAM(s) Oral daily  levothyroxine 50 MICROGram(s) Oral daily  polyethylene glycol 3350 17 Gram(s) Oral at bedtime  senna 2 Tablet(s) Oral at bedtime  simvastatin 40 milliGRAM(s) Oral at bedtime  timolol 0.5% Solution 1 Drop(s) Both EYES two times a day    MEDICATIONS  (PRN):  acetaminophen     Tablet .. 650 milliGRAM(s) Oral every 6 hours PRN Temp greater or equal to 38C (100.4F), Mild Pain (1 - 3)  aluminum hydroxide/magnesium hydroxide/simethicone Suspension 30 milliLiter(s) Oral every 4 hours PRN Dyspepsia  melatonin 3 milliGRAM(s) Oral at bedtime PRN Insomnia  ondansetron Injectable 4 milliGRAM(s) IV Push every 8 hours PRN Nausea and/or Vomiting  zolpidem 5 milliGRAM(s) Oral at bedtime PRN Insomnia    Pertinent Labs: 06-21 Na144 mmol/L Glu 82 mg/dL K+ 4.8 mmol/L Cr  1.30 mg/dL BUN 47 mg/dL<H> 06-17 Phos 4.9 mg/dL<H> 06-16 Alb 3.1 g/dL<L>     CAPILLARY BLOOD GLUCOSE        Skin:     Estimated Needs:   [ ] no change since previous assessment  [ ] recalculated:     Previous Nutrition Diagnosis:   [ ] Inadequate Energy Intake [ ]Inadequate Oral Intake [ ] Excessive Energy Intake   [ ] Underweight [ ] Increased Nutrient Needs [ ] Overweight/Obesity   [ ] Altered GI Function [ ] Unintended Weight Loss [ ] Food & Nutrition Related Knowledge Deficit [ ] Malnutrition     Nutrition Diagnosis is [ ] ongoing  [ ] resolved [ ] not applicable     New Nutrition Diagnosis: [ ] not applicable       Interventions:   Recommend  [ ] Change Diet To:  [ ] Nutrition Supplement  [ ] Nutrition Support  [ ] Other:     Monitoring and Evaluation:   [ ] PO intake [ x ] Tolerance to diet prescription [ x ] weights [ x ] labs[ x ] follow up per protocol  [ ] other: Assessment: "89F w/ PMHx of CHFrEF, CAD s/p CABG, CKD, Afib (on apixaban), HTN, moderate AS and pulmonary HTN, chronic sciatica, CVA who presents to the ED for wheezing. pt w/ ADHF and hypercanea resp failure.     At time of visit to pts room, nsg reports eating fair to good with feeding assistance     Factors impacting intake: [ ] none [ ] nausea  [ ] vomiting [ ] diarrhea [ ] constipation  [ ]chewing problems [ ] swallowing issues  [ x] other: needs assist with meals    Diet Presciption: Diet, Regular:   DASH/TLC {Sodium & Cholesterol Restricted}  1500mL Fluid Restriction ([id number]) (06-16-23 @ 18:50)    Intake: 65%    Current Weight: Weight (kg): 62.6 (06-16 @ 13:41) ? accuracy of most recent wt 69.4 kg ( 6/22/23)     Pertinent Medications: MEDICATIONS  (STANDING):  albuterol/ipratropium for Nebulization 3 milliLiter(s) Nebulizer every 8 hours  apixaban 2.5 milliGRAM(s) Oral every 12 hours  aspirin  chewable 81 milliGRAM(s) Oral daily  brimonidine 0.2% Ophthalmic Solution 1 Drop(s) Both EYES two times a day  buMETAnide Injectable 1 milliGRAM(s) IV Push every 12 hours  diltiazem    milliGRAM(s) Oral daily  levothyroxine 50 MICROGram(s) Oral daily  polyethylene glycol 3350 17 Gram(s) Oral at bedtime  senna 2 Tablet(s) Oral at bedtime  simvastatin 40 milliGRAM(s) Oral at bedtime  timolol 0.5% Solution 1 Drop(s) Both EYES two times a day    MEDICATIONS  (PRN):  acetaminophen     Tablet .. 650 milliGRAM(s) Oral every 6 hours PRN Temp greater or equal to 38C (100.4F), Mild Pain (1 - 3)  aluminum hydroxide/magnesium hydroxide/simethicone Suspension 30 milliLiter(s) Oral every 4 hours PRN Dyspepsia  melatonin 3 milliGRAM(s) Oral at bedtime PRN Insomnia  ondansetron Injectable 4 milliGRAM(s) IV Push every 8 hours PRN Nausea and/or Vomiting  zolpidem 5 milliGRAM(s) Oral at bedtime PRN Insomnia    Pertinent Labs: 06-21 Na144 mmol/L Glu 82 mg/dL K+ 4.8 mmol/L Cr  1.30 mg/dL BUN 47 mg/dL<H> 06-17 Phos 4.9 mg/dL<H> 06-16 Alb 3.1 g/dL<L>     CAPILLARY BLOOD GLUCOSE    Skin: no PI  edema: 2+ L leg ,3+ R knee    Estimated Needs:   [ ] no change since previous assessment  [x] recalculated needs # /52.7 kg ( 25-30 kcals/kg) 8889-8068 kcals and  ( 1.1-1.3 gm pro/kg) 60-70 gm pro    Previous Nutrition Diagnosis:    [X ] Increased Nutrient Needs ( protein ) [X ] decreased nutrient needs ( sodium and fluid)      Nutrition Diagnosis is [X ] ongoing  [ ] resolved [ ] not applicable     New Nutrition Diagnosis: [X] not applicable       Interventions:   Recommend  [ ] Change Diet To:  [ ] Nutrition Supplement  [ ] Nutrition Support  [ ] Other:     Monitoring and Evaluation:   [ ] PO intake [ x ] Tolerance to diet prescription [ x ] weights [ x ] labs[ x ] follow up per protocol  [ ] other: Assessment: "89F w/ PMHx of CHFrEF, CAD s/p CABG, CKD, Afib (on apixaban), HTN, moderate AS and pulmonary HTN, chronic sciatica, CVA who presents to the ED for wheezing. pt w/ ADHF and hypercanea resp failure.     At time of visit to pts room, nsg reports eating fair to good with feeding assistance     Factors impacting intake: [ ] none [ ] nausea  [ ] vomiting [ ] diarrhea [ ] constipation  [ ]chewing problems [ ] swallowing issues  [ x] other: needs assist with meals    Diet Presciption: Diet, Regular:   DASH/TLC {Sodium & Cholesterol Restricted}  1500mL Fluid Restriction ([id number]) (06-16-23 @ 18:50)    Intake: 65%    Current Weight: Weight (kg): 62.6 (06-16 @ 13:41) ? accuracy of most recent wt 69.4 kg ( 6/22/23)     Pertinent Medications: MEDICATIONS  (STANDING):  albuterol/ipratropium for Nebulization 3 milliLiter(s) Nebulizer every 8 hours  apixaban 2.5 milliGRAM(s) Oral every 12 hours  aspirin  chewable 81 milliGRAM(s) Oral daily  brimonidine 0.2% Ophthalmic Solution 1 Drop(s) Both EYES two times a day  buMETAnide Injectable 1 milliGRAM(s) IV Push every 12 hours  diltiazem    milliGRAM(s) Oral daily  levothyroxine 50 MICROGram(s) Oral daily  polyethylene glycol 3350 17 Gram(s) Oral at bedtime  senna 2 Tablet(s) Oral at bedtime  simvastatin 40 milliGRAM(s) Oral at bedtime  timolol 0.5% Solution 1 Drop(s) Both EYES two times a day    MEDICATIONS  (PRN):  acetaminophen     Tablet .. 650 milliGRAM(s) Oral every 6 hours PRN Temp greater or equal to 38C (100.4F), Mild Pain (1 - 3)  aluminum hydroxide/magnesium hydroxide/simethicone Suspension 30 milliLiter(s) Oral every 4 hours PRN Dyspepsia  melatonin 3 milliGRAM(s) Oral at bedtime PRN Insomnia  ondansetron Injectable 4 milliGRAM(s) IV Push every 8 hours PRN Nausea and/or Vomiting  zolpidem 5 milliGRAM(s) Oral at bedtime PRN Insomnia    Pertinent Labs: 06-21 Na144 mmol/L Glu 82 mg/dL K+ 4.8 mmol/L Cr  1.30 mg/dL BUN 47 mg/dL<H> 06-17 Phos 4.9 mg/dL<H> 06-16 Alb 3.1 g/dL<L>     CAPILLARY BLOOD GLUCOSE    Skin: no PI  edema: 2+ L leg ,3+ R knee    Estimated Needs:   [ ] no change since previous assessment  [x] recalculated needs # /52.7 kg ( 25-30 kcals/kg) 0517-0065 kcals and  ( 1.1-1.3 gm pro/kg) 60-70 gm pro    Previous Nutrition Diagnosis:    [X ] Increased Nutrient Needs ( protein ) [X ] decreased nutrient needs ( sodium and fluid)      Nutrition Diagnosis is [X ] ongoing  [ ] resolved [ ] not applicable     New Nutrition Diagnosis: [X] not applicable       Interventions:   Recommend  [ ] Change Diet To:  [ ] Nutrition Supplement  [ ] Nutrition Support  [X ] Other: cont current POC    Monitoring and Evaluation:   [ X] PO intake [ x ] Tolerance to diet prescription [ x ] weights [ x ] labs[ x ] follow up per protocol  [ ] other: skin integrity

## 2023-06-22 NOTE — PROGRESS NOTE ADULT - SUBJECTIVE AND OBJECTIVE BOX
[facility] -- Heather Roberts, Meir Son [place], [place]  Office # [phone]    Follow Up:  CHF     Subjective/Observations: Patient seen and examined, awake, alert, resting comfortably in bed, denies chest pain, palpitations or dizziness. Tolerating VM,     REVIEW OF SYSTEMS: All other review of systems is negative unless indicated above  PAST MEDICAL & SURGICAL HISTORY:  Benign Essential Hypertension      Chronic Sciatica      Personal History of Coronary Artery Disease      Hypertension      Atrial fibrillation      CVA (cerebral vascular accident)      S/P CABG        MEDICATIONS  (STANDING):  albuterol/ipratropium for Nebulization 3 milliLiter(s) Nebulizer every 8 hours  apixaban 2.5 milliGRAM(s) Oral every 12 hours  aspirin  chewable 81 milliGRAM(s) Oral daily  brimonidine 0.2% Ophthalmic Solution 1 Drop(s) Both EYES two times a day  buMETAnide Injectable 1 milliGRAM(s) IV Push every 12 hours  diltiazem    milliGRAM(s) Oral daily  levothyroxine 50 MICROGram(s) Oral daily  polyethylene glycol 3350 17 Gram(s) Oral at bedtime  senna 2 Tablet(s) Oral at bedtime  simvastatin 40 milliGRAM(s) Oral at bedtime  timolol 0.5% Solution 1 Drop(s) Both EYES two times a day    MEDICATIONS  (PRN):  acetaminophen     Tablet .. 650 milliGRAM(s) Oral every 6 hours PRN Temp greater or equal to 38C (100.4F), Mild Pain (1 - 3)  aluminum hydroxide/magnesium hydroxide/simethicone Suspension 30 milliLiter(s) Oral every 4 hours PRN Dyspepsia  melatonin 3 milliGRAM(s) Oral at bedtime PRN Insomnia  ondansetron Injectable 4 milliGRAM(s) IV Push every 8 hours PRN Nausea and/or Vomiting  zolpidem 5 milliGRAM(s) Oral at bedtime PRN Insomnia    Allergies    No Known Allergies    Intolerances      Vital Signs Last 24 Hrs  T(C): 36.7 (22 Jun 2023 10:17), Max: 37.8 (21 Jun 2023 12:39)  T(F): 98 (22 Jun 2023 10:17), Max: 100 (21 Jun 2023 12:39)  HR: 106 (22 Jun 2023 10:17) (84 - 106)  BP: 126/80 (22 Jun 2023 10:17) (115/63 - 134/75)  BP(mean): --  RR: 17 (22 Jun 2023 10:17) (17 - 18)  SpO2: 97% (22 Jun 2023 10:17) (90% - 99%)    Parameters below as of 22 Jun 2023 10:17  Patient On (Oxygen Delivery Method): mask, Venturi      I&O's Summary    21 Jun 2023 07:01  -  22 Jun 2023 07:00  --------------------------------------------------------  IN: 0 mL / OUT: 700 mL / NET: -700 mL        TELE: Afib 80's   PHYSICAL EXAM:  Constitutional: NAD, awake and alert  HEENT: Moist Mucous Membranes, Anicteric  Pulmonary: Non-labored, breath sounds are decreased bilaterally, No wheezing, rales or rhonchi  Cardiovascular: Regular, S1 and S2, +murmurs, rubs, gallops or clicks  Gastrointestinal: Bowel Sounds present, soft, nontender.   Lymph: +  peripheral edema. No lymphadenopathy.  Skin: No visible rashes or ulcers.  Psych:  Mood & affect appropriate  LABS: All Labs Reviewed:                        10.3   5.76  )-----------( 188      ( 22 Jun 2023 09:16 )             34.8                         10.4   6.72  )-----------( 209      ( 21 Jun 2023 07:47 )             36.6                         10.9   8.73  )-----------( 221      ( 20 Jun 2023 05:30 )             37.5     22 Jun 2023 09:16    143    |  102    |  52     ----------------------------<  109    4.7     |  40     |  1.20   21 Jun 2023 07:47    144    |  104    |  47     ----------------------------<  82     4.8     |  35     |  1.30   20 Jun 2023 05:30    142    |  102    |  45     ----------------------------<  111    4.8     |  38     |  1.20     Ca    8.9        22 Jun 2023 09:16  Ca    9.1        21 Jun 2023 07:47  Ca    9.2        20 Jun 2023 05:30            12 Lead ECG:   Ventricular Rate 102 BPM    QRS Duration 114 ms    Q-T Interval 398 ms    QTC Calculation(Bazett) 518 ms    R Axis 0 degrees    T Axis -18 degrees    Diagnosis Line Atrial fibrillation with rapid ventricular response  Low voltage QRS  Right bundle branch block  ST & T wave abnormality, consider anterolateral ischemia  Abnormal ECG  When compared with ECG of 14-MAY-2023 14:02,  No significant change was found  Confirmed by VELMA ELLSWORTH ([id number]) on 6/17/2023 5:32:50 PM (06-16-23 @ 13:56)      Patient name: JIMMIE BRIAN  YOB: 1926   Age: 89 (F)   MR#: [id number]  Study Date: 11/22/2016  [facility]  Study quality: Technically difficult  Referring Physician: Sally Phipps MD  Blood Pressure: 153/78 mmHg  Height: 140 cm  Weight: 60 kg  BSA: 1.5 m2  ------------------------------------------------------------------------  PROCEDURE: Transthoracic echocardiogram with 2-D, M-Mode  and complete spectral and color flow Doppler.  INDICATION: Unspecified atrial fibrillation (I48.91)  ------------------------------------------------------------------------  Dimensions:    Normal Values:  LA:     4.6    2.0 - 4.0 cm  Ao:     3.4    2.0 - 3.8 cm  SEPTUM: 1.0    0.6 - 1.2 cm  PWT:    1.1  0.6 - 1.1 cm  LVIDd:  3.4    3.0 - 5.6 cm  LVIDs:  2.6    1.8 - 4.0 cm  Derived variables:  LVMI: 72 g/m2  RWT: 0.64  Fractional short: 24 %  EF (Teicholtz): 48 %  ------------------------------------------------------------------------  Observations:  Mitral Valve: Mitral annular calcification. The posterior  annulus is heavily calcified. There appears to be  independently mobile material seen at the posterior  annulus. This may represent endocarditis, clinical  correlation indicated. Mild mitral regurgitation.  Aortic Valve/Aorta: Calcified trileaflet aortic valve with  normal opening. Mild-moderate aortic regurgitation.   msec  Aortic Root: 3.4 cm.  Left Atrium: Mildly dilated left atrium.  LA volume index =  37 cc/m2.  Left Ventricle: Endocardium not well visualized; Mild  segmental left ventricular systolic dysfunction. The  base-mid inferior wall, the mid lateral wall, and the basal  inferoseptum are hypokinetic.  Regional wall motion  variation is noted on the setting of atrial fibrillation.  Increased relative wall thickness with normal left  ventricular mass index, consistent with concentric left  ventricular remodeling.  Right Heart: Mild right atrial enlargement. Normal right  ventricular size with mildly  decreased right ventricular  systolic function. Normal tricuspid valve. Mild-moderate  tricuspid regurgitation. Normal pulmonic valve. Minimal  pulmonic regurgitation.  Pericardium/Pleura: Normal pericardium with no pericardial  effusion.  Hemodynamic: Estimated right atrial pressure is 8 mm Hg.  Estimated right ventricular systolic pressure equals 44 mm  Hg, assuming right atrial pressure equals 8 mm Hg,  consistent with mild pulmonary hypertension.  ------------------------------------------------------------------------  Conclusions:  1. Mitral annular calcification. The posterior annulus is  heavily calcified. There appears to be independently mobile  material seen at the posterior annulus. This may represent  endocarditis, clinical correlation indicated.  2. Calcified trileaflet aortic valve with normal opening.  Mild-moderate aortic regurgitation.  msec  3. Increased relative wall thickness with normal left  ventricular mass index, consistent with concentric left  ventricular remodeling.  4. Endocardium not well visualized; Mild segmental left  ventricular systolic dysfunction. The base-mid inferior  wall, the mid lateral wall, and the basal inferoseptum are  hypokinetic.  Regional wall motion variation is noted on  the setting ofatrial fibrillation.  5. Normal right ventricular size with mildly  decreased  right ventricular systolic function.  6. Normal tricuspid valve. Mild-moderate tricuspid  regurgitation.  7. Estimated pulmonary artery systolic pressure equals 44  mm Hg,assuming right atrial pressure equals 8 mm Hg,  consistent with mild pulmonary pressures.  ------------------------------------------------------------------------  Confirmed on  11/22/2016 - 14:45:39 by Jazzy Phillips M.D.  ------------------------------------------------------------------------

## 2023-06-22 NOTE — PROGRESS NOTE ADULT - SUBJECTIVE AND OBJECTIVE BOX
Date/Time Patient Seen:  		  Referring MD:   Data Reviewed	       Patient is a 89y old  Female who presents with a chief complaint of Wheezing (21 Jun 2023 12:26)      Subjective/HPI     PAST MEDICAL & SURGICAL HISTORY:  Benign Essential Hypertension    Chronic Sciatica    Personal History of Coronary Artery Disease    Hypertension    Atrial fibrillation    CVA (cerebral vascular accident)    S/P CABG          Medication list         MEDICATIONS  (STANDING):  apixaban 2.5 milliGRAM(s) Oral every 12 hours  aspirin  chewable 81 milliGRAM(s) Oral daily  brimonidine 0.2% Ophthalmic Solution 1 Drop(s) Both EYES two times a day  buMETAnide Injectable 1 milliGRAM(s) IV Push every 12 hours  diltiazem    milliGRAM(s) Oral daily  levothyroxine 50 MICROGram(s) Oral daily  polyethylene glycol 3350 17 Gram(s) Oral at bedtime  senna 2 Tablet(s) Oral at bedtime  simvastatin 40 milliGRAM(s) Oral at bedtime  timolol 0.5% Solution 1 Drop(s) Both EYES two times a day    MEDICATIONS  (PRN):  acetaminophen     Tablet .. 650 milliGRAM(s) Oral every 6 hours PRN Temp greater or equal to 38C (100.4F), Mild Pain (1 - 3)  aluminum hydroxide/magnesium hydroxide/simethicone Suspension 30 milliLiter(s) Oral every 4 hours PRN Dyspepsia  melatonin 3 milliGRAM(s) Oral at bedtime PRN Insomnia  ondansetron Injectable 4 milliGRAM(s) IV Push every 8 hours PRN Nausea and/or Vomiting  zolpidem 5 milliGRAM(s) Oral at bedtime PRN Insomnia         Vitals log        ICU Vital Signs Last 24 Hrs  T(C): 37 (22 Jun 2023 05:14), Max: 37.8 (21 Jun 2023 12:39)  T(F): 98.6 (22 Jun 2023 05:14), Max: 100 (21 Jun 2023 12:39)  HR: 84 (22 Jun 2023 05:14) (84 - 96)  BP: 134/75 (22 Jun 2023 05:14) (115/63 - 134/75)  BP(mean): --  ABP: --  ABP(mean): --  RR: 18 (22 Jun 2023 05:14) (18 - 18)  SpO2: 99% (22 Jun 2023 05:14) (90% - 99%)    O2 Parameters below as of 22 Jun 2023 05:14  Patient On (Oxygen Delivery Method): mask, Venturi                 Input and Output:  I&O's Detail    20 Jun 2023 07:01  -  21 Jun 2023 07:00  --------------------------------------------------------  IN:  Total IN: 0 mL    OUT:    Voided (mL): 1050 mL  Total OUT: 1050 mL    Total NET: -1050 mL      21 Jun 2023 07:01  -  22 Jun 2023 05:42  --------------------------------------------------------  IN:  Total IN: 0 mL    OUT:    Voided (mL): 700 mL  Total OUT: 700 mL    Total NET: -700 mL          Lab Data                        10.4   6.72  )-----------( 209      ( 21 Jun 2023 07:47 )             36.6     06-21    144  |  104  |  47<H>  ----------------------------<  82  4.8   |  35<H>  |  1.30    Ca    9.1      21 Jun 2023 07:47      ABG - ( 20 Jun 2023 08:26 )  pH, Arterial: 7.33  pH, Blood: x     /  pCO2: 71    /  pO2: 107   / HCO3: 37    / Base Excess: 11.5  /  SaO2: 99.3                    Review of Systems	      Objective     Physical Examination    heart s1s2  lung dc BS  head nc      Pertinent Lab findings & Imaging      Burak:  NO   Adequate UO     I&O's Detail    20 Jun 2023 07:01  -  21 Jun 2023 07:00  --------------------------------------------------------  IN:  Total IN: 0 mL    OUT:    Voided (mL): 1050 mL  Total OUT: 1050 mL    Total NET: -1050 mL      21 Jun 2023 07:01  -  22 Jun 2023 05:42  --------------------------------------------------------  IN:  Total IN: 0 mL    OUT:    Voided (mL): 700 mL  Total OUT: 700 mL    Total NET: -700 mL               Discussed with:     Cultures:	        Radiology

## 2023-06-22 NOTE — PROGRESS NOTE ADULT - ASSESSMENT
89F w/ PMHx of CHFrEF, CAD s/p CABG, CKD, Afib (on apixaban), HTN, moderate AS and pulmonary HTN, chronic sciatica, CVA who presents to the ED for wheezing. pt w/ ADHF.   Ct chest- small rt pl effusion    #ADHF w acute hypoxic and  hypercapnea respiratory failure  cont bumex  strict I/Os, monitor daily weights, Cr, and K.  rpt CXR- atelectasis and small effusion  cards and pulm following  chronic co2 retention    #AF controlled for now.   cont cardizem   cont eliquis    #wheezing- likely cardiac origin- CHF exac  prn nebs  diuresis  pulm fu noted    #DVt ppx- eliquis

## 2023-06-22 NOTE — PROGRESS NOTE ADULT - SUBJECTIVE AND OBJECTIVE BOX
Patient is a 89y old  Female who presents with a chief complaint of Wheezing (22 Jun 2023 10:39)        INTERVAL HPI/OVERNIGHT EVENTS:   no complaints  pt seen and examined         Vital Signs Last 24 Hrs  T(C): 36.7 (22 Jun 2023 10:17), Max: 37 (22 Jun 2023 05:14)  T(F): 98 (22 Jun 2023 10:17), Max: 98.6 (22 Jun 2023 05:14)  HR: 106 (22 Jun 2023 10:17) (84 - 106)  BP: 126/80 (22 Jun 2023 10:17) (115/63 - 134/75)  BP(mean): --  RR: 17 (22 Jun 2023 10:17) (17 - 18)  SpO2: 97% (22 Jun 2023 10:17) (96% - 99%)    Parameters below as of 22 Jun 2023 10:17  Patient On (Oxygen Delivery Method): mask, Venturi        acetaminophen     Tablet .. 650 milliGRAM(s) Oral every 6 hours PRN  albuterol/ipratropium for Nebulization 3 milliLiter(s) Nebulizer every 8 hours  aluminum hydroxide/magnesium hydroxide/simethicone Suspension 30 milliLiter(s) Oral every 4 hours PRN  apixaban 2.5 milliGRAM(s) Oral every 12 hours  aspirin  chewable 81 milliGRAM(s) Oral daily  brimonidine 0.2% Ophthalmic Solution 1 Drop(s) Both EYES two times a day  buMETAnide Injectable 1 milliGRAM(s) IV Push every 12 hours  diltiazem    milliGRAM(s) Oral daily  levothyroxine 50 MICROGram(s) Oral daily  melatonin 3 milliGRAM(s) Oral at bedtime PRN  ondansetron Injectable 4 milliGRAM(s) IV Push every 8 hours PRN  polyethylene glycol 3350 17 Gram(s) Oral at bedtime  senna 2 Tablet(s) Oral at bedtime  simvastatin 40 milliGRAM(s) Oral at bedtime  timolol 0.5% Solution 1 Drop(s) Both EYES two times a day  zolpidem 5 milliGRAM(s) Oral at bedtime PRN      PHYSICAL EXAM:  GENERAL: NAD   EYES: conjunctiva and sclera clear  ENMT: Moist mucous membranes  NECK: Supple, No JVD, Normal thyroid  CHEST/LUNG: non labored, cta b/l  HEART: Regular rate and rhythm; No murmurs, rubs, or gallops  ABDOMEN: Soft, Nontender, Nondistended; Bowel sounds present  EXTREMITIES:  2+ Peripheral Pulses, No clubbing or cyanosis, + edema  LYMPH: No lymphadenopathy noted  SKIN: No rashes or lesions    Consultant(s) Notes Reviewed:  [x ] YES  [ ] NO  Care Discussed with Consultants/Other Providers [ x] YES  [ ] NO    LABS:                        10.3   5.76  )-----------( 188      ( 22 Jun 2023 09:16 )             34.8     06-22    143  |  102  |  52<H>  ----------------------------<  109<H>  4.7   |  40<H>  |  1.20    Ca    8.9      22 Jun 2023 09:16        Urinalysis Basic - ( 22 Jun 2023 09:16 )    Color: x / Appearance: x / SG: x / pH: x  Gluc: 109 mg/dL / Ketone: x  / Bili: x / Urobili: x   Blood: x / Protein: x / Nitrite: x   Leuk Esterase: x / RBC: x / WBC x   Sq Epi: x / Non Sq Epi: x / Bacteria: x      CAPILLARY BLOOD GLUCOSE            Urinalysis Basic - ( 22 Jun 2023 09:16 )    Color: x / Appearance: x / SG: x / pH: x  Gluc: 109 mg/dL / Ketone: x  / Bili: x / Urobili: x   Blood: x / Protein: x / Nitrite: x   Leuk Esterase: x / RBC: x / WBC x   Sq Epi: x / Non Sq Epi: x / Bacteria: x          RADIOLOGY & ADDITIONAL TESTS:    Imaging Personally Reviewed  Reviewed consultants input

## 2023-06-22 NOTE — SOCIAL WORK PROGRESS NOTE - NSSWPROGRESSNOTE_GEN_ALL_CORE
SW spoke with pt dtr [place] regarding PT recc. for MIN. Per pt dtr, pt was in [facility] for 3 weeks and ended up being readmitted to the hospital. She would like pt to return home with 24 hr aide on d/c. Per dtr, pt has DME in the home and she feels that pt can return home with no additional needs. Pt has ramp to enter home and no steps reported in the home that pt needs to utilize. SW to follow and remain available for any needs.

## 2023-06-22 NOTE — PROGRESS NOTE ADULT - ASSESSMENT
89 yo F with diastolic HF, CAD s/p CABG, RBBB, CKD, atrial fibrillation (on apixaban), HTN, moderate AS and pulmonary HTN, chronic sciatica, CVA who was recently hospitalized  for fall sp IMN 5/14 s/p and discharge on 5/19/23 who is presented to ED  for fatigue and wheezing    ADHF, CAD, CABG, VHD, RBBB, A fib, HTN, Pulm HTN  - a/f acute hypoxic and hypercapnic respiratory failure  - CT chest with small right pleural effusion, pulm following   - TTE 2016: EF 48%, f/u TTE   - remains volume overloaded on exam, with improvement, now on VM, continue to wean as tolerated  - pt admits to breathing and feeling much better,   - continue Bumex IV, likely switching to PO soon   - net neg 700 x 24 hours   - continue to maintain strict I/Os, monitor daily weights, Cr, and K.     - Known CAD sp CABG  - EKG: Afib 102 with no acute  ischemic changes   - Troponin: <-34.1  - No evidence of any active ischemia   - Continue ASA, statin     - Known A fib, on apixaban , renally dosed has CKD   - rate controlled on tele, BP stable   - Continue Eliquis   - Continue Diltiazem 120 mg PO daily  - Monitor and replete lytes, keep K>4, Mg>2.    - Will continue to follow.    Elissa Valdovinos [facility]  Nurse Practitioner - Cardiology   Spectra #[phone]/ [phone]  call TEAMS

## 2023-06-22 NOTE — PROGRESS NOTE ADULT - ASSESSMENT
89F w/ PMHx of CHFrEF, CAD s/p CABG, CKD, Afib (on apixaban), HTN, moderate AS and pulmonary HTN, chronic sciatica, CVA who presents to the ED for wheezing    HF  OP  OA  hx of Hip Fx  CAD  CKD  valvular heart disease  Pulm HTN  GERD  CVA Hx  AF on AC    ct chest - no lung consolidation  GOC documented - DNR DNI  on BUMEX for Diuresis  enc use of NIV night time and prn - wean Fio2 - VM - keep sat > 88 pct  CM follow up - placement -   VS noted    eval for HF exacerbation  Cardio eval in progress  CXR noted  monitor VS and Sat  keep sat > 88 pct  old records reviewed - spoke with daughter - reviewed TTE -   cvs rx regimen optimization, Diuresis as per Cardio Recs  I and O  dietary discretion  LE doppler - NEG for DVT  GOC discussion

## 2023-06-23 LAB
ANION GAP SERPL CALC-SCNC: 1 MMOL/L — LOW (ref 5–17)
BASOPHILS # BLD AUTO: 0.02 K/UL — SIGNIFICANT CHANGE UP (ref 0–0.2)
BASOPHILS NFR BLD AUTO: 0.3 % — SIGNIFICANT CHANGE UP (ref 0–2)
BUN SERPL-MCNC: 47 MG/DL — HIGH (ref 7–23)
CALCIUM SERPL-MCNC: 9 MG/DL — SIGNIFICANT CHANGE UP (ref 8.5–10.1)
CHLORIDE SERPL-SCNC: 103 MMOL/L — SIGNIFICANT CHANGE UP (ref 96–108)
CO2 SERPL-SCNC: 38 MMOL/L — HIGH (ref 22–31)
CREAT SERPL-MCNC: 1.2 MG/DL — SIGNIFICANT CHANGE UP (ref 0.5–1.3)
EGFR: 41 ML/MIN/1.73M2 — LOW
EOSINOPHIL # BLD AUTO: 0.52 K/UL — HIGH (ref 0–0.5)
EOSINOPHIL NFR BLD AUTO: 8.8 % — HIGH (ref 0–6)
GLUCOSE SERPL-MCNC: 103 MG/DL — HIGH (ref 70–99)
HCT VFR BLD CALC: 26.5 % — LOW (ref 34.5–45)
HCT VFR BLD CALC: 32 % — LOW (ref 34.5–45)
HCT VFR BLD CALC: 35.5 % — SIGNIFICANT CHANGE UP (ref 34.5–45)
HGB BLD-MCNC: 10.5 G/DL — LOW (ref 11.5–15.5)
HGB BLD-MCNC: 8 G/DL — LOW (ref 11.5–15.5)
HGB BLD-MCNC: 9.6 G/DL — LOW (ref 11.5–15.5)
IMM GRANULOCYTES NFR BLD AUTO: 0.2 % — SIGNIFICANT CHANGE UP (ref 0–0.9)
LYMPHOCYTES # BLD AUTO: 0.68 K/UL — LOW (ref 1–3.3)
LYMPHOCYTES # BLD AUTO: 11.5 % — LOW (ref 13–44)
MCHC RBC-ENTMCNC: 29.6 GM/DL — LOW (ref 32–36)
MCHC RBC-ENTMCNC: 30 GM/DL — LOW (ref 32–36)
MCHC RBC-ENTMCNC: 31 PG — SIGNIFICANT CHANGE UP (ref 27–34)
MCHC RBC-ENTMCNC: 31.8 PG — SIGNIFICANT CHANGE UP (ref 27–34)
MCV RBC AUTO: 104.7 FL — HIGH (ref 80–100)
MCV RBC AUTO: 106 FL — HIGH (ref 80–100)
MONOCYTES # BLD AUTO: 0.68 K/UL — SIGNIFICANT CHANGE UP (ref 0–0.9)
MONOCYTES NFR BLD AUTO: 11.5 % — SIGNIFICANT CHANGE UP (ref 2–14)
NEUTROPHILS # BLD AUTO: 4 K/UL — SIGNIFICANT CHANGE UP (ref 1.8–7.4)
NEUTROPHILS NFR BLD AUTO: 67.7 % — SIGNIFICANT CHANGE UP (ref 43–77)
NRBC # BLD: 0 /100 WBCS — SIGNIFICANT CHANGE UP (ref 0–0)
NRBC # BLD: 0 /100 WBCS — SIGNIFICANT CHANGE UP (ref 0–0)
PLATELET # BLD AUTO: 187 K/UL — SIGNIFICANT CHANGE UP (ref 150–400)
PLATELET # BLD AUTO: 225 K/UL — SIGNIFICANT CHANGE UP (ref 150–400)
POTASSIUM SERPL-MCNC: 4.6 MMOL/L — SIGNIFICANT CHANGE UP (ref 3.5–5.3)
POTASSIUM SERPL-SCNC: 4.6 MMOL/L — SIGNIFICANT CHANGE UP (ref 3.5–5.3)
RBC # BLD: 3.02 M/UL — LOW (ref 3.8–5.2)
RBC # BLD: 3.39 M/UL — LOW (ref 3.8–5.2)
RBC # FLD: 15 % — HIGH (ref 10.3–14.5)
RBC # FLD: 15 % — HIGH (ref 10.3–14.5)
SODIUM SERPL-SCNC: 142 MMOL/L — SIGNIFICANT CHANGE UP (ref 135–145)
WBC # BLD: 5.91 K/UL — SIGNIFICANT CHANGE UP (ref 3.8–10.5)
WBC # BLD: 8.17 K/UL — SIGNIFICANT CHANGE UP (ref 3.8–10.5)
WBC # FLD AUTO: 5.91 K/UL — SIGNIFICANT CHANGE UP (ref 3.8–10.5)
WBC # FLD AUTO: 8.17 K/UL — SIGNIFICANT CHANGE UP (ref 3.8–10.5)

## 2023-06-23 PROCEDURE — 71045 X-RAY EXAM CHEST 1 VIEW: CPT | Mod: 26

## 2023-06-23 PROCEDURE — 99233 SBSQ HOSP IP/OBS HIGH 50: CPT

## 2023-06-23 PROCEDURE — 73502 X-RAY EXAM HIP UNI 2-3 VIEWS: CPT | Mod: 26,RT

## 2023-06-23 PROCEDURE — 73552 X-RAY EXAM OF FEMUR 2/>: CPT | Mod: 26,RT

## 2023-06-23 PROCEDURE — 73590 X-RAY EXAM OF LOWER LEG: CPT | Mod: 26,RT

## 2023-06-23 RX ORDER — MORPHINE SULFATE 50 MG/1
2 CAPSULE, EXTENDED RELEASE ORAL
Refills: 0 | Status: DISCONTINUED | OUTPATIENT
Start: 2023-06-23 | End: 2023-06-23

## 2023-06-23 RX ORDER — TRAMADOL HYDROCHLORIDE 50 MG/1
25 TABLET ORAL
Refills: 0 | Status: DISCONTINUED | OUTPATIENT
Start: 2023-06-23 | End: 2023-06-28

## 2023-06-23 RX ADMIN — Medication 650 MILLIGRAM(S): at 10:56

## 2023-06-23 RX ADMIN — ZOLPIDEM TARTRATE 5 MILLIGRAM(S): 10 TABLET ORAL at 21:57

## 2023-06-23 RX ADMIN — POLYETHYLENE GLYCOL 3350 17 GRAM(S): 17 POWDER, FOR SOLUTION ORAL at 21:58

## 2023-06-23 RX ADMIN — Medication 3 MILLILITER(S): at 07:37

## 2023-06-23 RX ADMIN — BUMETANIDE 1 MILLIGRAM(S): 0.25 INJECTION INTRAMUSCULAR; INTRAVENOUS at 06:10

## 2023-06-23 RX ADMIN — Medication 3 MILLILITER(S): at 14:40

## 2023-06-23 RX ADMIN — SIMVASTATIN 40 MILLIGRAM(S): 20 TABLET, FILM COATED ORAL at 21:58

## 2023-06-23 RX ADMIN — Medication 1 DROP(S): at 06:09

## 2023-06-23 RX ADMIN — Medication 3 MILLILITER(S): at 20:28

## 2023-06-23 RX ADMIN — Medication 120 MILLIGRAM(S): at 06:10

## 2023-06-23 RX ADMIN — BRIMONIDINE TARTRATE 1 DROP(S): 2 SOLUTION/ DROPS OPHTHALMIC at 18:26

## 2023-06-23 RX ADMIN — SENNA PLUS 2 TABLET(S): 8.6 TABLET ORAL at 21:58

## 2023-06-23 RX ADMIN — APIXABAN 2.5 MILLIGRAM(S): 2.5 TABLET, FILM COATED ORAL at 06:10

## 2023-06-23 RX ADMIN — BRIMONIDINE TARTRATE 1 DROP(S): 2 SOLUTION/ DROPS OPHTHALMIC at 06:09

## 2023-06-23 RX ADMIN — Medication 50 MICROGRAM(S): at 06:10

## 2023-06-23 RX ADMIN — BUMETANIDE 1 MILLIGRAM(S): 0.25 INJECTION INTRAMUSCULAR; INTRAVENOUS at 18:27

## 2023-06-23 RX ADMIN — Medication 1 DROP(S): at 18:26

## 2023-06-23 NOTE — CONSULT NOTE ADULT - SUBJECTIVE AND OBJECTIVE BOX
HPI:  89F w/ PMHx of CHFrEF, CAD s/p CABG, CKD, Afib (on apixaban), HTN, moderate AS and pulmonary HTN, CVA who presents to the ED for wheezing. Pt recently discharged from [facility] s/p R hip fracture. While in rehab she developed pneumonia. Was on antibiotics for IV and then oral, discharged home 5 days ago. According to the daughter she has been more fatigued than usual over this past 24 hours, and was presenting with audible wheezing this afternoon. Daughter called EMS, and was found to have an oxygen saturation at home of 85% on room air.  EMS administered 1 sublingual nitroglycerin started IV lock, and administered not nasal cannula oxygen with good relief of her hypoxia and symptoms. Patient currently feels much better, denies any fever/chills or GI symptoms.    Interval HPI:  History confirmed as written above. Patient was admitted for CHF exacerbation. This morning seen by ortho for follow up of her right hip ORIF, reported that she was feeling weak but had no reported falls after surgery. Xray of her hip/femur ordered per ortho recs. Patient was sent down to x-ray and returned complaining of calf pain in her right leg. Per the nurse patient has a new area of ecchymosis. The nurse reports that she went to go get pain medications for her and upon her return there was a new bleeding hematoma. Rapid response was called for profuse bleeding, RRT arrived and applied pressure.    IN THE ED:  Temp 97.3F, , /72, RR 20, 98 SpO2 on room air  Labs significant for: Hb 11.5 (borderline), .7, INR 1.55, BUN 45, Cr. 1.4, Glucose 112, BNP 4.5k, RVP negative  Imaging  CXR: Wet read: poor inspiraiton. No acute pathology.  EKG: Afib 102 BPM, incomplete RBBB  B/l LE venous doppler: No evidence of DVT in either lower extremity in the visualized venous   segments  Received in ED: 0.5L NS, Ceftriaxone 1g IV. (16 Jun 2023 17:35)      PAST MEDICAL & SURGICAL HISTORY:  Benign Essential Hypertension    Chronic Sciatica    Personal History of Coronary Artery Disease    Hypertension    Atrial fibrillation    CVA (cerebral vascular accident)    S/P CABG    MEDICATIONS  (STANDING):  albuterol/ipratropium for Nebulization 3 milliLiter(s) Nebulizer every 8 hours  aspirin  chewable 81 milliGRAM(s) Oral daily  brimonidine 0.2% Ophthalmic Solution 1 Drop(s) Both EYES two times a day  buMETAnide Injectable 1 milliGRAM(s) IV Push every 12 hours  diltiazem    milliGRAM(s) Oral daily  levothyroxine 50 MICROGram(s) Oral daily  polyethylene glycol 3350 17 Gram(s) Oral at bedtime  senna 2 Tablet(s) Oral at bedtime  simvastatin 40 milliGRAM(s) Oral at bedtime  timolol 0.5% Solution 1 Drop(s) Both EYES two times a day    MEDICATIONS  (PRN):  acetaminophen     Tablet .. 650 milliGRAM(s) Oral every 6 hours PRN Temp greater or equal to 38C (100.4F), Mild Pain (1 - 3)  aluminum hydroxide/magnesium hydroxide/simethicone Suspension 30 milliLiter(s) Oral every 4 hours PRN Dyspepsia  melatonin 3 milliGRAM(s) Oral at bedtime PRN Insomnia  morphine  - Injectable 2 milliGRAM(s) IV Push every 15 minutes PRN Severe Pain (7 - 10)  ondansetron Injectable 4 milliGRAM(s) IV Push every 8 hours PRN Nausea and/or Vomiting  zolpidem 5 milliGRAM(s) Oral at bedtime PRN Insomnia      Allergies  No Known Allergies    Vital Signs Last 24 Hrs  T(C): 37.2 (23 Jun 2023 11:46), Max: 37.2 (23 Jun 2023 11:46)  T(F): 99 (23 Jun 2023 11:46), Max: 99 (23 Jun 2023 11:46)  HR: 80 (23 Jun 2023 11:46) (75 - 98)  BP: 156/98 (23 Jun 2023 11:46) (115/72 - 156/98)  BP(mean): --  RR: 18 (23 Jun 2023 11:46) (17 - 18)  SpO2: 94% (23 Jun 2023 11:46) (94% - 98%)    Parameters below as of 23 Jun 2023 11:46  Patient On (Oxygen Delivery Method): mask, Venturi        PHYSICAL EXAM:  Constitutional: AAOx3, no acute distress  HEENT: NCAT, airway patent  Cardiovascular: RRR, pulses present bilaterally  Respiratory: nonlabored breathing  Gastrointestinal: abdomen soft, nontender, non distended  Neuro: no focal deficits  Extremities: Large right medial calf hematoma, slightly oozing blood, adaptiq placed on open wound, wrapped with ACE bandange     LABS:                        10.5   5.91  )-----------( 187      ( 23 Jun 2023 06:30 )             35.5     06-23    142  |  103  |  47<H>  ----------------------------<  103<H>  4.6   |  38<H>  |  1.20    Ca    9.0      23 Jun 2023 06:30        Urinalysis Basic - ( 23 Jun 2023 06:30 )    Color: x / Appearance: x / SG: x / pH: x  Gluc: 103 mg/dL / Ketone: x  / Bili: x / Urobili: x   Blood: x / Protein: x / Nitrite: x   Leuk Esterase: x / RBC: x / WBC x   Sq Epi: x / Non Sq Epi: x / Bacteria: x

## 2023-06-23 NOTE — PROGRESS NOTE ADULT - ASSESSMENT
89F w/ PMHx of CHFrEF, CAD s/p CABG, CKD, Afib (on apixaban), HTN, moderate AS and pulmonary HTN, chronic sciatica, CVA who presents to the ED for wheezing. pt w/ ADHF.   Ct chest- small rt pl effusion    #ADHF w acute hypoxic and  hypercapnea respiratory failure  cont bumex  strict I/Os, monitor daily weights, Cr, and K.  rpt CXR- atelectasis and small effusion  cards and pulm following  chronic co2 retention    #AF controlled for now.   cont cardizem   hold eliquis    #wheezing- likely cardiac origin- CHF exac  prn nebs  diuresis  pulm fu noted    hematoma (subsequently ruptured)  hold asa/eliquis  bleeding controlled with ace wrap  surgery to evaluate  trend h/h  pain control

## 2023-06-23 NOTE — CONSULT NOTE ADULT - SUBJECTIVE AND OBJECTIVE BOX
Pt Name: JIMMIE BRIAN    MRN: [id number]    HPI: Patient is a 89y Female admitted to the hospital on 6/16 for wheezing Cardiology and pulm on board and following. Orthopedic consult requested to follow up ORIF right hip done on 5/14/2023 for right hip fracture. Pt states she has been in a rehab facility and has not been walking. She states her hip does not bother her anymore. She is able to stand but has trouble getting around because she fells weak. Declines any recent falls since the surgery.     PAST MEDICAL & SURGICAL HISTORY:  Benign Essential Hypertension      Chronic Sciatica      Personal History of Coronary Artery Disease      Hypertension      Atrial fibrillation      CVA (cerebral vascular accident)      S/P CABG          Allergies: No Known Allergies      Ambulation: Baseline Ambulation  With Walker                           10.3   5.76  )-----------( 188      ( 22 Jun 2023 09:16 )             34.8     06-22    143  |  102  |  52<H>  ----------------------------<  109<H>  4.7   |  40<H>  |  1.20    Ca    8.9      22 Jun 2023 09:16            PHYSICAL EXAM:    Vital Signs Last 24 Hrs  T(C): 36.7 (23 Jun 2023 05:02), Max: 37 (22 Jun 2023 20:26)  T(F): 98 (23 Jun 2023 05:02), Max: 98.6 (22 Jun 2023 20:26)  HR: 95 (23 Jun 2023 05:02) (75 - 106)  BP: 123/69 (23 Jun 2023 05:02) (115/72 - 133/74)  BP(mean): --  RR: 18 (23 Jun 2023 05:02) (17 - 18)  SpO2: 95% (23 Jun 2023 05:02) (95% - 98%)    Parameters below as of 23 Jun 2023 05:02  Patient On (Oxygen Delivery Method): BiPAP/CPAP        Physical Exam:  General: NAD, Alert, Awake and oriented  Respiratory: on venti mask with CPAP machine   Skin- intact , right hip incision C/D/I   Abdomen- Soft and NT/ND     RIGHT LE: No open skin. right hip incision is C/D/I without any drainage or signs of infection. NT right hip passive ROM of the hip full. NT right knee with full passive ROM. NT right ankle with FROM. good movement of the toes, NVI distally, distal pulse palpable     Imaging:   xray hip/pelvis and femur pending     Orthopedic A/P:    Pt is a  89y Female with right hip ORIF 5/14 follow up     -Pain control PRN  -WBAT right leg   - DVT ppx as per primary team   -N/V Checks to monitor for compartment signs  -Follow up with Dr. Joseph within 1 week. Please call the office to make an appointment.   -Discussed with Dr. Joseph who is aware and approves of plan.

## 2023-06-23 NOTE — PROGRESS NOTE ADULT - ASSESSMENT
89F w/ PMHx of CHFrEF, CAD s/p CABG, CKD, Afib (on apixaban), HTN, moderate AS and pulmonary HTN, chronic sciatica, CVA who presents to the ED for wheezing    HF  OP  OA  hx of Hip Fx  CAD  CKD  valvular heart disease  Pulm HTN  GERD  CVA Hx  AF on AC    ct chest - no lung consolidation  GOC documented - DNR DNI  on BUMEX for Diuresis  enc use of NIV night time and prn - wean Fio2 - VM - keep sat > 88 pct  CM follow up - placement - [facility] -   VS noted    eval for HF exacerbation  Cardio eval in progress  CXR noted  monitor VS and Sat  keep sat > 88 pct  old records reviewed - spoke with daughter - reviewed TTE -   cvs rx regimen optimization, Diuresis as per Cardio Recs  I and O  dietary discretion  LE doppler - NEG for DVT  GOC discussion

## 2023-06-23 NOTE — PROGRESS NOTE ADULT - ASSESSMENT
89 yo F with diastolic HF, CAD s/p CABG, RBBB, CKD, atrial fibrillation (on apixaban), HTN, moderate AS and pulmonary HTN, chronic sciatica, CVA who was recently hospitalized  for fall sp IMN 5/14 s/p and discharge on 5/19/23 who is presented to ED  for fatigue and wheezing    ADHF, CAD, CABG, VHD, RBBB, A fib, HTN, Pulm HTN  - a/f acute hypoxic and hypercapnic respiratory failure  - CT chest with small right pleural effusion, pulm following   - TTE 2016: EF 48%, f/u TTE   - BNP:  <--4522  - Remains volume overloaded on exam, with improvement, now on VM, continue to wean as tolerated  - pt admits to breathing and feeling much better,   - Continue Bumex IV, 1 mg IV BID  - Continue to maintain strict I/Os, monitor daily weights, Cr, and K.     - Known CAD sp CABG  - EKG: Afib 102 with no acute  ischemic changes   - Troponin: <-34.1  - No evidence of any active ischemia   - Continue ASA, statin     - Known A fib, on apixaban , renally dosed has CKD   - Rate controlled on tele, BP stable   - Continue Diltiazem 120 mg PO daily    - Monitor and replete lytes, keep K>4, Mg>2.  - Will continue to follow.    Chavez Mendoza, MS FNP, AGACNP  Nurse Practitioner- Cardiology   Call teams (preferred)  Spectra #[phone] /[phone]

## 2023-06-23 NOTE — CONSULT NOTE ADULT - ATTENDING COMMENTS
89yoF sp R hip IMN 5/14 re admitted for wheezing with negative cardiac/pulmonary workup thus far, negative DVT. She has no pain with standing but feels weak and is likely de conditioned from her re-admission.  Her incisions are healed, she has moderate strength of her IP and quad, she is distally neurovascularly intact.  I would like her to focus on PT for strengthening and gait training both in the hospital and upon discharge.  She will follow up with me again in the office when she is deemed medically stable for discharge.  Xrays are pending.

## 2023-06-23 NOTE — PROGRESS NOTE ADULT - SUBJECTIVE AND OBJECTIVE BOX
Patient is a 89y old  Female who presents with a chief complaint of Wheezing (23 Jun 2023 12:43)        INTERVAL HPI/OVERNIGHT EVENTS:   called to bedside, pt experiencing severe pain in rt shin, noted tense hematoma at that location  pt seen and examined         Vital Signs Last 24 Hrs  T(C): 37.2 (23 Jun 2023 11:46), Max: 37.2 (23 Jun 2023 11:46)  T(F): 99 (23 Jun 2023 11:46), Max: 99 (23 Jun 2023 11:46)  HR: 80 (23 Jun 2023 11:46) (75 - 98)  BP: 156/98 (23 Jun 2023 11:46) (115/72 - 156/98)  BP(mean): --  RR: 18 (23 Jun 2023 11:46) (17 - 18)  SpO2: 94% (23 Jun 2023 11:46) (94% - 98%)    Parameters below as of 23 Jun 2023 11:46  Patient On (Oxygen Delivery Method): mask, Venturi        acetaminophen     Tablet .. 650 milliGRAM(s) Oral every 6 hours PRN  albuterol/ipratropium for Nebulization 3 milliLiter(s) Nebulizer every 8 hours  aluminum hydroxide/magnesium hydroxide/simethicone Suspension 30 milliLiter(s) Oral every 4 hours PRN  brimonidine 0.2% Ophthalmic Solution 1 Drop(s) Both EYES two times a day  buMETAnide Injectable 1 milliGRAM(s) IV Push every 12 hours  diltiazem    milliGRAM(s) Oral daily  levothyroxine 50 MICROGram(s) Oral daily  melatonin 3 milliGRAM(s) Oral at bedtime PRN  morphine  - Injectable 2 milliGRAM(s) IV Push every 15 minutes PRN  ondansetron Injectable 4 milliGRAM(s) IV Push every 8 hours PRN  polyethylene glycol 3350 17 Gram(s) Oral at bedtime  senna 2 Tablet(s) Oral at bedtime  simvastatin 40 milliGRAM(s) Oral at bedtime  timolol 0.5% Solution 1 Drop(s) Both EYES two times a day  zolpidem 5 milliGRAM(s) Oral at bedtime PRN      PHYSICAL EXAM:  GENERAL: in severe pain   EYES: conjunctiva and sclera clear  ENMT: Moist mucous membranes  NECK: Supple, No JVD, Normal thyroid  CHEST/LUNG: non labored, cta b/l  HEART: Regular rate and rhythm; No murmurs, rubs, or gallops  ABDOMEN: Soft, Nontender, Nondistended; Bowel sounds present  EXTREMITIES:  2+ Peripheral Pulses, No clubbing, cyanosis, or edema  LYMPH: No lymphadenopathy noted  SKIN: ~10cm tense hematoma rt shin    Consultant(s) Notes Reviewed:  [x ] YES  [ ] NO  Care Discussed with Consultants/Other Providers [ x] YES  [ ] NO    LABS:                        10.5   5.91  )-----------( 187      ( 23 Jun 2023 06:30 )             35.5     06-23    142  |  103  |  47<H>  ----------------------------<  103<H>  4.6   |  38<H>  |  1.20    Ca    9.0      23 Jun 2023 06:30        Urinalysis Basic - ( 23 Jun 2023 06:30 )    Color: x / Appearance: x / SG: x / pH: x  Gluc: 103 mg/dL / Ketone: x  / Bili: x / Urobili: x   Blood: x / Protein: x / Nitrite: x   Leuk Esterase: x / RBC: x / WBC x   Sq Epi: x / Non Sq Epi: x / Bacteria: x      CAPILLARY BLOOD GLUCOSE      POCT Blood Glucose.: 118 mg/dL (23 Jun 2023 12:23)        Urinalysis Basic - ( 23 Jun 2023 06:30 )    Color: x / Appearance: x / SG: x / pH: x  Gluc: 103 mg/dL / Ketone: x  / Bili: x / Urobili: x   Blood: x / Protein: x / Nitrite: x   Leuk Esterase: x / RBC: x / WBC x   Sq Epi: x / Non Sq Epi: x / Bacteria: x          RADIOLOGY & ADDITIONAL TESTS:    Imaging Personally Reviewed  Reviewed consultants input

## 2023-06-23 NOTE — CONSULT NOTE ADULT - ASSESSMENT
89 yo female presented to the ED with wheezing admitted for CHF exacerbation now with new ruptured hematoma on right calf.    Plan:  - Compression dressing  - On Eliquis for afib- hold any anticoagulation  - Monitor H/H  - no indication for surgical hematoma evacuation   - Surgery to follow

## 2023-06-23 NOTE — CHART NOTE - NSCHARTNOTEFT_GEN_A_CORE
RN called as pt hemoglobin drawn at 2005 was 8.0.  Pt had rapid response called this afternoon for bleeding hematoma of left leg. hematoma was dressed with pressure dressing. Eliquis was held.  Hemoglobin at 0115 was 9.6.  Now Hemoglobin at 2005 was 8.0. Vitals stable.    Pt examined at bedside. She is awake, alert, no acute distress. Denies pain at this time. Daughter Virginia Sharma is at bedside.  On exam, bandage is in place on right lower extremity, clean and dry.    Plan:  - FU H/H at midnight, will transfuse if Hemoglobin drops below 8.0.  - blood consent obtained, placed in chart  - Type and screen obtained.   - If pt requires blood, will call daughter Virginia to update.  Continue to monitor.  RN to notify with any changes. 2130:  RN called as pt hemoglobin drawn at 2005 was 8.0.  Pt had rapid response called this afternoon for bleeding hematoma of left leg. hematoma was dressed with pressure dressing. Eliquis was held.  Hemoglobin at 0115 was 9.6.  Now Hemoglobin at 2005 was 8.0. Vitals stable.    Pt examined at bedside. She is awake, alert, no acute distress. Denies pain at this time. Daughter Virginia Sharma is at bedside.  On exam, bandage is in place on right lower extremity, clean and dry.    Plan:  - FU H/H at midnight, will transfuse if Hemoglobin drops below 8.0.  - blood consent obtained, placed in chart  - Type and screen obtained.   - If pt requires blood, will call daughter Virginia to update.  Continue to monitor.  RN to notify with any changes. 2130:  RN called as pt hemoglobin drawn at 2005 was 8.0.  Pt had rapid response called this afternoon for bleeding hematoma of left leg. hematoma was dressed with pressure dressing. Eliquis was held.  Hemoglobin at 0115 was 9.6.  Now Hemoglobin at 2005 was 8.0. Vitals stable.    Pt examined at bedside. She is awake, alert, no acute distress. Denies pain at this time. Daughter Virginia Sharma is at bedside.  On exam, bandage is in place on right lower extremity, clean and dry.    Plan:  - FU H/H at midnight, will transfuse if Hemoglobin drops below 8.0.  - blood consent obtained, placed in chart  - Type and screen obtained.   - If pt requires blood, will call daughter Virginia to update.  Continue to monitor.  RN to notify with any changes.      Addendum at 0030:  Hemoglobin 8.4  no need for blood transfusion at this time    Continue to monitor.  RN to notify with any changes. 2130:  RN called as pt hemoglobin drawn at 2005 was 8.0.  Pt had rapid response called this afternoon for bleeding hematoma of left leg. hematoma was dressed with pressure dressing. Eliquis was held.  Hemoglobin at 0115 was 9.6.  Now Hemoglobin at 2005 was 8.0. Vitals stable.    Pt examined at bedside. She is awake, alert, no acute distress. Denies pain at this time. Daughter Virginia Sharma is at bedside.  On exam, bandage is in place on right lower extremity with medial area of swelling, erythema and edema on calf above the bandage, bandage clean and dry.    Plan:  - FU H/H at midnight, will transfuse if Hemoglobin drops below 8.0.  - blood consent obtained, placed in chart  - Type and screen obtained.   - If pt requires blood, will call daughter Virginia to update.  Continue to monitor.  RN to notify with any changes.      Addendum at 0030:  Hemoglobin 8.4  no need for blood transfusion at this time    Continue to monitor.  RN to notify with any changes.      Addendum @ 0200  RN called as pt complaining of 10/10 pain in right leg. Pt recently took tramadol 25 mg   Pt evaluated at bedside. State right leg is very painful  On exam, bandage is in place on right lower extremity with medial area of swelling, erythema and edema on calf above the bandage, bandage clean and dry. No sign of compartment syndrome.    Plan:  - will give morphine 2 mg IV now  - Day team to order pain regimen for hematoma  Continue to monitor.  RN to notify with any changes.

## 2023-06-23 NOTE — PROGRESS NOTE ADULT - SUBJECTIVE AND OBJECTIVE BOX
Date/Time Patient Seen:  		  Referring MD:   Data Reviewed	       Patient is a 89y old  Female who presents with a chief complaint of Wheezing (22 Jun 2023 16:34)      Subjective/HPI     PAST MEDICAL & SURGICAL HISTORY:  Benign Essential Hypertension    Chronic Sciatica    Personal History of Coronary Artery Disease    Hypertension    Atrial fibrillation    CVA (cerebral vascular accident)    S/P CABG          Medication list         MEDICATIONS  (STANDING):  albuterol/ipratropium for Nebulization 3 milliLiter(s) Nebulizer every 8 hours  apixaban 2.5 milliGRAM(s) Oral every 12 hours  aspirin  chewable 81 milliGRAM(s) Oral daily  brimonidine 0.2% Ophthalmic Solution 1 Drop(s) Both EYES two times a day  buMETAnide Injectable 1 milliGRAM(s) IV Push every 12 hours  diltiazem    milliGRAM(s) Oral daily  levothyroxine 50 MICROGram(s) Oral daily  polyethylene glycol 3350 17 Gram(s) Oral at bedtime  senna 2 Tablet(s) Oral at bedtime  simvastatin 40 milliGRAM(s) Oral at bedtime  timolol 0.5% Solution 1 Drop(s) Both EYES two times a day    MEDICATIONS  (PRN):  acetaminophen     Tablet .. 650 milliGRAM(s) Oral every 6 hours PRN Temp greater or equal to 38C (100.4F), Mild Pain (1 - 3)  aluminum hydroxide/magnesium hydroxide/simethicone Suspension 30 milliLiter(s) Oral every 4 hours PRN Dyspepsia  melatonin 3 milliGRAM(s) Oral at bedtime PRN Insomnia  ondansetron Injectable 4 milliGRAM(s) IV Push every 8 hours PRN Nausea and/or Vomiting  zolpidem 5 milliGRAM(s) Oral at bedtime PRN Insomnia         Vitals log        ICU Vital Signs Last 24 Hrs  T(C): 36.7 (23 Jun 2023 05:02), Max: 37 (22 Jun 2023 20:26)  T(F): 98 (23 Jun 2023 05:02), Max: 98.6 (22 Jun 2023 20:26)  HR: 95 (23 Jun 2023 05:02) (75 - 106)  BP: 123/69 (23 Jun 2023 05:02) (115/72 - 133/74)  BP(mean): --  ABP: --  ABP(mean): --  RR: 18 (23 Jun 2023 05:02) (17 - 18)  SpO2: 95% (23 Jun 2023 05:02) (95% - 98%)    O2 Parameters below as of 23 Jun 2023 05:02  Patient On (Oxygen Delivery Method): BiPAP/CPAP                 Input and Output:  I&O's Detail    21 Jun 2023 07:01  -  22 Jun 2023 07:00  --------------------------------------------------------  IN:  Total IN: 0 mL    OUT:    Voided (mL): 700 mL  Total OUT: 700 mL    Total NET: -700 mL      22 Jun 2023 07:01  -  23 Jun 2023 05:33  --------------------------------------------------------  IN:    Oral Fluid: 240 mL  Total IN: 240 mL    OUT:    Voided (mL): 450 mL  Total OUT: 450 mL    Total NET: -210 mL          Lab Data                        10.3   5.76  )-----------( 188      ( 22 Jun 2023 09:16 )             34.8     06-22    143  |  102  |  52<H>  ----------------------------<  109<H>  4.7   |  40<H>  |  1.20    Ca    8.9      22 Jun 2023 09:16              Review of Systems	      Objective     Physical Examination    heart s1s2  lung dec BS  head nc      Pertinent Lab findings & Imaging      Burak:  NO   Adequate UO     I&O's Detail    21 Jun 2023 07:01  -  22 Jun 2023 07:00  --------------------------------------------------------  IN:  Total IN: 0 mL    OUT:    Voided (mL): 700 mL  Total OUT: 700 mL    Total NET: -700 mL      22 Jun 2023 07:01  -  23 Jun 2023 05:33  --------------------------------------------------------  IN:    Oral Fluid: 240 mL  Total IN: 240 mL    OUT:    Voided (mL): 450 mL  Total OUT: 450 mL    Total NET: -210 mL               Discussed with:     Cultures:	        Radiology

## 2023-06-23 NOTE — CHART NOTE - NSCHARTNOTEFT_GEN_A_CORE
Resident Rapid Response Note    Rapid response was called at 12:21 for ruptured hematoma.     Events leading up to Rapid Response:  Rapid response called for ruptured hematoma. Patient was going down to x-ray for imaging of the hip and femur given weakness and hx of falls. When patient was brought back to her room she was expressing pain on the right calf and noted a ?new hematoma. So pain medications was to be administered and by the time nurse came back the hematoma was rupture and oozing blood. Rapid response was called and team arrived and applied pressured and wrapped it in an aced bandage. Eliquis was given this morning and now held. Surgery was consulted and evaluated at bedside. Patient denies any lightheaded or dizziness. She endorses pain at the hematoma site. Patient was seen and examined at the bedside by the rapid response team and PA at bedside.    Rapid Response Vital Signs:  BP: 118/68  HR: 88  RR: 14  SpO2: 100% on RA    Temp: 98.5   FS: 118          Physical Exam:  Gen: Elderly female, well appearing, NAD  HEENT: NCAT, PEERLA b/l, EOMI b/l  Cardio: regular rate and rhythm, +s1s2, no murmurs, rubs, or gallops  Pulm: CTA b/l, no wheezes, rales or rhonchi  Abdomen: soft, nontender, nondistended, +BS x4 quadrants, no guarding  Extremities: right large hematoma, oozing blood, now w/ ace bandage   Neuro: AAOx3, CNII-XII intact, 5/5 strength all extremities, sensation intact  Skin: warm and dry        Assessment/Plan:   89F w/ PMHx of CHFrEF, CAD s/p CABG, CKD, Afib (on apixaban), HTN, moderate AS and pulmonary HTN, chronic sciatica, CVA who presents to the ED for wheezing. pt w/ ADHF. RR called for ruptured hematoma on right calf   - Currently hemodynamically stable   - STAT CBC ordered   - Applied pressure and dressing + ace bandage over, bleeding is better controlled   - Currently hold Eliquis   - Surgery (Dr. Gomez) consulted, f/u recs   - Surgical PA at bedside evaluated patient   - Dr. Rodriguez was called and made aware agrees with above plan   - Daughter was updated at bedside   -RN to call if any changes.

## 2023-06-23 NOTE — PROGRESS NOTE ADULT - SUBJECTIVE AND OBJECTIVE BOX
[facility] -- Heather Roberts, Meir Son Savella, Goodger: Office # [phone]    Follow Up:  CHF     Subjective/Observations: Patient seen and examined, awake, alert, resting comfortably in bed, denies chest pain, palpitations or dizziness. Tolerating VM, Family at bedside.     REVIEW OF SYSTEMS: All other review of systems are negative unless indicated above    PAST MEDICAL & SURGICAL HISTORY:  Benign Essential Hypertension      Chronic Sciatica      Personal History of Coronary Artery Disease      Hypertension      Hypertension      Atrial fibrillation      CVA (cerebral vascular accident)      S/P CABG          MEDICATIONS  (STANDING):  albuterol/ipratropium for Nebulization 3 milliLiter(s) Nebulizer every 8 hours  aspirin  chewable 81 milliGRAM(s) Oral daily  brimonidine 0.2% Ophthalmic Solution 1 Drop(s) Both EYES two times a day  buMETAnide Injectable 1 milliGRAM(s) IV Push every 12 hours  diltiazem    milliGRAM(s) Oral daily  levothyroxine 50 MICROGram(s) Oral daily  polyethylene glycol 3350 17 Gram(s) Oral at bedtime  senna 2 Tablet(s) Oral at bedtime  simvastatin 40 milliGRAM(s) Oral at bedtime  timolol 0.5% Solution 1 Drop(s) Both EYES two times a day    MEDICATIONS  (PRN):  acetaminophen     Tablet .. 650 milliGRAM(s) Oral every 6 hours PRN Temp greater or equal to 38C (100.4F), Mild Pain (1 - 3)  aluminum hydroxide/magnesium hydroxide/simethicone Suspension 30 milliLiter(s) Oral every 4 hours PRN Dyspepsia  melatonin 3 milliGRAM(s) Oral at bedtime PRN Insomnia  morphine  - Injectable 2 milliGRAM(s) IV Push every 15 minutes PRN Severe Pain (7 - 10)  ondansetron Injectable 4 milliGRAM(s) IV Push every 8 hours PRN Nausea and/or Vomiting  zolpidem 5 milliGRAM(s) Oral at bedtime PRN Insomnia    Allergies    No Known Allergies    Intolerances      Vital Signs Last 24 Hrs  T(C): 37.2 (23 Jun 2023 11:46), Max: 37.2 (23 Jun 2023 11:46)  T(F): 99 (23 Jun 2023 11:46), Max: 99 (23 Jun 2023 11:46)  HR: 80 (23 Jun 2023 11:46) (75 - 98)  BP: 156/98 (23 Jun 2023 11:46) (115/72 - 156/98)  BP(mean): --  RR: 18 (23 Jun 2023 11:46) (17 - 18)  SpO2: 94% (23 Jun 2023 11:46) (94% - 98%)    Parameters below as of 23 Jun 2023 11:46  Patient On (Oxygen Delivery Method): mask, Venturi      I&O's Summary    22 Jun 2023 07:01  -  23 Jun 2023 07:00  --------------------------------------------------------  IN: 240 mL / OUT: 950 mL / NET: -710 mL          TELE: A fib 90s   PHYSICAL EXAM:  Constitutional: NAD, awake and alert  HEENT: Moist Mucous Membranes, Anicteric  Pulmonary: Non-labored, breath sounds are clear bilaterally, Diminished at bases No wheezing, rales or rhonchi  Cardiovascular: Regular, S1 and S2, + murmurs, No rubs, gallops or clicks  Gastrointestinal:  soft, nontender, nondistended   Lymph: +1 LE peripheral edema. No lymphadenopathy.   Skin: No visible rashes or ulcers.  Psych:  Mood & affect appropriate      LABS: All Labs Reviewed:                        10.5   5.91  )-----------( 187      ( 23 Jun 2023 06:30 )             35.5                         10.3   5.76  )-----------( 188      ( 22 Jun 2023 09:16 )             34.8                         10.4   6.72  )-----------( 209      ( 21 Jun 2023 07:47 )             36.6     23 Jun 2023 06:30    142    |  103    |  47     ----------------------------<  103    4.6     |  38     |  1.20   22 Jun 2023 09:16    143    |  102    |  52     ----------------------------<  109    4.7     |  40     |  1.20   21 Jun 2023 07:47    144    |  104    |  47     ----------------------------<  82     4.8     |  35     |  1.30     Ca    9.0        23 Jun 2023 06:30  Ca    8.9        22 Jun 2023 09:16  Ca    9.1        21 Jun 2023 07:47       Troponin I, High Sensitivity Result: 34.1 ng/L (06-16-23 @ 15:20)    12 Lead ECG:   Ventricular Rate 102 BPM    QRS Duration 114 ms    Q-T Interval 398 ms    QTC Calculation(Bazett) 518 ms    R Axis 0 degrees    T Axis -18 degrees    Diagnosis Line Atrial fibrillation with rapid ventricular response  Low voltage QRS  Right bundle branch block  ST & T wave abnormality, consider anterolateral ischemia  Abnormal ECG  When compared with ECG of 14-MAY-2023 14:02,  No significant change was found  Confirmed by VELMA ELLSWORTH ([id number]) on 6/17/2023 5:32:50 PM (06-16-23 @ 13:56)      Patient name: JIMMIE BRIAN  YOB: 1926   Age: 89 (F)   MR#: [id number]  Study Date: 11/22/2016  [facility] Malia Oneill RDCS  Study quality: Technically difficult  Referring Physician: Sally Phipps MD  Blood Pressure: 153/78 mmHg  Height: 140 cm  Weight: 60 kg  BSA: 1.5 m2  ------------------------------------------------------------------------  PROCEDURE: Transthoracic echocardiogram with 2-D, M-Mode  and complete spectral and color flow Doppler.  INDICATION: Unspecified atrial fibrillation (I48.91)  ------------------------------------------------------------------------  Dimensions:    Normal Values:  LA:     4.6    2.0 - 4.0 cm  Ao:     3.4    2.0 - 3.8 cm  SEPTUM: 1.0    0.6 - 1.2 cm  PWT:    1.1  0.6 - 1.1 cm  LVIDd:  3.4    3.0 - 5.6 cm  LVIDs:  2.6    1.8 - 4.0 cm  Derived variables:  LVMI: 72 g/m2  RWT: 0.64  Fractional short: 24 %  EF (Teicholtz): 48 %  ------------------------------------------------------------------------  Observations:  Mitral Valve: Mitral annular calcification. The posterior  annulus is heavily calcified. There appears to be  independently mobile material seen at the posterior  annulus. This may represent endocarditis, clinical  correlation indicated. Mild mitral regurgitation.  Aortic Valve/Aorta: Calcified trileaflet aortic valve with  normal opening. Mild-moderate aortic regurgitation.   msec  Aortic Root: 3.4 cm.  Left Atrium: Mildly dilated left atrium.  LA volume index =  37 cc/m2.  Left Ventricle: Endocardium not well visualized; Mild  segmental left ventricular systolic dysfunction. The  base-mid inferior wall, the mid lateral wall, and the basal  inferoseptum are hypokinetic.  Regional wall motion  variation is noted on the setting of atrial fibrillation.  Increased relative wall thickness with normal left  ventricular mass index, consistent with concentric left  ventricular remodeling.  Right Heart: Mild right atrial enlargement. Normal right  ventricular size with mildly  decreased right ventricular  systolic function. Normal tricuspid valve. Mild-moderate  tricuspid regurgitation. Normal pulmonic valve. Minimal  pulmonic regurgitation.  Pericardium/Pleura: Normal pericardium with no pericardial  effusion.  Hemodynamic: Estimated right atrial pressure is 8 mm Hg.  Estimated right ventricular systolic pressure equals 44 mm  Hg, assuming right atrial pressure equals 8 mm Hg,  consistent with mild pulmonary hypertension.  ------------------------------------------------------------------------  Conclusions:  1. Mitral annular calcification. The posterior annulus is  heavily calcified. There appears to be independently mobile  material seen at the posterior annulus. This may represent  endocarditis, clinical correlation indicated.  2. Calcified trileaflet aortic valve with normal opening.  Mild-moderate aortic regurgitation.  msec  3. Increased relative wall thickness with normal left  ventricular mass index, consistent with concentric left  ventricular remodeling.  4. Endocardium not well visualized; Mild segmental left  ventricular systolic dysfunction. The base-mid inferior  wall, the mid lateral wall, and the basal inferoseptum are  hypokinetic.  Regional wall motion variation is noted on  the setting ofatrial fibrillation.  5. Normal right ventricular size with mildly  decreased  right ventricular systolic function.  6. Normal tricuspid valve. Mild-moderate tricuspid  regurgitation.  7. Estimated pulmonary artery systolic pressure equals 44  mm Hg,assuming right atrial pressure equals 8 mm Hg,  consistent with mild pulmonary pressures.  ------------------------------------------------------------------------  Confirmed on  11/22/2016 - 14:45:39 by Jazzy Phillips M.D.  ------------------------------------------------------------------------

## 2023-06-24 LAB
ANION GAP SERPL CALC-SCNC: 0 MMOL/L — LOW (ref 5–17)
BASOPHILS # BLD AUTO: 0 K/UL — SIGNIFICANT CHANGE UP (ref 0–0.2)
BASOPHILS NFR BLD AUTO: 0 % — SIGNIFICANT CHANGE UP (ref 0–2)
BUN SERPL-MCNC: 45 MG/DL — HIGH (ref 7–23)
CALCIUM SERPL-MCNC: 8.7 MG/DL — SIGNIFICANT CHANGE UP (ref 8.5–10.1)
CHLORIDE SERPL-SCNC: 102 MMOL/L — SIGNIFICANT CHANGE UP (ref 96–108)
CO2 SERPL-SCNC: 39 MMOL/L — HIGH (ref 22–31)
CREAT SERPL-MCNC: 1.4 MG/DL — HIGH (ref 0.5–1.3)
EGFR: 34 ML/MIN/1.73M2 — LOW
EOSINOPHIL # BLD AUTO: 0.67 K/UL — HIGH (ref 0–0.5)
EOSINOPHIL NFR BLD AUTO: 9 % — HIGH (ref 0–6)
GLUCOSE SERPL-MCNC: 111 MG/DL — HIGH (ref 70–99)
HCT VFR BLD CALC: 24.8 % — LOW (ref 34.5–45)
HCT VFR BLD CALC: 27.9 % — LOW (ref 34.5–45)
HCT VFR BLD CALC: 28.3 % — LOW (ref 34.5–45)
HGB BLD-MCNC: 7.4 G/DL — LOW (ref 11.5–15.5)
HGB BLD-MCNC: 8.4 G/DL — LOW (ref 11.5–15.5)
HGB BLD-MCNC: 8.6 G/DL — LOW (ref 11.5–15.5)
LYMPHOCYTES # BLD AUTO: 1.12 K/UL — SIGNIFICANT CHANGE UP (ref 1–3.3)
LYMPHOCYTES # BLD AUTO: 15 % — SIGNIFICANT CHANGE UP (ref 13–44)
MCHC RBC-ENTMCNC: 29.8 GM/DL — LOW (ref 32–36)
MCHC RBC-ENTMCNC: 31.4 PG — SIGNIFICANT CHANGE UP (ref 27–34)
MCV RBC AUTO: 105.1 FL — HIGH (ref 80–100)
MONOCYTES # BLD AUTO: 0.67 K/UL — SIGNIFICANT CHANGE UP (ref 0–0.9)
MONOCYTES NFR BLD AUTO: 9 % — SIGNIFICANT CHANGE UP (ref 2–14)
NEUTROPHILS # BLD AUTO: 4.98 K/UL — SIGNIFICANT CHANGE UP (ref 1.8–7.4)
NEUTROPHILS NFR BLD AUTO: 67 % — SIGNIFICANT CHANGE UP (ref 43–77)
NRBC # BLD: SIGNIFICANT CHANGE UP /100 WBCS (ref 0–0)
PLATELET # BLD AUTO: 196 K/UL — SIGNIFICANT CHANGE UP (ref 150–400)
POTASSIUM SERPL-MCNC: 4.6 MMOL/L — SIGNIFICANT CHANGE UP (ref 3.5–5.3)
POTASSIUM SERPL-SCNC: 4.6 MMOL/L — SIGNIFICANT CHANGE UP (ref 3.5–5.3)
RBC # BLD: 2.36 M/UL — LOW (ref 3.8–5.2)
RBC # FLD: 14.9 % — HIGH (ref 10.3–14.5)
SODIUM SERPL-SCNC: 141 MMOL/L — SIGNIFICANT CHANGE UP (ref 135–145)
WBC # BLD: 7.44 K/UL — SIGNIFICANT CHANGE UP (ref 3.8–10.5)
WBC # FLD AUTO: 7.44 K/UL — SIGNIFICANT CHANGE UP (ref 3.8–10.5)

## 2023-06-24 PROCEDURE — 99233 SBSQ HOSP IP/OBS HIGH 50: CPT

## 2023-06-24 PROCEDURE — 99232 SBSQ HOSP IP/OBS MODERATE 35: CPT

## 2023-06-24 RX ORDER — MORPHINE SULFATE 50 MG/1
2 CAPSULE, EXTENDED RELEASE ORAL ONCE
Refills: 0 | Status: DISCONTINUED | OUTPATIENT
Start: 2023-06-24 | End: 2023-06-24

## 2023-06-24 RX ORDER — METOPROLOL TARTRATE 50 MG
25 TABLET ORAL
Refills: 0 | Status: DISCONTINUED | OUTPATIENT
Start: 2023-06-24 | End: 2023-06-28

## 2023-06-24 RX ORDER — ZOLPIDEM TARTRATE 10 MG/1
5 TABLET ORAL AT BEDTIME
Refills: 0 | Status: DISCONTINUED | OUTPATIENT
Start: 2023-06-24 | End: 2023-06-28

## 2023-06-24 RX ADMIN — Medication 1 DROP(S): at 18:14

## 2023-06-24 RX ADMIN — MORPHINE SULFATE 2 MILLIGRAM(S): 50 CAPSULE, EXTENDED RELEASE ORAL at 02:22

## 2023-06-24 RX ADMIN — ONDANSETRON 4 MILLIGRAM(S): 8 TABLET, FILM COATED ORAL at 14:59

## 2023-06-24 RX ADMIN — Medication 3 MILLILITER(S): at 22:46

## 2023-06-24 RX ADMIN — BRIMONIDINE TARTRATE 1 DROP(S): 2 SOLUTION/ DROPS OPHTHALMIC at 18:15

## 2023-06-24 RX ADMIN — Medication 120 MILLIGRAM(S): at 05:10

## 2023-06-24 RX ADMIN — Medication 25 MILLIGRAM(S): at 18:15

## 2023-06-24 RX ADMIN — BUMETANIDE 1 MILLIGRAM(S): 0.25 INJECTION INTRAMUSCULAR; INTRAVENOUS at 05:10

## 2023-06-24 RX ADMIN — Medication 50 MICROGRAM(S): at 05:11

## 2023-06-24 RX ADMIN — Medication 1 DROP(S): at 05:10

## 2023-06-24 RX ADMIN — MORPHINE SULFATE 2 MILLIGRAM(S): 50 CAPSULE, EXTENDED RELEASE ORAL at 02:52

## 2023-06-24 RX ADMIN — Medication 3 MILLILITER(S): at 15:18

## 2023-06-24 RX ADMIN — SENNA PLUS 2 TABLET(S): 8.6 TABLET ORAL at 22:34

## 2023-06-24 RX ADMIN — TRAMADOL HYDROCHLORIDE 25 MILLIGRAM(S): 50 TABLET ORAL at 01:55

## 2023-06-24 RX ADMIN — TRAMADOL HYDROCHLORIDE 25 MILLIGRAM(S): 50 TABLET ORAL at 01:06

## 2023-06-24 RX ADMIN — BUMETANIDE 1 MILLIGRAM(S): 0.25 INJECTION INTRAMUSCULAR; INTRAVENOUS at 18:15

## 2023-06-24 RX ADMIN — BRIMONIDINE TARTRATE 1 DROP(S): 2 SOLUTION/ DROPS OPHTHALMIC at 05:10

## 2023-06-24 RX ADMIN — ZOLPIDEM TARTRATE 5 MILLIGRAM(S): 10 TABLET ORAL at 22:33

## 2023-06-24 RX ADMIN — Medication 3 MILLILITER(S): at 07:47

## 2023-06-24 RX ADMIN — Medication 25 MILLIGRAM(S): at 10:43

## 2023-06-24 RX ADMIN — SIMVASTATIN 40 MILLIGRAM(S): 20 TABLET, FILM COATED ORAL at 22:33

## 2023-06-24 NOTE — PROGRESS NOTE ADULT - ASSESSMENT
89F w/ PMHx of CHFrEF, CAD s/p CABG, CKD, Afib (on apixaban), HTN, moderate AS and pulmonary HTN, chronic sciatica, CVA who presents to the ED for wheezing    HF  OP  OA  hx of Hip Fx  CAD  CKD  valvular heart disease  Pulm HTN  GERD  CVA Hx  AF on AC    ct chest - no lung consolidation  GOC documented - DNR DNI  on BUMEX for Diuresis  enc use of NIV night time and prn - wean Fio2 - VM - keep sat > 88 pct  cm f/u  on tramadol for pain  caution with sedating agents    eval for HF exacerbation  Cardio eval in progress  CXR noted  monitor VS and Sat  keep sat > 88 pct  old records reviewed - spoke with daughter - reviewed TTE -   cvs rx regimen optimization, Diuresis as per Cardio Recs  I and O  dietary discretion  LE doppler - NEG for DVT  GOC discussion

## 2023-06-24 NOTE — PROGRESS NOTE ADULT - SUBJECTIVE AND OBJECTIVE BOX
Patient is a 89y old  Female who presents with a chief complaint of Wheezing (24 Jun 2023 09:17)        INTERVAL HPI/OVERNIGHT EVENTS:   no complaints  pt seen and examined         Vital Signs Last 24 Hrs  T(C): 36.6 (24 Jun 2023 10:40), Max: 36.7 (24 Jun 2023 04:09)  T(F): 97.8 (24 Jun 2023 10:40), Max: 98 (24 Jun 2023 04:09)  HR: 98 (24 Jun 2023 10:40) (86 - 110)  BP: 108/69 (24 Jun 2023 10:40) (108/69 - 147/73)  BP(mean): --  RR: 18 (24 Jun 2023 10:40) (18 - 18)  SpO2: 98% (24 Jun 2023 10:40) (96% - 100%)    Parameters below as of 24 Jun 2023 10:40  Patient On (Oxygen Delivery Method): nasal cannula  O2 Flow (L/min): 4      acetaminophen     Tablet .. 650 milliGRAM(s) Oral every 6 hours PRN  albuterol/ipratropium for Nebulization 3 milliLiter(s) Nebulizer every 8 hours  aluminum hydroxide/magnesium hydroxide/simethicone Suspension 30 milliLiter(s) Oral every 4 hours PRN  brimonidine 0.2% Ophthalmic Solution 1 Drop(s) Both EYES two times a day  buMETAnide Injectable 1 milliGRAM(s) IV Push every 12 hours  diltiazem    milliGRAM(s) Oral daily  levothyroxine 50 MICROGram(s) Oral daily  melatonin 3 milliGRAM(s) Oral at bedtime PRN  metolazone 2.5 milliGRAM(s) Oral once  metoprolol tartrate 25 milliGRAM(s) Oral two times a day  ondansetron Injectable 4 milliGRAM(s) IV Push every 8 hours PRN  polyethylene glycol 3350 17 Gram(s) Oral at bedtime  senna 2 Tablet(s) Oral at bedtime  simvastatin 40 milliGRAM(s) Oral at bedtime  timolol 0.5% Solution 1 Drop(s) Both EYES two times a day  traMADol 25 milliGRAM(s) Oral four times a day PRN  zolpidem 5 milliGRAM(s) Oral at bedtime PRN      PHYSICAL EXAM:  GENERAL: NAD   EYES: conjunctiva and sclera clear  ENMT: Moist mucous membranes  NECK: Supple, No JVD, Normal thyroid  CHEST/LUNG: non labored, cta b/l  HEART: Regular rate and rhythm; No murmurs, rubs, or gallops  ABDOMEN: Soft, Nontender, Nondistended; Bowel sounds present  EXTREMITIES:  2+ Peripheral Pulses, No clubbing, cyanosis, or edema  LYMPH: No lymphadenopathy noted  SKIN: No rashes or lesions    Consultant(s) Notes Reviewed:  [x ] YES  [ ] NO  Care Discussed with Consultants/Other Providers [ x] YES  [ ] NO    LABS:                        7.4    7.44  )-----------( 196      ( 24 Jun 2023 07:06 )             24.8     06-24    141  |  102  |  45<H>  ----------------------------<  111<H>  4.6   |  39<H>  |  1.40<H>    Ca    8.7      24 Jun 2023 07:06        Urinalysis Basic - ( 24 Jun 2023 07:06 )    Color: x / Appearance: x / SG: x / pH: x  Gluc: 111 mg/dL / Ketone: x  / Bili: x / Urobili: x   Blood: x / Protein: x / Nitrite: x   Leuk Esterase: x / RBC: x / WBC x   Sq Epi: x / Non Sq Epi: x / Bacteria: x      CAPILLARY BLOOD GLUCOSE            Urinalysis Basic - ( 24 Jun 2023 07:06 )    Color: x / Appearance: x / SG: x / pH: x  Gluc: 111 mg/dL / Ketone: x  / Bili: x / Urobili: x   Blood: x / Protein: x / Nitrite: x   Leuk Esterase: x / RBC: x / WBC x   Sq Epi: x / Non Sq Epi: x / Bacteria: x          RADIOLOGY & ADDITIONAL TESTS:    Imaging Personally Reviewed  Reviewed consultants input

## 2023-06-24 NOTE — PROGRESS NOTE ADULT - SUBJECTIVE AND OBJECTIVE BOX
Date/Time Patient Seen:  		  Referring MD:   Data Reviewed	       Patient is a 89y old  Female who presents with a chief complaint of Wheezing (24 Jun 2023 08:18)      Subjective/HPI     PAST MEDICAL & SURGICAL HISTORY:  Benign Essential Hypertension    Chronic Sciatica    Personal History of Coronary Artery Disease    Hypertension    Atrial fibrillation    CVA (cerebral vascular accident)    S/P CABG          Medication list         MEDICATIONS  (STANDING):  albuterol/ipratropium for Nebulization 3 milliLiter(s) Nebulizer every 8 hours  brimonidine 0.2% Ophthalmic Solution 1 Drop(s) Both EYES two times a day  buMETAnide Injectable 1 milliGRAM(s) IV Push every 12 hours  diltiazem    milliGRAM(s) Oral daily  levothyroxine 50 MICROGram(s) Oral daily  polyethylene glycol 3350 17 Gram(s) Oral at bedtime  senna 2 Tablet(s) Oral at bedtime  simvastatin 40 milliGRAM(s) Oral at bedtime  timolol 0.5% Solution 1 Drop(s) Both EYES two times a day    MEDICATIONS  (PRN):  acetaminophen     Tablet .. 650 milliGRAM(s) Oral every 6 hours PRN Temp greater or equal to 38C (100.4F), Mild Pain (1 - 3)  aluminum hydroxide/magnesium hydroxide/simethicone Suspension 30 milliLiter(s) Oral every 4 hours PRN Dyspepsia  melatonin 3 milliGRAM(s) Oral at bedtime PRN Insomnia  ondansetron Injectable 4 milliGRAM(s) IV Push every 8 hours PRN Nausea and/or Vomiting  traMADol 25 milliGRAM(s) Oral four times a day PRN Severe Pain (7 - 10)         Vitals log        ICU Vital Signs Last 24 Hrs  T(C): 36.6 (24 Jun 2023 08:54), Max: 37.2 (23 Jun 2023 11:46)  T(F): 97.8 (24 Jun 2023 08:54), Max: 99 (23 Jun 2023 11:46)  HR: 91 (24 Jun 2023 08:54) (80 - 110)  BP: 109/76 (24 Jun 2023 08:54) (109/76 - 156/98)  BP(mean): --  ABP: --  ABP(mean): --  RR: 18 (24 Jun 2023 08:54) (18 - 18)  SpO2: 99% (24 Jun 2023 08:54) (94% - 100%)    O2 Parameters below as of 24 Jun 2023 08:54  Patient On (Oxygen Delivery Method): BiPAP/CPAP                 Input and Output:  I&O's Detail    23 Jun 2023 07:01  -  24 Jun 2023 07:00  --------------------------------------------------------  IN:  Total IN: 0 mL    OUT:    Voided (mL): 550 mL  Total OUT: 550 mL    Total NET: -550 mL          Lab Data                        7.4    7.44  )-----------( 196      ( 24 Jun 2023 07:06 )             24.8     06-24    141  |  102  |  45<H>  ----------------------------<  111<H>  4.6   |  39<H>  |  1.40<H>    Ca    8.7      24 Jun 2023 07:06              Review of Systems	      Objective     Physical Examination    heart s1s2  lung dc BS  head nc      Pertinent Lab findings & Imaging      Burak:  NO   Adequate UO     I&O's Detail    23 Jun 2023 07:01  -  24 Jun 2023 07:00  --------------------------------------------------------  IN:  Total IN: 0 mL    OUT:    Voided (mL): 550 mL  Total OUT: 550 mL    Total NET: -550 mL               Discussed with:     Cultures:	        Radiology

## 2023-06-24 NOTE — PROGRESS NOTE ADULT - SUBJECTIVE AND OBJECTIVE BOX
SUBJECTIVE:  Patient seen and examined at bedside. Overnight events noted. Pt resting comfortably at present, with pt's daughter at the bedside. Pt's daughter inquiring about pt's hgb results and need for transfusion.   Patient denies any fever, chills, chest pain, shortness of breath, nausea, vomiting, or urinary complaints.    VITALS  Vital Signs Last 24 Hrs  T(C): 36.7 (24 Jun 2023 04:09), Max: 37.2 (23 Jun 2023 11:46)  T(F): 98 (24 Jun 2023 04:09), Max: 99 (23 Jun 2023 11:46)  HR: 98 (24 Jun 2023 04:09) (80 - 110)  BP: 123/66 (24 Jun 2023 04:09) (123/66 - 156/98)  BP(mean): --  RR: 18 (24 Jun 2023 04:09) (18 - 18)  SpO2: 99% (24 Jun 2023 04:09) (94% - 100%)    Parameters below as of 24 Jun 2023 04:09  Patient On (Oxygen Delivery Method): BiPAP/CPAP    PHYSICAL EXAM  GENERAL:  Well-nourished, well-developed elderly Female lying comfortably in bed in [facility].  HEENT:  NC/AT. Sclera white. Mucous membranes moist.  EXTREMITIES: B/l LE swelling noted; RLE below knee wrapped w/ ACE dressing with protuberant mass underneath. Dressing remains dry, no drainage from dressing; Directly below knee, skin appears slightly violaceous.   SKIN:  No jaundice, pallor, or cyanosis  NEURO:  A&O x 3    INTAKE & OUTPUT  I&O's Summary    23 Jun 2023 07:01  -  24 Jun 2023 07:00  --------------------------------------------------------  IN: 0 mL / OUT: 550 mL / NET: -550 mL    I&O's Detail    23 Jun 2023 07:01  -  24 Jun 2023 07:00  --------------------------------------------------------  IN:  Total IN: 0 mL    OUT:    Voided (mL): 550 mL  Total OUT: 550 mL    Total NET: -550 mL    MEDICATIONS  MEDICATIONS  (STANDING):  albuterol/ipratropium for Nebulization 3 milliLiter(s) Nebulizer every 8 hours  brimonidine 0.2% Ophthalmic Solution 1 Drop(s) Both EYES two times a day  buMETAnide Injectable 1 milliGRAM(s) IV Push every 12 hours  diltiazem    milliGRAM(s) Oral daily  levothyroxine 50 MICROGram(s) Oral daily  polyethylene glycol 3350 17 Gram(s) Oral at bedtime  senna 2 Tablet(s) Oral at bedtime  simvastatin 40 milliGRAM(s) Oral at bedtime  timolol 0.5% Solution 1 Drop(s) Both EYES two times a day    MEDICATIONS  (PRN):  acetaminophen     Tablet .. 650 milliGRAM(s) Oral every 6 hours PRN Temp greater or equal to 38C (100.4F), Mild Pain (1 - 3)  aluminum hydroxide/magnesium hydroxide/simethicone Suspension 30 milliLiter(s) Oral every 4 hours PRN Dyspepsia  melatonin 3 milliGRAM(s) Oral at bedtime PRN Insomnia  ondansetron Injectable 4 milliGRAM(s) IV Push every 8 hours PRN Nausea and/or Vomiting  traMADol 25 milliGRAM(s) Oral four times a day PRN Severe Pain (7 - 10)    LABS:                      7.4    7.44  )-----------( 196      ( 24 Jun 2023 07:06 )             24.8     06-23    142  |  103  |  47<H>  ----------------------------<  103<H>  4.6   |  38<H>  |  1.20    Ca    9.0      23 Jun 2023 06:30    ASSESSMENT & PLAN   89y Female a/w CHF exacerbation, RRT 6/23 called for significant bleeding from RLE hematoma.   VSS this AM. Downtrending Hgb noted.    - Continue diet  - Monitor H/H; transfuse prn  - Hold AC  - Maintain current pressure dressing  - To continue current conservative management, would not recommend evacuation of hematoma.

## 2023-06-24 NOTE — PROGRESS NOTE ADULT - SUBJECTIVE AND OBJECTIVE BOX
[facility] -- Heather Roberts Pannella, Patel, Savella, Goodger: Office # [phone]    Follow Up:  CHF     Subjective/Observations: Patient seen and examined, awake, alert, resting comfortably in bed, denies chest pain, palpitations or dizziness. Tolerating VM, Continues to be in A fib on tele     REVIEW OF SYSTEMS: All other review of systems are negative unless indicated above    PAST MEDICAL & SURGICAL HISTORY:  Benign Essential Hypertension      Chronic Sciatica      Personal History of Coronary Artery Disease      Hypertension      Hypertension      Atrial fibrillation      CVA (cerebral vascular accident)      S/P CABG      MEDICATIONS  (STANDING):  albuterol/ipratropium for Nebulization 3 milliLiter(s) Nebulizer every 8 hours  brimonidine 0.2% Ophthalmic Solution 1 Drop(s) Both EYES two times a day  buMETAnide Injectable 1 milliGRAM(s) IV Push every 12 hours  diltiazem    milliGRAM(s) Oral daily  levothyroxine 50 MICROGram(s) Oral daily  polyethylene glycol 3350 17 Gram(s) Oral at bedtime  senna 2 Tablet(s) Oral at bedtime  simvastatin 40 milliGRAM(s) Oral at bedtime  timolol 0.5% Solution 1 Drop(s) Both EYES two times a day    MEDICATIONS  (PRN):  acetaminophen     Tablet .. 650 milliGRAM(s) Oral every 6 hours PRN Temp greater or equal to 38C (100.4F), Mild Pain (1 - 3)  aluminum hydroxide/magnesium hydroxide/simethicone Suspension 30 milliLiter(s) Oral every 4 hours PRN Dyspepsia  melatonin 3 milliGRAM(s) Oral at bedtime PRN Insomnia  ondansetron Injectable 4 milliGRAM(s) IV Push every 8 hours PRN Nausea and/or Vomiting  traMADol 25 milliGRAM(s) Oral four times a day PRN Severe Pain (7 - 10)    Allergies  No Known Allergies    Vital Signs Last 24 Hrs  T(C): 36.6 (24 Jun 2023 08:54), Max: 37.2 (23 Jun 2023 11:46)  T(F): 97.8 (24 Jun 2023 08:54), Max: 99 (23 Jun 2023 11:46)  HR: 91 (24 Jun 2023 08:54) (80 - 110)  BP: 109/76 (24 Jun 2023 08:54) (109/76 - 156/98)  BP(mean): --  RR: 18 (24 Jun 2023 08:54) (18 - 18)  SpO2: 99% (24 Jun 2023 08:54) (94% - 100%)    Parameters below as of 24 Jun 2023 08:54  Patient On (Oxygen Delivery Method): BiPAP/CPAP      I&O's Summary    23 Jun 2023 07:01  -  24 Jun 2023 07:00  --------------------------------------------------------  IN: 0 mL / OUT: 550 mL / NET: -550 mL          TELE: A fib 100-120s   PHYSICAL EXAM:  Constitutional: NAD, awake and alert  HEENT: Moist Mucous Membranes, Anicteric  Pulmonary: Non-labored, breath sounds are clear bilaterally, Diminished at bases No wheezing, rales or rhonchi  Cardiovascular: Regular, S1 and S2, + murmurs, No rubs, gallops or clicks  Gastrointestinal:  soft, nontender, nondistended   Lymph: trace LE peripheral edema. No lymphadenopathy.   Skin: No visible rashes or ulcers. RLE DSD  Psych:  Mood & affect appropriate      LABS: All Labs Reviewed:                        7.4    7.44  )-----------( 196      ( 24 Jun 2023 07:06 )             24.8                         8.4    x     )-----------( x        ( 24 Jun 2023 00:02 )             28.3                         8.0    x     )-----------( x        ( 23 Jun 2023 20:05 )             26.5     24 Jun 2023 07:06    141    |  102    |  45     ----------------------------<  111    4.6     |  39     |  1.40   23 Jun 2023 06:30    142    |  103    |  47     ----------------------------<  103    4.6     |  38     |  1.20   22 Jun 2023 09:16    143    |  102    |  52     ----------------------------<  109    4.7     |  40     |  1.20     Ca    8.7        24 Jun 2023 07:06  Ca    9.0        23 Jun 2023 06:30  Ca    8.9        22 Jun 2023 09:16       Troponin I, High Sensitivity Result: 34.1 ng/L (06-16-23 @ 15:20)    12 Lead ECG:   Ventricular Rate 102 BPM    QRS Duration 114 ms    Q-T Interval 398 ms    QTC Calculation(Bazett) 518 ms    R Axis 0 degrees    T Axis -18 degrees    Diagnosis Line Atrial fibrillation with rapid ventricular response  Low voltage QRS  Right bundle branch block  ST & T wave abnormality, consider anterolateral ischemia  Abnormal ECG  When compared with ECG of 14-MAY-2023 14:02,  No significant change was found  Confirmed by VELMA ELLSWORTH ([id number]) on 6/17/2023 5:32:50 PM (06-16-23 @ 13:56)      Patient name: JIMMIE BRIAN  YOB: 1926   Age: 89 (F)   MR#: [id number]  Study Date: 11/22/2016  [facility]  Study quality: Technically difficult  Referring Physician: Sally Phipps MD  Blood Pressure: 153/78 mmHg  Height: 140 cm  Weight: 60 kg  BSA: 1.5 m2  ------------------------------------------------------------------------  PROCEDURE: Transthoracic echocardiogram with 2-D, M-Mode  and complete spectral and color flow Doppler.  INDICATION: Unspecified atrial fibrillation (I48.91)  ------------------------------------------------------------------------  Dimensions:    Normal Values:  LA:     4.6    2.0 - 4.0 cm  Ao:     3.4    2.0 - 3.8 cm  SEPTUM: 1.0    0.6 - 1.2 cm  PWT:    1.1  0.6 - 1.1 cm  LVIDd:  3.4    3.0 - 5.6 cm  LVIDs:  2.6    1.8 - 4.0 cm  Derived variables:  LVMI: 72 g/m2  RWT: 0.64  Fractional short: 24 %  EF (Teicholtz): 48 %  ------------------------------------------------------------------------  Observations:  Mitral Valve: Mitral annular calcification. The posterior  annulus is heavily calcified. There appears to be  independently mobile material seen at the posterior  annulus. This may represent endocarditis, clinical  correlation indicated. Mild mitral regurgitation.  Aortic Valve/Aorta: Calcified trileaflet aortic valve with  normal opening. Mild-moderate aortic regurgitation.   msec  Aortic Root: 3.4 cm.  Left Atrium: Mildly dilated left atrium.  LA volume index =  37 cc/m2.  Left Ventricle: Endocardium not well visualized; Mild  segmental left ventricular systolic dysfunction. The  base-mid inferior wall, the mid lateral wall, and the basal  inferoseptum are hypokinetic.  Regional wall motion  variation is noted on the setting of atrial fibrillation.  Increased relative wall thickness with normal left  ventricular mass index, consistent with concentric left  ventricular remodeling.  Right Heart: Mild right atrial enlargement. Normal right  ventricular size with mildly  decreased right ventricular  systolic function. Normal tricuspid valve. Mild-moderate  tricuspid regurgitation. Normal pulmonic valve. Minimal  pulmonic regurgitation.  Pericardium/Pleura: Normal pericardium with no pericardial  effusion.  Hemodynamic: Estimated right atrial pressure is 8 mm Hg.  Estimated right ventricular systolic pressure equals 44 mm  Hg, assuming right atrial pressure equals 8 mm Hg,  consistent with mild pulmonary hypertension.  ------------------------------------------------------------------------  Conclusions:  1. Mitral annular calcification. The posterior annulus is  heavily calcified. There appears to be independently mobile  material seen at the posterior annulus. This may represent  endocarditis, clinical correlation indicated.  2. Calcified trileaflet aortic valve with normal opening.  Mild-moderate aortic regurgitation.  msec  3. Increased relative wall thickness with normal left  ventricular mass index, consistent with concentric left  ventricular remodeling.  4. Endocardium not well visualized; Mild segmental left  ventricular systolic dysfunction. The base-mid inferior  wall, the mid lateral wall, and the basal inferoseptum are  hypokinetic.  Regional wall motion variation is noted on  the setting ofatrial fibrillation.  5. Normal right ventricular size with mildly  decreased  right ventricular systolic function.  6. Normal tricuspid valve. Mild-moderate tricuspid  regurgitation.  7. Estimated pulmonary artery systolic pressure equals 44  mm Hg,assuming right atrial pressure equals 8 mm Hg,  consistent with mild pulmonary pressures.  ------------------------------------------------------------------------  Confirmed on  11/22/2016 - 14:45:39 by Jazzy Phillips M.D.  ------------------------------------------------------------------------

## 2023-06-24 NOTE — PROGRESS NOTE ADULT - ASSESSMENT
89 yo F with diastolic HF, CAD s/p CABG, RBBB, CKD, atrial fibrillation (on apixaban), HTN, moderate AS and pulmonary HTN, chronic sciatica, CVA who was recently hospitalized  for fall sp IMN 5/14 s/p and discharge on 5/19/23 who is presented to ED  for fatigue and wheezing    ADHF, CAD, CABG, VHD, RBBB, A fib, HTN, Pulm HTN  - a/f acute hypoxic and hypercapnic respiratory failure  - CT chest with small right pleural effusion, pulm following   - TTE 2016: EF 48%, f/u TTE   - BNP:  <--4522  - Remains volume overloaded on exam, with improvement, now on VM, continue to wean as tolerated  - pt admits to breathing and feeling much better,   - Continue Bumex IV, 1 mg IV BID  - Creatinine: 1.40; Bicarb: 39  - Continue to maintain strict I/Os, monitor daily weights, Cr, and K.     - Known CAD sp CABG  - EKG: Afib 102 with no acute  ischemic changes   - Troponin: <-34.1  - No evidence of any active ischemia   - Continue ASA, statin     - Known A fib, on apixaban , renally dosed has CKD   - Tele with A fib 100-120s  - Continue Diltiazem 120 mg PO daily  - Would add Metoprolol 25 mg PO BID     - Monitor and replete lytes, keep K>4, Mg>2.  - Will continue to follow.    Chavez Mendoza, MS FNP, [facility]  Nurse Practitioner- Cardiology   Call teams (preferred)  Spectra #[phone] /[phone]

## 2023-06-24 NOTE — PROGRESS NOTE ADULT - ASSESSMENT
89F w/ PMHx of CHFrEF, CAD s/p CABG, CKD, Afib (on apixaban), HTN, moderate AS and pulmonary HTN, chronic sciatica, CVA who presents to the ED for wheezing. pt w/ ADHF.   Ct chest- small rt pl effusion    #ADHF w acute hypoxic and  hypercapnea respiratory failure  cont bumex  strict I/Os, monitor daily weights, Cr, and K.  rpt CXR- atelectasis and small effusion  cards and pulm following  chronic co2 retention  decreased urine output  given transfusion today will give a dose of metolazone    #AF controlled for now.   cont cardizem   hold eliquis    #wheezing- likely cardiac origin- CHF exac  prn nebs  diuresis  pulm fu noted    ruptured hematoma/anemia  hold asa/eliquis  bleeding controlled with ace wrap  surgery following  trend h/h  pain control  unit of prbcs today

## 2023-06-25 DIAGNOSIS — T14.8XXA OTHER INJURY OF UNSPECIFIED BODY REGION, INITIAL ENCOUNTER: ICD-10-CM

## 2023-06-25 LAB
ANION GAP SERPL CALC-SCNC: 2 MMOL/L — LOW (ref 5–17)
BASOPHILS # BLD AUTO: 0.02 K/UL — SIGNIFICANT CHANGE UP (ref 0–0.2)
BASOPHILS NFR BLD AUTO: 0.2 % — SIGNIFICANT CHANGE UP (ref 0–2)
BUN SERPL-MCNC: 43 MG/DL — HIGH (ref 7–23)
CALCIUM SERPL-MCNC: 8.7 MG/DL — SIGNIFICANT CHANGE UP (ref 8.5–10.1)
CHLORIDE SERPL-SCNC: 102 MMOL/L — SIGNIFICANT CHANGE UP (ref 96–108)
CO2 SERPL-SCNC: 39 MMOL/L — HIGH (ref 22–31)
CREAT SERPL-MCNC: 1.4 MG/DL — HIGH (ref 0.5–1.3)
EGFR: 34 ML/MIN/1.73M2 — LOW
EOSINOPHIL # BLD AUTO: 0.57 K/UL — HIGH (ref 0–0.5)
EOSINOPHIL NFR BLD AUTO: 7.1 % — HIGH (ref 0–6)
GLUCOSE SERPL-MCNC: 99 MG/DL — SIGNIFICANT CHANGE UP (ref 70–99)
HCT VFR BLD CALC: 26.7 % — LOW (ref 34.5–45)
HCT VFR BLD CALC: 27.8 % — LOW (ref 34.5–45)
HGB BLD-MCNC: 8 G/DL — LOW (ref 11.5–15.5)
HGB BLD-MCNC: 8.3 G/DL — LOW (ref 11.5–15.5)
IMM GRANULOCYTES NFR BLD AUTO: 0.5 % — SIGNIFICANT CHANGE UP (ref 0–0.9)
LYMPHOCYTES # BLD AUTO: 1.1 K/UL — SIGNIFICANT CHANGE UP (ref 1–3.3)
LYMPHOCYTES # BLD AUTO: 13.6 % — SIGNIFICANT CHANGE UP (ref 13–44)
MCHC RBC-ENTMCNC: 29.9 GM/DL — LOW (ref 32–36)
MCHC RBC-ENTMCNC: 30.5 PG — SIGNIFICANT CHANGE UP (ref 27–34)
MCV RBC AUTO: 102.2 FL — HIGH (ref 80–100)
MONOCYTES # BLD AUTO: 0.96 K/UL — HIGH (ref 0–0.9)
MONOCYTES NFR BLD AUTO: 11.9 % — SIGNIFICANT CHANGE UP (ref 2–14)
NEUTROPHILS # BLD AUTO: 5.37 K/UL — SIGNIFICANT CHANGE UP (ref 1.8–7.4)
NEUTROPHILS NFR BLD AUTO: 66.7 % — SIGNIFICANT CHANGE UP (ref 43–77)
NRBC # BLD: 0 /100 WBCS — SIGNIFICANT CHANGE UP (ref 0–0)
PLATELET # BLD AUTO: 178 K/UL — SIGNIFICANT CHANGE UP (ref 150–400)
POTASSIUM SERPL-MCNC: 4.6 MMOL/L — SIGNIFICANT CHANGE UP (ref 3.5–5.3)
POTASSIUM SERPL-SCNC: 4.6 MMOL/L — SIGNIFICANT CHANGE UP (ref 3.5–5.3)
RBC # BLD: 2.72 M/UL — LOW (ref 3.8–5.2)
RBC # FLD: 17.6 % — HIGH (ref 10.3–14.5)
SODIUM SERPL-SCNC: 143 MMOL/L — SIGNIFICANT CHANGE UP (ref 135–145)
WBC # BLD: 8.06 K/UL — SIGNIFICANT CHANGE UP (ref 3.8–10.5)
WBC # FLD AUTO: 8.06 K/UL — SIGNIFICANT CHANGE UP (ref 3.8–10.5)

## 2023-06-25 PROCEDURE — 99233 SBSQ HOSP IP/OBS HIGH 50: CPT

## 2023-06-25 PROCEDURE — 99232 SBSQ HOSP IP/OBS MODERATE 35: CPT

## 2023-06-25 RX ADMIN — TRAMADOL HYDROCHLORIDE 25 MILLIGRAM(S): 50 TABLET ORAL at 10:03

## 2023-06-25 RX ADMIN — ZOLPIDEM TARTRATE 5 MILLIGRAM(S): 10 TABLET ORAL at 21:33

## 2023-06-25 RX ADMIN — Medication 3 MILLILITER(S): at 14:54

## 2023-06-25 RX ADMIN — Medication 1 DROP(S): at 18:11

## 2023-06-25 RX ADMIN — BRIMONIDINE TARTRATE 1 DROP(S): 2 SOLUTION/ DROPS OPHTHALMIC at 06:01

## 2023-06-25 RX ADMIN — POLYETHYLENE GLYCOL 3350 17 GRAM(S): 17 POWDER, FOR SOLUTION ORAL at 21:33

## 2023-06-25 RX ADMIN — Medication 25 MILLIGRAM(S): at 18:11

## 2023-06-25 RX ADMIN — SIMVASTATIN 40 MILLIGRAM(S): 20 TABLET, FILM COATED ORAL at 21:33

## 2023-06-25 RX ADMIN — BUMETANIDE 1 MILLIGRAM(S): 0.25 INJECTION INTRAMUSCULAR; INTRAVENOUS at 18:11

## 2023-06-25 RX ADMIN — Medication 3 MILLILITER(S): at 07:30

## 2023-06-25 RX ADMIN — BRIMONIDINE TARTRATE 1 DROP(S): 2 SOLUTION/ DROPS OPHTHALMIC at 18:12

## 2023-06-25 RX ADMIN — Medication 50 MICROGRAM(S): at 06:01

## 2023-06-25 RX ADMIN — BUMETANIDE 1 MILLIGRAM(S): 0.25 INJECTION INTRAMUSCULAR; INTRAVENOUS at 06:01

## 2023-06-25 RX ADMIN — Medication 1 DROP(S): at 06:01

## 2023-06-25 RX ADMIN — Medication 3 MILLILITER(S): at 23:24

## 2023-06-25 RX ADMIN — TRAMADOL HYDROCHLORIDE 25 MILLIGRAM(S): 50 TABLET ORAL at 10:45

## 2023-06-25 RX ADMIN — Medication 25 MILLIGRAM(S): at 06:01

## 2023-06-25 RX ADMIN — Medication 120 MILLIGRAM(S): at 06:01

## 2023-06-25 RX ADMIN — SENNA PLUS 2 TABLET(S): 8.6 TABLET ORAL at 21:33

## 2023-06-25 NOTE — PROGRESS NOTE ADULT - ASSESSMENT
89 yo F with diastolic HF, CAD s/p CABG, RBBB, CKD, atrial fibrillation (on apixaban), HTN, moderate AS and pulmonary HTN, chronic sciatica, CVA who was recently hospitalized  for fall sp IMN 5/14 s/p and discharge on 5/19/23 who is presented to ED  for fatigue and wheezing a/w CHF, A fib RVR    ADHF, CAD, CABG, VHD, RBBB, A fib, HTN, Pulm HTN  - a/f acute hypoxic and hypercapnic respiratory failure  - CT chest with small right pleural effusion, pulm following   - TTE 2016: EF 48%, f/u TTE   - BNP:  <--4522  - Volume status improving, now on NC continue to wean as tolerated  - Continue Bumex IV, 1 mg IV BID. S/p metolazone 2.5 x 1 6/24 after transfusion.   - Creatinine: 1.40  - Continue to maintain strict I/Os, monitor daily weights, Cr, and K.     - Known CAD sp CABG  - EKG: Afib 102 with no acute  ischemic changes   - Troponin: <-34.1  - No evidence of any active ischemia   - Continue ASA, statin     - Known A fib, on apixaban , renally dosed has CKD   - Tele with A fib 80-100s  - Continue Diltiazem 120 mg PO daily  - Rates improved after adding Metoprolol 25 mg PO BID     - Monitor and replete lytes, keep K>4, Mg>2.  - Will continue to follow.    Chavez Mendoza, MS FNP, [facility]  Nurse Practitioner- Cardiology   Call teams (preferred)  Spectra #[phone] /[phone]

## 2023-06-25 NOTE — PROGRESS NOTE ADULT - SUBJECTIVE AND OBJECTIVE BOX
[facility] -- Heather Roberts Pannella, Patel, Savella, Goodger: Office # [phone]    Follow Up:  CHF     Subjective/Observations: Patient seen and examined, awake, alert, resting comfortably in bed, denies chest pain, palpitations or dizziness. Tolerating VM, Continues to be in A fib on tele     REVIEW OF SYSTEMS: All other review of systems are negative unless indicated above    PAST MEDICAL & SURGICAL HISTORY:  Benign Essential Hypertension      Chronic Sciatica      Personal History of Coronary Artery Disease      Hypertension      Hypertension      Atrial fibrillation      CVA (cerebral vascular accident)      S/P CABG    MEDICATIONS  (STANDING):  albuterol/ipratropium for Nebulization 3 milliLiter(s) Nebulizer every 8 hours  brimonidine 0.2% Ophthalmic Solution 1 Drop(s) Both EYES two times a day  buMETAnide Injectable 1 milliGRAM(s) IV Push every 12 hours  diltiazem    milliGRAM(s) Oral daily  levothyroxine 50 MICROGram(s) Oral daily  metoprolol tartrate 25 milliGRAM(s) Oral two times a day  polyethylene glycol 3350 17 Gram(s) Oral at bedtime  senna 2 Tablet(s) Oral at bedtime  simvastatin 40 milliGRAM(s) Oral at bedtime  timolol 0.5% Solution 1 Drop(s) Both EYES two times a day    MEDICATIONS  (PRN):  acetaminophen     Tablet .. 650 milliGRAM(s) Oral every 6 hours PRN Temp greater or equal to 38C (100.4F), Mild Pain (1 - 3)  aluminum hydroxide/magnesium hydroxide/simethicone Suspension 30 milliLiter(s) Oral every 4 hours PRN Dyspepsia  melatonin 3 milliGRAM(s) Oral at bedtime PRN Insomnia  ondansetron Injectable 4 milliGRAM(s) IV Push every 8 hours PRN Nausea and/or Vomiting  traMADol 25 milliGRAM(s) Oral four times a day PRN Severe Pain (7 - 10)  zolpidem 5 milliGRAM(s) Oral at bedtime PRN Insomnia    Allergies  No Known Allergies    Vital Signs Last 24 Hrs  T(C): 36.4 (25 Jun 2023 04:26), Max: 36.7 (24 Jun 2023 20:30)  T(F): 97.5 (25 Jun 2023 04:26), Max: 98.1 (24 Jun 2023 20:30)  HR: 80 (25 Jun 2023 07:30) (65 - 99)  BP: 132/85 (25 Jun 2023 04:26) (100/53 - 132/85)  BP(mean): --  RR: 16 (25 Jun 2023 05:58) (16 - 19)  SpO2: 100% (25 Jun 2023 07:30) (94% - 100%)    Parameters below as of 25 Jun 2023 07:30  Patient On (Oxygen Delivery Method): nasal cannula      I&O's Summary    24 Jun 2023 07:01  -  25 Jun 2023 07:00  --------------------------------------------------------  IN: 0 mL / OUT: 600 mL / NET: -600 mL    TELE: A fib 80-100s  PHYSICAL EXAM:  Constitutional: NAD, awake and alert  HEENT: Moist Mucous Membranes, Anicteric  Pulmonary: Non-labored, breath sounds are clear bilaterally, Diminished at bases No wheezing, rales or rhonchi  Cardiovascular: Irregular, S1 and S2, + murmurs, No rubs, gallops or clicks  Gastrointestinal:  soft, nontender, nondistended   Lymph: No peripheral edema. No lymphadenopathy.   Skin: No visible rashes or ulcers. RLE DSD  Psych:  Mood & affect appropriate      LABS: All Labs Reviewed:                        8.3    8.06  )-----------( 178      ( 25 Jun 2023 07:08 )             27.8                         8.6    x     )-----------( x        ( 24 Jun 2023 17:35 )             27.9                         7.4    7.44  )-----------( 196      ( 24 Jun 2023 07:06 )             24.8     25 Jun 2023 07:08    143    |  102    |  43     ----------------------------<  99     4.6     |  39     |  1.40   24 Jun 2023 07:06    141    |  102    |  45     ----------------------------<  111    4.6     |  39     |  1.40   23 Jun 2023 06:30    142    |  103    |  47     ----------------------------<  103    4.6     |  38     |  1.20     Ca    8.7        25 Jun 2023 07:08  Ca    8.7        24 Jun 2023 07:06  Ca    9.0        23 Jun 2023 06:30       Troponin I, High Sensitivity Result: 34.1 ng/L (06-16-23 @ 15:20)    12 Lead ECG:   Ventricular Rate 102 BPM    QRS Duration 114 ms    Q-T Interval 398 ms    QTC Calculation(Bazett) 518 ms    R Axis 0 degrees    T Axis -18 degrees    Diagnosis Line Atrial fibrillation with rapid ventricular response  Low voltage QRS  Right bundle branch block  ST & T wave abnormality, consider anterolateral ischemia  Abnormal ECG  When compared with ECG of 14-MAY-2023 14:02,  No significant change was found  Confirmed by VELMA ELLSWORTH ([id number]) on 6/17/2023 5:32:50 PM (06-16-23 @ 13:56)      Patient name: JIMMIE BRIAN  YOB: 1926   Age: 89 (F)   MR#: [id number]  Study Date: 11/22/2016  [facility]  Study quality: Technically difficult  Referring Physician: Sally Phipps MD  Blood Pressure: 153/78 mmHg  Height: 140 cm  Weight: 60 kg  BSA: 1.5 m2  ------------------------------------------------------------------------  PROCEDURE: Transthoracic echocardiogram with 2-D, M-Mode  and complete spectral and color flow Doppler.  INDICATION: Unspecified atrial fibrillation (I48.91)  ------------------------------------------------------------------------  Dimensions:    Normal Values:  LA:     4.6    2.0 - 4.0 cm  Ao:     3.4    2.0 - 3.8 cm  SEPTUM: 1.0    0.6 - 1.2 cm  PWT:    1.1  0.6 - 1.1 cm  LVIDd:  3.4    3.0 - 5.6 cm  LVIDs:  2.6    1.8 - 4.0 cm  Derived variables:  LVMI: 72 g/m2  RWT: 0.64  Fractional short: 24 %  EF (Teicholtz): 48 %  ------------------------------------------------------------------------  Observations:  Mitral Valve: Mitral annular calcification. The posterior  annulus is heavily calcified. There appears to be  independently mobile material seen at the posterior  annulus. This may represent endocarditis, clinical  correlation indicated. Mild mitral regurgitation.  Aortic Valve/Aorta: Calcified trileaflet aortic valve with  normal opening. Mild-moderate aortic regurgitation.   msec  Aortic Root: 3.4 cm.  Left Atrium: Mildly dilated left atrium.  LA volume index =  37 cc/m2.  Left Ventricle: Endocardium not well visualized; Mild  segmental left ventricular systolic dysfunction. The  base-mid inferior wall, the mid lateral wall, and the basal  inferoseptum are hypokinetic.  Regional wall motion  variation is noted on the setting of atrial fibrillation.  Increased relative wall thickness with normal left  ventricular mass index, consistent with concentric left  ventricular remodeling.  Right Heart: Mild right atrial enlargement. Normal right  ventricular size with mildly  decreased right ventricular  systolic function. Normal tricuspid valve. Mild-moderate  tricuspid regurgitation. Normal pulmonic valve. Minimal  pulmonic regurgitation.  Pericardium/Pleura: Normal pericardium with no pericardial  effusion.  Hemodynamic: Estimated right atrial pressure is 8 mm Hg.  Estimated right ventricular systolic pressure equals 44 mm  Hg, assuming right atrial pressure equals 8 mm Hg,  consistent with mild pulmonary hypertension.  ------------------------------------------------------------------------  Conclusions:  1. Mitral annular calcification. The posterior annulus is  heavily calcified. There appears to be independently mobile  material seen at the posterior annulus. This may represent  endocarditis, clinical correlation indicated.  2. Calcified trileaflet aortic valve with normal opening.  Mild-moderate aortic regurgitation.  msec  3. Increased relative wall thickness with normal left  ventricular mass index, consistent with concentric left  ventricular remodeling.  4. Endocardium not well visualized; Mild segmental left  ventricular systolic dysfunction. The base-mid inferior  wall, the mid lateral wall, and the basal inferoseptum are  hypokinetic.  Regional wall motion variation is noted on  the setting ofatrial fibrillation.  5. Normal right ventricular size with mildly  decreased  right ventricular systolic function.  6. Normal tricuspid valve. Mild-moderate tricuspid  regurgitation.  7. Estimated pulmonary artery systolic pressure equals 44  mm Hg,assuming right atrial pressure equals 8 mm Hg,  consistent with mild pulmonary pressures.  ------------------------------------------------------------------------  Confirmed on  11/22/2016 - 14:45:39 by Jazzy Phillips M.D.  ------------------------------------------------------------------------

## 2023-06-25 NOTE — PROGRESS NOTE ADULT - PROBLEM SELECTOR PLAN 1
Continue to keep leg wrapped.  Plan for surgical team to take down dressing and assess wound/re dress   Currently off AC  Regular/dash diet  No ABX  AM labs pending- continue to monitor H/H  Transfuse prn  Will discuss with Dr. Yañez (Covering for Dr. Gomez)

## 2023-06-25 NOTE — PROVIDER CONTACT NOTE (OTHER) - REASON
[facility] [place] call got from Tele tech
Pt desat to 81% on 4L NC
Pt with 10/10 R leg pain after tramadol 25 mg
pt confused this am
Pt with audible wheezing

## 2023-06-25 NOTE — PROGRESS NOTE ADULT - SUBJECTIVE AND OBJECTIVE BOX
Hospital day: 9    89y Female admitted with Shortness of breath  A/W ruptured right leg hematoma.       Patient seen and examined bedside resting comfortably.  No complaints offered.   Currently on a venti mask resting.  No complaints.  No overnight events.       T(F): 97.5 (06-25-23 @ 04:26), Max: 98.1 (06-24-23 @ 20:30)  HR: 88 (06-25-23 @ 04:26) (65 - 109)  BP: 132/85 (06-25-23 @ 04:26) (100/53 - 132/85)  RR: 16 (06-25-23 @ 05:58) (16 - 19)  SpO2: 98% (06-25-23 @ 05:58) (94% - 100%)  Wt(kg): --  CAPILLARY BLOOD GLUCOSE          PHYSICAL EXAM:  General: NAD  Neuro:  Alert   Extremities: right leg with bandage in place.  Noted ecchymosis up to the knee.  Palpable DP.        LABS:                        8.6    x     )-----------( x        ( 24 Jun 2023 17:35 )             27.9     06-24    141  |  102  |  45<H>  ----------------------------<  111<H>  4.6   |  39<H>  |  1.40<H>    Ca    8.7      24 Jun 2023 07:06        I&O's Detail    24 Jun 2023 07:01  -  25 Jun 2023 07:00  --------------------------------------------------------  IN:  Total IN: 0 mL    OUT:    Voided (mL): 600 mL  Total OUT: 600 mL    Total NET: -600 mL            RADIOLOGY:

## 2023-06-25 NOTE — PROGRESS NOTE ADULT - SUBJECTIVE AND OBJECTIVE BOX
Date/Time Patient Seen:  		  Referring MD:   Data Reviewed	       Patient is a 89y old  Female who presents with a chief complaint of Wheezing (24 Jun 2023 14:14)      Subjective/HPI     PAST MEDICAL & SURGICAL HISTORY:  Benign Essential Hypertension    Chronic Sciatica    Personal History of Coronary Artery Disease    Hypertension    Atrial fibrillation    CVA (cerebral vascular accident)    S/P CABG          Medication list         MEDICATIONS  (STANDING):  albuterol/ipratropium for Nebulization 3 milliLiter(s) Nebulizer every 8 hours  brimonidine 0.2% Ophthalmic Solution 1 Drop(s) Both EYES two times a day  buMETAnide Injectable 1 milliGRAM(s) IV Push every 12 hours  diltiazem    milliGRAM(s) Oral daily  levothyroxine 50 MICROGram(s) Oral daily  metoprolol tartrate 25 milliGRAM(s) Oral two times a day  polyethylene glycol 3350 17 Gram(s) Oral at bedtime  senna 2 Tablet(s) Oral at bedtime  simvastatin 40 milliGRAM(s) Oral at bedtime  timolol 0.5% Solution 1 Drop(s) Both EYES two times a day    MEDICATIONS  (PRN):  acetaminophen     Tablet .. 650 milliGRAM(s) Oral every 6 hours PRN Temp greater or equal to 38C (100.4F), Mild Pain (1 - 3)  aluminum hydroxide/magnesium hydroxide/simethicone Suspension 30 milliLiter(s) Oral every 4 hours PRN Dyspepsia  melatonin 3 milliGRAM(s) Oral at bedtime PRN Insomnia  ondansetron Injectable 4 milliGRAM(s) IV Push every 8 hours PRN Nausea and/or Vomiting  traMADol 25 milliGRAM(s) Oral four times a day PRN Severe Pain (7 - 10)  zolpidem 5 milliGRAM(s) Oral at bedtime PRN Insomnia         Vitals log        ICU Vital Signs Last 24 Hrs  T(C): 36.4 (25 Jun 2023 04:26), Max: 36.7 (24 Jun 2023 20:30)  T(F): 97.5 (25 Jun 2023 04:26), Max: 98.1 (24 Jun 2023 20:30)  HR: 88 (25 Jun 2023 04:26) (65 - 109)  BP: 132/85 (25 Jun 2023 04:26) (100/53 - 132/85)  BP(mean): --  ABP: --  ABP(mean): --  RR: 16 (25 Jun 2023 05:58) (16 - 19)  SpO2: 98% (25 Jun 2023 05:58) (94% - 100%)    O2 Parameters below as of 25 Jun 2023 05:58  Patient On (Oxygen Delivery Method): mask, Venturi  O2 Flow (L/min): 50               Input and Output:  I&O's Detail    23 Jun 2023 07:01  -  24 Jun 2023 07:00  --------------------------------------------------------  IN:  Total IN: 0 mL    OUT:    Voided (mL): 550 mL  Total OUT: 550 mL    Total NET: -550 mL      24 Jun 2023 07:01  -  25 Jun 2023 06:46  --------------------------------------------------------  IN:  Total IN: 0 mL    OUT:    Voided (mL): 600 mL  Total OUT: 600 mL    Total NET: -600 mL          Lab Data                        8.6    x     )-----------( x        ( 24 Jun 2023 17:35 )             27.9     06-24    141  |  102  |  45<H>  ----------------------------<  111<H>  4.6   |  39<H>  |  1.40<H>    Ca    8.7      24 Jun 2023 07:06              Review of Systems	      Objective     Physical Examination    heart s1s2  lung dec bS      Pertinent Lab findings & Imaging      Burak:  NO   Adequate UO     I&O's Detail    23 Jun 2023 07:01  -  24 Jun 2023 07:00  --------------------------------------------------------  IN:  Total IN: 0 mL    OUT:    Voided (mL): 550 mL  Total OUT: 550 mL    Total NET: -550 mL      24 Jun 2023 07:01  -  25 Jun 2023 06:46  --------------------------------------------------------  IN:  Total IN: 0 mL    OUT:    Voided (mL): 600 mL  Total OUT: 600 mL    Total NET: -600 mL               Discussed with:     Cultures:	        Radiology

## 2023-06-25 NOTE — PROGRESS NOTE ADULT - SUBJECTIVE AND OBJECTIVE BOX
Patient is a 89y old  Female who presents with a chief complaint of Wheezing (25 Jun 2023 10:34)        INTERVAL HPI/OVERNIGHT EVENTS:   c/o fatigue  pt seen and examined         Vital Signs Last 24 Hrs  T(C): 36.5 (25 Jun 2023 11:05), Max: 36.7 (24 Jun 2023 20:30)  T(F): 97.7 (25 Jun 2023 11:05), Max: 98.1 (24 Jun 2023 20:30)  HR: 73 (25 Jun 2023 11:05) (65 - 98)  BP: 101/53 (25 Jun 2023 11:05) (100/53 - 132/85)  BP(mean): --  RR: 18 (25 Jun 2023 11:05) (16 - 18)  SpO2: 98% (25 Jun 2023 11:05) (94% - 100%)    Parameters below as of 25 Jun 2023 11:05  Patient On (Oxygen Delivery Method): nasal cannula  O2 Flow (L/min): 4      acetaminophen     Tablet .. 650 milliGRAM(s) Oral every 6 hours PRN  albuterol/ipratropium for Nebulization 3 milliLiter(s) Nebulizer every 8 hours  aluminum hydroxide/magnesium hydroxide/simethicone Suspension 30 milliLiter(s) Oral every 4 hours PRN  brimonidine 0.2% Ophthalmic Solution 1 Drop(s) Both EYES two times a day  buMETAnide Injectable 1 milliGRAM(s) IV Push every 12 hours  diltiazem    milliGRAM(s) Oral daily  levothyroxine 50 MICROGram(s) Oral daily  melatonin 3 milliGRAM(s) Oral at bedtime PRN  metoprolol tartrate 25 milliGRAM(s) Oral two times a day  ondansetron Injectable 4 milliGRAM(s) IV Push every 8 hours PRN  polyethylene glycol 3350 17 Gram(s) Oral at bedtime  senna 2 Tablet(s) Oral at bedtime  simvastatin 40 milliGRAM(s) Oral at bedtime  timolol 0.5% Solution 1 Drop(s) Both EYES two times a day  traMADol 25 milliGRAM(s) Oral four times a day PRN  zolpidem 5 milliGRAM(s) Oral at bedtime PRN      PHYSICAL EXAM:  GENERAL: NAD   EYES: conjunctiva and sclera clear  ENMT: Moist mucous membranes  NECK: Supple, No JVD, Normal thyroid  CHEST/LUNG: non labored, cta b/l  HEART: Regular rate and rhythm; No murmurs, rubs, or gallops  ABDOMEN: Soft, Nontender, Nondistended; Bowel sounds present  EXTREMITIES:  2+ Peripheral Pulses, No clubbing, cyanosis, or edema  LYMPH: No lymphadenopathy noted  SKIN: No rashes or lesions    Consultant(s) Notes Reviewed:  [x ] YES  [ ] NO  Care Discussed with Consultants/Other Providers [ x] YES  [ ] NO    LABS:                        8.3    8.06  )-----------( 178      ( 25 Jun 2023 07:08 )             27.8     06-25    143  |  102  |  43<H>  ----------------------------<  99  4.6   |  39<H>  |  1.40<H>    Ca    8.7      25 Jun 2023 07:08        Urinalysis Basic - ( 25 Jun 2023 07:08 )    Color: x / Appearance: x / SG: x / pH: x  Gluc: 99 mg/dL / Ketone: x  / Bili: x / Urobili: x   Blood: x / Protein: x / Nitrite: x   Leuk Esterase: x / RBC: x / WBC x   Sq Epi: x / Non Sq Epi: x / Bacteria: x      CAPILLARY BLOOD GLUCOSE            Urinalysis Basic - ( 25 Jun 2023 07:08 )    Color: x / Appearance: x / SG: x / pH: x  Gluc: 99 mg/dL / Ketone: x  / Bili: x / Urobili: x   Blood: x / Protein: x / Nitrite: x   Leuk Esterase: x / RBC: x / WBC x   Sq Epi: x / Non Sq Epi: x / Bacteria: x          RADIOLOGY & ADDITIONAL TESTS:    Imaging Personally Reviewed  Reviewed consultants input

## 2023-06-25 NOTE — PROGRESS NOTE ADULT - ASSESSMENT
89F w/ PMHx of CHFrEF, CAD s/p CABG, CKD, Afib (on apixaban), HTN, moderate AS and pulmonary HTN, chronic sciatica, CVA who presents to the ED for wheezing    HF  OP  OA  hx of Hip Fx  CAD  CKD  valvular heart disease  Pulm HTN  GERD  CVA Hx  AF on AC    ct chest - no lung consolidation  GOC documented - DNR DNI  on BUMEX for Diuresis  enc use of NIV night time and prn - wean Fio2 - VM - keep sat > 88 pct  s/p PRBC - am Cr and Hgb pending    eval for HF exacerbation  Cardio eval in progress  CXR noted  monitor VS and Sat  keep sat > 88 pct  old records reviewed - spoke with daughter - reviewed TTE -   cvs rx regimen optimization, Diuresis as per Cardio Recs  I and O  dietary discretion  LE doppler - NEG for DVT  GOC discussion

## 2023-06-25 NOTE — PROGRESS NOTE ADULT - ASSESSMENT
89 yo  female here with pmhx of CHF, CAD, CKD, Afib, HTN, pulm HTN, here with right leg hematoma with small opening.  Currently leg is with compression wrap.

## 2023-06-25 NOTE — PROVIDER CONTACT NOTE (OTHER) - SITUATION
Pt with audible wheezing. Admitted with SOB. Sating 90s on 4L NC. Complains of difficulty breathing.
Pt noted to be confused this am.  when asked where she was  pt stated " Home" stated the month was "April" skin warm and dry, pt denied any c/o .  Pt ws sleepy.  In change of shift report it was
Pt with 10/10 R leg pain after tramadol 25 mg. Pt with R leg hematoma. RRT today for rupture of hematoma. Pressure dressing clean, dry, and intact.
Pt on telemonitored  got 7 beets of Vtachy
Pt on 4L NC desat to 81%. Pt complains of difficulty breathing. 1mg bumex given at 5:30 am

## 2023-06-25 NOTE — PROVIDER CONTACT NOTE (OTHER) - BACKGROUND
Admit diagnosis: Syncope and collapse
Admit diagnosis: wheezing
Admit diagnosis: SOB
admitted with acute CHF and SOB history of AFib
reported that pt was awake most of the night.  ISRAEL Major NP notified of pts confusion

## 2023-06-25 NOTE — PROVIDER CONTACT NOTE (OTHER) - ASSESSMENT
Pt on 4L NC desat to 81%. Pt complains of difficulty breathing. 1mg bumex given at 5:30 am. Denies chest pain.
Pt with 10/10 R leg pain after tramadol 25 mg. Pt with R leg hematoma. RRT today for rupture of hematoma. Pressure dressing clean, dry, and intact. Repeat Hgb 8.4.
denies chest pain ,SOB . vitals checked no change accepted range
Pt with audible wheezing. Admitted with SOB. Sating 90s on 4L NC. Complains of difficulty breathing.
Stat ABG ordered and completed, see lab results for details. and pt placed on bipap by resp therapy  Pts daughter at bedside and updated on pts status

## 2023-06-26 LAB
ANION GAP SERPL CALC-SCNC: 4 MMOL/L — LOW (ref 5–17)
BASOPHILS # BLD AUTO: 0.01 K/UL — SIGNIFICANT CHANGE UP (ref 0–0.2)
BASOPHILS NFR BLD AUTO: 0.1 % — SIGNIFICANT CHANGE UP (ref 0–2)
BUN SERPL-MCNC: 44 MG/DL — HIGH (ref 7–23)
CALCIUM SERPL-MCNC: 8.4 MG/DL — LOW (ref 8.5–10.1)
CHLORIDE SERPL-SCNC: 100 MMOL/L — SIGNIFICANT CHANGE UP (ref 96–108)
CO2 SERPL-SCNC: 38 MMOL/L — HIGH (ref 22–31)
CREAT SERPL-MCNC: 1.5 MG/DL — HIGH (ref 0.5–1.3)
EGFR: 32 ML/MIN/1.73M2 — LOW
EOSINOPHIL # BLD AUTO: 0.65 K/UL — HIGH (ref 0–0.5)
EOSINOPHIL NFR BLD AUTO: 7.5 % — HIGH (ref 0–6)
GLUCOSE SERPL-MCNC: 104 MG/DL — HIGH (ref 70–99)
HCT VFR BLD CALC: 26.3 % — LOW (ref 34.5–45)
HGB BLD-MCNC: 7.9 G/DL — LOW (ref 11.5–15.5)
IMM GRANULOCYTES NFR BLD AUTO: 0.5 % — SIGNIFICANT CHANGE UP (ref 0–0.9)
LYMPHOCYTES # BLD AUTO: 0.95 K/UL — LOW (ref 1–3.3)
LYMPHOCYTES # BLD AUTO: 10.9 % — LOW (ref 13–44)
MCHC RBC-ENTMCNC: 30 GM/DL — LOW (ref 32–36)
MCHC RBC-ENTMCNC: 30.6 PG — SIGNIFICANT CHANGE UP (ref 27–34)
MCV RBC AUTO: 101.9 FL — HIGH (ref 80–100)
MONOCYTES # BLD AUTO: 1.05 K/UL — HIGH (ref 0–0.9)
MONOCYTES NFR BLD AUTO: 12.1 % — SIGNIFICANT CHANGE UP (ref 2–14)
NEUTROPHILS # BLD AUTO: 5.98 K/UL — SIGNIFICANT CHANGE UP (ref 1.8–7.4)
NEUTROPHILS NFR BLD AUTO: 68.9 % — SIGNIFICANT CHANGE UP (ref 43–77)
NRBC # BLD: 0 /100 WBCS — SIGNIFICANT CHANGE UP (ref 0–0)
PLATELET # BLD AUTO: 183 K/UL — SIGNIFICANT CHANGE UP (ref 150–400)
POTASSIUM SERPL-MCNC: 4.2 MMOL/L — SIGNIFICANT CHANGE UP (ref 3.5–5.3)
POTASSIUM SERPL-SCNC: 4.2 MMOL/L — SIGNIFICANT CHANGE UP (ref 3.5–5.3)
RBC # BLD: 2.58 M/UL — LOW (ref 3.8–5.2)
RBC # FLD: 16 % — HIGH (ref 10.3–14.5)
SODIUM SERPL-SCNC: 142 MMOL/L — SIGNIFICANT CHANGE UP (ref 135–145)
WBC # BLD: 8.68 K/UL — SIGNIFICANT CHANGE UP (ref 3.8–10.5)
WBC # FLD AUTO: 8.68 K/UL — SIGNIFICANT CHANGE UP (ref 3.8–10.5)

## 2023-06-26 PROCEDURE — 99233 SBSQ HOSP IP/OBS HIGH 50: CPT

## 2023-06-26 RX ADMIN — BRIMONIDINE TARTRATE 1 DROP(S): 2 SOLUTION/ DROPS OPHTHALMIC at 17:42

## 2023-06-26 RX ADMIN — Medication 3 MILLIGRAM(S): at 21:29

## 2023-06-26 RX ADMIN — Medication 25 MILLIGRAM(S): at 06:33

## 2023-06-26 RX ADMIN — TRAMADOL HYDROCHLORIDE 25 MILLIGRAM(S): 50 TABLET ORAL at 13:45

## 2023-06-26 RX ADMIN — BUMETANIDE 1 MILLIGRAM(S): 0.25 INJECTION INTRAMUSCULAR; INTRAVENOUS at 17:42

## 2023-06-26 RX ADMIN — SENNA PLUS 2 TABLET(S): 8.6 TABLET ORAL at 21:28

## 2023-06-26 RX ADMIN — BUMETANIDE 1 MILLIGRAM(S): 0.25 INJECTION INTRAMUSCULAR; INTRAVENOUS at 06:44

## 2023-06-26 RX ADMIN — TRAMADOL HYDROCHLORIDE 25 MILLIGRAM(S): 50 TABLET ORAL at 14:24

## 2023-06-26 RX ADMIN — Medication 120 MILLIGRAM(S): at 06:33

## 2023-06-26 RX ADMIN — BRIMONIDINE TARTRATE 1 DROP(S): 2 SOLUTION/ DROPS OPHTHALMIC at 06:32

## 2023-06-26 RX ADMIN — Medication 50 MICROGRAM(S): at 06:33

## 2023-06-26 RX ADMIN — SIMVASTATIN 40 MILLIGRAM(S): 20 TABLET, FILM COATED ORAL at 21:29

## 2023-06-26 RX ADMIN — Medication 25 MILLIGRAM(S): at 17:42

## 2023-06-26 RX ADMIN — Medication 3 MILLILITER(S): at 07:29

## 2023-06-26 RX ADMIN — Medication 3 MILLILITER(S): at 13:53

## 2023-06-26 RX ADMIN — ZOLPIDEM TARTRATE 5 MILLIGRAM(S): 10 TABLET ORAL at 21:29

## 2023-06-26 RX ADMIN — Medication 1 DROP(S): at 06:32

## 2023-06-26 RX ADMIN — Medication 3 MILLILITER(S): at 21:16

## 2023-06-26 RX ADMIN — Medication 1 DROP(S): at 17:42

## 2023-06-26 RX ADMIN — POLYETHYLENE GLYCOL 3350 17 GRAM(S): 17 POWDER, FOR SOLUTION ORAL at 21:29

## 2023-06-26 NOTE — PROGRESS NOTE ADULT - ASSESSMENT
89F w/ PMHx of CHFrEF, CAD s/p CABG, CKD, Afib (on apixaban), HTN, moderate AS and pulmonary HTN, chronic sciatica, CVA who presents to the ED for wheezing    HF  OP  OA  hx of Hip Fx  CAD  CKD  valvular heart disease  Pulm HTN  GERD  CVA Hx  AF on AC    ct chest - no lung consolidation  GOC documented - DNR DNI  on BUMEX for Diuresis  enc use of NIV night time and prn - wean Fio2 - VM - keep sat > 88 pct    eval for HF exacerbation  Cardio eval in progress  CXR noted  monitor VS and Sat  keep sat > 88 pct  old records reviewed - spoke with daughter - reviewed TTE -   cvs rx regimen optimization, Diuresis as per Cardio Recs  I and O  dietary discretion  LE doppler - NEG for DVT  GOC discussion - DNR DNI

## 2023-06-26 NOTE — CHART NOTE - NSCHARTNOTEFT_GEN_A_CORE
Patient requires a hospital bed due to inability of patient to lay flat on regular bed. Patient requires frequent and immediate repositioning changes that are not feasible in a regular bed with pillows or wedges. Patient needs head of bed elevated more than 30 degrees mos of the time due to acute respiratory failure requiring oxygen therapy, risk of aspiration and acute decompensated CHF. Patient needs Gel overlay to prevent breakdown / decubitus ulcer formation.

## 2023-06-26 NOTE — PROGRESS NOTE ADULT - SUBJECTIVE AND OBJECTIVE BOX
Date/Time Patient Seen:  		  Referring MD:   Data Reviewed	       Patient is a 89y old  Female who presents with a chief complaint of Wheezing (25 Jun 2023 11:42)      Subjective/HPI     PAST MEDICAL & SURGICAL HISTORY:  Benign Essential Hypertension    Chronic Sciatica    Personal History of Coronary Artery Disease    Hypertension    Atrial fibrillation    CVA (cerebral vascular accident)    S/P CABG          Medication list         MEDICATIONS  (STANDING):  albuterol/ipratropium for Nebulization 3 milliLiter(s) Nebulizer every 8 hours  brimonidine 0.2% Ophthalmic Solution 1 Drop(s) Both EYES two times a day  buMETAnide Injectable 1 milliGRAM(s) IV Push every 12 hours  diltiazem    milliGRAM(s) Oral daily  levothyroxine 50 MICROGram(s) Oral daily  metoprolol tartrate 25 milliGRAM(s) Oral two times a day  polyethylene glycol 3350 17 Gram(s) Oral at bedtime  senna 2 Tablet(s) Oral at bedtime  simvastatin 40 milliGRAM(s) Oral at bedtime  timolol 0.5% Solution 1 Drop(s) Both EYES two times a day    MEDICATIONS  (PRN):  acetaminophen     Tablet .. 650 milliGRAM(s) Oral every 6 hours PRN Temp greater or equal to 38C (100.4F), Mild Pain (1 - 3)  aluminum hydroxide/magnesium hydroxide/simethicone Suspension 30 milliLiter(s) Oral every 4 hours PRN Dyspepsia  melatonin 3 milliGRAM(s) Oral at bedtime PRN Insomnia  ondansetron Injectable 4 milliGRAM(s) IV Push every 8 hours PRN Nausea and/or Vomiting  traMADol 25 milliGRAM(s) Oral four times a day PRN Severe Pain (7 - 10)  zolpidem 5 milliGRAM(s) Oral at bedtime PRN Insomnia         Vitals log        ICU Vital Signs Last 24 Hrs  T(C): 36.9 (26 Jun 2023 04:19), Max: 36.9 (26 Jun 2023 04:19)  T(F): 98.5 (26 Jun 2023 04:19), Max: 98.5 (26 Jun 2023 04:19)  HR: 82 (26 Jun 2023 04:19) (62 - 93)  BP: 147/- (26 Jun 2023 04:19) (101/53 - 147/-)  BP(mean): --  ABP: --  ABP(mean): --  RR: 18 (26 Jun 2023 04:19) (16 - 18)  SpO2: 93% (26 Jun 2023 04:19) (93% - 100%)    O2 Parameters below as of 25 Jun 2023 23:24  Patient On (Oxygen Delivery Method): nasal cannula, 4LPM                 Input and Output:  I&O's Detail    24 Jun 2023 07:01  -  25 Jun 2023 07:00  --------------------------------------------------------  IN:  Total IN: 0 mL    OUT:    Voided (mL): 600 mL  Total OUT: 600 mL    Total NET: -600 mL      25 Jun 2023 07:01  -  26 Jun 2023 05:33  --------------------------------------------------------  IN:  Total IN: 0 mL    OUT:    Voided (mL): 600 mL  Total OUT: 600 mL    Total NET: -600 mL          Lab Data                        8.0    x     )-----------( x        ( 25 Jun 2023 16:00 )             26.7     06-25    143  |  102  |  43<H>  ----------------------------<  99  4.6   |  39<H>  |  1.40<H>    Ca    8.7      25 Jun 2023 07:08              Review of Systems	      Objective     Physical Examination    heart s1s2  lung dc BS  head nc      Pertinent Lab findings & Imaging      Burak:  NO   Adequate UO     I&O's Detail    24 Jun 2023 07:01  -  25 Jun 2023 07:00  --------------------------------------------------------  IN:  Total IN: 0 mL    OUT:    Voided (mL): 600 mL  Total OUT: 600 mL    Total NET: -600 mL      25 Jun 2023 07:01  -  26 Jun 2023 05:33  --------------------------------------------------------  IN:  Total IN: 0 mL    OUT:    Voided (mL): 600 mL  Total OUT: 600 mL    Total NET: -600 mL               Discussed with:     Cultures:	        Radiology

## 2023-06-26 NOTE — GOALS OF CARE CONVERSATION - ADVANCED CARE PLANNING - CONVERSATION/DISCUSSION
Diagnosis/Prognosis/MOLST Discussed

## 2023-06-26 NOTE — PROGRESS NOTE ADULT - ASSESSMENT
89F w/ PMHx of CHFrEF, CAD s/p CABG, CKD, Afib (on apixaban), HTN, moderate AS and pulmonary HTN, chronic sciatica, CVA who presents to the ED for wheezing. pt w/ ADHF.   Ct chest- small rt pl effusion    #ADHF w acute hypoxic and  hypercapnea respiratory failure  cont bumex  strict I/Os, monitor daily weights, Cr, and K.  rpt CXR- atelectasis and small effusion  cards and pulm following  chronic co2 retention  wean oxygen as tolerated    #AF controlled for now.   cont cardizem   hold eliquis    #wheezing- likely cardiac origin- CHF exac  prn nebs  diuresis  pulm fu noted    ruptured hematoma/anemia  hold asa/eliquis  bleeding controlled with ace wrap  surgery following  trend h/h  pain control

## 2023-06-26 NOTE — GOALS OF CARE CONVERSATION - ADVANCED CARE PLANNING - NS PRO AD PATIENT TYPE
Health Care Proxy (HCP)/Medical Orders for Life-Sustaining Treatment (MOLST)

## 2023-06-26 NOTE — PROGRESS NOTE ADULT - SUBJECTIVE AND OBJECTIVE BOX
SUBJECTIVE:  Patient seen and examined at bedside. No overnight events. Patient reports no new complaints at this time. Denies pain in RLE, dressing changed yesterday.  Patient denies any fever, chills, chest pain, shortness of breath, nausea, vomiting, or urinary complaints.    VITALS  Vital Signs Last 24 Hrs  T(C): 36.9 (26 Jun 2023 04:19), Max: 36.9 (26 Jun 2023 04:19)  T(F): 98.5 (26 Jun 2023 04:19), Max: 98.5 (26 Jun 2023 04:19)  HR: 85 (26 Jun 2023 06:34) (62 - 93)  BP: 116/60 (26 Jun 2023 06:34) (101/53 - 147/-)  BP(mean): --  RR: 18 (26 Jun 2023 04:19) (17 - 18)  SpO2: 93% (26 Jun 2023 04:19) (93% - 100%)    Parameters below as of 25 Jun 2023 23:24  Patient On (Oxygen Delivery Method): nasal cannula, 4LPM    PHYSICAL EXAM  GENERAL:  Well-nourished, well-developed Female lying comfortably in bed in [facility].  HEENT:  NC/AT. Sclera white. Mucous membranes moist.  CARDIO:  Regular rate and rhythm.  No murmur, gallop or rub appreciated.  RESPIRATORY:  Nonlabored breathing, no accessory muscle use. Lungs clear to auscultation bilaterally.  No wheezing, rales or rhonchi appreciated.  ABDOMEN:  Soft, nondistended, nontender. BS appreciated on auscultation.  No rebound tenderness or guarding.  EXTREMITIES: No calf tenderness bilaterally.  SKIN:  No jaundice, pallor, or cyanosis  NEURO:  A&O x 3    INTAKE & OUTPUT  I&O's Summary    25 Jun 2023 07:01  -  26 Jun 2023 07:00  --------------------------------------------------------  IN: 0 mL / OUT: 600 mL / NET: -600 mL      I&O's Detail    25 Jun 2023 07:01  -  26 Jun 2023 07:00  --------------------------------------------------------  IN:  Total IN: 0 mL    OUT:    Voided (mL): 600 mL  Total OUT: 600 mL    Total NET: -600 mL          MEDICATIONS  MEDICATIONS  (STANDING):  albuterol/ipratropium for Nebulization 3 milliLiter(s) Nebulizer every 8 hours  brimonidine 0.2% Ophthalmic Solution 1 Drop(s) Both EYES two times a day  buMETAnide Injectable 1 milliGRAM(s) IV Push every 12 hours  diltiazem    milliGRAM(s) Oral daily  levothyroxine 50 MICROGram(s) Oral daily  metoprolol tartrate 25 milliGRAM(s) Oral two times a day  polyethylene glycol 3350 17 Gram(s) Oral at bedtime  senna 2 Tablet(s) Oral at bedtime  simvastatin 40 milliGRAM(s) Oral at bedtime  timolol 0.5% Solution 1 Drop(s) Both EYES two times a day    MEDICATIONS  (PRN):  acetaminophen     Tablet .. 650 milliGRAM(s) Oral every 6 hours PRN Temp greater or equal to 38C (100.4F), Mild Pain (1 - 3)  aluminum hydroxide/magnesium hydroxide/simethicone Suspension 30 milliLiter(s) Oral every 4 hours PRN Dyspepsia  melatonin 3 milliGRAM(s) Oral at bedtime PRN Insomnia  ondansetron Injectable 4 milliGRAM(s) IV Push every 8 hours PRN Nausea and/or Vomiting  traMADol 25 milliGRAM(s) Oral four times a day PRN Severe Pain (7 - 10)  zolpidem 5 milliGRAM(s) Oral at bedtime PRN Insomnia      LABS:                        8.0    x     )-----------( x        ( 25 Jun 2023 16:00 )             26.7     06-25    143  |  102  |  43<H>  ----------------------------<  99  4.6   |  39<H>  |  1.40<H>    Ca    8.7      25 Jun 2023 07:08            RADIOLOGY & ADDITIONAL STUDIES:    ASSESSMENT & PLAN   89y Female POD# s/p    - Continue diet  - Continue antibiotics  - Serial abdominal exams  - Is & Os  - DVT prophylaxis with  - OOB, pain control  - Incentive spirometry  - Follow up AM labs  - Case to be discussed with   SUBJECTIVE:  Patient seen and examined at bedside. No overnight events. Patient reports no new complaints at this time. Denies pain in RLE, dressing changed yesterday.  Patient denies any fever, chills, chest pain, shortness of breath, nausea, vomiting, or urinary complaints.    VITALS  Vital Signs Last 24 Hrs  T(C): 36.9 (26 Jun 2023 04:19), Max: 36.9 (26 Jun 2023 04:19)  T(F): 98.5 (26 Jun 2023 04:19), Max: 98.5 (26 Jun 2023 04:19)  HR: 85 (26 Jun 2023 06:34) (62 - 93)  BP: 116/60 (26 Jun 2023 06:34) (101/53 - 147/-)  BP(mean): --  RR: 18 (26 Jun 2023 04:19) (17 - 18)  SpO2: 93% (26 Jun 2023 04:19) (93% - 100%)    Parameters below as of 25 Jun 2023 23:24  Patient On (Oxygen Delivery Method): nasal cannula, 4LPM    PHYSICAL EXAM  GENERAL:  Well-nourished, well-developed elderly Female lying comfortably in bed in [facility].  HEENT:  NC/AT. Sclera white. Mucous membranes moist.  ABDOMEN:  Soft, nondistended, nontender  EXTREMITIES: B/l LE swelling noted; RLE wrapped w/ ACE dressing just above the knee with protuberant mass underneath. Dressing remains dry, no drainage from dressing  SKIN:  No jaundice, pallor, or cyanosis  NEURO:  A&O x 3    INTAKE & OUTPUT  I&O's Summary    25 Jun 2023 07:01  -  26 Jun 2023 07:00  --------------------------------------------------------  IN: 0 mL / OUT: 600 mL / NET: -600 mL    I&O's Detail    25 Jun 2023 07:01  -  26 Jun 2023 07:00  --------------------------------------------------------  IN:  Total IN: 0 mL    OUT:    Voided (mL): 600 mL  Total OUT: 600 mL    Total NET: -600 mL    MEDICATIONS  MEDICATIONS  (STANDING):  albuterol/ipratropium for Nebulization 3 milliLiter(s) Nebulizer every 8 hours  brimonidine 0.2% Ophthalmic Solution 1 Drop(s) Both EYES two times a day  buMETAnide Injectable 1 milliGRAM(s) IV Push every 12 hours  diltiazem    milliGRAM(s) Oral daily  levothyroxine 50 MICROGram(s) Oral daily  metoprolol tartrate 25 milliGRAM(s) Oral two times a day  polyethylene glycol 3350 17 Gram(s) Oral at bedtime  senna 2 Tablet(s) Oral at bedtime  simvastatin 40 milliGRAM(s) Oral at bedtime  timolol 0.5% Solution 1 Drop(s) Both EYES two times a day    MEDICATIONS  (PRN):  acetaminophen     Tablet .. 650 milliGRAM(s) Oral every 6 hours PRN Temp greater or equal to 38C (100.4F), Mild Pain (1 - 3)  aluminum hydroxide/magnesium hydroxide/simethicone Suspension 30 milliLiter(s) Oral every 4 hours PRN Dyspepsia  melatonin 3 milliGRAM(s) Oral at bedtime PRN Insomnia  ondansetron Injectable 4 milliGRAM(s) IV Push every 8 hours PRN Nausea and/or Vomiting  traMADol 25 milliGRAM(s) Oral four times a day PRN Severe Pain (7 - 10)  zolpidem 5 milliGRAM(s) Oral at bedtime PRN Insomnia    LABS:                      8.0    x     )-----------( x        ( 25 Jun 2023 16:00 )             26.7     06-25    143  |  102  |  43<H>  ----------------------------<  99  4.6   |  39<H>  |  1.40<H>    Ca    8.7      25 Jun 2023 07:08    ASSESSMENT & PLAN   89y Female a/w CHF exacerbation, RRT 6/23 called for significant bleeding from RLE hematoma. s/p 1 u pRBC on 6/24.  VSS this AM. Downtrending Hgb noted.    - Continue diet  - Monitor H/H; transfuse prn  - Hold AC  - To continue current conservative management; Maintain pressure dressing

## 2023-06-26 NOTE — SOCIAL WORK PROGRESS NOTE - NSSWPROGRESSNOTE_GEN_ALL_CORE
As per palliative SW dtr. interested in home hospice evaluation, referral sent to [facility]. SW to follow.

## 2023-06-26 NOTE — GOALS OF CARE CONVERSATION - ADVANCED CARE PLANNING - CAREGIVER NAME
[facility] 
[facility] 
[facility]

## 2023-06-26 NOTE — PROGRESS NOTE ADULT - ASSESSMENT
89 yo F with diastolic HF, CAD s/p CABG, RBBB, CKD, atrial fibrillation (on apixaban), HTN, moderate AS and pulmonary HTN, chronic sciatica, CVA who was recently hospitalized  for fall sp IMN 5/14 s/p and discharge on 5/19/23 who is presented to ED  for fatigue and wheezing a/w CHF, A fib RVR    ADHF, CAD, CABG, VHD, RBBB, A fib, HTN, Pulm HTN  - a/f acute hypoxic and hypercapnic respiratory failure  - CT chest with small right pleural effusion, pulm following   - TTE 2016: EF 48%, f/u TTE   - BNP:  <--4522  - Volume status improving, now on NC continue to wean as tolerated  - Continue Bumex IV, 1 mg IV BID. S/p metolazone 2.5 x 1 6/24 after transfusion.   - Creatinine:  <--1.50  - Continue to maintain strict I/Os, monitor daily weights, Cr, and K.     - Known CAD sp CABG  - EKG: Afib 102 with no acute  ischemic changes   - Troponin: <-34.1  - No evidence of any active ischemia   - Continue ASA, statin     - Known A fib, on apixaban , renally dosed has CKD   - Tele with A fib 80-100s  - Continue Diltiazem 120 mg daily and Metoprolol 25 mg PO BID     - Monitor and replete lytes, keep K>4, Mg>2.  - Will continue to follow.    Chavez Mendoza, MS FNP, AGAP  Nurse Practitioner- Cardiology   Call teams (preferred)  Spectra #[phone] /[phone]

## 2023-06-26 NOTE — PROGRESS NOTE ADULT - SUBJECTIVE AND OBJECTIVE BOX
[facility] -- Heather Roberts, Meir Son Savella, [place]: Office # [phone]    Follow Up:  CHF     Subjective/Observations: Patient seen and examined, awake, alert, resting comfortably in bed, denies chest pain, palpitations or dizziness. Tolerating VM, Continues to be in A fib on tele.     REVIEW OF SYSTEMS: All other review of systems are negative unless indicated above    PAST MEDICAL & SURGICAL HISTORY:  Benign Essential Hypertension      Chronic Sciatica      Personal History of Coronary Artery Disease      Hypertension      Hypertension      Atrial fibrillation      CVA (cerebral vascular accident)      S/P CABG    MEDICATIONS  (STANDING):  albuterol/ipratropium for Nebulization 3 milliLiter(s) Nebulizer every 8 hours  brimonidine 0.2% Ophthalmic Solution 1 Drop(s) Both EYES two times a day  buMETAnide Injectable 1 milliGRAM(s) IV Push every 12 hours  diltiazem    milliGRAM(s) Oral daily  levothyroxine 50 MICROGram(s) Oral daily  metoprolol tartrate 25 milliGRAM(s) Oral two times a day  polyethylene glycol 3350 17 Gram(s) Oral at bedtime  senna 2 Tablet(s) Oral at bedtime  simvastatin 40 milliGRAM(s) Oral at bedtime  timolol 0.5% Solution 1 Drop(s) Both EYES two times a day    MEDICATIONS  (PRN):  acetaminophen     Tablet .. 650 milliGRAM(s) Oral every 6 hours PRN Temp greater or equal to 38C (100.4F), Mild Pain (1 - 3)  aluminum hydroxide/magnesium hydroxide/simethicone Suspension 30 milliLiter(s) Oral every 4 hours PRN Dyspepsia  melatonin 3 milliGRAM(s) Oral at bedtime PRN Insomnia  ondansetron Injectable 4 milliGRAM(s) IV Push every 8 hours PRN Nausea and/or Vomiting  traMADol 25 milliGRAM(s) Oral four times a day PRN Severe Pain (7 - 10)  zolpidem 5 milliGRAM(s) Oral at bedtime PRN Insomnia    Allergies    No Known Allergies    Intolerances      Vital Signs Last 24 Hrs  T(C): 36.9 (26 Jun 2023 04:19), Max: 36.9 (26 Jun 2023 04:19)  T(F): 98.5 (26 Jun 2023 04:19), Max: 98.5 (26 Jun 2023 04:19)  HR: 66 (26 Jun 2023 07:29) (62 - 93)  BP: 116/60 (26 Jun 2023 06:34) (101/53 - 147/-)  BP(mean): --  RR: 18 (26 Jun 2023 04:19) (17 - 18)  SpO2: 96% (26 Jun 2023 07:29) (93% - 98%)    Parameters below as of 26 Jun 2023 08:20  Patient On (Oxygen Delivery Method): nasal cannula      I&O's Summary    25 Jun 2023 07:01  -  26 Jun 2023 07:00  --------------------------------------------------------  IN: 0 mL / OUT: 600 mL / NET: -600 mL    TELE: A fib 80-90s, nonsustained 100s   PHYSICAL EXAM:  Constitutional: NAD, awake and alert  HEENT: Moist Mucous Membranes, Anicteric  Pulmonary: Non-labored, breath sounds are clear bilaterally, Diminished at bases No wheezing, rales or rhonchi  Cardiovascular: Irregular, S1 and S2, + murmurs, No rubs, gallops or clicks  Gastrointestinal:  soft, nontender, nondistended   Lymph: No peripheral edema. No lymphadenopathy.   Skin: No visible rashes or ulcers. RLE DSD  Psych:  Mood & affect appropriate    LABS: All Labs Reviewed:                        7.9    8.68  )-----------( 183      ( 26 Jun 2023 06:54 )             26.3                         8.0    x     )-----------( x        ( 25 Jun 2023 16:00 )             26.7                         8.3    8.06  )-----------( 178      ( 25 Jun 2023 07:08 )             27.8     26 Jun 2023 06:54    142    |  100    |  44     ----------------------------<  104    4.2     |  38     |  1.50   25 Jun 2023 07:08    143    |  102    |  43     ----------------------------<  99     4.6     |  39     |  1.40   24 Jun 2023 07:06    141    |  102    |  45     ----------------------------<  111    4.6     |  39     |  1.40     Ca    8.4        26 Jun 2023 06:54  Ca    8.7        25 Jun 2023 07:08  Ca    8.7        24 Jun 2023 07:06       Troponin I, High Sensitivity Result: 34.1 ng/L (06-16-23 @ 15:20)    12 Lead ECG:   Ventricular Rate 102 BPM    QRS Duration 114 ms    Q-T Interval 398 ms    QTC Calculation(Bazett) 518 ms    R Axis 0 degrees    T Axis -18 degrees    Diagnosis Line Atrial fibrillation with rapid ventricular response  Low voltage QRS  Right bundle branch block  ST & T wave abnormality, consider anterolateral ischemia  Abnormal ECG  When compared with ECG of 14-MAY-2023 14:02,  No significant change was found  Confirmed by VELMA ELLSWORTH ([id number]) on 6/17/2023 5:32:50 PM (06-16-23 @ 13:56)      Patient name: JIMMIE BRIAN  YOB: 1926   Age: 89 (F)   MR#: [id number]  Study Date: 11/22/2016  [facility] RDCS  Study quality: Technically difficult  Referring Physician: Sally Phipps MD  Blood Pressure: 153/78 mmHg  Height: 140 cm  Weight: 60 kg  BSA: 1.5 m2  ------------------------------------------------------------------------  PROCEDURE: Transthoracic echocardiogram with 2-D, M-Mode  and complete spectral and color flow Doppler.  INDICATION: Unspecified atrial fibrillation (I48.91)  ------------------------------------------------------------------------  Dimensions:    Normal Values:  LA:     4.6    2.0 - 4.0 cm  Ao:     3.4    2.0 - 3.8 cm  SEPTUM: 1.0    0.6 - 1.2 cm  PWT:    1.1  0.6 - 1.1 cm  LVIDd:  3.4    3.0 - 5.6 cm  LVIDs:  2.6    1.8 - 4.0 cm  Derived variables:  LVMI: 72 g/m2  RWT: 0.64  Fractional short: 24 %  EF (Teicholtz): 48 %  ------------------------------------------------------------------------  Observations:  Mitral Valve: Mitral annular calcification. The posterior  annulus is heavily calcified. There appears to be  independently mobile material seen at the posterior  annulus. This may represent endocarditis, clinical  correlation indicated. Mild mitral regurgitation.  Aortic Valve/Aorta: Calcified trileaflet aortic valve with  normal opening. Mild-moderate aortic regurgitation.   msec  Aortic Root: 3.4 cm.  Left Atrium: Mildly dilated left atrium.  LA volume index =  37 cc/m2.  Left Ventricle: Endocardium not well visualized; Mild  segmental left ventricular systolic dysfunction. The  base-mid inferior wall, the mid lateral wall, and the basal  inferoseptum are hypokinetic.  Regional wall motion  variation is noted on the setting of atrial fibrillation.  Increased relative wall thickness with normal left  ventricular mass index, consistent with concentric left  ventricular remodeling.  Right Heart: Mild right atrial enlargement. Normal right  ventricular size with mildly  decreased right ventricular  systolic function. Normal tricuspid valve. Mild-moderate  tricuspid regurgitation. Normal pulmonic valve. Minimal  pulmonic regurgitation.  Pericardium/Pleura: Normal pericardium with no pericardial  effusion.  Hemodynamic: Estimated right atrial pressure is 8 mm Hg.  Estimated right ventricular systolic pressure equals 44 mm  Hg, assuming right atrial pressure equals 8 mm Hg,  consistent with mild pulmonary hypertension.  ------------------------------------------------------------------------  Conclusions:  1. Mitral annular calcification. The posterior annulus is  heavily calcified. There appears to be independently mobile  material seen at the posterior annulus. This may represent  endocarditis, clinical correlation indicated.  2. Calcified trileaflet aortic valve with normal opening.  Mild-moderate aortic regurgitation.  msec  3. Increased relative wall thickness with normal left  ventricular mass index, consistent with concentric left  ventricular remodeling.  4. Endocardium not well visualized; Mild segmental left  ventricular systolic dysfunction. The base-mid inferior  wall, the mid lateral wall, and the basal inferoseptum are  hypokinetic.  Regional wall motion variation is noted on  the setting ofatrial fibrillation.  5. Normal right ventricular size with mildly  decreased  right ventricular systolic function.  6. Normal tricuspid valve. Mild-moderate tricuspid  regurgitation.  7. Estimated pulmonary artery systolic pressure equals 44  mm Hg,assuming right atrial pressure equals 8 mm Hg,  consistent with mild pulmonary pressures.  ------------------------------------------------------------------------  Confirmed on  11/22/2016 - 14:45:39 by Jazzy Phillips M.D.  ------------------------------------------------------------------------

## 2023-06-26 NOTE — GOALS OF CARE CONVERSATION - ADVANCED CARE PLANNING - CONVERSATION DETAILS
Writer met with pt and her daughter monalisa who states pt is sometimes confused and she helps her with decisions . Reviewed patient's medical and social history as well as events leading to patient's hospitalization. Writer discussed patient's current diagnosis CHF, CAD,afib, mod AS, pulmonary HTN , hypothyroid, advanced age, debility,  medical condition and management, prognosis, and life expectancy. Inquired about patient's wishes regarding extent of medical care to be provided including escalation of medical care into the ICU intubation with vent  and use of vasopressor support. In addition, the writer inquired about thoughts regarding cardiopulmonary resuscitation, artificial nutrition and hydration including use of feeding tubes and IVF, antibiotics, and further investigative studies such as blood draws and radiology .daughter  showed good  insight into medical condition. All questions answered. Writer recommended completion of advanced directives  Patient consented to DNR/DNI  status. MOLST form filled out and placed in chart. Psychosocial support provided.
Writer met with pt and her daughter to further discuss GOC and options. Daughter had several questions about palliative care  and services at home. SW reviewed what palliative care is and discussed with daughter home hospice services. SW also discussed with daughter homecare services and [facility] house calls programs. Contact information for hospice and house calls programs provided to daughter. At this time, daughter does not wish to pursue hospice services and will speak with patient's PCP if she feels it is needed later on. All questions answered. SW contact information provided.
Writer met with pt and her daughter monalisa who states pt is sometimes confused and she helps her with decisions . Reviewed patient's medical and social history as well as events leading to patient's hospitalization. Writer discussed patient's current diagnosis CHF, CAD,afib, mod AS, pulmonary HTN , hypothyroid, advanced age, debility,  medical condition and management, prognosis, and life expectancy. Monalisa now wants to consider home hospice. Had long discussion explaining purpose of hospice addressing events leading to EOL. Daughter understands that goal is comfort and to keep pt at home without future hospitalizations. Daughter states that this is her goal and wishes to have hospice referral.

## 2023-06-26 NOTE — PROGRESS NOTE ADULT - SUBJECTIVE AND OBJECTIVE BOX
Patient is a 89y old  Female who presents with a chief complaint of Wheezing (26 Jun 2023 08:49)        INTERVAL HPI/OVERNIGHT EVENTS:   doing well, weaning oxygen  pt seen and examined         Vital Signs Last 24 Hrs  T(C): 36.9 (26 Jun 2023 04:19), Max: 36.9 (26 Jun 2023 04:19)  T(F): 98.5 (26 Jun 2023 04:19), Max: 98.5 (26 Jun 2023 04:19)  HR: 66 (26 Jun 2023 07:29) (62 - 93)  BP: 116/60 (26 Jun 2023 06:34) (111/57 - 147/-)  BP(mean): --  RR: 18 (26 Jun 2023 04:19) (17 - 18)  SpO2: 96% (26 Jun 2023 07:29) (93% - 98%)    Parameters below as of 26 Jun 2023 08:20  Patient On (Oxygen Delivery Method): nasal cannula        acetaminophen     Tablet .. 650 milliGRAM(s) Oral every 6 hours PRN  albuterol/ipratropium for Nebulization 3 milliLiter(s) Nebulizer every 8 hours  aluminum hydroxide/magnesium hydroxide/simethicone Suspension 30 milliLiter(s) Oral every 4 hours PRN  brimonidine 0.2% Ophthalmic Solution 1 Drop(s) Both EYES two times a day  buMETAnide Injectable 1 milliGRAM(s) IV Push every 12 hours  diltiazem    milliGRAM(s) Oral daily  levothyroxine 50 MICROGram(s) Oral daily  melatonin 3 milliGRAM(s) Oral at bedtime PRN  metoprolol tartrate 25 milliGRAM(s) Oral two times a day  ondansetron Injectable 4 milliGRAM(s) IV Push every 8 hours PRN  polyethylene glycol 3350 17 Gram(s) Oral at bedtime  senna 2 Tablet(s) Oral at bedtime  simvastatin 40 milliGRAM(s) Oral at bedtime  timolol 0.5% Solution 1 Drop(s) Both EYES two times a day  traMADol 25 milliGRAM(s) Oral four times a day PRN  zolpidem 5 milliGRAM(s) Oral at bedtime PRN      PHYSICAL EXAM:  GENERAL: NAD   EYES: conjunctiva and sclera clear  ENMT: Moist mucous membranes  NECK: Supple, No JVD, Normal thyroid  CHEST/LUNG: non labored, cta b/l  HEART: Regular rate and rhythm; No murmurs, rubs, or gallops  ABDOMEN: Soft, Nontender, Nondistended; Bowel sounds present  EXTREMITIES:  2+ Peripheral Pulses, No clubbing, cyanosis, or edema  LYMPH: No lymphadenopathy noted  SKIN: No rashes or lesions    Consultant(s) Notes Reviewed:  [x ] YES  [ ] NO  Care Discussed with Consultants/Other Providers [ x] YES  [ ] NO    LABS:                        7.9    8.68  )-----------( 183      ( 26 Jun 2023 06:54 )             26.3     06-26    142  |  100  |  44<H>  ----------------------------<  104<H>  4.2   |  38<H>  |  1.50<H>    Ca    8.4<L>      26 Jun 2023 06:54        Urinalysis Basic - ( 26 Jun 2023 06:54 )    Color: x / Appearance: x / SG: x / pH: x  Gluc: 104 mg/dL / Ketone: x  / Bili: x / Urobili: x   Blood: x / Protein: x / Nitrite: x   Leuk Esterase: x / RBC: x / WBC x   Sq Epi: x / Non Sq Epi: x / Bacteria: x      CAPILLARY BLOOD GLUCOSE            Urinalysis Basic - ( 26 Jun 2023 06:54 )    Color: x / Appearance: x / SG: x / pH: x  Gluc: 104 mg/dL / Ketone: x  / Bili: x / Urobili: x   Blood: x / Protein: x / Nitrite: x   Leuk Esterase: x / RBC: x / WBC x   Sq Epi: x / Non Sq Epi: x / Bacteria: x          RADIOLOGY & ADDITIONAL TESTS:    Imaging Personally Reviewed  Reviewed consultants input

## 2023-06-27 LAB
ANION GAP SERPL CALC-SCNC: 2 MMOL/L — LOW (ref 5–17)
BUN SERPL-MCNC: 45 MG/DL — HIGH (ref 7–23)
CALCIUM SERPL-MCNC: 8.4 MG/DL — LOW (ref 8.5–10.1)
CHLORIDE SERPL-SCNC: 97 MMOL/L — SIGNIFICANT CHANGE UP (ref 96–108)
CO2 SERPL-SCNC: 39 MMOL/L — HIGH (ref 22–31)
CREAT SERPL-MCNC: 1.5 MG/DL — HIGH (ref 0.5–1.3)
EGFR: 32 ML/MIN/1.73M2 — LOW
GLUCOSE SERPL-MCNC: 97 MG/DL — SIGNIFICANT CHANGE UP (ref 70–99)
HCT VFR BLD CALC: 24.8 % — LOW (ref 34.5–45)
HGB BLD-MCNC: 7.6 G/DL — LOW (ref 11.5–15.5)
MCHC RBC-ENTMCNC: 30.6 GM/DL — LOW (ref 32–36)
MCHC RBC-ENTMCNC: 31.4 PG — SIGNIFICANT CHANGE UP (ref 27–34)
MCV RBC AUTO: 102.5 FL — HIGH (ref 80–100)
NRBC # BLD: 0 /100 WBCS — SIGNIFICANT CHANGE UP (ref 0–0)
PLATELET # BLD AUTO: 191 K/UL — SIGNIFICANT CHANGE UP (ref 150–400)
POTASSIUM SERPL-MCNC: 4 MMOL/L — SIGNIFICANT CHANGE UP (ref 3.5–5.3)
POTASSIUM SERPL-SCNC: 4 MMOL/L — SIGNIFICANT CHANGE UP (ref 3.5–5.3)
RBC # BLD: 2.42 M/UL — LOW (ref 3.8–5.2)
RBC # FLD: 15.4 % — HIGH (ref 10.3–14.5)
SODIUM SERPL-SCNC: 138 MMOL/L — SIGNIFICANT CHANGE UP (ref 135–145)
WBC # BLD: 7.77 K/UL — SIGNIFICANT CHANGE UP (ref 3.8–10.5)
WBC # FLD AUTO: 7.77 K/UL — SIGNIFICANT CHANGE UP (ref 3.8–10.5)

## 2023-06-27 PROCEDURE — 99233 SBSQ HOSP IP/OBS HIGH 50: CPT

## 2023-06-27 PROCEDURE — 99231 SBSQ HOSP IP/OBS SF/LOW 25: CPT

## 2023-06-27 RX ORDER — ASPIRIN/CALCIUM CARB/MAGNESIUM 324 MG
81 TABLET ORAL DAILY
Refills: 0 | Status: DISCONTINUED | OUTPATIENT
Start: 2023-06-27 | End: 2023-06-28

## 2023-06-27 RX ORDER — APIXABAN 2.5 MG/1
2.5 TABLET, FILM COATED ORAL EVERY 12 HOURS
Refills: 0 | Status: DISCONTINUED | OUTPATIENT
Start: 2023-06-27 | End: 2023-06-28

## 2023-06-27 RX ADMIN — BRIMONIDINE TARTRATE 1 DROP(S): 2 SOLUTION/ DROPS OPHTHALMIC at 05:21

## 2023-06-27 RX ADMIN — SENNA PLUS 2 TABLET(S): 8.6 TABLET ORAL at 21:50

## 2023-06-27 RX ADMIN — TRAMADOL HYDROCHLORIDE 25 MILLIGRAM(S): 50 TABLET ORAL at 08:19

## 2023-06-27 RX ADMIN — Medication 25 MILLIGRAM(S): at 17:55

## 2023-06-27 RX ADMIN — BRIMONIDINE TARTRATE 1 DROP(S): 2 SOLUTION/ DROPS OPHTHALMIC at 17:55

## 2023-06-27 RX ADMIN — BUMETANIDE 1 MILLIGRAM(S): 0.25 INJECTION INTRAMUSCULAR; INTRAVENOUS at 05:21

## 2023-06-27 RX ADMIN — Medication 3 MILLILITER(S): at 20:32

## 2023-06-27 RX ADMIN — Medication 1 DROP(S): at 17:55

## 2023-06-27 RX ADMIN — Medication 3 MILLILITER(S): at 07:16

## 2023-06-27 RX ADMIN — Medication 81 MILLIGRAM(S): at 17:55

## 2023-06-27 RX ADMIN — ZOLPIDEM TARTRATE 5 MILLIGRAM(S): 10 TABLET ORAL at 21:50

## 2023-06-27 RX ADMIN — TRAMADOL HYDROCHLORIDE 25 MILLIGRAM(S): 50 TABLET ORAL at 08:59

## 2023-06-27 RX ADMIN — BUMETANIDE 1 MILLIGRAM(S): 0.25 INJECTION INTRAMUSCULAR; INTRAVENOUS at 17:55

## 2023-06-27 RX ADMIN — APIXABAN 2.5 MILLIGRAM(S): 2.5 TABLET, FILM COATED ORAL at 17:55

## 2023-06-27 RX ADMIN — Medication 120 MILLIGRAM(S): at 05:21

## 2023-06-27 RX ADMIN — Medication 50 MICROGRAM(S): at 05:21

## 2023-06-27 RX ADMIN — POLYETHYLENE GLYCOL 3350 17 GRAM(S): 17 POWDER, FOR SOLUTION ORAL at 21:50

## 2023-06-27 RX ADMIN — Medication 3 MILLILITER(S): at 14:38

## 2023-06-27 RX ADMIN — SIMVASTATIN 40 MILLIGRAM(S): 20 TABLET, FILM COATED ORAL at 21:50

## 2023-06-27 RX ADMIN — Medication 25 MILLIGRAM(S): at 05:21

## 2023-06-27 RX ADMIN — Medication 1 DROP(S): at 05:21

## 2023-06-27 NOTE — PROGRESS NOTE ADULT - ASSESSMENT
89F w/ PMHx of CHFrEF, CAD s/p CABG, CKD, Afib (on apixaban), HTN, moderate AS and pulmonary HTN, chronic sciatica, CVA who presents to the ED for wheezing    HF  OP  OA  hx of Hip Fx  CAD  CKD  valvular heart disease  Pulm HTN  GERD  CVA Hx  AF on AC    bumex diuresis  GOC f/u - plan for home hospice  vs noted  SW following    eval for HF exacerbation - Cardio eval in progress  CXR noted  monitor VS and Sat  keep sat > 88 pct  old records reviewed - spoke with daughter - reviewed TTE -   cvs rx regimen optimization, Diuresis as per Cardio Recs  I and O  dietary discretion  LE doppler - NEG for DVT  GOC discussion - DNR DNI

## 2023-06-27 NOTE — PROGRESS NOTE ADULT - ASSESSMENT
89 yo with RLE hematoma. No signs of infections, skin compromise      cont local wound care      may start anticoagulation

## 2023-06-27 NOTE — PROGRESS NOTE ADULT - SUBJECTIVE AND OBJECTIVE BOX
pt seen   c/o pain  ICU Vital Signs Last 24 Hrs  T(C): 36.3 (27 Jun 2023 12:08), Max: 36.9 (26 Jun 2023 20:08)  T(F): 97.3 (27 Jun 2023 12:08), Max: 98.4 (26 Jun 2023 20:08)  HR: 69 (27 Jun 2023 12:08) (69 - 88)  BP: 117/67 (27 Jun 2023 12:08) (114/62 - 126/68)  BP(mean): --  ABP: --  ABP(mean): --  RR: 18 (27 Jun 2023 12:10) (17 - 18)  SpO2: 95% (27 Jun 2023 12:10) (85% - 96%)    O2 Parameters below as of 27 Jun 2023 12:10  Patient On (Oxygen Delivery Method): nasal cannula  O2 Flow (L/min): 2      gen-NAD  resp-clear  abd-soft NT/ND  RLE-hematoma, stable, soft, minimal erythema                          7.6    7.77  )-----------( 191      ( 27 Jun 2023 06:42 )             24.8

## 2023-06-27 NOTE — PROGRESS NOTE ADULT - SUBJECTIVE AND OBJECTIVE BOX
Date/Time Patient Seen:  		  Referring MD:   Data Reviewed	       Patient is a 89y old  Female who presents with a chief complaint of Wheezing (26 Jun 2023 11:43)      Subjective/HPI     PAST MEDICAL & SURGICAL HISTORY:  Benign Essential Hypertension    Chronic Sciatica    Personal History of Coronary Artery Disease    Hypertension    Atrial fibrillation    CVA (cerebral vascular accident)    S/P CABG          Medication list         MEDICATIONS  (STANDING):  albuterol/ipratropium for Nebulization 3 milliLiter(s) Nebulizer every 8 hours  brimonidine 0.2% Ophthalmic Solution 1 Drop(s) Both EYES two times a day  buMETAnide Injectable 1 milliGRAM(s) IV Push every 12 hours  diltiazem    milliGRAM(s) Oral daily  levothyroxine 50 MICROGram(s) Oral daily  metoprolol tartrate 25 milliGRAM(s) Oral two times a day  polyethylene glycol 3350 17 Gram(s) Oral at bedtime  senna 2 Tablet(s) Oral at bedtime  simvastatin 40 milliGRAM(s) Oral at bedtime  timolol 0.5% Solution 1 Drop(s) Both EYES two times a day    MEDICATIONS  (PRN):  acetaminophen     Tablet .. 650 milliGRAM(s) Oral every 6 hours PRN Temp greater or equal to 38C (100.4F), Mild Pain (1 - 3)  aluminum hydroxide/magnesium hydroxide/simethicone Suspension 30 milliLiter(s) Oral every 4 hours PRN Dyspepsia  melatonin 3 milliGRAM(s) Oral at bedtime PRN Insomnia  ondansetron Injectable 4 milliGRAM(s) IV Push every 8 hours PRN Nausea and/or Vomiting  traMADol 25 milliGRAM(s) Oral four times a day PRN Severe Pain (7 - 10)  zolpidem 5 milliGRAM(s) Oral at bedtime PRN Insomnia         Vitals log        ICU Vital Signs Last 24 Hrs  T(C): 36.5 (27 Jun 2023 05:14), Max: 36.9 (26 Jun 2023 20:08)  T(F): 97.7 (27 Jun 2023 05:14), Max: 98.4 (26 Jun 2023 20:08)  HR: 86 (27 Jun 2023 05:14) (66 - 88)  BP: 126/68 (27 Jun 2023 05:14) (112/62 - 126/68)  BP(mean): --  ABP: --  ABP(mean): --  RR: 17 (27 Jun 2023 05:14) (17 - 18)  SpO2: 96% (27 Jun 2023 05:14) (85% - 96%)    O2 Parameters below as of 27 Jun 2023 05:14  Patient On (Oxygen Delivery Method): nasal cannula  O2 Flow (L/min): 2               Input and Output:  I&O's Detail    25 Jun 2023 07:01  -  26 Jun 2023 07:00  --------------------------------------------------------  IN:  Total IN: 0 mL    OUT:    Voided (mL): 600 mL  Total OUT: 600 mL    Total NET: -600 mL      26 Jun 2023 07:01  -  27 Jun 2023 05:32  --------------------------------------------------------  IN:    Oral Fluid: 570 mL  Total IN: 570 mL    OUT:    Voided (mL): 450 mL  Total OUT: 450 mL    Total NET: 120 mL          Lab Data                        7.9    8.68  )-----------( 183      ( 26 Jun 2023 06:54 )             26.3     06-26    142  |  100  |  44<H>  ----------------------------<  104<H>  4.2   |  38<H>  |  1.50<H>    Ca    8.4<L>      26 Jun 2023 06:54              Review of Systems	      Objective     Physical Examination    heart s1s2  lung dec BS  head nc      Pertinent Lab findings & Imaging      Burak:  NO   Adequate UO     I&O's Detail    25 Jun 2023 07:01  -  26 Jun 2023 07:00  --------------------------------------------------------  IN:  Total IN: 0 mL    OUT:    Voided (mL): 600 mL  Total OUT: 600 mL    Total NET: -600 mL      26 Jun 2023 07:01  -  27 Jun 2023 05:32  --------------------------------------------------------  IN:    Oral Fluid: 570 mL  Total IN: 570 mL    OUT:    Voided (mL): 450 mL  Total OUT: 450 mL    Total NET: 120 mL               Discussed with:     Cultures:	        Radiology

## 2023-06-27 NOTE — PROGRESS NOTE ADULT - ASSESSMENT
89F w/ PMHx of CHFrEF, CAD s/p CABG, CKD, Afib (on apixaban), HTN, moderate AS and pulmonary HTN, chronic sciatica, CVA who presents to the ED for wheezing. pt w/ ADHF.   Ct chest- small rt pl effusion    #ADHF w acute hypoxic and  hypercapnea respiratory failure  cont bumex  strict I/Os, monitor daily weights, Cr, and K.  rpt CXR- atelectasis and small effusion  cards and pulm following  chronic co2 retention  wean oxygen as tolerated    #AF controlled for now.   cont cardizem   resume eliquis    #wheezing- likely cardiac origin- CHF exac  prn nebs  diuresis  pulm fu noted    ruptured hematoma/anemia  bleeding controlled with ace wrap  surgery following  trend h/h  pain control

## 2023-06-27 NOTE — PROGRESS NOTE ADULT - SUBJECTIVE AND OBJECTIVE BOX
[facility] -- Heather Roberts Pannella, Patel, Savella, Goodger  Office # [phone]    Follow Up:  CHF     Subjective/Observations: seen and examined, awake, alert, resting in bed, denies chest pain, dyspnea, palpitations or dizziness, orthopnea and PND. Tolerating O2 via NC. c/o right leg pain, dressing CDI     REVIEW OF SYSTEMS: All other review of systems is negative unless indicated above  PAST MEDICAL & SURGICAL HISTORY:  Benign Essential Hypertension      Chronic Sciatica      Personal History of Coronary Artery Disease      Hypertension      Atrial fibrillation      CVA (cerebral vascular accident)      S/P CABG        MEDICATIONS  (STANDING):  albuterol/ipratropium for Nebulization 3 milliLiter(s) Nebulizer every 8 hours  brimonidine 0.2% Ophthalmic Solution 1 Drop(s) Both EYES two times a day  buMETAnide Injectable 1 milliGRAM(s) IV Push every 12 hours  diltiazem    milliGRAM(s) Oral daily  levothyroxine 50 MICROGram(s) Oral daily  metoprolol tartrate 25 milliGRAM(s) Oral two times a day  polyethylene glycol 3350 17 Gram(s) Oral at bedtime  senna 2 Tablet(s) Oral at bedtime  simvastatin 40 milliGRAM(s) Oral at bedtime  timolol 0.5% Solution 1 Drop(s) Both EYES two times a day    MEDICATIONS  (PRN):  acetaminophen     Tablet .. 650 milliGRAM(s) Oral every 6 hours PRN Temp greater or equal to 38C (100.4F), Mild Pain (1 - 3)  aluminum hydroxide/magnesium hydroxide/simethicone Suspension 30 milliLiter(s) Oral every 4 hours PRN Dyspepsia  melatonin 3 milliGRAM(s) Oral at bedtime PRN Insomnia  ondansetron Injectable 4 milliGRAM(s) IV Push every 8 hours PRN Nausea and/or Vomiting  traMADol 25 milliGRAM(s) Oral four times a day PRN Severe Pain (7 - 10)  zolpidem 5 milliGRAM(s) Oral at bedtime PRN Insomnia    Allergies    No Known Allergies    Intolerances      Vital Signs Last 24 Hrs  T(C): 36.5 (27 Jun 2023 05:14), Max: 36.9 (26 Jun 2023 20:08)  T(F): 97.7 (27 Jun 2023 05:14), Max: 98.4 (26 Jun 2023 20:08)  HR: 84 (27 Jun 2023 07:44) (66 - 88)  BP: 126/68 (27 Jun 2023 05:14) (112/62 - 126/68)  BP(mean): --  RR: 17 (27 Jun 2023 05:14) (17 - 18)  SpO2: 96% (27 Jun 2023 07:44) (85% - 96%)    Parameters below as of 27 Jun 2023 08:20  Patient On (Oxygen Delivery Method): nasal cannula      I&O's Summary    26 Jun 2023 07:01  -  27 Jun 2023 07:00  --------------------------------------------------------  IN: 770 mL / OUT: 450 mL / NET: 320 mL        TELE: Afib 80's  PHYSICAL EXAM:  Constitutional: NAD, awake and alert, obese  HEENT: Moist Mucous Membranes, Anicteric  Pulmonary: Non-labored, breath sounds are clear, diminished at bases bilaterally, No wheezing, rales or rhonchi  Cardiovascular: Regular, S1 and S2, + murmurs, rubs, gallops or clicks  Gastrointestinal: Bowel Sounds present, soft, nontender.   Lymph: No peripheral edema. No lymphadenopathy.  Skin: No visible rashes or ulcers.  Psych:  Mood & affect appropriate  LABS: All Labs Reviewed:                        7.6    7.77  )-----------( 191      ( 27 Jun 2023 06:42 )             24.8                         7.9    8.68  )-----------( 183      ( 26 Jun 2023 06:54 )             26.3                         8.0    x     )-----------( x        ( 25 Jun 2023 16:00 )             26.7     27 Jun 2023 06:42    138    |  97     |  45     ----------------------------<  97     4.0     |  39     |  1.50   26 Jun 2023 06:54    142    |  100    |  44     ----------------------------<  104    4.2     |  38     |  1.50   25 Jun 2023 07:08    143    |  102    |  43     ----------------------------<  99     4.6     |  39     |  1.40     Ca    8.4        27 Jun 2023 06:42  Ca    8.4        26 Jun 2023 06:54  Ca    8.7        25 Jun 2023 07:08            12 Lead ECG:   Ventricular Rate 102 BPM    QRS Duration 114 ms    Q-T Interval 398 ms    QTC Calculation(Bazett) 518 ms    R Axis 0 degrees    T Axis -18 degrees    Diagnosis Line Atrial fibrillation with rapid ventricular response  Low voltage QRS  Right bundle branch block  ST & T wave abnormality, consider anterolateral ischemia  Abnormal ECG  When compared with ECG of 14-MAY-2023 14:02,  No significant change was found  Confirmed by VELMA ELLSWORTH ([id number]) on 6/17/2023 5:32:50 PM (06-16-23 @ 13:56)      Patient name: JIMMIE BRIAN  YOB: 1926   Age: 89 (F)   MR#: [id number]  Study Date: 11/22/2016  [facility] Malia Oneill RDCS  Study quality: Technically difficult  Referring Physician: Sally Phipps MD  Blood Pressure: 153/78 mmHg  Height: 140 cm  Weight: 60 kg  BSA: 1.5 m2  ------------------------------------------------------------------------  PROCEDURE: Transthoracic echocardiogram with 2-D, M-Mode  and complete spectral and color flow Doppler.  INDICATION: Unspecified atrial fibrillation (I48.91)  ------------------------------------------------------------------------  Dimensions:    Normal Values:  LA:     4.6    2.0 - 4.0 cm  Ao:     3.4    2.0 - 3.8 cm  SEPTUM: 1.0    0.6 - 1.2 cm  PWT:    1.1  0.6 - 1.1 cm  LVIDd:  3.4    3.0 - 5.6 cm  LVIDs:  2.6    1.8 - 4.0 cm  Derived variables:  LVMI: 72 g/m2  RWT: 0.64  Fractional short: 24 %  EF (Teicholtz): 48 %  ------------------------------------------------------------------------  Observations:  Mitral Valve: Mitral annular calcification. The posterior  annulus is heavily calcified. There appears to be  independently mobile material seen at the posterior  annulus. This may represent endocarditis, clinical  correlation indicated. Mild mitral regurgitation.  Aortic Valve/Aorta: Calcified trileaflet aortic valve with  normal opening. Mild-moderate aortic regurgitation.   msec  Aortic Root: 3.4 cm.  Left Atrium: Mildly dilated left atrium.  LA volume index =  37 cc/m2.  Left Ventricle: Endocardium not well visualized; Mild  segmental left ventricular systolic dysfunction. The  base-mid inferior wall, the mid lateral wall, and the basal  inferoseptum are hypokinetic.  Regional wall motion  variation is noted on the setting of atrial fibrillation.  Increased relative wall thickness with normal left  ventricular mass index, consistent with concentric left  ventricular remodeling.  Right Heart: Mild right atrial enlargement. Normal right  ventricular size with mildly  decreased right ventricular  systolic function. Normal tricuspid valve. Mild-moderate  tricuspid regurgitation. Normal pulmonic valve. Minimal  pulmonic regurgitation.  Pericardium/Pleura: Normal pericardium with no pericardial  effusion.  Hemodynamic: Estimated right atrial pressure is 8 mm Hg.  Estimated right ventricular systolic pressure equals 44 mm  Hg, assuming right atrial pressure equals 8 mm Hg,  consistent with mild pulmonary hypertension.  ------------------------------------------------------------------------  Conclusions:  1. Mitral annular calcification. The posterior annulus is  heavily calcified. There appears to be independently mobile  material seen at the posterior annulus. This may represent  endocarditis, clinical correlation indicated.  2. Calcified trileaflet aortic valve with normal opening.  Mild-moderate aortic regurgitation.  msec  3. Increased relative wall thickness with normal left  ventricular mass index, consistent with concentric left  ventricular remodeling.  4. Endocardium not well visualized; Mild segmental left  ventricular systolic dysfunction. The base-mid inferior  wall, the mid lateral wall, and the basal inferoseptum are  hypokinetic.  Regional wall motion variation is noted on  the setting ofatrial fibrillation.  5. Normal right ventricular size with mildly  decreased  right ventricular systolic function.  6. Normal tricuspid valve. Mild-moderate tricuspid  regurgitation.  7. Estimated pulmonary artery systolic pressure equals 44  mm Hg,assuming right atrial pressure equals 8 mm Hg,  consistent with mild pulmonary pressures.  ------------------------------------------------------------------------  Confirmed on  11/22/2016 - 14:45:39 by Jazzy Phillips M.D.  ------------------------------------------------------------------------

## 2023-06-27 NOTE — PROGRESS NOTE ADULT - ASSESSMENT
89 yo F with diastolic HF, CAD s/p CABG, RBBB, CKD, atrial fibrillation (on apixaban), HTN, moderate AS and pulmonary HTN, chronic sciatica, CVA who was recently hospitalized  for fall sp IMN 5/14 s/p and discharge on 5/19/23 who is presented to ED  for fatigue and wheezing a/w CHF, A fib RVR    ADHF, CAD, CABG, VHD, RBBB, A fib, HTN, Pulm HTN  - a/f acute hypoxic and hypercapnic respiratory failure  - CT chest with small right pleural effusion, pulm following   - TTE 2016: EF 48%, f/u TTE (ordered)   - BNP: 4522  - Volume status improving, now on NC continue to wean as tolerated, though noted desaturations overnight   - Continue Bumex IV, 1 mg IV BID, s/p metolazone 2.5 x 1 6/24 after transfusion.   - Creatinine ~1.50  - Continue to maintain strict I/Os, monitor daily weights, Cr, and K.     - Known CAD s/p CABG  - EKG: Afib 102 with no acute  ischemic changes   - Troponin: <-34.1  - No evidence of any active ischemia   - Continue ASA, statin     - Known Afib, on apixaban , renally dosed has CKD   - Tele with A fib 80's  - Continue Diltiazem 120 mg daily  - continue  Metoprolol 25 mg PO BID     - c/o pain right leg, dressing CDI, pain management per primary   - H/H 7.6/ 24.8, transfuse per primary to keep hgb > 8 given ext cardiac hx   - continue to monitor volume status post transfusion, as she may need diuretics  - Monitor and replete Lytes. Keep K > 4 and Mg > 2  - Will continue to follow.    Elissa Valdovinos [facility]  Nurse Practitioner - Cardiology   call TEAMS

## 2023-06-27 NOTE — PROGRESS NOTE ADULT - NS ATTEND AMEND GEN_ALL_CORE FT
I have personally seen and examined the patient in detail.  I have spoken to the provider regarding the assessment and plan of care.  I have made changes to the note accordingly.
Remains vol OL on IV Bumex.   Continue to maintain negative balance.  Monitor I, O, K, creatinine closely.  To follow while admitted.  At risk for abrupt decompensation.
remains volume overloaded  remains on sig o2, but now nc  cont iv bumex and trend creat
remains volume overloaded, and now on 50% FM this am  Continue IV Bumex. Hopefully change to po next 1-2 days.  Continue to monitor I, O, K, creatinine closely.  To follow.
I have personally seen and examined the patient in detail.  I have spoken to the provider regarding the assessment and plan of care.  I have made changes to the note accordingly.
I have personally seen and examined the patient in detail.  I have spoken to the provider regarding the assessment and plan of care.  I have made changes to the note accordingly.
Improving vol OL, but remains vol OL.  Continue IV Bumex. Hopefully change to po next 1-2 days.  Continue to monitor I, O, K, creatinine closely.  To follow.
pt wit hnow hypercapnic resp failure. on bipap. serial ABGs.   has decompensated HF as well with better response with Bumex. cont bumex IV. Please continue to maintain strict I/Os, monitor daily weights, Cr, and K.   Further cardiac workup will depend on clinical course.
still very vol ol. switch to bumex 1mg q12. Please continue to maintain strict I/Os, monitor daily weights, Cr, and K.   Further cardiac workup will depend on clinical course.
adm with fatigue and wheezing  acute hypoxic and hypercarbic resp failure  sxs improving having been in bipap and now on vm  remains vol ol and would continue bumex iv  echo pending  no acute ischemia  af on ac and dilt, controlled
I have personally seen and examined the patient.  I fully participated in the care of this patient.  I have made amendments to the documentation where necessary, and agree with the history, physical exam, impression/assessment, and plan as documented by the PA    RLE hematoma in the setting of therapeutic anticoagulation  Pressure dressing in place  hold AC  trend H&H  transfuse prn  no acute surgical intervention at this time, anticipate as Eliquis wears off bleeding will stop, however transfuse prn in the interim. No obvious large amount of active bleeding through dressing noted  d/w pt and family and all questions answered
still markedly vol but improving on bumex. cont bumex 1mg q12. Please continue to maintain strict I/Os, monitor daily weights, Cr, and K. echo Further cardiac workup will depend on clinical course.
I have personally seen and examined the patient.  I fully participated in the care of this patient.  I have made amendments to the documentation where necessary, and agree with the history, physical exam, impression/assessment, and plan as documented by the PA    RLE hematoma in the setting of therapeutic anticoagulation  Pressure dressing redone, no active bleeding noted but large blister on anteromedial aspect of shin  hold AC  trend H&H  transfuse prn  May need unroofing of blister as overlying skin may necrose - will determine based on exam now that active bleeding has stopped  d/w pt and family and all questions answered

## 2023-06-27 NOTE — PROGRESS NOTE ADULT - NS ATTEND OPT1 GEN_ALL_CORE

## 2023-06-27 NOTE — PROGRESS NOTE ADULT - SUBJECTIVE AND OBJECTIVE BOX
Patient is a 89y old  Female who presents with a chief complaint of Wheezing (27 Jun 2023 14:42)        INTERVAL HPI/OVERNIGHT EVENTS:   no complaints  pt seen and examined         Vital Signs Last 24 Hrs  T(C): 36.5 (27 Jun 2023 15:46), Max: 36.9 (26 Jun 2023 20:08)  T(F): 97.7 (27 Jun 2023 15:46), Max: 98.4 (26 Jun 2023 20:08)  HR: 85 (27 Jun 2023 15:46) (69 - 88)  BP: 118/68 (27 Jun 2023 15:46) (114/62 - 126/68)  BP(mean): --  RR: 18 (27 Jun 2023 15:46) (17 - 18)  SpO2: 97% (27 Jun 2023 15:46) (86% - 97%)    Parameters below as of 27 Jun 2023 15:46  Patient On (Oxygen Delivery Method): nasal cannula  O2 Flow (L/min): 2      acetaminophen     Tablet .. 650 milliGRAM(s) Oral every 6 hours PRN  albuterol/ipratropium for Nebulization 3 milliLiter(s) Nebulizer every 8 hours  aluminum hydroxide/magnesium hydroxide/simethicone Suspension 30 milliLiter(s) Oral every 4 hours PRN  apixaban 2.5 milliGRAM(s) Oral every 12 hours  aspirin enteric coated 81 milliGRAM(s) Oral daily  brimonidine 0.2% Ophthalmic Solution 1 Drop(s) Both EYES two times a day  buMETAnide Injectable 1 milliGRAM(s) IV Push every 12 hours  diltiazem    milliGRAM(s) Oral daily  levothyroxine 50 MICROGram(s) Oral daily  melatonin 3 milliGRAM(s) Oral at bedtime PRN  metolazone 2.5 milliGRAM(s) Oral once  metoprolol tartrate 25 milliGRAM(s) Oral two times a day  ondansetron Injectable 4 milliGRAM(s) IV Push every 8 hours PRN  polyethylene glycol 3350 17 Gram(s) Oral at bedtime  senna 2 Tablet(s) Oral at bedtime  simvastatin 40 milliGRAM(s) Oral at bedtime  timolol 0.5% Solution 1 Drop(s) Both EYES two times a day  traMADol 25 milliGRAM(s) Oral four times a day PRN  zolpidem 5 milliGRAM(s) Oral at bedtime PRN      PHYSICAL EXAM:  GENERAL: NAD   EYES: conjunctiva and sclera clear  ENMT: Moist mucous membranes  NECK: Supple, No JVD, Normal thyroid  CHEST/LUNG: non labored, cta b/l  HEART: Regular rate and rhythm; No murmurs, rubs, or gallops  ABDOMEN: Soft, Nontender, Nondistended; Bowel sounds present  EXTREMITIES:  2+ Peripheral Pulses, No clubbing, cyanosis, or edema  LYMPH: No lymphadenopathy noted  SKIN: No rashes or lesions    Consultant(s) Notes Reviewed:  [x ] YES  [ ] NO  Care Discussed with Consultants/Other Providers [ x] YES  [ ] NO    LABS:                        7.6    7.77  )-----------( 191      ( 27 Jun 2023 06:42 )             24.8     06-27    138  |  97  |  45<H>  ----------------------------<  97  4.0   |  39<H>  |  1.50<H>    Ca    8.4<L>      27 Jun 2023 06:42        Urinalysis Basic - ( 27 Jun 2023 06:42 )    Color: x / Appearance: x / SG: x / pH: x  Gluc: 97 mg/dL / Ketone: x  / Bili: x / Urobili: x   Blood: x / Protein: x / Nitrite: x   Leuk Esterase: x / RBC: x / WBC x   Sq Epi: x / Non Sq Epi: x / Bacteria: x      CAPILLARY BLOOD GLUCOSE            Urinalysis Basic - ( 27 Jun 2023 06:42 )    Color: x / Appearance: x / SG: x / pH: x  Gluc: 97 mg/dL / Ketone: x  / Bili: x / Urobili: x   Blood: x / Protein: x / Nitrite: x   Leuk Esterase: x / RBC: x / WBC x   Sq Epi: x / Non Sq Epi: x / Bacteria: x          RADIOLOGY & ADDITIONAL TESTS:    Imaging Personally Reviewed  Reviewed consultants input

## 2023-06-28 ENCOUNTER — TRANSCRIPTION ENCOUNTER (OUTPATIENT)
Age: 88
End: 2023-06-28

## 2023-06-28 VITALS — DIASTOLIC BLOOD PRESSURE: 62 MMHG | SYSTOLIC BLOOD PRESSURE: 133 MMHG

## 2023-06-28 LAB
ANION GAP SERPL CALC-SCNC: 4 MMOL/L — LOW (ref 5–17)
BUN SERPL-MCNC: 44 MG/DL — HIGH (ref 7–23)
CALCIUM SERPL-MCNC: 8.3 MG/DL — LOW (ref 8.5–10.1)
CHLORIDE SERPL-SCNC: 98 MMOL/L — SIGNIFICANT CHANGE UP (ref 96–108)
CO2 SERPL-SCNC: 39 MMOL/L — HIGH (ref 22–31)
CREAT SERPL-MCNC: 1.4 MG/DL — HIGH (ref 0.5–1.3)
EGFR: 34 ML/MIN/1.73M2 — LOW
GLUCOSE SERPL-MCNC: 93 MG/DL — SIGNIFICANT CHANGE UP (ref 70–99)
HCT VFR BLD CALC: 29.3 % — LOW (ref 34.5–45)
HGB BLD-MCNC: 8.9 G/DL — LOW (ref 11.5–15.5)
MCHC RBC-ENTMCNC: 30.4 GM/DL — LOW (ref 32–36)
MCHC RBC-ENTMCNC: 30.5 PG — SIGNIFICANT CHANGE UP (ref 27–34)
MCV RBC AUTO: 100.3 FL — HIGH (ref 80–100)
NRBC # BLD: 0 /100 WBCS — SIGNIFICANT CHANGE UP (ref 0–0)
PLATELET # BLD AUTO: 205 K/UL — SIGNIFICANT CHANGE UP (ref 150–400)
POTASSIUM SERPL-MCNC: 3.8 MMOL/L — SIGNIFICANT CHANGE UP (ref 3.5–5.3)
POTASSIUM SERPL-SCNC: 3.8 MMOL/L — SIGNIFICANT CHANGE UP (ref 3.5–5.3)
RBC # BLD: 2.92 M/UL — LOW (ref 3.8–5.2)
RBC # FLD: 15.8 % — HIGH (ref 10.3–14.5)
SODIUM SERPL-SCNC: 141 MMOL/L — SIGNIFICANT CHANGE UP (ref 135–145)
WBC # BLD: 7.98 K/UL — SIGNIFICANT CHANGE UP (ref 3.8–10.5)
WBC # FLD AUTO: 7.98 K/UL — SIGNIFICANT CHANGE UP (ref 3.8–10.5)

## 2023-06-28 PROCEDURE — 86850 RBC ANTIBODY SCREEN: CPT

## 2023-06-28 PROCEDURE — 85014 HEMATOCRIT: CPT

## 2023-06-28 PROCEDURE — 86901 BLOOD TYPING SEROLOGIC RH(D): CPT

## 2023-06-28 PROCEDURE — 96361 HYDRATE IV INFUSION ADD-ON: CPT

## 2023-06-28 PROCEDURE — 86900 BLOOD TYPING SEROLOGIC ABO: CPT

## 2023-06-28 PROCEDURE — 85730 THROMBOPLASTIN TIME PARTIAL: CPT

## 2023-06-28 PROCEDURE — 84145 PROCALCITONIN (PCT): CPT

## 2023-06-28 PROCEDURE — 82962 GLUCOSE BLOOD TEST: CPT

## 2023-06-28 PROCEDURE — 86923 COMPATIBILITY TEST ELECTRIC: CPT

## 2023-06-28 PROCEDURE — 80053 COMPREHEN METABOLIC PANEL: CPT

## 2023-06-28 PROCEDURE — P9016: CPT

## 2023-06-28 PROCEDURE — 97116 GAIT TRAINING THERAPY: CPT

## 2023-06-28 PROCEDURE — 85025 COMPLETE CBC W/AUTO DIFF WBC: CPT

## 2023-06-28 PROCEDURE — 87040 BLOOD CULTURE FOR BACTERIA: CPT

## 2023-06-28 PROCEDURE — 83605 ASSAY OF LACTIC ACID: CPT

## 2023-06-28 PROCEDURE — 97110 THERAPEUTIC EXERCISES: CPT

## 2023-06-28 PROCEDURE — 87086 URINE CULTURE/COLONY COUNT: CPT

## 2023-06-28 PROCEDURE — 99233 SBSQ HOSP IP/OBS HIGH 50: CPT

## 2023-06-28 PROCEDURE — 81001 URINALYSIS AUTO W/SCOPE: CPT

## 2023-06-28 PROCEDURE — 97530 THERAPEUTIC ACTIVITIES: CPT

## 2023-06-28 PROCEDURE — 71045 X-RAY EXAM CHEST 1 VIEW: CPT

## 2023-06-28 PROCEDURE — 94660 CPAP INITIATION&MGMT: CPT

## 2023-06-28 PROCEDURE — 94640 AIRWAY INHALATION TREATMENT: CPT

## 2023-06-28 PROCEDURE — 99231 SBSQ HOSP IP/OBS SF/LOW 25: CPT

## 2023-06-28 PROCEDURE — 71250 CT THORAX DX C-: CPT

## 2023-06-28 PROCEDURE — 36430 TRANSFUSION BLD/BLD COMPNT: CPT

## 2023-06-28 PROCEDURE — 73552 X-RAY EXAM OF FEMUR 2/>: CPT

## 2023-06-28 PROCEDURE — 87637 SARSCOV2&INF A&B&RSV AMP PRB: CPT

## 2023-06-28 PROCEDURE — 36415 COLL VENOUS BLD VENIPUNCTURE: CPT

## 2023-06-28 PROCEDURE — 84484 ASSAY OF TROPONIN QUANT: CPT

## 2023-06-28 PROCEDURE — 97162 PT EVAL MOD COMPLEX 30 MIN: CPT

## 2023-06-28 PROCEDURE — 83735 ASSAY OF MAGNESIUM: CPT

## 2023-06-28 PROCEDURE — 83880 ASSAY OF NATRIURETIC PEPTIDE: CPT

## 2023-06-28 PROCEDURE — 96365 THER/PROPH/DIAG IV INF INIT: CPT

## 2023-06-28 PROCEDURE — 94760 N-INVAS EAR/PLS OXIMETRY 1: CPT

## 2023-06-28 PROCEDURE — 73502 X-RAY EXAM HIP UNI 2-3 VIEWS: CPT

## 2023-06-28 PROCEDURE — 85027 COMPLETE CBC AUTOMATED: CPT

## 2023-06-28 PROCEDURE — 80048 BASIC METABOLIC PNL TOTAL CA: CPT

## 2023-06-28 PROCEDURE — 93005 ELECTROCARDIOGRAM TRACING: CPT

## 2023-06-28 PROCEDURE — 73590 X-RAY EXAM OF LOWER LEG: CPT

## 2023-06-28 PROCEDURE — 99285 EMERGENCY DEPT VISIT HI MDM: CPT

## 2023-06-28 PROCEDURE — 85018 HEMOGLOBIN: CPT

## 2023-06-28 PROCEDURE — 82803 BLOOD GASES ANY COMBINATION: CPT

## 2023-06-28 PROCEDURE — 85610 PROTHROMBIN TIME: CPT

## 2023-06-28 PROCEDURE — 84100 ASSAY OF PHOSPHORUS: CPT

## 2023-06-28 PROCEDURE — 93970 EXTREMITY STUDY: CPT

## 2023-06-28 RX ORDER — METOPROLOL TARTRATE 50 MG
1 TABLET ORAL
Qty: 60 | Refills: 0
Start: 2023-06-28 | End: 2023-07-27

## 2023-06-28 RX ORDER — TRAMADOL HYDROCHLORIDE 50 MG/1
0.5 TABLET ORAL
Qty: 0 | Refills: 0 | DISCHARGE
Start: 2023-06-28

## 2023-06-28 RX ORDER — BUMETANIDE 0.25 MG/ML
1 INJECTION INTRAMUSCULAR; INTRAVENOUS
Qty: 60 | Refills: 0
Start: 2023-06-28 | End: 2023-07-27

## 2023-06-28 RX ORDER — TRAMADOL HYDROCHLORIDE 50 MG/1
1 TABLET ORAL
Qty: 24 | Refills: 0
Start: 2023-06-28 | End: 2023-07-03

## 2023-06-28 RX ORDER — TRAMADOL HYDROCHLORIDE 50 MG/1
1 TABLET ORAL
Qty: 20 | Refills: 0
Start: 2023-06-28 | End: 2023-07-02

## 2023-06-28 RX ORDER — TRAMADOL HYDROCHLORIDE 50 MG/1
1 TABLET ORAL
Qty: 20 | Refills: 0
Start: 2023-06-28

## 2023-06-28 RX ADMIN — TRAMADOL HYDROCHLORIDE 25 MILLIGRAM(S): 50 TABLET ORAL at 12:30

## 2023-06-28 RX ADMIN — Medication 25 MILLIGRAM(S): at 05:24

## 2023-06-28 RX ADMIN — BUMETANIDE 1 MILLIGRAM(S): 0.25 INJECTION INTRAMUSCULAR; INTRAVENOUS at 05:24

## 2023-06-28 RX ADMIN — BRIMONIDINE TARTRATE 1 DROP(S): 2 SOLUTION/ DROPS OPHTHALMIC at 05:24

## 2023-06-28 RX ADMIN — BRIMONIDINE TARTRATE 1 DROP(S): 2 SOLUTION/ DROPS OPHTHALMIC at 17:14

## 2023-06-28 RX ADMIN — TRAMADOL HYDROCHLORIDE 25 MILLIGRAM(S): 50 TABLET ORAL at 11:38

## 2023-06-28 RX ADMIN — Medication 25 MILLIGRAM(S): at 17:15

## 2023-06-28 RX ADMIN — Medication 50 MICROGRAM(S): at 05:24

## 2023-06-28 RX ADMIN — APIXABAN 2.5 MILLIGRAM(S): 2.5 TABLET, FILM COATED ORAL at 05:24

## 2023-06-28 RX ADMIN — Medication 3 MILLILITER(S): at 08:03

## 2023-06-28 RX ADMIN — Medication 1 DROP(S): at 17:14

## 2023-06-28 RX ADMIN — Medication 3 MILLILITER(S): at 14:39

## 2023-06-28 RX ADMIN — Medication 120 MILLIGRAM(S): at 05:24

## 2023-06-28 RX ADMIN — APIXABAN 2.5 MILLIGRAM(S): 2.5 TABLET, FILM COATED ORAL at 17:15

## 2023-06-28 RX ADMIN — Medication 1 DROP(S): at 05:24

## 2023-06-28 RX ADMIN — Medication 81 MILLIGRAM(S): at 11:38

## 2023-06-28 RX ADMIN — BUMETANIDE 1 MILLIGRAM(S): 0.25 INJECTION INTRAMUSCULAR; INTRAVENOUS at 17:15

## 2023-06-28 NOTE — PROGRESS NOTE ADULT - SUBJECTIVE AND OBJECTIVE BOX
Date/Time Patient Seen:  		  Referring MD:   Data Reviewed	       Patient is a 89y old  Female who presents with a chief complaint of Wheezing (27 Jun 2023 16:08)      Subjective/HPI     PAST MEDICAL & SURGICAL HISTORY:  Benign Essential Hypertension    Chronic Sciatica    Personal History of Coronary Artery Disease    Hypertension    Atrial fibrillation    CVA (cerebral vascular accident)    S/P CABG          Medication list         MEDICATIONS  (STANDING):  albuterol/ipratropium for Nebulization 3 milliLiter(s) Nebulizer every 8 hours  apixaban 2.5 milliGRAM(s) Oral every 12 hours  aspirin enteric coated 81 milliGRAM(s) Oral daily  brimonidine 0.2% Ophthalmic Solution 1 Drop(s) Both EYES two times a day  buMETAnide Injectable 1 milliGRAM(s) IV Push every 12 hours  diltiazem    milliGRAM(s) Oral daily  levothyroxine 50 MICROGram(s) Oral daily  metoprolol tartrate 25 milliGRAM(s) Oral two times a day  polyethylene glycol 3350 17 Gram(s) Oral at bedtime  senna 2 Tablet(s) Oral at bedtime  simvastatin 40 milliGRAM(s) Oral at bedtime  timolol 0.5% Solution 1 Drop(s) Both EYES two times a day    MEDICATIONS  (PRN):  acetaminophen     Tablet .. 650 milliGRAM(s) Oral every 6 hours PRN Temp greater or equal to 38C (100.4F), Mild Pain (1 - 3)  aluminum hydroxide/magnesium hydroxide/simethicone Suspension 30 milliLiter(s) Oral every 4 hours PRN Dyspepsia  melatonin 3 milliGRAM(s) Oral at bedtime PRN Insomnia  ondansetron Injectable 4 milliGRAM(s) IV Push every 8 hours PRN Nausea and/or Vomiting  traMADol 25 milliGRAM(s) Oral four times a day PRN Severe Pain (7 - 10)  zolpidem 5 milliGRAM(s) Oral at bedtime PRN Insomnia         Vitals log        ICU Vital Signs Last 24 Hrs  T(C): 36.9 (28 Jun 2023 05:06), Max: 36.9 (28 Jun 2023 05:06)  T(F): 98.4 (28 Jun 2023 05:06), Max: 98.4 (28 Jun 2023 05:06)  HR: 97 (28 Jun 2023 05:06) (69 - 97)  BP: 157/70 (28 Jun 2023 05:06) (117/67 - 157/70)  BP(mean): --  ABP: --  ABP(mean): --  RR: 18 (28 Jun 2023 05:06) (18 - 18)  SpO2: 94% (28 Jun 2023 05:06) (86% - 98%)    O2 Parameters below as of 28 Jun 2023 05:06  Patient On (Oxygen Delivery Method): nasal cannula  O2 Flow (L/min): 2               Input and Output:  I&O's Detail    26 Jun 2023 07:01  -  27 Jun 2023 07:00  --------------------------------------------------------  IN:    Oral Fluid: 770 mL  Total IN: 770 mL    OUT:    Voided (mL): 450 mL  Total OUT: 450 mL    Total NET: 320 mL      27 Jun 2023 07:01  -  28 Jun 2023 05:37  --------------------------------------------------------  IN:    Oral Fluid: 520 mL    PRBCs (Packed Red Blood Cells): 200 mL  Total IN: 720 mL    OUT:    Voided (mL): 800 mL  Total OUT: 800 mL    Total NET: -80 mL          Lab Data                        7.6    7.77  )-----------( 191      ( 27 Jun 2023 06:42 )             24.8     06-27    138  |  97  |  45<H>  ----------------------------<  97  4.0   |  39<H>  |  1.50<H>    Ca    8.4<L>      27 Jun 2023 06:42              Review of Systems	      Objective     Physical Examination    heart s1s2  lung dc BS  head nc      Pertinent Lab findings & Imaging      Burak:  NO   Adequate UO     I&O's Detail    26 Jun 2023 07:01  -  27 Jun 2023 07:00  --------------------------------------------------------  IN:    Oral Fluid: 770 mL  Total IN: 770 mL    OUT:    Voided (mL): 450 mL  Total OUT: 450 mL    Total NET: 320 mL      27 Jun 2023 07:01  -  28 Jun 2023 05:37  --------------------------------------------------------  IN:    Oral Fluid: 520 mL    PRBCs (Packed Red Blood Cells): 200 mL  Total IN: 720 mL    OUT:    Voided (mL): 800 mL  Total OUT: 800 mL    Total NET: -80 mL               Discussed with:     Cultures:	        Radiology

## 2023-06-28 NOTE — DISCHARGE NOTE NURSING/CASE MANAGEMENT/SOCIAL WORK - PATIENT PORTAL LINK FT
You can access the FollowMyHealth Patient Portal offered by [facility] by registering at the following website: [email or web address]. By joining [facility]’s FollowMyHealth portal, you will also be able to view your health information using other applications (apps) compatible with our system.

## 2023-06-28 NOTE — PROGRESS NOTE PEDS - SUBJECTIVE AND OBJECTIVE BOX
[facility] -- Heather Roberts Pannella, Patel, Savella, Goodger  Office # [phone]    Follow Up:  CHF     Subjective/Observations: seen and examined, lethargic in bed, denies chest pain, dyspnea, palpitations or dizziness, orthopnea and PND. on O2 via NC, neb treatment in progress    REVIEW OF SYSTEMS: All other review of systems is negative unless indicated above  PAST MEDICAL & SURGICAL HISTORY:  Benign Essential Hypertension      Chronic Sciatica      Personal History of Coronary Artery Disease      Hypertension      Atrial fibrillation      CVA (cerebral vascular accident)      S/P CABG        MEDICATIONS  (STANDING):  albuterol/ipratropium for Nebulization 3 milliLiter(s) Nebulizer every 8 hours  apixaban 2.5 milliGRAM(s) Oral every 12 hours  aspirin enteric coated 81 milliGRAM(s) Oral daily  brimonidine 0.2% Ophthalmic Solution 1 Drop(s) Both EYES two times a day  buMETAnide Injectable 1 milliGRAM(s) IV Push every 12 hours  diltiazem    milliGRAM(s) Oral daily  levothyroxine 50 MICROGram(s) Oral daily  metoprolol tartrate 25 milliGRAM(s) Oral two times a day  polyethylene glycol 3350 17 Gram(s) Oral at bedtime  senna 2 Tablet(s) Oral at bedtime  simvastatin 40 milliGRAM(s) Oral at bedtime  timolol 0.5% Solution 1 Drop(s) Both EYES two times a day    MEDICATIONS  (PRN):  acetaminophen     Tablet .. 650 milliGRAM(s) Oral every 6 hours PRN Temp greater or equal to 38C (100.4F), Mild Pain (1 - 3)  aluminum hydroxide/magnesium hydroxide/simethicone Suspension 30 milliLiter(s) Oral every 4 hours PRN Dyspepsia  melatonin 3 milliGRAM(s) Oral at bedtime PRN Insomnia  ondansetron Injectable 4 milliGRAM(s) IV Push every 8 hours PRN Nausea and/or Vomiting  traMADol 25 milliGRAM(s) Oral four times a day PRN Severe Pain (7 - 10)  zolpidem 5 milliGRAM(s) Oral at bedtime PRN Insomnia    Allergies    No Known Allergies    Intolerances      Vital Signs Last 24 Hrs  T(C): 36.9 (28 Jun 2023 05:06), Max: 36.9 (28 Jun 2023 05:06)  T(F): 98.4 (28 Jun 2023 05:06), Max: 98.4 (28 Jun 2023 05:06)  HR: 80 (28 Jun 2023 08:48) (69 - 97)  BP: 157/70 (28 Jun 2023 05:06) (117/67 - 157/70)  BP(mean): --  RR: 18 (28 Jun 2023 05:06) (18 - 18)  SpO2: 94% (28 Jun 2023 08:48) (86% - 98%)    Parameters below as of 28 Jun 2023 08:48  Patient On (Oxygen Delivery Method): nasal cannula      I&O's Summary    27 Jun 2023 07:01  -  28 Jun 2023 07:00  --------------------------------------------------------  IN: 720 mL / OUT: 800 mL / NET: -80 mL       TELE: Afib 80's  PHYSICAL EXAM:  Constitutional: NAD, awake and alert, obese  HEENT: Moist Mucous Membranes, Anicteric  Pulmonary: Non-labored, breath sounds are clear, diminished at bases bilaterally, No wheezing, rales or rhonchi  Cardiovascular: Regular, S1 and S2, + murmurs, rubs, gallops or clicks  Gastrointestinal: Bowel Sounds present, soft, nontender.   Lymph: No peripheral edema. No lymphadenopathy.  Skin: No visible rashes or ulcers.  Psych:  Mood & affect appropriate    LABS: All Labs Reviewed:                        8.9    7.98  )-----------( 205      ( 28 Jun 2023 06:00 )             29.3                         7.6    7.77  )-----------( 191      ( 27 Jun 2023 06:42 )             24.8                         7.9    8.68  )-----------( 183      ( 26 Jun 2023 06:54 )             26.3     28 Jun 2023 06:45    141    |  98     |  44     ----------------------------<  93     3.8     |  39     |  1.40   27 Jun 2023 06:42    138    |  97     |  45     ----------------------------<  97     4.0     |  39     |  1.50   26 Jun 2023 06:54    142    |  100    |  44     ----------------------------<  104    4.2     |  38     |  1.50     Ca    8.3        28 Jun 2023 06:45  Ca    8.4        27 Jun 2023 06:42  Ca    8.4        26 Jun 2023 06:54            12 Lead ECG:   Ventricular Rate 102 BPM    QRS Duration 114 ms    Q-T Interval 398 ms    QTC Calculation(Bazett) 518 ms    R Axis 0 degrees    T Axis -18 degrees    Diagnosis Line Atrial fibrillation with rapid ventricular response  Low voltage QRS  Right bundle branch block  ST & T wave abnormality, consider anterolateral ischemia  Abnormal ECG  When compared with ECG of 14-MAY-2023 14:02,  No significant change was found  Confirmed by VELMA ELLSWORTH ([id number]) on 6/17/2023 5:32:50 PM (06-16-23 @ 13:56)      Patient name: JIMMIE BRIAN  YOB: 1926   Age: 89 (F)   MR#: [id number]  Study Date: 11/22/2016  [facility] RDCS  Study quality: Technically difficult  Referring Physician: Sally Phipps MD  Blood Pressure: 153/78 mmHg  Height: 140 cm  Weight: 60 kg  BSA: 1.5 m2  ------------------------------------------------------------------------  PROCEDURE: Transthoracic echocardiogram with 2-D, M-Mode  and complete spectral and color flow Doppler.  INDICATION: Unspecified atrial fibrillation (I48.91)  ------------------------------------------------------------------------  Dimensions:    Normal Values:  LA:     4.6    2.0 - 4.0 cm  Ao:     3.4    2.0 - 3.8 cm  SEPTUM: 1.0    0.6 - 1.2 cm  PWT:    1.1  0.6 - 1.1 cm  LVIDd:  3.4    3.0 - 5.6 cm  LVIDs:  2.6    1.8 - 4.0 cm  Derived variables:  LVMI: 72 g/m2  RWT: 0.64  Fractional short: 24 %  EF (Teicholtz): 48 %  ------------------------------------------------------------------------  Observations:  Mitral Valve: Mitral annular calcification. The posterior  annulus is heavily calcified. There appears to be  independently mobile material seen at the posterior  annulus. This may represent endocarditis, clinical  correlation indicated. Mild mitral regurgitation.  Aortic Valve/Aorta: Calcified trileaflet aortic valve with  normal opening. Mild-moderate aortic regurgitation.   msec  Aortic Root: 3.4 cm.  Left Atrium: Mildly dilated left atrium.  LA volume index =  37 cc/m2.  Left Ventricle: Endocardium not well visualized; Mild  segmental left ventricular systolic dysfunction. The  base-mid inferior wall, the mid lateral wall, and the basal  inferoseptum are hypokinetic.  Regional wall motion  variation is noted on the setting of atrial fibrillation.  Increased relative wall thickness with normal left  ventricular mass index, consistent with concentric left  ventricular remodeling.  Right Heart: Mild right atrial enlargement. Normal right  ventricular size with mildly  decreased right ventricular  systolic function. Normal tricuspid valve. Mild-moderate  tricuspid regurgitation. Normal pulmonic valve. Minimal  pulmonic regurgitation.  Pericardium/Pleura: Normal pericardium with no pericardial  effusion.  Hemodynamic: Estimated right atrial pressure is 8 mm Hg.  Estimated right ventricular systolic pressure equals 44 mm  Hg, assuming right atrial pressure equals 8 mm Hg,  consistent with mild pulmonary hypertension.  ------------------------------------------------------------------------  Conclusions:  1. Mitral annular calcification. The posterior annulus is  heavily calcified. There appears to be independently mobile  material seen at the posterior annulus. This may represent  endocarditis, clinical correlation indicated.  2. Calcified trileaflet aortic valve with normal opening.  Mild-moderate aortic regurgitation.  msec  3. Increased relative wall thickness with normal left  ventricular mass index, consistent with concentric left  ventricular remodeling.  4. Endocardium not well visualized; Mild segmental left  ventricular systolic dysfunction. The base-mid inferior  wall, the mid lateral wall, and the basal inferoseptum are  hypokinetic.  Regional wall motion variation is noted on  the setting ofatrial fibrillation.  5. Normal right ventricular size with mildly  decreased  right ventricular systolic function.  6. Normal tricuspid valve. Mild-moderate tricuspid  regurgitation.  7. Estimated pulmonary artery systolic pressure equals 44  mm Hg,assuming right atrial pressure equals 8 mm Hg,  consistent with mild pulmonary pressures.  ------------------------------------------------------------------------  Confirmed on  11/22/2016 - 14:45:39 by Jazzy Phillips M.D.  ------------------------------------------------------------------------

## 2023-06-28 NOTE — PROGRESS NOTE ADULT - SUBJECTIVE AND OBJECTIVE BOX
pt seen  still with pain  received 1 U PRBC  ICU Vital Signs Last 24 Hrs  T(C): 36.9 (28 Jun 2023 05:06), Max: 36.9 (28 Jun 2023 05:06)  T(F): 98.4 (28 Jun 2023 05:06), Max: 98.4 (28 Jun 2023 05:06)  HR: 80 (28 Jun 2023 08:48) (69 - 97)  BP: 157/70 (28 Jun 2023 05:06) (117/67 - 157/70)  BP(mean): --  ABP: --  ABP(mean): --  RR: 18 (28 Jun 2023 05:06) (18 - 18)  SpO2: 94% (28 Jun 2023 08:48) (86% - 98%)    O2 Parameters below as of 28 Jun 2023 08:48  Patient On (Oxygen Delivery Method): nasal cannula        gen-NAD  resp-clear  abd-soft NT/ND  RLE hematoma, stable, soft,  slight drainage inferiorly

## 2023-06-28 NOTE — PROGRESS NOTE ADULT - REASON FOR ADMISSION
Wheezing

## 2023-06-28 NOTE — DISCHARGE NOTE NURSING/CASE MANAGEMENT/SOCIAL WORK - NSSCTYPOFSERV_GEN_ALL_CORE
RN and Physical therapy to visit the day after the hospital discharge.  Please contact the home care agency  at the above phone number if you have not heard from them by 12 noon on the day after your hospital discharge.

## 2023-06-28 NOTE — DISCHARGE NOTE PROVIDER - NSDCFUADDINST_GEN_ALL_CORE_FT
DRESSING CHANGE:  Dress Right Lower Extremity with Xeroform, cover with dry dressing and wrap with Ace Bandage daily, family to learn. Follow with Dr. Vick in 1-2 weeks. Please call and make an appointment.

## 2023-06-28 NOTE — DISCHARGE NOTE PROVIDER - NSDCMRMEDTOKEN_GEN_ALL_CORE_FT
apixaban 2.5 mg oral tablet: 1 tab(s) orally every 12 hours  Centrum Adults oral tablet: 1 tab(s) orally once a day  cholecalciferol 25 mcg (1000 intl units) oral tablet: 1 tab(s) orally once a day  Combigan 0.2%-0.5% ophthalmic solution: 1 drop(s) to each affected eye every 12 hours  dilTIAZem 120 mg/24 hours oral capsule, extended release: 1 cap(s) orally once a day (at bedtime)  levothyroxine 50 mcg (0.05 mg) oral tablet: 1 tab(s) orally once a day  polyethylene glycol 3350 oral powder for reconstitution: 17 gram(s) orally once a day (at bedtime)  PreserVision AREDS 2 oral capsule: 2 cap(s) orally 2 times a day  senna leaf extract oral tablet: 2 tab(s) orally once a day (at bedtime)  simvastatin 40 mg oral tablet: 1 tab(s) orally once a day (at bedtime)  traMADol 50 mg oral tablet: 1 tab(s) orally every 6 hours MDD: 200mg  zolpidem 10 mg oral tablet: 0.5 tab(s) orally once a day (at bedtime) as needed for  insomnia   apixaban 2.5 mg oral tablet: 1 tab(s) orally every 12 hours  Centrum Adults oral tablet: 1 tab(s) orally once a day  cholecalciferol 25 mcg (1000 intl units) oral tablet: 1 tab(s) orally once a day  Combigan 0.2%-0.5% ophthalmic solution: 1 drop(s) to each affected eye every 12 hours  dilTIAZem 120 mg/24 hours oral capsule, extended release: 1 cap(s) orally once a day (at bedtime)  levothyroxine 50 mcg (0.05 mg) oral tablet: 1 tab(s) orally once a day  polyethylene glycol 3350 oral powder for reconstitution: 17 gram(s) orally once a day (at bedtime)  PreserVision AREDS 2 oral capsule: 2 cap(s) orally 2 times a day  senna leaf extract oral tablet: 2 tab(s) orally once a day (at bedtime)  simvastatin 40 mg oral tablet: 1 tab(s) orally once a day (at bedtime)  zolpidem 10 mg oral tablet: 0.5 tab(s) orally once a day (at bedtime) as needed for  insomnia

## 2023-06-28 NOTE — DISCHARGE NOTE PROVIDER - PROVIDER TOKENS
PROVIDER:[TOKEN:[[id number]],FOLLOWUP:[2 weeks]],PROVIDER:[TOKEN:[[id number]],FOLLOWUP:[2 weeks]]

## 2023-06-28 NOTE — PROGRESS NOTE ADULT - SUBJECTIVE AND OBJECTIVE BOX
SUBJECTIVE:  Patient seen and examined at bedside. No overnight events. C/o RLE pain at hematoma site. Does not offer up any other complaints.  Patient denies any fever, chills, chest pain, shortness of breath, nausea, vomiting, or urinary complaints.    VITALS  Vital Signs Last 24 Hrs  T(C): 36.9 (28 Jun 2023 05:06), Max: 36.9 (28 Jun 2023 05:06)  T(F): 98.4 (28 Jun 2023 05:06), Max: 98.4 (28 Jun 2023 05:06)  HR: 80 (28 Jun 2023 08:48) (69 - 97)  BP: 157/70 (28 Jun 2023 05:06) (117/67 - 157/70)  BP(mean): --  RR: 18 (28 Jun 2023 05:06) (18 - 18)  SpO2: 94% (28 Jun 2023 08:48) (86% - 98%)    Parameters below as of 28 Jun 2023 08:48  Patient On (Oxygen Delivery Method): nasal cannula    PHYSICAL EXAM  GENERAL:  Well-nourished, well-developed elderly Female lying comfortably in bed in [facility].  HEENT:  NC/AT. Sclera white. Mucous membranes moist.  ABDOMEN:  Soft, nondistended, nontender.  EXTREMITIES: B/l LE swelling noted; Hematoma with slight serous drainage, without surrounding skin changes; RLE wrapped w/ xeroform, 4x4 gauze, kerlix, and ACE dressing just below the knee.  SKIN:  No jaundice, pallor, or cyanosis  NEURO:  A&O x 3    INTAKE & OUTPUT  I&O's Summary    27 Jun 2023 07:01  -  28 Jun 2023 07:00  --------------------------------------------------------  IN: 720 mL / OUT: 800 mL / NET: -80 mL    I&O's Detail    27 Jun 2023 07:01  -  28 Jun 2023 07:00  --------------------------------------------------------  IN:    Oral Fluid: 520 mL    PRBCs (Packed Red Blood Cells): 200 mL  Total IN: 720 mL    OUT:    Voided (mL): 800 mL  Total OUT: 800 mL    Total NET: -80 mL    MEDICATIONS  MEDICATIONS  (STANDING):  albuterol/ipratropium for Nebulization 3 milliLiter(s) Nebulizer every 8 hours  apixaban 2.5 milliGRAM(s) Oral every 12 hours  aspirin enteric coated 81 milliGRAM(s) Oral daily  brimonidine 0.2% Ophthalmic Solution 1 Drop(s) Both EYES two times a day  buMETAnide Injectable 1 milliGRAM(s) IV Push every 12 hours  diltiazem    milliGRAM(s) Oral daily  levothyroxine 50 MICROGram(s) Oral daily  metoprolol tartrate 25 milliGRAM(s) Oral two times a day  polyethylene glycol 3350 17 Gram(s) Oral at bedtime  senna 2 Tablet(s) Oral at bedtime  simvastatin 40 milliGRAM(s) Oral at bedtime  timolol 0.5% Solution 1 Drop(s) Both EYES two times a day    MEDICATIONS  (PRN):  acetaminophen     Tablet .. 650 milliGRAM(s) Oral every 6 hours PRN Temp greater or equal to 38C (100.4F), Mild Pain (1 - 3)  aluminum hydroxide/magnesium hydroxide/simethicone Suspension 30 milliLiter(s) Oral every 4 hours PRN Dyspepsia  melatonin 3 milliGRAM(s) Oral at bedtime PRN Insomnia  ondansetron Injectable 4 milliGRAM(s) IV Push every 8 hours PRN Nausea and/or Vomiting  traMADol 25 milliGRAM(s) Oral four times a day PRN Severe Pain (7 - 10)  zolpidem 5 milliGRAM(s) Oral at bedtime PRN Insomnia    LABS:                      8.9    7.98  )-----------( 205      ( 28 Jun 2023 06:00 )             29.3     06-28    141  |  98  |  44<H>  ----------------------------<  93  3.8   |  39<H>  |  1.40<H>    Ca    8.3<L>      28 Jun 2023 06:45    ASSESSMENT & PLAN   89y Female a/w CHF exacerbation, RRT 6/23 called for significant bleeding from RLE hematoma. s/p 1 u pRBC on 6/24.  Most recently, s/p 1 u pRBC yesterday for Hgb 7.6.  VSS. HGB 8.9 noted.    - Continue diet  - Monitor H/H; transfuse prn  - AC (Eliquis) started yesterday, monitor for further signs of bleeding  - To continue current conservative management; Maintain pressure dressing

## 2023-06-28 NOTE — DISCHARGE NOTE PROVIDER - CARE PROVIDER_API CALL
Herb Beck  Internal Medicine  [address] [place] [address]  Phone: [phone]  Fax: [phone]  Follow Up Time: 2 weeks    Bj Gomez  Surgery  [address] [place] [address]  Phone: [phone]  Fax: [phone]  Follow Up Time: 2 weeks

## 2023-06-28 NOTE — DISCHARGE NOTE NURSING/CASE MANAGEMENT/SOCIAL WORK - NSSCNAMETXT_GEN_ALL_CORE
2/week(s) [facility] care agency [phone] will reach out to you within 24-72 hours of your discharge to schedule home care visit/eval appointment with you. Please call agency for any queries regarding home care services

## 2023-06-28 NOTE — DISCHARGE NOTE PROVIDER - NSDCCPCAREPLAN_GEN_ALL_CORE_FT
PRINCIPAL DISCHARGE DIAGNOSIS  Diagnosis: Acute CHF  Assessment and Plan of Treatment: bumex  home care, poor prognosis, outpt hospice eval      SECONDARY DISCHARGE DIAGNOSES  Diagnosis: Hypoxia  Assessment and Plan of Treatment:     Diagnosis: Acute CHF  Assessment and Plan of Treatment:     Diagnosis: Superficial hematoma  Assessment and Plan of Treatment: sp rupture  wound care per surgery

## 2023-06-28 NOTE — DISCHARGE NOTE PROVIDER - HOSPITAL COURSE
89F w/ PMHx of CHFrEF, CAD s/p CABG, CKD, Afib (on apixaban), HTN, moderate AS and pulmonary HTN, chronic sciatica, CVA who presents to the ED for wheezing. pt w/ ADHF.   Ct chest- small rt pl effusion    #ADHF w acute hypoxic and  hypercapnea respiratory failure  cont bumex switched po upon discharge  strict I/Os, monitor daily weights, Cr, and K.  chronic co2 retention- home o2 setup  overall poor prognosis w advanced heart failure    #AF controlled for now.   cont cardizem ,resumed eliquis    #course cb ruptured leg hematoma/anemia  bleeding controlled with ace wrap  surgery following, sp transfusion with stable hb  eliquis restarted  wound care per surgery

## 2023-06-28 NOTE — DISCHARGE NOTE NURSING/CASE MANAGEMENT/SOCIAL WORK - NSDCPEFALRISK_GEN_ALL_CORE
For information on Fall & Injury Prevention, visit: [email or web address] OR  [email or web address] OR  https://www.cdc.gov/steadi/patient.html

## 2023-06-28 NOTE — PROGRESS NOTE ADULT - PROVIDER SPECIALTY LIST ADULT
Hospitalist
Hospitalist
Pulmonology
Pulmonology
Surgery
Surgery
Cardiology
Hospitalist
Pulmonology
Surgery
Surgery
Cardiology
Hospitalist
Internal Medicine
Pulmonology
Cardiology
Hospitalist
Internal Medicine
Pulmonology
Pulmonology
Surgery
Surgery

## 2023-06-28 NOTE — PROGRESS NOTE PEDS - ASSESSMENT
89 yo F with diastolic HF, CAD s/p CABG, RBBB, CKD, atrial fibrillation (on apixaban), HTN, moderate AS and pulmonary HTN, chronic sciatica, CVA who was recently hospitalized  for fall sp IMN 5/14 s/p and discharge on 5/19/23 who is presented to ED  for fatigue and wheezing a/w CHF, A fib RVR    ADHF, CAD, CABG, VHD, RBBB, A fib, HTN, Pulm HTN  - a/f acute hypoxic and hypercapnic respiratory failure  - CT chest with small right pleural effusion, pulm following   - TTE 2016: EF 48%, f/u TTE (ordered)   - BNP: 4522  - respiratory remains tenuous noted desaturations overnight, lethargic during exam, pulm following   - Continue Bumex  1 mg IV BID  - s/p metolazone 2.5 x 1 6/24 after transfusion.   - Creatinine ~1.40  - Continue to maintain strict I/Os, monitor daily weights, Cr, and K.     - Known CAD s/p CABG  - EKG: Afib 102 with no acute  ischemic changes   - Troponin neg   - No evidence of any active ischemia   - Continue ASA, statin     - Known Afib, on apixaban , renally dosed has CKD   - Tele with A fib 80's  - Continue Diltiazem 120 mg daily  - continue  Metoprolol 25 mg PO BID     - H/H 8.9/29.3 transfuse per primary to keep hgb > 8 given ext cardiac hx   - Monitor and replete Lytes. Keep K > 4 and Mg > 2    - GOC discussion ongoing  - at risk for abrupt decompensation   - Will continue to follow.    Elissa Valdovinos [facility]  Nurse Practitioner - Cardiology   call TEAMS

## 2023-06-28 NOTE — PROGRESS NOTE PEDS - NS ATTEND BILL GEN_ALL_CORE
Ongoing SW/CM Assessment/Plan of Care Note       Progress note:   SW called SW and continuing care at [facility] [phone] and left a detailed message regarding pt transfer this afternoon for complex abdominal surgery.  SW also reported that pt requires a Hmong  for all conversation.  SW left call back number in case of additional questions.    SW will continue to follow until discharge.             Attending to bill

## 2023-06-28 NOTE — CASE MANAGEMENT PROGRESS NOTE - NSCMPROGRESSNOTE_GEN_ALL_CORE
Patient is discharge home. CM met with patient, daughter and private aide to discussed discharge disposition home with Homecare (awaiting acceptance- CM to call daughter tomorrow in regards of home care). POC was deliver today at bedside.  Hospital bed and O2 concentrator delivered at home today by [facility]. Daughter verbalized understanding of discharge plan and patient is in agreement.  Ambulance  at 6pm.  CM remains available throughout hospital stay.

## 2023-06-28 NOTE — DISCHARGE NOTE PROVIDER - NSDCFUSCHEDAPPT_GEN_ALL_CORE_FT
Kwasi Chew Physician [facility]  CARDIOLOGY [address]  Scheduled Appointment: 08/16/2023

## 2023-06-29 DIAGNOSIS — I50.9 HEART FAILURE, UNSPECIFIED: ICD-10-CM

## 2023-06-29 RX ORDER — TORSEMIDE 20 MG/1
20 TABLET ORAL
Qty: 150 | Refills: 3 | Status: DISCONTINUED | COMMUNITY
Start: 2022-06-29 | End: 2023-06-29

## 2023-06-30 RX ORDER — BUMETANIDE 1 MG/1
1 TABLET ORAL
Qty: 180 | Refills: 1 | Status: ACTIVE | COMMUNITY
Start: 2023-06-29 | End: 1900-01-01

## 2023-07-03 ENCOUNTER — LABORATORY RESULT (OUTPATIENT)
Age: 88
End: 2023-07-03

## 2023-07-03 ENCOUNTER — TRANSCRIPTION ENCOUNTER (OUTPATIENT)
Age: 88
End: 2023-07-03

## 2023-07-13 NOTE — PROGRESS NOTE ADULT - SUBJECTIVE AND OBJECTIVE BOX
How Severe Are Your Warts?: mild Is This A New Presentation, Or A Follow-Up?: Follow Up Hayder Additional History: The patient is here following up on her wart on her left pinky finger. She states since the last treatment it has gotten better. Ultimately, she is here seeking next steps in her treatment plan. [facility] -- Heather Roberts Pannella, Patel, Savella [facility]  Office # [phone]      Follow Up:  chf    Subjective/Observations: Patient now on bipap notes reviewed had desaturation this am     REVIEW OF SYSTEMS: All other review of systems is negative unless indicated above    PAST MEDICAL & SURGICAL HISTORY:  Benign Essential Hypertension      Chronic Sciatica      Personal History of Coronary Artery Disease      Hypertension      Atrial fibrillation      CVA (cerebral vascular accident)      S/P CABG          MEDICATIONS  (STANDING):  apixaban 2.5 milliGRAM(s) Oral every 12 hours  aspirin  chewable 81 milliGRAM(s) Oral daily  brimonidine 0.2% Ophthalmic Solution 1 Drop(s) Both EYES two times a day  buMETAnide Injectable 1 milliGRAM(s) IV Push every 12 hours  diltiazem    milliGRAM(s) Oral daily  levothyroxine 50 MICROGram(s) Oral daily  polyethylene glycol 3350 17 Gram(s) Oral at bedtime  senna 2 Tablet(s) Oral at bedtime  simvastatin 40 milliGRAM(s) Oral at bedtime  timolol 0.5% Solution 1 Drop(s) Both EYES two times a day    MEDICATIONS  (PRN):  acetaminophen     Tablet .. 650 milliGRAM(s) Oral every 6 hours PRN Temp greater or equal to 38C (100.4F), Mild Pain (1 - 3)  aluminum hydroxide/magnesium hydroxide/simethicone Suspension 30 milliLiter(s) Oral every 4 hours PRN Dyspepsia  melatonin 3 milliGRAM(s) Oral at bedtime PRN Insomnia  ondansetron Injectable 4 milliGRAM(s) IV Push every 8 hours PRN Nausea and/or Vomiting  zolpidem 5 milliGRAM(s) Oral at bedtime PRN Insomnia      Allergies    No Known Allergies    Intolerances        Vital Signs Last 24 Hrs  T(C): 37.1 (2023 11:12), Max: 37.1 (2023 11:12)  T(F): 98.8 (2023 11:12), Max: 98.8 (2023 11:12)  HR: 94 (2023 11:12) (83 - 97)  BP: 146/73 (2023 11:12) (116/70 - 146/73)  BP(mean): --  RR: 13 (2023 11:12) (13 - 20)  SpO2: 92% (2023 11:12) (92% - 99%)    Parameters below as of 2023 11:12  Patient On (Oxygen Delivery Method): BiPAP/CPAP        I&O's Summary    2023 07:01  -  2023 14:57  --------------------------------------------------------  IN: 0 mL / OUT: 700 mL / NET: -700 mL          PHYSICAL EXAM:  TELE: Af  Constitutional NAD  HEENT: Moist Mucous Membranes, Anicteric  Pulmonary: Non-labored, breath sounds are dim  Cardiovascular: IRRegular, S1 and S2 nl, murmur   Gastrointestinal: Bowel Sounds present, soft, nontender.   Lymph: + peripheral edema.  Skin: No visible rashes or ulcers.  Psych:  Mood & affect appropriate    LABS: All Labs Reviewed:                        10.9   8.73  )-----------( 221      ( 2023 05:30 )             37.5                         9.9    7.23  )-----------( 194      ( 2023 07:30 )             33.8                         11.0   7.63  )-----------( 199      ( 2023 06:52 )             37.1     2023 05:30    142    |  102    |  45     ----------------------------<  111    4.8     |  38     |  1.20   2023 07:30    143    |  104    |  41     ----------------------------<  94     4.4     |  36     |  1.30   2023 06:52    144    |  104    |  42     ----------------------------<  95     4.1     |  37     |  1.20     Ca    9.2        2023 05:30  Ca    8.5        2023 07:30  Ca    8.6        2023 06:52               EC Lead ECG:   Ventricular Rate 102 BPM    QRS Duration 114 ms    Q-T Interval 398 ms    QTC Calculation(Bazett) 518 ms    R Axis 0 degrees    T Axis -18 degrees    Diagnosis Line Atrial fibrillation with rapid ventricular response  Low voltage QRS  Right bundle branch block  ST & T wave abnormality, consider anterolateral ischemia  Abnormal ECG  When compared with ECG of 14-MAY-2023 14:02,  No significant change was found  Confirmed by VELMA ELLSWORTH ([id number]) on 2023 5:32:50 PM (23 @ 13:56)      Patient name: JIMMIE BRIAN  YOB: 1926   Age: 89 (F)   MR#: [id number]  Study Date: 2016  [facility]  Study quality: Technically difficult  Referring Physician: Sally Phipps MD  Blood Pressure: 153/78 mmHg  Height: 140 cm  Weight: 60 kg  BSA: 1.5 m2  ------------------------------------------------------------------------  PROCEDURE: Transthoracic echocardiogram with 2-D, M-Mode  and complete spectral and color flow Doppler.  INDICATION: Unspecified atrial fibrillation (I48.91)  ------------------------------------------------------------------------  Dimensions:    Normal Values:  LA:     4.6    2.0 - 4.0 cm  Ao:     3.4    2.0 - 3.8 cm  SEPTUM: 1.0    0.6 - 1.2 cm  PWT:    1.1  0.6 - 1.1 cm  LVIDd:  3.4    3.0 - 5.6 cm  LVIDs:  2.6    1.8 - 4.0 cm  Derived variables:  LVMI: 72 g/m2  RWT: 0.64  Fractional short: 24 %  EF (Teicholtz): 48 %  ------------------------------------------------------------------------  Observations:  Mitral Valve: Mitral annular calcification. The posterior  annulus is heavily calcified. There appears to be  independently mobile material seen at the posterior  annulus. This may represent endocarditis, clinical  correlation indicated. Mild mitral regurgitation.  Aortic Valve/Aorta: Calcified trileaflet aortic valve with  normal opening. Mild-moderate aortic regurgitation.   msec  Aortic Root: 3.4 cm.  Left Atrium: Mildly dilated left atrium.  LA volume index =  37 cc/m2.  Left Ventricle: Endocardium not well visualized; Mild  segmental left ventricular systolic dysfunction. The  base-mid inferior wall, the mid lateral wall, and the basal  inferoseptum are hypokinetic.  Regional wall motion  variation is noted on the setting of atrial fibrillation.  Increased relative wall thickness with normal left  ventricular mass index, consistent with concentric left  ventricular remodeling.  Right Heart: Mild right atrial enlargement. Normal right  ventricular size with mildly  decreased right ventricular  systolic function. Normal tricuspid valve. Mild-moderate  tricuspid regurgitation. Normal pulmonic valve. Minimal  pulmonic regurgitation.  Pericardium/Pleura: Normal pericardium with no pericardial  effusion.  Hemodynamic: Estimated right atrial pressure is 8 mm Hg.  Estimated right ventricular systolic pressure equals 44 mm  Hg, assuming right atrial pressure equals 8 mm Hg,  consistent with mild pulmonary hypertension.  ------------------------------------------------------------------------  Conclusions:  1. Mitral annular calcification. The posterior annulus is  heavily calcified. There appears to be independently mobile  material seen at the posterior annulus. This may represent  endocarditis, clinical correlation indicated.  2. Calcified trileaflet aortic valve with normal opening.  Mild-moderate aortic regurgitation.  msec  3. Increased relative wall thickness with normal left  ventricular mass index, consistent with concentric left  ventricular remodeling.  4. Endocardium not well visualized; Mild segmental left  ventricular systolic dysfunction. The base-mid inferior  wall, the mid lateral wall, and the basal inferoseptum are  hypokinetic.  Regional wall motion variation is noted on  the setting ofatrial fibrillation.  5. Normal right ventricular size with mildly  decreased  right ventricular systolic function.  6. Normal tricuspid valve. Mild-moderate tricuspid  regurgitation.  7. Estimated pulmonary artery systolic pressure equals 44  mm Hg,assuming right atrial pressure equals 8 mm Hg,  consistent with mild pulmonary pressures.  ------------------------------------------------------------------------  Confirmed on  2016 - 14:45:39 by Jazzy Phillips M.D.  ------------------------------------------------------------------------      Radiology:

## 2023-07-16 NOTE — HISTORY OF PRESENT ILLNESS
[FreeTextEntry1] : Ms. BRIAN presents for the evaluation of HFpEF\par Overall she has felt well. She had an increase in weight. Torsemide was increased. Weight did get better. Weight is around 134 now down from 137. She remains on the increased Torsemide dose. BMP has been stable.

## 2023-07-16 NOTE — END OF VISIT
Dr. Posada reviewed message and recommends to wait until diarrhea resolved, then start Cipro 500 mg bid for 3 weeks and follow up as scheduled.  The patient was called.  She said diarrhea is resolving.  She was informed of Dr. Posada's recommendations.  Prescription for Cipro was sent to the pharmacy.   
She was in last week and given an antibiotic but is having the side effects from it. She is having diarrhea. She would like a call back.   
Spoke to Carmencita and advised to stop antibiotic, we will discuss with Dr. Posada when he returns to the clinic in a few days. She understood and had no additional questions or concerns  
[Time Spent: ___ minutes] : I have spent [unfilled] minutes of time on the encounter.

## 2023-07-16 NOTE — DISCUSSION/SUMMARY
[FreeTextEntry1] : COntinue to dose diuretic with titration based on weight increase, edema increase or SOB\par Decrease back to 4 a day of torsemide given creep in crt. \par  [EKG obtained to assist in diagnosis and management of assessed problem(s)] : EKG obtained to assist in diagnosis and management of assessed problem(s)

## 2023-07-16 NOTE — PHYSICAL EXAM

## 2023-08-02 ENCOUNTER — NON-APPOINTMENT (OUTPATIENT)
Age: 88
End: 2023-08-02

## 2023-08-16 ENCOUNTER — APPOINTMENT (OUTPATIENT)
Dept: CARDIOLOGY | Facility: CLINIC | Age: 88
End: 2023-08-16

## 2023-09-13 NOTE — DIETITIAN INITIAL EVALUATION ADULT - PROBLEM SELECTOR PROBLEM 8
CAD (coronary artery disease) Graft Cartilage Fenestration Text: The cartilage was fenestrated with a 2mm punch biopsy to help facilitate graft survival and healing.

## 2023-10-31 NOTE — PATIENT PROFILE ADULT - HAVE YOU RECEIVED AT LEAST TWO PFIZER AND/OR MODERNA VACCINATIONS (IN ANY COMBINATION) AND/OR ONE JOHNSON & JOHNSON VACCINATION?
Quality 110: Preventive Care And Screening: Influenza Immunization: Influenza Immunization not Administered because Patient Refused. Detail Level: Detailed Yes

## 2024-05-21 NOTE — PHYSICAL THERAPY INITIAL EVALUATION ADULT - NAME OF CLINICIAN
"Subjective: Pt agreeable to therapeutic plan of care.    Objective:     Bed mobility - Supine<>sitting SBA on flat bed without use of bedrails.     Transfers - SBA    Ambulation - 100 feet CGA and Min-A without AD. Pt unsteady while ambulating without AD, and uses nearby structures to maintain balance.     Therapeutic Exercise - 20 Reps B LE AROM unsupported sitting / EOB    Vitals: WNL    Pain: 4 VAS   Location: B knees, L>R  Intervention for pain: Repositioned, Increased Activity, and Therapeutic Presence    Education: Provided education on the importance of mobility in the acute care setting, Verbal/Tactile Cues, Transfer Training, and Gait Training    Assessment: Laura Mejia presents with functional mobility impairments which indicate the need for skilled intervention. Tolerating session today without incident. Pt A&Ox3 this session (reports year is ). Pt able to increase ambulation distance this session; however, is notably unsteady, holding onto nearby surfaces for balance and min A. Recommending use of RW at home. Pt also demonstrates impaired safety and deficit awareness. Recommending 24/7 supervision and HHPT at discharge. Will continue to follow and progress as tolerated.     Plan/Recommendations:   If medically appropriate, Low Intensity Therapy recommended post-acute care - This is recommended as therapy feels this patient would require 2-3 visits per week. OP or HH would be the best option depending on patient's home bound status. Consider, if the patient has other  \"skilled\" needs such as wounds, IV antibiotics, etc. Combined with \"low intensity\" could also equate to a SNF. If patient is medically complex, consider LTAC. Pt requires no DME at discharge.     Pt desires Home with family assist and Home Health at discharge. Pt cooperative; agreeable to therapeutic recommendations and plan of care.         Basic Mobility 6-click:  Rollin = Total, A lot = 2, A little = 3; 4 = " None  Supine>Sit:   1 = Total, A lot = 2, A little = 3; 4 = None   Sit>Stand with arms:  1 = Total, A lot = 2, A little = 3; 4 = None  Bed>Chair:   1 = Total, A lot = 2, A little = 3; 4 = None  Ambulate in room:  1 = Total, A lot = 2, A little = 3; 4 = None  3-5 Steps with railin = Total, A lot = 2, A little = 3; 4 = None  Score: 21    Modified [place]: N/A = No pre-op stroke/TIA    Post-Tx Position: Supine with HOB Elevated, Alarms activated, and Call light and personal items within reach  PPE: gloves     RN: Umm

## 2024-07-02 NOTE — OCCUPATIONAL THERAPY INITIAL EVALUATION ADULT - BATHING, PREVIOUS LEVEL OF FUNCTION, OT EVAL
Addended by: CORIE RANGEL on: 7/2/2024 08:41 AM     Modules accepted: Orders    
needs device and assist

## 2024-10-15 NOTE — PROGRESS NOTE ADULT - ASSESSMENT
89F w/ PMHx of CHFrEF, CAD s/p CABG, CKD, Afib (on apixaban), HTN, moderate AS and pulmonary HTN, chronic sciatica, CVA who presents to the ED for wheezing. pt w/ ADHF.   Ct chest- small rt pl effusion    #ADHF w acute hypoxic and  hypercapnea respiratory failure  cont bumex  strict I/Os, monitor daily weights, Cr, and K.  rpt CXR- atelectasis and small effusion  cards and pulm following  chronic co2 retention    #AF controlled for now.   cont cardizem   hold eliquis    #wheezing- likely cardiac origin- CHF exac  prn nebs  diuresis  pulm fu noted    ruptured hematoma/anemia  hold asa/eliquis  bleeding controlled with ace wrap  surgery following  trend h/h  pain control   Bed in lowest position, wheels locked, appropriate side rails in place/Call bell, personal items and telephone in reach/Instruct patient to call for assistance before getting out of bed or chair/Non-slip footwear when patient is out of bed/[place] to call system/Physically safe environment - no spills, clutter or unnecessary equipment/Purposeful Proactive Rounding/Room/bathroom lighting operational, light cord in reach

## 2025-05-21 NOTE — PHYSICAL THERAPY INITIAL EVALUATION ADULT - PLANNED THERAPY INTERVENTIONS, PT EVAL
TRIED TO REACH PATIENT AT BOTH NUMBERS NO ANSWER   REF SENT TO DR. CERRATO PATIENT REQUEST    balance training/bed mobility training/gait training/strengthening/transfer training
